# Patient Record
Sex: FEMALE | Race: WHITE | NOT HISPANIC OR LATINO | Employment: OTHER | URBAN - METROPOLITAN AREA
[De-identification: names, ages, dates, MRNs, and addresses within clinical notes are randomized per-mention and may not be internally consistent; named-entity substitution may affect disease eponyms.]

---

## 2016-12-26 NOTE — PATIENT INSTRUCTIONS
Preventive Health Recommendations  Female Ages 50 - 64    Yearly exam: See your health care provider every year in order to  o Review health changes.   o Discuss preventive care.    o Review your medicines if your doctor has prescribed any.      Get a Pap test every three years (unless you have an abnormal result and your provider advises testing more often).    If you get Pap tests with HPV test, you only need to test every 5 years, unless you have an abnormal result.     You do not need a Pap test if your uterus was removed (hysterectomy) and you have not had cancer.    You should be tested each year for STDs (sexually transmitted diseases) if you're at risk.     Have a mammogram every 1 to 2 years.    Have a colonoscopy at age 50, or have a yearly FIT test (stool test). These exams screen for colon cancer.      Have a cholesterol test every 5 years, or more often if advised.    Have a diabetes test (fasting glucose) every three years. If you are at risk for diabetes, you should have this test more often.     If you are at risk for osteoporosis (brittle bone disease), think about having a bone density scan (DEXA).    Shots: Get a flu shot each year. Get a tetanus shot every 10 years.    Nutrition:     Eat at least 5 servings of fruits and vegetables each day.    Eat whole-grain bread, whole-wheat pasta and brown rice instead of white grains and rice.    Talk to your provider about Calcium and Vitamin D.     Lifestyle    Exercise at least 150 minutes a week (30 minutes a day, 5 days a week). This will help you control your weight and prevent disease.    Limit alcohol to one drink per day.    No smoking.     Wear sunscreen to prevent skin cancer.     See your dentist every six months for an exam and cleaning.    See your eye doctor every 1 to 2 years.  ----    Constipation  Goal is at least 1 soft bowel movement daily (toothpaste consistency).   - Drink plenty of liquids, especially water.   - Eat plenty of fruits  and vegetables. Fruits such as peaches, plums, pears, oranges and   prunes/prune juice can help.   - Continue a daily fiber supplement (metamucil, benefiber).   - Dulcolax is a stimulant and will promote or cause a bowel movement. Oral tablet (BM in 6-12  Hrs). Rectal suppository (BM in 60 min)  - Miralax 1-2 capful dissolved in water or juice 1-2x per day    ---  Try tylenol PM at bedtime (less aleve, which is less NSAID use)  ---  Will let you know about spine referral    To schedule your test (mammogram) please call:    [facility]  [phone] [address]  [facility] [address]     ---    --- Spine and Neurosurgery

## 2016-12-26 NOTE — PROGRESS NOTES
"   SUBJECTIVE:     CC: Seema Johnson is an 61 year old woman who presents for preventive health visit.     Healthy Habits:    Do you get at least three servings of calcium containing foods daily (dairy, green leafy vegetables, etc.)? yes    Amount of exercise or daily activities, outside of work: 6-7 day(s) per week    Problems taking medications regularly No    Medication side effects: Yes constipation    Have you had an eye exam in the past two years? yes    Do you see a dentist twice per year? yes    Do you have sleep apnea, excessive snoring or daytime drowsiness?no      Invasive Ductal Carcinoma of right breast: Seeing  in March 2017. Prior visit was Dec 2015. Has completed chemo and radiation.   Feels like she is doing pretty well. Energy is improved. Mood is good for the most part.   Sleep is an issue, related to her compression fractures.   Last mammo was through [facility]: Diagnostic mammo and RIGHT breast US 12/31/2015; and Right Breast bx 01/04/2016.    Compression fractures: Thoracic incurred while skiing spring 2015. Then noted a new lumbar compression fx on imaging when had xrays as part of her breast radiation. Has been managing pain conservatively. Referred to rheumatology for osteoporosis, because these fx were incurred while already on fosamax. Has now done reclast infusion x1.   Dr.Maren Boo- Rheumatology. Continues to have pain. Better than initially but improved to a degree and never totally better beyond that. \"Can tolerate the pain\".  Taking aleve in the am daily for the pain and Aleve PM 2 tabs at bedtime for a year to help with sleep. Doesn't totally take pain away but makes it tolerable. Has been taking calcium. Has gotten back to exercise. Walks the [place] marathon (almost annually). Was able to train Oct-Dec 2016 and did finish the marathon just recently. Recently back from [place].   Did do 2 sessions of PT. Thinks helped some, but has not been doing her " "home exercises.   Kyphosis of spine has been worsening. \"I look 78\" since the fractures. Wonders if there is something that can be done so it doesn't get worse.     Has had constipation for 1 year. Started abruptly when she started tamoxifen and taking calcium citrate. Started both at same time. Not sure if one is aggravating it more.   No BM unless using dulcolax. Has tried metamucil fiber and senna, not helping.  Used to be regular.  Had colonoscopy: 2015- , [facility]. Normal scope. F/U in 5 yrs due to fam hx of colon ca in grandparent.       Today's PHQ-2 Score:   PHQ-2 (  [employer]) 2017   Q1: Little interest or pleasure in doing things 0 0   Q2: Feeling down, depressed or hopeless 0 0   PHQ-2 Score 0 0       Abuse: Current or Past(Physical, Sexual or Emotional)- No  Do you feel safe in your environment - Yes    Social History   Substance Use Topics     Smoking status: Never Smoker      Smokeless tobacco: Never Used     Alcohol Use: Yes      Comment: socially     The patient does not drink >3 drinks per day nor >7 drinks per week.    No results for input(s): CHOL, HDL, LDL, TRIG, CHOLHDLRATIO, NHDL in the last 45580 hours.    Reviewed orders with patient.  Reviewed health maintenance and updated orders accordingly - Yes    Mammo Decision Support:  Alternate mammogram schedule due to breast cancer history: annual mamogram    Pertinent mammograms are reviewed under the imaging tab.  History of abnormal Pap smear: NO - age 30- 65 PAP every 3 years recommended  Comment:/  [facility]  Surgical Pathology Laboratory  [address] [place] [address]   [id number]  Tel: [phone]    CYTOLOGY REPORT    Patient Name: NONA WILLS Specimen No: [id number] Location:  [facility] Age:  57 Sex: F Frank. Date: 5/3/2013 Med. Rec. #: [id number]  : 1955 Received: 2013 Physician(s): Shantel Plaza PA-C   Reported: 2013         SOURCE OF SPECIMEN  A: " CERVICAL-THIN PREP WITH HPV TEST  Imager Screening    CLINICAL HISTORY  Date of Last Menstrual Period:  menopa        SPECIMEN ADEQUACY    Satisfactory for evaluation.  Endocervical/transformation zone component present.    INTERPRETATION    Negative for intraepithelial lesion or malignant cells.    HPV TESTING:    Negative for one or more high risk HPV types 16, 18, 31, 33, 35,  39, 45, 51, 52, 56, 58, 59, 66, or 68.   Test performed using FDA-approved Cervista HPV high risk assay  for qualitative detection of DNA from 14 high risk HPV types.            Electronically Signed Out         ZULMA Stevens (ASCP)      All Histories reviewed and updated in Epic.    ROS:  C: NEGATIVE for fever, chills, change in weight  I: NEGATIVE for worrisome rashes, moles or lesions  E: NEGATIVE for vision changes or irritation  ENT: NEGATIVE for ear, mouth and throat problems  R: NEGATIVE for significant cough or SOB  B: NEGATIVE for masses, tenderness or discharge  CV: NEGATIVE for chest pain, palpitations or peripheral edema  GI: NEGATIVE for nausea, abdominal pain, heartburn  GI: POSITIVE  Constipation. No bleeding w/BM.   menopausal female: no unusual urinary symptoms and no unusual vaginal symptoms  M: MUSCULOSKELETAL:POSITIVE  for back pain ; NEGATIVE for other significant arthralgias or myalgia  N: NEGATIVE for weakness, dizziness or paresthesias; essential tremor is little worse, not at stage where wants to be on meds for it.  H: NEGATIVE for bleeding problems  P: NEGATIVE for changes in mood or affect     Problem list, Medication list, Allergies, and Medical/Social/Surgical histories reviewed in Psychiatric and updated as appropriate.  BP Readings from Last 3 Encounters:   01/05/17 92/60   08/24/16 94/60   10/15/12 109/59    Wt Readings from Last 3 Encounters:   01/05/17 121 lb 4.8 oz (55.021 kg)   08/24/16 130 lb (58.968 kg)   10/15/12 129 lb 9.6 oz (58.786 kg)                  Patient Active Problem List   Diagnosis     Mohs  "defect of cheek     Closed compression fracture of thoracic vertebra (H)     Closed compression fracture of first lumbar vertebra (H)     Osteoporosis     Invasive ductal carcinoma of breast, female (H)-  [facility]     Benign essential tremor     Hyperlipidemia LDL goal <100     Past Surgical History   Procedure Laterality Date     Breast surgery Right 01/2016     breast cancer       Social History   Substance Use Topics     Smoking status: Never Smoker      Smokeless tobacco: Never Used     Alcohol Use: Yes      Comment: socially     Family History   Problem Relation Age of Onset     Breast Cancer Sister      DIABETES No family hx of      Coronary Artery Disease No family hx of      Hypertension No family hx of      Hyperlipidemia No family hx of      CEREBROVASCULAR DISEASE No family hx of      Colon Cancer No family hx of      Prostate Cancer No family hx of      Depression No family hx of      Substance Abuse No family hx of      Thyroid Disease No family hx of      Obesity No family hx of      OSTEOPOROSIS No family hx of      Anesthesia Reaction No family hx of      Asthma No family hx of      MENTAL ILLNESS No family hx of      Anxiety Disorder No family hx of      Other Cancer No family hx of          Current Outpatient Prescriptions   Medication Sig Dispense Refill     simvastatin (ZOCOR) 20 MG tablet Take 1 tablet (20 mg) by mouth At Bedtime 90 tablet 3     tamoxifen (NOLVADEX) 20 MG tablet Take 20 mg by mouth       [DISCONTINUED] simvastatin (ZOCOR) 20 MG tablet Take 1 tablet (20 mg) by mouth At Bedtime 90 tablet 3     Allergies   Allergen Reactions     Compazine [Prochlorperazine]      Codeine Sulfate Nausea     OBJECTIVE:     BP 92/60 mmHg  Pulse 60  Temp(Src) 97.8  F (36.6  C) (Temporal)  Resp 16  Ht 5' 4.41\" (1.636 m)  Wt 121 lb 4.8 oz (55.021 kg)  BMI 20.56 kg/m2  EXAM:  GENERAL APPEARANCE: healthy, alert and no distress  EYES: Eyes grossly normal to inspection, PERRL and " conjunctivae and sclerae normal  HENT: ear canals and TM's normal, nose and mouth without ulcers or lesions, oropharynx clear and oral mucous membranes moist  NECK: no adenopathy, no asymmetry, masses, or scars and thyroid normal to palpation  RESP: lungs clear to auscultation - no rales, rhonchi or wheezes  BREAST: biopsy scars bilaterally, port scar. Normal without masses, tenderness or nipple discharge and no palpable axillary masses or adenopathy  Chest/torso: kyphotic spine with deformity.   CV: regular rate and rhythm, normal S1 S2, no S3 or S4, no murmur, click or rub, no peripheral edema and peripheral pulses strong  ABDOMEN: soft, nontender, no hepatosplenomegaly, no masses and bowel sounds normal   (female): normal female external genitalia, normal urethral meatus, vaginal mucosal atrophy noted, normal cervix, adnexae, and uterus without masses or abnormal discharge  MS: no musculoskeletal defects are noted and gait is age appropriate without ataxia  SKIN: no suspicious lesions or rashes  NEURO: essential tremor - subtle head, hands- sits with hands clasped. Normal strength and tone, sensory exam grossly normal, mentation intact and speech normal  PSYCH: mentation appears normal and affect normal/bright    Results for orders placed or performed in visit on 01/05/17   Lipid panel reflex to direct LDL   Result Value Ref Range    Cholesterol 172 <200 mg/dL    Triglycerides 55 <150 mg/dL    HDL Cholesterol 99 >49 mg/dL    LDL Cholesterol Calculated 62 <100 mg/dL    Non HDL Cholesterol 73 <130 mg/dL   Comprehensive metabolic panel (BMP + Alb, Alk Phos, ALT, AST, Total. Bili, TP)   Result Value Ref Range    Sodium 141 133 - 144 mmol/L    Potassium 4.0 3.4 - 5.3 mmol/L    Chloride 105 94 - 109 mmol/L    Carbon Dioxide 28 20 - 32 mmol/L    Anion Gap 8 3 - 14 mmol/L    Glucose 87 70 - 99 mg/dL    Urea Nitrogen 14 7 - 30 mg/dL    Creatinine 1.06 (H) 0.52 - 1.04 mg/dL    GFR Estimate 52 (L) >60 mL/min/1.7m2    GFR  Estimate If Black 63 >60 mL/min/1.7m2    Calcium 8.9 8.5 - 10.1 mg/dL    Bilirubin Total 0.4 0.2 - 1.3 mg/dL    Albumin 4.0 3.4 - 5.0 g/dL    Protein Total 6.4 (L) 6.8 - 8.8 g/dL    Alkaline Phosphatase 29 (L) 40 - 150 U/L    ALT 21 0 - 50 U/L    AST 20 0 - 45 U/L   TSH with free T4 reflex   Result Value Ref Range    TSH 1.22 0.40 - 4.00 mU/L         ASSESSMENT/PLAN:     1. Routine general medical examination at a health care facility  - Pap imaged thin layer screen with HPV - recommended age 30 - 65  - HPV High Risk Types DNA Cervical  - *MA Screening Digital Bilateral; Future  - Lipid panel reflex to direct LDL  - Comprehensive metabolic panel (BMP + Alb, Alk Phos, ALT, AST, Total. Bili, TP)  - CBC with platelets and differential; Future    2. Closed compression fracture of first lumbar vertebra (H)  3. Closed compression fracture of thoracic vertebra, with routine healing, subsequent encounter  Previous labs from 08/08/2016 at [facility]: Cr 0.65, GFR >60, BUN 23.  Creatinine elevated. Suspect from regular aleve use over the last year  Advised switching to tylenol.   Pain persisting. Consult with neurosurgery.   Try to restart  PT exercises.   - ORTHO  REFERRAL    4. Invasive ductal carcinoma of breast, female, right (H)  Follow up with oncology as scheduled.   Plan for annual mammogram    5. Hyperlipidemia LDL goal <100  refilled x1 yr  - simvastatin (ZOCOR) 20 MG tablet; Take 1 tablet (20 mg) by mouth At Bedtime  Dispense: 90 tablet; Refill: 3    6. Visit for screening mammogram  Referral as above    7. Other kyphosis of thoracic region  - ORTHO  REFERRAL    8. Drug-induced constipation  Constipation onset at time of starting tamoxifen and calcium.   Reviewed tamoxifen side effects- constipation in 4-8% of pts.   Will check TSH as well.   miralax daily, titrate dose prn.   Colonoscopy UTD- 03/2015-- normal.   - TSH with free T4 reflex    COUNSELING:   Reviewed preventive health counseling,  "as reflected in patient instructions       Osteoporosis Prevention/Bone Health       Consider Hep C screening for patients born between 1945 and 1965         reports that she has never smoked. She has never used smokeless tobacco.    Estimated body mass index is 20.56 kg/(m^2) as calculated from the following:    Height as of this encounter: 5' 4.41\" (1.636 m).    Weight as of this encounter: 121 lb 4.8 oz (55.021 kg).       Counseling Resources:  ATP IV Guidelines  Pooled Cohorts Equation Calculator  Breast Cancer Risk Calculator  FRAX Risk Assessment  ICSI Preventive Guidelines  Dietary Guidelines for Americans, 2010  USDA's MyPlate  ASA Prophylaxis  Lung CA Screening    Shantel Plaza PA-C  [facility]  "

## 2017-01-05 ENCOUNTER — OFFICE VISIT (OUTPATIENT)
Dept: FAMILY MEDICINE | Facility: CLINIC | Age: 62
End: 2017-01-05
Payer: COMMERCIAL

## 2017-01-05 VITALS
DIASTOLIC BLOOD PRESSURE: 60 MMHG | SYSTOLIC BLOOD PRESSURE: 92 MMHG | WEIGHT: 121.3 LBS | RESPIRATION RATE: 16 BRPM | HEIGHT: 64 IN | HEART RATE: 60 BPM | TEMPERATURE: 97.8 F | BODY MASS INDEX: 20.71 KG/M2

## 2017-01-05 DIAGNOSIS — Z12.31 VISIT FOR SCREENING MAMMOGRAM: ICD-10-CM

## 2017-01-05 DIAGNOSIS — C50.911 INVASIVE DUCTAL CARCINOMA OF BREAST, FEMALE, RIGHT (H): Chronic | ICD-10-CM

## 2017-01-05 DIAGNOSIS — M40.294 OTHER KYPHOSIS OF THORACIC REGION: ICD-10-CM

## 2017-01-05 DIAGNOSIS — K59.03 DRUG-INDUCED CONSTIPATION: ICD-10-CM

## 2017-01-05 DIAGNOSIS — E78.5 HYPERLIPIDEMIA LDL GOAL <100: ICD-10-CM

## 2017-01-05 DIAGNOSIS — Z00.00 ROUTINE GENERAL MEDICAL EXAMINATION AT A HEALTH CARE FACILITY: Primary | ICD-10-CM

## 2017-01-05 DIAGNOSIS — S22.000D CLOSED COMPRESSION FRACTURE OF THORACIC VERTEBRA, WITH ROUTINE HEALING, SUBSEQUENT ENCOUNTER: ICD-10-CM

## 2017-01-05 DIAGNOSIS — S32.010A CLOSED COMPRESSION FRACTURE OF FIRST LUMBAR VERTEBRA (H): ICD-10-CM

## 2017-01-05 LAB
ALBUMIN SERPL-MCNC: 4 G/DL (ref 3.4–5)
ALP SERPL-CCNC: 29 U/L (ref 40–150)
ALT SERPL W P-5'-P-CCNC: 21 U/L (ref 0–50)
ANION GAP SERPL CALCULATED.3IONS-SCNC: 8 MMOL/L (ref 3–14)
AST SERPL W P-5'-P-CCNC: 20 U/L (ref 0–45)
BILIRUB SERPL-MCNC: 0.4 MG/DL (ref 0.2–1.3)
BUN SERPL-MCNC: 14 MG/DL (ref 7–30)
CALCIUM SERPL-MCNC: 8.9 MG/DL (ref 8.5–10.1)
CHLORIDE SERPL-SCNC: 105 MMOL/L (ref 94–109)
CHOLEST SERPL-MCNC: 172 MG/DL
CO2 SERPL-SCNC: 28 MMOL/L (ref 20–32)
CREAT SERPL-MCNC: 1.06 MG/DL (ref 0.52–1.04)
GFR SERPL CREATININE-BSD FRML MDRD: 52 ML/MIN/1.7M2
GLUCOSE SERPL-MCNC: 87 MG/DL (ref 70–99)
HDLC SERPL-MCNC: 99 MG/DL
LDLC SERPL CALC-MCNC: 62 MG/DL
NONHDLC SERPL-MCNC: 73 MG/DL
POTASSIUM SERPL-SCNC: 4 MMOL/L (ref 3.4–5.3)
PROT SERPL-MCNC: 6.4 G/DL (ref 6.8–8.8)
SODIUM SERPL-SCNC: 141 MMOL/L (ref 133–144)
TRIGL SERPL-MCNC: 55 MG/DL
TSH SERPL DL<=0.005 MIU/L-ACNC: 1.22 MU/L (ref 0.4–4)

## 2017-01-05 PROCEDURE — 36415 COLL VENOUS BLD VENIPUNCTURE: CPT | Performed by: PHYSICIAN ASSISTANT

## 2017-01-05 PROCEDURE — 80053 COMPREHEN METABOLIC PANEL: CPT | Performed by: PHYSICIAN ASSISTANT

## 2017-01-05 PROCEDURE — 99000 SPECIMEN HANDLING OFFICE-LAB: CPT | Performed by: PHYSICIAN ASSISTANT

## 2017-01-05 PROCEDURE — 87624 HPV HI-RISK TYP POOLED RSLT: CPT | Mod: 90 | Performed by: PHYSICIAN ASSISTANT

## 2017-01-05 PROCEDURE — 80061 LIPID PANEL: CPT | Performed by: PHYSICIAN ASSISTANT

## 2017-01-05 PROCEDURE — 84443 ASSAY THYROID STIM HORMONE: CPT | Performed by: PHYSICIAN ASSISTANT

## 2017-01-05 PROCEDURE — G0145 SCR C/V CYTO,THINLAYER,RESCR: HCPCS | Mod: 90 | Performed by: PHYSICIAN ASSISTANT

## 2017-01-05 PROCEDURE — 99396 PREV VISIT EST AGE 40-64: CPT | Performed by: PHYSICIAN ASSISTANT

## 2017-01-05 RX ORDER — TAMOXIFEN CITRATE 20 MG/1
20 TABLET ORAL
COMMUNITY
Start: 2016-08-08 | End: 2018-03-22

## 2017-01-05 ASSESSMENT — ANXIETY QUESTIONNAIRES
2. NOT BEING ABLE TO STOP OR CONTROL WORRYING: NOT AT ALL
7. FEELING AFRAID AS IF SOMETHING AWFUL MIGHT HAPPEN: NOT AT ALL
5. BEING SO RESTLESS THAT IT IS HARD TO SIT STILL: NOT AT ALL
3. WORRYING TOO MUCH ABOUT DIFFERENT THINGS: NOT AT ALL
IF YOU CHECKED OFF ANY PROBLEMS ON THIS QUESTIONNAIRE, HOW DIFFICULT HAVE THESE PROBLEMS MADE IT FOR YOU TO DO YOUR WORK, TAKE CARE OF THINGS AT HOME, OR GET ALONG WITH OTHER PEOPLE: NOT DIFFICULT AT ALL
1. FEELING NERVOUS, ANXIOUS, OR ON EDGE: NOT AT ALL
6. BECOMING EASILY ANNOYED OR IRRITABLE: NOT AT ALL
GAD7 TOTAL SCORE: 0

## 2017-01-05 ASSESSMENT — PAIN SCALES - GENERAL: PAINLEVEL: NO PAIN (0)

## 2017-01-05 ASSESSMENT — PATIENT HEALTH QUESTIONNAIRE - PHQ9: 5. POOR APPETITE OR OVEREATING: NOT AT ALL

## 2017-01-05 NOTE — NURSING NOTE
"Chief Complaint   Patient presents with     Physical     Panel Management     MyChart, Flu Shot, Pap, Mammogram, Honoring Choices, Hep C Screening, Lipids, PHQ-9/ANGIE       Initial BP 92/60 mmHg  Pulse 60  Temp(Src) 97.8  F (36.6  C) (Temporal)  Resp 16  Ht 5' 4.41\" (1.636 m)  Wt 121 lb 4.8 oz (55.021 kg)  BMI 20.56 kg/m2 Estimated body mass index is 20.56 kg/(m^2) as calculated from the following:    Height as of this encounter: 5' 4.41\" (1.636 m).    Weight as of this encounter: 121 lb 4.8 oz (55.021 kg).  BP completed using cuff size: meli Phelps CMA (AAMA)    "

## 2017-01-05 NOTE — MR AVS SNAPSHOT
After Visit Summary   1/5/2017    Seema Johnson    MRN: [id number]           Patient Information     Date Of Birth          1955        Visit Information        Provider Department      1/5/2017 1:40 PM Shantel Plaza PA-C [facility]        Today's Diagnoses     Routine general medical examination at a health care facility    -  1     Encounter for routine adult medical exam with abnormal findings         Closed compression fracture of first lumbar vertebra (H)         Invasive ductal carcinoma of breast, female, right (H)         Hyperlipidemia LDL goal <100         Visit for screening mammogram         Need for hepatitis C screening test         Closed compression fracture of thoracic vertebra, with routine healing, subsequent encounter         Other kyphosis of thoracic region           Care Instructions      Preventive Health Recommendations  Female Ages 50 - 64    Yearly exam: See your health care provider every year in order to  o Review health changes.   o Discuss preventive care.    o Review your medicines if your doctor has prescribed any.      Get a Pap test every three years (unless you have an abnormal result and your provider advises testing more often).    If you get Pap tests with HPV test, you only need to test every 5 years, unless you have an abnormal result.     You do not need a Pap test if your uterus was removed (hysterectomy) and you have not had cancer.    You should be tested each year for STDs (sexually transmitted diseases) if you're at risk.     Have a mammogram every 1 to 2 years.    Have a colonoscopy at age 50, or have a yearly FIT test (stool test). These exams screen for colon cancer.      Have a cholesterol test every 5 years, or more often if advised.    Have a diabetes test (fasting glucose) every three years. If you are at risk for diabetes, you should have this test more often.     If you are at risk for osteoporosis (brittle bone  disease), think about having a bone density scan (DEXA).    Shots: Get a flu shot each year. Get a tetanus shot every 10 years.    Nutrition:     Eat at least 5 servings of fruits and vegetables each day.    Eat whole-grain bread, whole-wheat pasta and brown rice instead of white grains and rice.    Talk to your provider about Calcium and Vitamin D.     Lifestyle    Exercise at least 150 minutes a week (30 minutes a day, 5 days a week). This will help you control your weight and prevent disease.    Limit alcohol to one drink per day.    No smoking.     Wear sunscreen to prevent skin cancer.     See your dentist every six months for an exam and cleaning.    See your eye doctor every 1 to 2 years.  ----    Constipation  Goal is at least 1 soft bowel movement daily (toothpaste consistency).   - Drink plenty of liquids, especially water.   - Eat plenty of fruits and vegetables. Fruits such as peaches, plums, pears, oranges and   prunes/prune juice can help.   - Continue a daily fiber supplement (metamucil, benefiber).   - Dulcolax is a stimulant and will promote or cause a bowel movement. Oral tablet (BM in 6-12  Hrs). Rectal suppository (BM in 60 min)  - Miralax 1-2 capful dissolved in water or juice 1-2x per day    ---  Try tylenol PM at bedtime (less aleve, which is less NSAID use)  ---  Will let you know about spine referral    To schedule your test (mammogram) please call:    [facility]  [phone] [address]  [facility] [address]     ---    --- Spine and Neurosurgery          Follow-ups after your visit        Additional Services     ORTHO  REFERRAL       [facility] is referring you to the Orthopedic  Services at [facility].       The  Representative will assist you in the coordination of your Orthopedic and Musculoskeletal Care as prescribed by your physician.    The  Representative  "will call you within 1 business day to help schedule your appointment, or you may contact the [facility] Representative at:    All areas ~ [phone]     Type of Referral : Spine: Cervical / Thoracic: Cervical / Thoracic Spine Surgeon        Timeframe requested: Routine    Coverage of these services is subject to the terms and limitations of your health insurance plan.  Please call member services at your health plan with any benefit or coverage questions.      If X-rays, CT or MRI's have been performed, please contact the facility where they were done to arrange for , prior to your scheduled appointment.  Please bring this referral request to your appointment and present it to your specialist.                  Future tests that were ordered for you today     Open Future Orders        Priority Expected Expires Ordered    *MA Screening Digital Bilateral Routine  12/26/2017 1/5/2017            Who to contact     If you have questions or need follow up information about today's clinic visit or your schedule please contact [facility] directly at [phone].  Normal or non-critical lab and imaging results will be communicated to you by [employer], letter or phone within 4 business days after the clinic has received the results. If you do not hear from us within 7 days, please contact the clinic through [employer] or phone. If you have a critical or abnormal lab result, we will notify you by phone as soon as possible.  Submit refill requests through [facility] or call your pharmacy and they will forward the refill request to us. Please allow 3 business days for your refill to be completed.          Additional Information About Your Visit        [facility] Information     [facility] lets you send messages to your doctor, view your test results, renew your prescriptions, schedule appointments and more. To sign up, go to [email or web address] . Click on \"Log in\" on the left side of the screen, which will take " "you to the Welcome page. Then click on \"Sign up Now\" on the right side of the page.     You will be asked to enter the access code listed below, as well as some personal information. Please follow the directions to create your username and password.     Your access code is: [email or web address]  Expires: 2017  2:14 PM     Your access code will  in 90 days. If you need help or a new code, please call your [place] clinic or [phone].        Care EveryWhere ID     This is your Care EveryWhere ID. This could be used by other organizations to access your [place] medical records  [id number]        Your Vitals Were     Pulse Temperature Respirations Height BMI (Body Mass Index)       60 97.8  F (36.6  C) (Temporal) 16 5' 4.41\" (1.636 m) 20.56 kg/m2        Blood Pressure from Last 3 Encounters:   17 92/60   16 94/60   10/15/12 109/59    Weight from Last 3 Encounters:   17 121 lb 4.8 oz (55.021 kg)   16 130 lb (58.968 kg)   10/15/12 129 lb 9.6 oz (58.786 kg)              We Performed the Following     Comprehensive metabolic panel (BMP + Alb, Alk Phos, ALT, AST, Total. Bili, TP)     HPV High Risk Types DNA Cervical     Lipid panel reflex to direct LDL     ORTHO  REFERRAL     Pap imaged thin layer screen with HPV - recommended age 30 - 65        Primary Care Provider Office Phone # Fax #    [facility] [phone]       [address] [place] [address], [address]  [facility] [address]        Thank you!     Thank you for choosing [facility]  for your care. Our goal is always to provide you with excellent care. Hearing back from our patients is one way we can continue to improve our services. Please take a few minutes to complete the written survey that you may receive in the mail after your visit with us. Thank you!             Your Updated Medication List - Protect others around you: Learn how to safely use, store and throw away your medicines at " www.disposemymeds.org.          This list is accurate as of: 1/5/17  2:20 PM.  Always use your most recent med list.                   Brand Name Dispense Instructions for use    simvastatin 20 MG tablet    ZOCOR    90 tablet    Take 1 tablet (20 mg) by mouth At Bedtime       tamoxifen 20 MG tablet    NOLVADEX     Take 20 mg by mouth

## 2017-01-05 NOTE — Clinical Note
[facility] [place]  [address]  [place] [address]  [phone]      January 11, 2017    Seema Johnson  [address]  [place] [address]    Dear Seema,  We are happy to inform you that your PAP smear result from 1/5/17 is normal.  We are now able to do a follow up test on PAP smears. The DNA test is for HPV (Human Papilloma Virus). Cervical cancer is closely linked with certain types of HPV. Your result showed no evidence of high risk HPV.  Therefore we recommend you return in 3 years for your next pap smear.  You will still need to return to the clinic every year for an annual exam and other preventive tests.  Please contact the clinic with any questions.  Sincerely,  Shantel Plaza PA-C/pablo

## 2017-01-06 RX ORDER — SIMVASTATIN 20 MG
20 TABLET ORAL AT BEDTIME
Qty: 90 TABLET | Refills: 3 | Status: SHIPPED | OUTPATIENT
Start: 2017-01-06 | End: 2017-11-16

## 2017-01-06 ASSESSMENT — PATIENT HEALTH QUESTIONNAIRE - PHQ9: SUM OF ALL RESPONSES TO PHQ QUESTIONS 1-9: 3

## 2017-01-06 ASSESSMENT — ANXIETY QUESTIONNAIRES: GAD7 TOTAL SCORE: 0

## 2017-01-09 ENCOUNTER — RADIANT APPOINTMENT (OUTPATIENT)
Dept: MAMMOGRAPHY | Facility: CLINIC | Age: 62
End: 2017-01-09
Attending: PHYSICIAN ASSISTANT
Payer: COMMERCIAL

## 2017-01-09 DIAGNOSIS — Z00.00 ROUTINE GENERAL MEDICAL EXAMINATION AT A HEALTH CARE FACILITY: ICD-10-CM

## 2017-01-09 DIAGNOSIS — R79.89 ELEVATED SERUM CREATININE: Primary | ICD-10-CM

## 2017-01-09 LAB
COPATH REPORT: NORMAL
PAP: NORMAL

## 2017-01-09 PROCEDURE — G0202 SCR MAMMO BI INCL CAD: HCPCS | Performed by: RADIOLOGY

## 2017-01-10 LAB
FINAL DIAGNOSIS: NORMAL
HPV HR 12 DNA CVX QL NAA+PROBE: NEGATIVE
HPV16 DNA SPEC QL NAA+PROBE: NEGATIVE
HPV18 DNA SPEC QL NAA+PROBE: NEGATIVE
SPECIMEN DESCRIPTION: NORMAL

## 2017-03-15 ENCOUNTER — TRANSFERRED RECORDS (OUTPATIENT)
Dept: HEALTH INFORMATION MANAGEMENT | Facility: CLINIC | Age: 62
End: 2017-03-15

## 2017-03-29 ENCOUNTER — ALLIED HEALTH/NURSE VISIT (OUTPATIENT)
Dept: FAMILY MEDICINE | Facility: CLINIC | Age: 62
End: 2017-03-29
Payer: COMMERCIAL

## 2017-03-29 DIAGNOSIS — H93.8X9 EAR CONGESTION: Primary | ICD-10-CM

## 2017-03-29 PROCEDURE — 99207 ZZC NO CHARGE NURSE ONLY: CPT

## 2017-03-29 NOTE — MR AVS SNAPSHOT
"              After Visit Summary   3/29/2017    Seema Johnson    MRN: [id number]           Patient Information     Date Of Birth          1955        Visit Information        Provider Department      3/29/2017 1:00 PM RG FLOAT 1 [facility] [place]        Today's Diagnoses     Ear congestion    -  1       Follow-ups after your visit        Who to contact     If you have questions or need follow up information about today's clinic visit or your schedule please contact [facility] [place] directly at [phone].  Normal or non-critical lab and imaging results will be communicated to you by MyChart, letter or phone within 4 business days after the clinic has received the results. If you do not hear from us within 7 days, please contact the clinic through [employer] or phone. If you have a critical or abnormal lab result, we will notify you by phone as soon as possible.  Submit refill requests through [employer] or call your pharmacy and they will forward the refill request to us. Please allow 3 business days for your refill to be completed.          Additional Information About Your Visit        MyChart Information     [employer] lets you send messages to your doctor, view your test results, renew your prescriptions, schedule appointments and more. To sign up, go to [email or web address] . Click on \"Log in\" on the left side of the screen, which will take you to the Welcome page. Then click on \"Sign up Now\" on the right side of the page.     You will be asked to enter the access code listed below, as well as some personal information. Please follow the directions to create your username and password.     Your access code is: [email or web address]  Expires: 2017  3:14 PM     Your access code will  in 90 days. If you need help or a new code, please call your [facility] or [phone].        Care EveryWhere ID     This is your Care EveryWhere ID. This could be used by other organizations to access " your [place] medical records  [id number]         Blood Pressure from Last 3 Encounters:   01/05/17 92/60   08/24/16 94/60   10/15/12 109/59    Weight from Last 3 Encounters:   01/05/17 121 lb 4.8 oz (55 kg)   08/24/16 130 lb (59 kg)   10/15/12 129 lb 9.6 oz (58.8 kg)              Today, you had the following     No orders found for display       Primary Care Provider Office Phone # Fax #    [facility] [phone]       [address] [place] [address], [address]  [facility] [address]        Thank you!     Thank you for choosing [facility]  for your care. Our goal is always to provide you with excellent care. Hearing back from our patients is one way we can continue to improve our services. Please take a few minutes to complete the written survey that you may receive in the mail after your visit with us. Thank you!             Your Updated Medication List - Protect others around you: Learn how to safely use, store and throw away your medicines at www.disposemymeds.org.          This list is accurate as of: 3/29/17  1:45 PM.  Always use your most recent med list.                   Brand Name Dispense Instructions for use    simvastatin 20 MG tablet    ZOCOR    90 tablet    Take 1 tablet (20 mg) by mouth At Bedtime       tamoxifen 20 MG tablet    NOLVADEX     Take 20 mg by mouth

## 2017-11-16 ENCOUNTER — TELEPHONE (OUTPATIENT)
Dept: FAMILY MEDICINE | Facility: CLINIC | Age: 62
End: 2017-11-16

## 2017-11-16 DIAGNOSIS — E78.5 HYPERLIPIDEMIA LDL GOAL <100: ICD-10-CM

## 2017-11-16 RX ORDER — SIMVASTATIN 20 MG
20 TABLET ORAL AT BEDTIME
Qty: 90 TABLET | Refills: 3 | Status: SHIPPED | OUTPATIENT
Start: 2017-11-16 | End: 2018-04-20

## 2017-11-16 NOTE — TELEPHONE ENCOUNTER
Reason for Call:  Medication or medication refill:    Do you use a [facility]?  Name of the pharmacy and phone number for the current request:  [facility] [phone]    Name of the medication requested: simvastatin     Other request: patient is currently in [place] and will not be back until mid January.  Thank you    Can we leave a detailed message on this number? YES    Phone number patient can be reached at: Home number on file [phone] (home)    Best Time: any    Call taken on 11/16/2017 at 12:57 PM by Larisa Mendoza

## 2018-03-20 NOTE — PROGRESS NOTES
SUBJECTIVE:   Seema Johnson is a 62 year old female who presents to clinic today for the following health issues:      HPI  Concern - Plugged left ear  Onset: 1 week    Description:   Plugged left ear    Intensity: mild    Progression of Symptoms:  same    Accompanying Signs & Symptoms:  Can feel pressure when laying down, sometimes painful in the morning but not every morning.    Previous history of similar problem:   Yes    Precipitating factors:   Worsened by: laying done    Alleviating factors:  Improved by: None    Therapies Tried and outcome: None    Problem list and histories reviewed & adjusted, as indicated.  Additional history: as documented          Patient Active Problem List   Diagnosis     Mohs defect of cheek     Closed compression fracture of thoracic vertebra (H)     Closed compression fracture of first lumbar vertebra (H)     Osteoporosis     Invasive ductal carcinoma of breast, female (H)- RIGHT breast, Upper outer quadrant:  [facility]- 12/31/2015     Benign essential tremor     Hyperlipidemia LDL goal <100     Past Surgical History:   Procedure Laterality Date     BREAST SURGERY Right 01/2016    breast cancer       Social History   Substance Use Topics     Smoking status: Never Smoker     Smokeless tobacco: Never Used     Alcohol use Yes      Comment: socially     Family History   Problem Relation Age of Onset     Breast Cancer Sister      DIABETES No family hx of      Coronary Artery Disease No family hx of      Hypertension No family hx of      Hyperlipidemia No family hx of      CEREBROVASCULAR DISEASE No family hx of      Colon Cancer No family hx of      Prostate Cancer No family hx of      Depression No family hx of      Substance Abuse No family hx of      Thyroid Disease No family hx of      Obesity No family hx of      OSTEOPOROSIS No family hx of      Anesthesia Reaction No family hx of      Asthma No family hx of      MENTAL ILLNESS No family hx of      Anxiety  "Disorder No family hx of      Other Cancer No family hx of          Current Outpatient Prescriptions   Medication Sig Dispense Refill     tamoxifen (NOLVADEX) 20 MG tablet Take 20 mg by mouth       simvastatin (ZOCOR) 20 MG tablet Take 1 tablet (20 mg) by mouth At Bedtime 90 tablet 3     [DISCONTINUED] tamoxifen (NOLVADEX) 20 MG tablet Take 20 mg by mouth       Allergies   Allergen Reactions     Compazine [Prochlorperazine]      Codeine Sulfate Nausea     Labs reviewed in EPIC    ROS:  Constitutional, neuro, ENT, endocrine, pulmonary, cardiac, gastrointestinal, genitourinary, musculoskeletal, integument and psychiatric systems are negative, except as otherwise noted.    This document serves as a record of the services and decisions personally performed and made by BRIELLE Whelan CNP. It was created on her behalf by Madyson Arnold, a trained medical scribe. The creation of this document is based the provider's statements to the medical scribe.    Madyson Arnold March 22, 2018 11:15 AM  OBJECTIVE:                                                    /70  Pulse 62  Temp 98.2  F (36.8  C) (Temporal)  Resp 14  Ht 1.664 m (5' 5.5\")  Wt 57.9 kg (127 lb 11.2 oz)  SpO2 98%  BMI 20.93 kg/m2  Body mass index is 20.93 kg/(m^2).  GENERAL APPEARANCE: healthy, alert and no distress  HENT: ear canals and TM's normal and nose and mouth without ulcers or lesions. 10-20% cerumen at top of left eardrum. Minimal wax on right eardrum.    PSYCH: mentation appears normal and affect normal/bright    Diagnostic Test Results:  none      ASSESSMENT/PLAN:                                                        ICD-10-CM    1. Bilateral impacted cerumen H61.23        Will get bilateral ear wash today.    Patient will follow up for annual physical, sooner PRN for other health maintenance. Patient instructed to call with any questions or concerns.      The information in this document, created by the medical scribe for me, " accurately reflects the services I personally performed and the decisions made by me. I have reviewed and approved this document for accuracy prior to leaving the patient care area.    BRIELLE Whelan CNP  [facility] [place]

## 2018-03-22 ENCOUNTER — OFFICE VISIT (OUTPATIENT)
Dept: FAMILY MEDICINE | Facility: CLINIC | Age: 63
End: 2018-03-22
Payer: COMMERCIAL

## 2018-03-22 VITALS
HEART RATE: 62 BPM | RESPIRATION RATE: 14 BRPM | HEIGHT: 66 IN | WEIGHT: 127.7 LBS | BODY MASS INDEX: 20.52 KG/M2 | DIASTOLIC BLOOD PRESSURE: 70 MMHG | SYSTOLIC BLOOD PRESSURE: 100 MMHG | OXYGEN SATURATION: 98 % | TEMPERATURE: 98.2 F

## 2018-03-22 DIAGNOSIS — H61.23 BILATERAL IMPACTED CERUMEN: Primary | ICD-10-CM

## 2018-03-22 PROCEDURE — 69209 REMOVE IMPACTED EAR WAX UNI: CPT | Mod: 50 | Performed by: NURSE PRACTITIONER

## 2018-03-22 PROCEDURE — 99212 OFFICE O/P EST SF 10 MIN: CPT | Mod: 25 | Performed by: NURSE PRACTITIONER

## 2018-03-22 RX ORDER — TAMOXIFEN CITRATE 20 MG/1
20 TABLET ORAL
COMMUNITY
Start: 2016-08-08 | End: 2018-05-29

## 2018-03-22 ASSESSMENT — ANXIETY QUESTIONNAIRES
3. WORRYING TOO MUCH ABOUT DIFFERENT THINGS: NOT AT ALL
IF YOU CHECKED OFF ANY PROBLEMS ON THIS QUESTIONNAIRE, HOW DIFFICULT HAVE THESE PROBLEMS MADE IT FOR YOU TO DO YOUR WORK, TAKE CARE OF THINGS AT HOME, OR GET ALONG WITH OTHER PEOPLE: NOT DIFFICULT AT ALL
6. BECOMING EASILY ANNOYED OR IRRITABLE: NOT AT ALL
5. BEING SO RESTLESS THAT IT IS HARD TO SIT STILL: NOT AT ALL
7. FEELING AFRAID AS IF SOMETHING AWFUL MIGHT HAPPEN: NOT AT ALL
1. FEELING NERVOUS, ANXIOUS, OR ON EDGE: NOT AT ALL
2. NOT BEING ABLE TO STOP OR CONTROL WORRYING: NOT AT ALL
GAD7 TOTAL SCORE: 0

## 2018-03-22 ASSESSMENT — PAIN SCALES - GENERAL: PAINLEVEL: NO PAIN (0)

## 2018-03-22 ASSESSMENT — PATIENT HEALTH QUESTIONNAIRE - PHQ9: 5. POOR APPETITE OR OVEREATING: NOT AT ALL

## 2018-03-22 NOTE — MR AVS SNAPSHOT
"              After Visit Summary   3/22/2018    Seema Johnson    MRN: [id number]           Patient Information     Date Of Birth          1955        Visit Information        Provider Department      3/22/2018 10:40 AM Betty Coe APRN CNP [facility]        Today's Diagnoses     Bilateral impacted cerumen    -  1       Follow-ups after your visit        Who to contact     If you have questions or need follow up information about today's clinic visit or your schedule please [facility] directly at [phone].  Normal or non-critical lab and imaging results will be communicated to you by [employer], letter or phone within 4 business days after the clinic has received the results. If you do not hear from us within 7 days, please contact the clinic through [employer] or phone. If you have a critical or abnormal lab result, we will notify you by phone as soon as possible.  Submit refill requests through [employer] or call your pharmacy and they will forward the refill request to us. Please allow 3 business days for your refill to be completed.          Additional Information About Your Visit        MyChart Information     [employer] lets you send messages to your doctor, view your test results, renew your prescriptions, schedule appointments and more. To sign up, go to [email or web address] . Click on \"Log in\" on the left side of the screen, which will take you to the Welcome page. Then click on \"Sign up Now\" on the right side of the page.     You will be asked to enter the access code listed below, as well as some personal information. Please follow the directions to create your username and password.     Your access code is: [email or web address]  Expires: 2018  5:49 PM     Your access code will  in 90 days. If you need help or a new code, please call your [facility] or [phone].        Care EveryWhere ID     This is your Care EveryWhere ID. This could be used by other " "organizations to access your [place] medical records  [id number]        Your Vitals Were     Pulse Temperature Respirations Height Pulse Oximetry BMI (Body Mass Index)    62 98.2  F (36.8  C) (Temporal) 14 5' 5.5\" (1.664 m) 98% 20.93 kg/m2       Blood Pressure from Last 3 Encounters:   03/22/18 100/70   01/05/17 92/60   08/24/16 94/60    Weight from Last 3 Encounters:   03/22/18 127 lb 11.2 oz (57.9 kg)   01/05/17 121 lb 4.8 oz (55 kg)   08/24/16 130 lb (59 kg)              Today, you had the following     No orders found for display       Primary Care Provider Office Phone # Fax #    [facility] [phone]       [address]  [facility] [address]        Equal Access to Services     [facility] : Hadii aad ku hadasho Soomaali, waaxda luqadaha, qaybta kaalmada adeegyada, waxay idiin hayaan mustapha haron . So [facility] [phone].    ATENCIÓN: Si habla español, tiene a pereira disposición servicios gratuitos de asistencia lingüística. Reilly al [phone].    We comply with applicable federal civil rights laws and [place] laws. We do not discriminate on the basis of race, color, national origin, age, disability, sex, sexual orientation, or gender identity.            Thank you!     Thank you for choosing [facility]  for your care. Our goal is always to provide you with excellent care. Hearing back from our patients is one way we can continue to improve our services. Please take a few minutes to complete the written survey that you may receive in the mail after your visit with us. Thank you!             Your Updated Medication List - Protect others around you: Learn how to safely use, store and throw away your medicines at www.disposemymeds.org.          This list is accurate as of 3/22/18 11:59 PM.  Always use your most recent med list.                   Brand Name Dispense Instructions for use Diagnosis    simvastatin 20 MG tablet    ZOCOR    90 tablet    " Take 1 tablet (20 mg) by mouth At Bedtime    Hyperlipidemia LDL goal <100       tamoxifen 20 MG tablet    NOLVADEX     Take 20 mg by mouth

## 2018-03-23 ASSESSMENT — ANXIETY QUESTIONNAIRES: GAD7 TOTAL SCORE: 0

## 2018-03-23 ASSESSMENT — PATIENT HEALTH QUESTIONNAIRE - PHQ9: SUM OF ALL RESPONSES TO PHQ QUESTIONS 1-9: 0

## 2018-04-02 ENCOUNTER — TELEPHONE (OUTPATIENT)
Dept: FAMILY MEDICINE | Facility: CLINIC | Age: 63
End: 2018-04-02

## 2018-04-02 NOTE — TELEPHONE ENCOUNTER
Please call pt- I do not prescribe tamoxifen. This is a specialty medication that should go through oncology and I would like her to be seeing oncology for her necessary follow up. Can transfer care from Anjum Luis at [facility] to the [facility] if she would like instead. Shantel Plaza PA-C

## 2018-04-02 NOTE — TELEPHONE ENCOUNTER
Reason for Call:  Other medication question    Detailed comments: patient wants to know if Shantel Bola would prescribe tamoxifen (NOLVADEX) 20 MG tablet for her? Patient states this is because of her insurance it is very expensive through her oncologist and it would be cheaper for her to go through Shantel for this.     Phone Number Patient can be reached at: Home number on file [phone] (home) or Cell number on file:    Telephone Information:   Mobile [phone]       Best Time: anytime    Can we leave a detailed message on this number? YES    Call taken on 4/2/2018 at 11:14 AM by Pascale Gibson

## 2018-04-11 NOTE — PATIENT INSTRUCTIONS
Preventive Health Recommendations  Female Ages 50 - 64    Yearly exam: See your health care provider every year in order to  o Review health changes.   o Discuss preventive care.    o Review your medicines if your doctor has prescribed any.      Get a Pap test every three years (unless you have an abnormal result and your provider advises testing more often).    If you get Pap tests with HPV test, you only need to test every 5 years, unless you have an abnormal result.     You do not need a Pap test if your uterus was removed (hysterectomy) and you have not had cancer.    You should be tested each year for STDs (sexually transmitted diseases) if you're at risk.     Have a mammogram every 1 to 2 years.    Have a colonoscopy at age 50, or have a yearly FIT test (stool test). These exams screen for colon cancer.      Have a cholesterol test every 5 years, or more often if advised.    Have a diabetes test (fasting glucose) every three years. If you are at risk for diabetes, you should have this test more often.     If you are at risk for osteoporosis (brittle bone disease), think about having a bone density scan (DEXA).    Shots: Get a flu shot each year. Get a tetanus shot every 10 years.    Nutrition:     Eat at least 5 servings of fruits and vegetables each day.    Eat whole-grain bread, whole-wheat pasta and brown rice instead of white grains and rice.    Talk to your provider about Calcium and Vitamin D.     Lifestyle    Exercise at least 150 minutes a week (30 minutes a day, 5 days a week). This will help you control your weight and prevent disease.    Limit alcohol to one drink per day.    No smoking.     Wear sunscreen to prevent skin cancer.     See your dentist every six months for an exam and cleaning.    See your eye doctor every 1 to 2 years.    ---   at [facility]    Consider acetaminophen (tylenol) 1000mg every 8 hours (up to 3x daily) better alternative for chronic daily  muscular/back/arthritis pain    Monitoring kidney function with the daily ibuprofen.   The GFR was slightly low last year. Could be from this. Rechecking this year.   Risk of gastritis or ulcer with daily advil

## 2018-04-11 NOTE — PROGRESS NOTES
SUBJECTIVE:   CC: Seema Johnson is an 62 year old woman who presents for preventive health visit.     Physical   Annual:     Getting at least 3 servings of Calcium per day::  Yes    Bi-annual eye exam::  Yes    Dental care twice a year::  Yes    Sleep apnea or symptoms of sleep apnea::  None    Diet::  Regular (no restrictions)    Frequency of exercise::  6-7 days/week    Duration of exercise::  Greater than 60 minutes    Taking medications regularly::  Yes    Medication side effects::  Other    Additional concerns today::  YES                 Hyperlipidemia Follow-Up- fasting for labs. No side effects on simvastatin. Tolerating well.     Rate your low fat/cholesterol diet?: good    Taking statin?  Yes, no muscle aches from statin    Other lipid medications/supplements?:  Vitamin D , Calcium , Advil   For exercise: skiing in [place] few months. [place].   Small lull in exercise.   Back - wore lidocaine patches when skiing. Compression fx. OTC patches.       Compression fractures/back pain/osteoporosis:  Reclast infusion. Oct 2017. Dr.Maren Boo rheumatology. Per pt had improvement in most recent DEXA. Taking calcium, getting in diet. Vit D- 5000 IU daily.     NSAIDs daily: take with food. Takes ibuprofen 400mg once daily in the morning. Hydrates well.   For back pain. Compression fractures and kyphosis.  Did PT for pain a few years ago and did help her pain. Is not good about doing her exercises    Breast cancer  Saw  Oct 2017- [facility] - after found out she was out of network. Needs to establish in network and needs referral    Tremor   Saw [facility] 2011 or 2013- dx benign essential tremor-   Tremor at rest worsening. Affects hands, arms, lips, face.   Affects eating  Does not affect gait  Is ready to try something to treat  Had tried propranolol a few years ago but felt tired on it.     Going to [place] Early July for 3  months. May need vacation override on Rxs    Very active. Skiied Jan-March in [place]. Walks the [place] annually December (trains Oct -Dec).  In a small lull with exercise.  Feels good with exercise. Limitations with exercise due to: thoracic back pain. Denies CV sxs with exercise including CP, SOB, palpitations, SPENCE, presyncope.      Today's PHQ-2 Score:   PHQ-2 ( 1999 Pfizer) 4/20/2018   Q1: Little interest or pleasure in doing things 0   Q2: Feeling down, depressed or hopeless 0   PHQ-2 Score 0   Q1: Little interest or pleasure in doing things Not at all   Q2: Feeling down, depressed or hopeless Not at all   PHQ-2 Score 0       Abuse: Current or Past(Physical, Sexual or Emotional)- No  Do you feel safe in your environment - Yes    Social History   Substance Use Topics     Smoking status: Never Smoker     Smokeless tobacco: Never Used     Alcohol use Yes      Comment: socially       Reviewed orders with patient.  Reviewed health maintenance and updated orders accordingly - Yes  Labs reviewed in EPIC  BP Readings from Last 3 Encounters:   04/20/18 112/64   03/22/18 100/70   01/05/17 92/60    Wt Readings from Last 3 Encounters:   04/20/18 128 lb 9.6 oz (58.3 kg)   03/22/18 127 lb 11.2 oz (57.9 kg)   01/05/17 121 lb 4.8 oz (55 kg)                  Patient Active Problem List   Diagnosis     Mohs defect of cheek     Closed compression fracture of thoracic vertebra (H)     Closed compression fracture of first lumbar vertebra (H)     Osteoporosis     Invasive ductal carcinoma of breast, female (H)- RIGHT breast, Upper outer quadrant:  [facility]- 12/31/2015     Benign essential tremor     Hyperlipidemia LDL goal <100     Mixed urge and stress incontinence     Past Surgical History:   Procedure Laterality Date     BREAST SURGERY Right 01/2016    breast cancer       Social History   Substance Use Topics     Smoking status: Never Smoker     Smokeless tobacco: Never Used     Alcohol use Yes      Comment:  socially     Family History   Problem Relation Age of Onset     Breast Cancer Sister      DIABETES No family hx of      Coronary Artery Disease No family hx of      Hypertension No family hx of      Hyperlipidemia No family hx of      CEREBROVASCULAR DISEASE No family hx of      Colon Cancer No family hx of      Prostate Cancer No family hx of      Depression No family hx of      Substance Abuse No family hx of      Thyroid Disease No family hx of      Obesity No family hx of      OSTEOPOROSIS No family hx of      Anesthesia Reaction No family hx of      Asthma No family hx of      MENTAL ILLNESS No family hx of      Anxiety Disorder No family hx of      Other Cancer No family hx of          Current Outpatient Prescriptions   Medication Sig Dispense Refill     propranolol (INDERAL) 20 MG tablet Take 1 tablet (20 mg) by mouth 2 times daily 60 tablet 1     simvastatin (ZOCOR) 20 MG tablet Take 1 tablet (20 mg) by mouth At Bedtime 90 tablet 3     tamoxifen (NOLVADEX) 20 MG tablet Take 20 mg by mouth       Allergies   Allergen Reactions     Compazine [Prochlorperazine] Other (See Comments)     Jittery and hyper and foggy     Codeine Sulfate Nausea       Alternate mammogram schedule due to breast cancer history - per Oncology  Last done 10/02/2017:  MAMMO DIGITAL SCREENING BI10/2/2017  [facility]   Result Impression     #[id number] - MAMMO DIGITAL SCREENING BI  BILATERAL DIGITAL SCREENING MAMMOGRAM WITH CAD: 10/2/2017    COMPARISON: Comparison is made to exams dated:  3/17/2015 mamm[facility] - [facility], 12/31/2015 mammogram - [facility], 10/18/2013 mammogram, and 3/7/2011 mammogram - [facility].      FINDINGS: The tissue of both breasts is extremely dense.    Current study was also evaluated with a Computer Aided Detection (CAD) system.    There are benign scattered calcifications in both breasts.  There also are post operative findings in the right  "breast.    No significant masses, calcifications, or other findings are seen in either breast.    There has been no significant interval change.    IMPRESSION: BENIGN  There is no mammographic evidence of malignancy. A 1 year screening mammogram is recommended.    The patient will be notified of the results by mail.        Miguel Walker M.D., cb/christiano:10/2/2017 14:58:58        letter sent: Normal Letter    Mammogram BI-RADS: 2 Benign           Pertinent mammograms are reviewed under the imaging tab.  History of abnormal Pap smear: no  Last 3 Pap Results:   PAP (no units)   Date Value   01/05/2017 NIL       Reviewed and updated as needed this visit by clinical staff  Tobacco  Allergies  Med Hx  Surg Hx  Fam Hx  Soc Hx        Reviewed and updated as needed this visit by Provider            Review of Systems  CONSTITUTIONAL: NEGATIVE for fever, chills, change in weight  INTEGUMENTARY/SKIN: NEGATIVE for worrisome rashes, moles or lesions  EYES: NEGATIVE for vision changes or irritation  ENT: NEGATIVE for ear, mouth and throat problems  RESP: NEGATIVE for significant cough or SOB  BREAST: NEGATIVE for masses, tenderness or discharge  CV: NEGATIVE for chest pain, palpitations or peripheral edema  GI: NEGATIVE for nausea, abdominal pain, heartburn; Constipation after starting tamoxifen and calcium  : NEGATIVE for unusual urinary or vaginal symptoms. No vaginal bleeding. +stress and urge incontinence, stable, better w/kegels but has not been doing them  MUSCULOSKELETAL: + kyphosis and back pain  NEURO: + tremor  ENDOCRINE: NEGATIVE for temperature intolerance, skin/hair changes  HEME/ALLERGY/IMMUNE: NEGATIVE for bleeding problems  PSYCHIATRIC: NEGATIVE for changes in mood or affect      OBJECTIVE:   /64  Pulse 74  Temp 97.4  F (36.3  C) (Temporal)  Resp 14  Ht 5' 4.5\" (1.638 m)  Wt 128 lb 9.6 oz (58.3 kg)  SpO2 97%  BMI 21.73 kg/m2  Physical Exam  GENERAL APPEARANCE: healthy, alert and no " distress  EYES: Eyes grossly normal to inspection, PERRL and conjunctivae and sclerae normal  HENT: ear canals and TM's normal, nose and mouth without ulcers or lesions, oropharynx clear and oral mucous membranes moist  NECK: no adenopathy, no asymmetry, masses, or scars and thyroid normal to palpation  RESP: lungs clear to auscultation - no rales, rhonchi or wheezes  BREAST: normal without masses, tenderness or nipple discharge and no palpable axillary masses or adenopathy  CV: regular rate and rhythm, normal S1 S2, no S3 or S4, no murmur, click or rub, no peripheral edema and peripheral pulses strong  ABDOMEN: soft, nontender, no hepatosplenomegaly, no masses and bowel sounds normal  MS: +kyphosis of thoracic spine, no musculoskeletal defects are noted and gait is age appropriate without ataxia  SKIN: no suspicious lesions or rashes  NEURO: visible tremor, pt sits holding her hands tightly in her lap. Normal strength and tone, sensory exam grossly normal, mentation intact and speech normal  PSYCH: mentation appears normal and affect normal/bright    Results for orders placed or performed in visit on 04/20/18   Comprehensive metabolic panel (BMP + Alb, Alk Phos, ALT, AST, Total. Bili, TP)   Result Value Ref Range    Sodium 142 133 - 144 mmol/L    Potassium 3.9 3.4 - 5.3 mmol/L    Chloride 108 94 - 109 mmol/L    Carbon Dioxide 28 20 - 32 mmol/L    Anion Gap 6 3 - 14 mmol/L    Glucose 82 70 - 99 mg/dL    Urea Nitrogen 18 7 - 30 mg/dL    Creatinine 0.80 0.52 - 1.04 mg/dL    GFR Estimate 72 >60 mL/min/1.7m2    GFR Estimate If Black 88 >60 mL/min/1.7m2    Calcium 9.0 8.5 - 10.1 mg/dL    Bilirubin Total 0.6 0.2 - 1.3 mg/dL    Albumin 4.0 3.4 - 5.0 g/dL    Protein Total 7.3 6.8 - 8.8 g/dL    Alkaline Phosphatase 37 (L) 40 - 150 U/L    ALT 26 0 - 50 U/L    AST 22 0 - 45 U/L   Vitamin D Deficiency   Result Value Ref Range    Vitamin D Deficiency screening 55 20 - 75 ug/L   CBC with platelets   Result Value Ref Range    WBC  4.6 4.0 - 11.0 10e9/L    RBC Count 4.30 3.8 - 5.2 10e12/L    Hemoglobin 12.9 11.7 - 15.7 g/dL    Hematocrit 38.9 35.0 - 47.0 %    MCV 91 78 - 100 fl    MCH 30.0 26.5 - 33.0 pg    MCHC 33.2 31.5 - 36.5 g/dL    RDW 13.5 10.0 - 15.0 %    Platelet Count 226 150 - 450 10e9/L   Lipid panel reflex to direct LDL Fasting   Result Value Ref Range    Cholesterol 169 <200 mg/dL    Triglycerides 57 <150 mg/dL    HDL Cholesterol 90 >49 mg/dL    LDL Cholesterol Calculated 68 <100 mg/dL    Non HDL Cholesterol 79 <130 mg/dL         ASSESSMENT/PLAN:     1. Routine general medical examination at a health care facility  See counseling messages below  - Comprehensive metabolic panel (BMP + Alb, Alk Phos, ALT, AST, Total. Bili, TP)  - Vitamin D Deficiency  - CBC with platelets  - Lipid panel reflex to direct LDL Fasting    2. Infiltrating ductal carcinoma of right female breast (H)  Referral to oncology, given  information  Reviewed mammogram report in Care Every Where and note from  10/2017  - ONC/HEME ADULT REFERRAL    3. Benign essential tremor  Will retry propranolol low dose BID. If tolerating can up titrate dose. Ref to neurology   - propranolol (INDERAL) 20 MG tablet; Take 1 tablet (20 mg) by mouth 2 times daily  Dispense: 60 tablet; Refill: 1  - NEUROLOGY ADULT REFERRAL    4. Hyperlipidemia LDL goal <100  Tolerating simvastatin, at goal, refilled x1 yr  - Comprehensive metabolic panel (BMP + Alb, Alk Phos, ALT, AST, Total. Bili, TP)  - simvastatin (ZOCOR) 20 MG tablet; Take 1 tablet (20 mg) by mouth At Bedtime  Dispense: 90 tablet; Refill: 3    5. Encounter for monitoring chronic NSAID therapy  Discussed risks vs benefits of chronic nsaids. GFR last year was below her baseline.   Recheck today.  Consider acetaminophen as alternative for pain. Pt will try acetaminophen daily vs advil/aleve and reserve nsaids prn  - Comprehensive metabolic panel (BMP + Alb, Alk Phos, ALT, AST, Total. Bili, TP)  - CBC with  "platelets    6. Osteoporosis, unspecified osteoporosis type, unspecified pathological fracture presence  Vit D level at goal on current supplementation  - Vitamin D Deficiency    7. Chronic bilateral thoracic back pain  8. Kyphosis (acquired) (postural)  9. Closed compression fracture of thoracic vertebra with routine healing, subsequent encounter  Pt had some improvement with PT.   Does limit how active she would like to be  Consult sports med   - SPORTS MEDICINE REFERRAL      COUNSELING:  Reviewed preventive health counseling, as reflected in patient instructions       Regular exercise       Healthy diet/nutrition       Osteoporosis Prevention/Bone Health       Colon cancer screening         reports that she has never smoked. She has never used smokeless tobacco.    Estimated body mass index is 21.73 kg/(m^2) as calculated from the following:    Height as of this encounter: 5' 4.5\" (1.638 m).    Weight as of this encounter: 128 lb 9.6 oz (58.3 kg).       Counseling Resources:  ATP IV Guidelines  Pooled Cohorts Equation Calculator  Breast Cancer Risk Calculator  FRAX Risk Assessment  ICSI Preventive Guidelines  Dietary Guidelines for Americans, 2010  USDA's MyPlate  ASA Prophylaxis  Lung CA Screening    Shantel Plaza PA-C  [facility]  "

## 2018-04-20 ENCOUNTER — OFFICE VISIT (OUTPATIENT)
Dept: FAMILY MEDICINE | Facility: CLINIC | Age: 63
End: 2018-04-20
Payer: COMMERCIAL

## 2018-04-20 VITALS
OXYGEN SATURATION: 97 % | WEIGHT: 128.6 LBS | DIASTOLIC BLOOD PRESSURE: 64 MMHG | BODY MASS INDEX: 21.43 KG/M2 | RESPIRATION RATE: 14 BRPM | HEIGHT: 65 IN | TEMPERATURE: 97.4 F | HEART RATE: 74 BPM | SYSTOLIC BLOOD PRESSURE: 112 MMHG

## 2018-04-20 DIAGNOSIS — Z79.1 ENCOUNTER FOR MONITORING CHRONIC NSAID THERAPY: ICD-10-CM

## 2018-04-20 DIAGNOSIS — G25.0 BENIGN ESSENTIAL TREMOR: ICD-10-CM

## 2018-04-20 DIAGNOSIS — G89.29 CHRONIC BILATERAL THORACIC BACK PAIN: ICD-10-CM

## 2018-04-20 DIAGNOSIS — Z00.00 ROUTINE GENERAL MEDICAL EXAMINATION AT A HEALTH CARE FACILITY: Primary | ICD-10-CM

## 2018-04-20 DIAGNOSIS — C50.911 INFILTRATING DUCTAL CARCINOMA OF RIGHT FEMALE BREAST (H): Chronic | ICD-10-CM

## 2018-04-20 DIAGNOSIS — M81.0 OSTEOPOROSIS, UNSPECIFIED OSTEOPOROSIS TYPE, UNSPECIFIED PATHOLOGICAL FRACTURE PRESENCE: ICD-10-CM

## 2018-04-20 DIAGNOSIS — M40.00 KYPHOSIS (ACQUIRED) (POSTURAL): ICD-10-CM

## 2018-04-20 DIAGNOSIS — E78.5 HYPERLIPIDEMIA LDL GOAL <100: ICD-10-CM

## 2018-04-20 DIAGNOSIS — M54.6 CHRONIC BILATERAL THORACIC BACK PAIN: ICD-10-CM

## 2018-04-20 DIAGNOSIS — S22.000D CLOSED COMPRESSION FRACTURE OF THORACIC VERTEBRA WITH ROUTINE HEALING, SUBSEQUENT ENCOUNTER: Chronic | ICD-10-CM

## 2018-04-20 DIAGNOSIS — Z51.81 ENCOUNTER FOR MONITORING CHRONIC NSAID THERAPY: ICD-10-CM

## 2018-04-20 LAB
ALBUMIN SERPL-MCNC: 4 G/DL (ref 3.4–5)
ALP SERPL-CCNC: 37 U/L (ref 40–150)
ALT SERPL W P-5'-P-CCNC: 26 U/L (ref 0–50)
ANION GAP SERPL CALCULATED.3IONS-SCNC: 6 MMOL/L (ref 3–14)
AST SERPL W P-5'-P-CCNC: 22 U/L (ref 0–45)
BILIRUB SERPL-MCNC: 0.6 MG/DL (ref 0.2–1.3)
BUN SERPL-MCNC: 18 MG/DL (ref 7–30)
CALCIUM SERPL-MCNC: 9 MG/DL (ref 8.5–10.1)
CHLORIDE SERPL-SCNC: 108 MMOL/L (ref 94–109)
CHOLEST SERPL-MCNC: 169 MG/DL
CO2 SERPL-SCNC: 28 MMOL/L (ref 20–32)
CREAT SERPL-MCNC: 0.8 MG/DL (ref 0.52–1.04)
DEPRECATED CALCIDIOL+CALCIFEROL SERPL-MC: 55 UG/L (ref 20–75)
ERYTHROCYTE [DISTWIDTH] IN BLOOD BY AUTOMATED COUNT: 13.5 % (ref 10–15)
GFR SERPL CREATININE-BSD FRML MDRD: 72 ML/MIN/1.7M2
GLUCOSE SERPL-MCNC: 82 MG/DL (ref 70–99)
HCT VFR BLD AUTO: 38.9 % (ref 35–47)
HDLC SERPL-MCNC: 90 MG/DL
HGB BLD-MCNC: 12.9 G/DL (ref 11.7–15.7)
LDLC SERPL CALC-MCNC: 68 MG/DL
MCH RBC QN AUTO: 30 PG (ref 26.5–33)
MCHC RBC AUTO-ENTMCNC: 33.2 G/DL (ref 31.5–36.5)
MCV RBC AUTO: 91 FL (ref 78–100)
NONHDLC SERPL-MCNC: 79 MG/DL
PLATELET # BLD AUTO: 226 10E9/L (ref 150–450)
POTASSIUM SERPL-SCNC: 3.9 MMOL/L (ref 3.4–5.3)
PROT SERPL-MCNC: 7.3 G/DL (ref 6.8–8.8)
RBC # BLD AUTO: 4.3 10E12/L (ref 3.8–5.2)
SODIUM SERPL-SCNC: 142 MMOL/L (ref 133–144)
TRIGL SERPL-MCNC: 57 MG/DL
WBC # BLD AUTO: 4.6 10E9/L (ref 4–11)

## 2018-04-20 PROCEDURE — 99214 OFFICE O/P EST MOD 30 MIN: CPT | Mod: 25 | Performed by: PHYSICIAN ASSISTANT

## 2018-04-20 PROCEDURE — 36415 COLL VENOUS BLD VENIPUNCTURE: CPT | Performed by: PHYSICIAN ASSISTANT

## 2018-04-20 PROCEDURE — 99396 PREV VISIT EST AGE 40-64: CPT | Performed by: PHYSICIAN ASSISTANT

## 2018-04-20 PROCEDURE — 80061 LIPID PANEL: CPT | Performed by: PHYSICIAN ASSISTANT

## 2018-04-20 PROCEDURE — 85027 COMPLETE CBC AUTOMATED: CPT | Performed by: PHYSICIAN ASSISTANT

## 2018-04-20 PROCEDURE — 80053 COMPREHEN METABOLIC PANEL: CPT | Performed by: PHYSICIAN ASSISTANT

## 2018-04-20 PROCEDURE — 82306 VITAMIN D 25 HYDROXY: CPT | Performed by: PHYSICIAN ASSISTANT

## 2018-04-20 RX ORDER — PROPRANOLOL HYDROCHLORIDE 20 MG/1
20 TABLET ORAL 2 TIMES DAILY
Qty: 60 TABLET | Refills: 1 | Status: SHIPPED | OUTPATIENT
Start: 2018-04-20 | End: 2018-06-08

## 2018-04-20 RX ORDER — SIMVASTATIN 20 MG
20 TABLET ORAL AT BEDTIME
Qty: 90 TABLET | Refills: 3 | Status: SHIPPED | OUTPATIENT
Start: 2018-04-20 | End: 2019-04-08

## 2018-04-20 ASSESSMENT — PAIN SCALES - GENERAL: PAINLEVEL: MILD PAIN (3)

## 2018-04-20 NOTE — MR AVS SNAPSHOT
After Visit Summary   4/20/2018    Seema Johnson    MRN: [id number]           Patient Information     Date Of Birth          1955        Visit Information        Provider Department      4/20/2018 9:00 AM Shantel Plaza PA-C [facility]        Today's Diagnoses     Routine general medical examination at a health care facility    -  1    Need for hepatitis C screening test        Infiltrating ductal carcinoma of right female breast (H)        Closed compression fracture of thoracic vertebra with routine healing, subsequent encounter        Osteoporosis, unspecified osteoporosis type, unspecified pathological fracture presence        Benign essential tremor        Hyperlipidemia LDL goal <100        Encounter for monitoring chronic NSAID therapy        Kyphosis (acquired) (postural)          Care Instructions      Preventive Health Recommendations  Female Ages 50 - 64    Yearly exam: See your health care provider every year in order to  o Review health changes.   o Discuss preventive care.    o Review your medicines if your doctor has prescribed any.      Get a Pap test every three years (unless you have an abnormal result and your provider advises testing more often).    If you get Pap tests with HPV test, you only need to test every 5 years, unless you have an abnormal result.     You do not need a Pap test if your uterus was removed (hysterectomy) and you have not had cancer.    You should be tested each year for STDs (sexually transmitted diseases) if you're at risk.     Have a mammogram every 1 to 2 years.    Have a colonoscopy at age 50, or have a yearly FIT test (stool test). These exams screen for colon cancer.      Have a cholesterol test every 5 years, or more often if advised.    Have a diabetes test (fasting glucose) every three years. If you are at risk for diabetes, you should have this test more often.     If you are at risk for osteoporosis (brittle bone  disease), think about having a bone density scan (DEXA).    Shots: Get a flu shot each year. Get a tetanus shot every 10 years.    Nutrition:     Eat at least 5 servings of fruits and vegetables each day.    Eat whole-grain bread, whole-wheat pasta and brown rice instead of white grains and rice.    Talk to your provider about Calcium and Vitamin D.     Lifestyle    Exercise at least 150 minutes a week (30 minutes a day, 5 days a week). This will help you control your weight and prevent disease.    Limit alcohol to one drink per day.    No smoking.     Wear sunscreen to prevent skin cancer.     See your dentist every six months for an exam and cleaning.    See your eye doctor every 1 to 2 years.    ---   at [place]    Consider acetaminophen (tylenol) 1000mg every 8 hours (up to 3x daily) better alternative for chronic daily muscular/back/arthritis pain    Monitoring kidney function with the daily ibuprofen.   The GFR was slightly low last year. Could be from this. Rechecking this year.   Risk of gastritis or ulcer with daily advil              Follow-ups after your visit        Additional Services     NEUROLOGY ADULT REFERRAL       Your provider has referred you for the following:   Consult at [facility]: [facility] [phone]   [email or web address]    Please be aware that coverage of these services is subject to the terms and limitations of your health insurance plan.  Call member services at your health plan with any benefit or coverage questions.      Please bring the following with you to your appointment:    (1) Any X-Rays, CTs or MRIs which have been performed.  Contact the facility where they were done to arrange for  prior to your scheduled appointment.    (2) List of current medications  (3) This referral request   (4) Any documents/labs given to you for this referral            ONC/HEME ADULT REFERRAL        Your provider has referred you to: [facility]: Cancer Care/Hematology (All Cancer Related Services) - [phone]   [email or web address]    Please be aware that coverage of these services is subject to the terms and limitations of your health insurance plan.  Call member services at your health plan with any benefit or coverage questions.      Please bring the following with you to your appointment:    (1) Any X-Rays, CTs or MRIs which have been performed.  Contact the facility where they were done to arrange for  prior to your scheduled appointment.   (2) List of current medications  (3) This referral request   (4) Any documents/labs given to you for this referral            SPORTS MEDICINE REFERRAL       Your provider has referred you to:  [facility]: [facility] [phone]   [email or web address]    Please be aware that coverage of these services is subject to the terms and limitations of your health insurance plan.  Call member services at your health plan with any benefit or coverage questions.      Please bring the following to your appointment:    >>   Any x-rays, CTs or MRIs which have been performed.  Contact the facility where they were done to arrange for  prior to your scheduled appointment.    >>   List of current medications   >>   This referral request   >>   Any documents/labs given to you for this referral                  Who to contact     If you have questions or need follow up information about today's clinic visit or your schedule please contact [facility] directly at [phone].  Normal or non-critical lab and imaging results will be communicated to you by MyChart, letter or phone within 4 business days after the clinic has received the results. If you do not hear from us within 7 days, please contact the clinic through MyChart or phone. If you have  "a critical or abnormal lab result, we will notify you by phone as soon as possible.  Submit refill requests through Get[employer] or call your pharmacy and they will forward the refill request to us. Please allow 3 business days for your refill to be completed.          Additional Information About Your Visit        [employer] Information     GetMy[employer] lets you send messages to your doctor, view your test results, renew your prescriptions, schedule appointments and more. To sign up, go to [email or web address] . Click on \"Log in\" on the left side of the screen, which will take you to the Welcome page. Then click on \"Sign up Now\" on the right side of the page.     You will be asked to enter the access code listed below, as well as some personal information. Please follow the directions to create your username and password.     Your access code is: [email or web address]  Expires: 2018  5:49 PM     Your access code will  in 90 days. If you need help or a new code, please call your [place] clinic or [phone].        Care EveryWhere ID     This is your Care EveryWhere ID. This could be used by other organizations to access your [place] medical records  [id number]        Your Vitals Were     Pulse Temperature Respirations Height Pulse Oximetry BMI (Body Mass Index)    74 97.4  F (36.3  C) (Temporal) 14 5' 4.5\" (1.638 m) 97% 21.73 kg/m2       Blood Pressure from Last 3 Encounters:   18 112/64   18 100/70   17 92/60    Weight from Last 3 Encounters:   18 128 lb 9.6 oz (58.3 kg)   18 127 lb 11.2 oz (57.9 kg)   17 121 lb 4.8 oz (55 kg)              We Performed the Following     CBC with platelets     Comprehensive metabolic panel (BMP + Alb, Alk Phos, ALT, AST, Total. Bili, TP)     Lipid panel reflex to direct LDL Fasting     NEUROLOGY ADULT REFERRAL     ONC/HEME ADULT REFERRAL     SPORTS MEDICINE REFERRAL     Vitamin D Deficiency          Today's Medication Changes          These changes " are accurate as of 4/20/18  9:41 AM.  If you have any questions, ask your nurse or doctor.               Start taking these medicines.        Dose/Directions    propranolol 20 MG tablet   Commonly known as:  INDERAL   Used for:  Benign essential tremor   Started by:  Shantel Plaza PA-C        Dose:  20 mg   Take 1 tablet (20 mg) by mouth 2 times daily   Quantity:  60 tablet   Refills:  1            Where to get your medicines      These medications were sent to [id number] - [place], [place] - [address]  [address], [facility] [address]     Phone:  [phone]     propranolol 20 MG tablet    simvastatin 20 MG tablet                Primary Care Provider Office Phone # Fax #    [facility] [phone]       [id number]  [facility] [address]        Equal Access to Services     [facility]ID [facility] : Hadii elie park hadasho Sojames, waaxda luqadaha, qaybta kaalmada adeegyada, divina pulliam hayflaco johnson . So [facility] [phone].    ATENCIÓN: Si habla español, tiene a pereira disposición servicios gratuitos de asistencia lingüística. Llame al [phone].    We comply with applicable federal civil rights laws and [place] laws. We do not discriminate on the basis of race, color, national origin, age, disability, sex, sexual orientation, or gender identity.            Thank you!     Thank you for choosing [facility]  for your care. Our goal is always to provide you with excellent care. Hearing back from our patients is one way we can continue to improve our services. Please take a few minutes to complete the written survey that you may receive in the mail after your visit with us. Thank you!             Your Updated Medication List - Protect others around you: Learn how to safely use, store and throw away your medicines at www.disposemymeds.org.          This list is accurate as of 4/20/18  9:41 AM.  Always use your most recent  med list.                   Brand Name Dispense Instructions for use Diagnosis    propranolol 20 MG tablet    INDERAL    60 tablet    Take 1 tablet (20 mg) by mouth 2 times daily    Benign essential tremor       simvastatin 20 MG tablet    ZOCOR    90 tablet    Take 1 tablet (20 mg) by mouth At Bedtime    Hyperlipidemia LDL goal <100       tamoxifen 20 MG tablet    NOLVADEX     Take 20 mg by mouth

## 2018-04-20 NOTE — LETTER
April 24, 2018      Seema Johnson  [address]  [facility] [id number]        Dear ,    We are writing to inform you of your test results.    Your test results fall within the expected range(s) or remain unchanged from previous results.  Please continue with current treatment plan.    Great to see you in clinic. Labs look good!  Vit D is at goal at 55 on your current supplementation dosing.   Cholesterol is well controlled on the simvastatin.   Kidney function was normal this year- good news. Would still have you try acetaminophen (tylenol) for daily pain and advil or aleve on more intermittent/limited basis.  Blood sugar (glucose) - normal- no diabetes.   Complete blood counts normal.   Liver tests normal.     Resulted Orders   Comprehensive metabolic panel (BMP + Alb, Alk Phos, ALT, AST, Total. Bili, TP)   Result Value Ref Range    Sodium 142 133 - 144 mmol/L    Potassium 3.9 3.4 - 5.3 mmol/L    Chloride 108 94 - 109 mmol/L    Carbon Dioxide 28 20 - 32 mmol/L    Anion Gap 6 3 - 14 mmol/L    Glucose 82 70 - 99 mg/dL    Urea Nitrogen 18 7 - 30 mg/dL    Creatinine 0.80 0.52 - 1.04 mg/dL    GFR Estimate 72 >60 mL/min/1.7m2      Comment:      Non  GFR Calc    GFR Estimate If Black 88 >60 mL/min/1.7m2      Comment:       GFR Calc    Calcium 9.0 8.5 - 10.1 mg/dL    Bilirubin Total 0.6 0.2 - 1.3 mg/dL    Albumin 4.0 3.4 - 5.0 g/dL    Protein Total 7.3 6.8 - 8.8 g/dL    Alkaline Phosphatase 37 (L) 40 - 150 U/L    ALT 26 0 - 50 U/L    AST 22 0 - 45 U/L   Vitamin D Deficiency   Result Value Ref Range    Vitamin D Deficiency screening 55 20 - 75 ug/L      Comment:      Season, race, dietary intake, and treatment affect the concentration of   25-hydroxy-Vitamin D. Values may decrease during winter months and increase   during summer months. Values 20-29 ug/L may indicate Vitamin D insufficiency   and values <20 ug/L may indicate Vitamin D deficiency.  Vitamin D determination  is routinely performed by an immunoassay specific for   25 hydroxyvitamin D3.  If an individual is on vitamin D2 (ergocalciferol)   supplementation, please specify 25 OH vitamin D2 and D3 level determination by   LCMSMS test [id number].     CBC with platelets   Result Value Ref Range    WBC 4.6 4.0 - 11.0 10e9/L    RBC Count 4.30 3.8 - 5.2 10e12/L    Hemoglobin 12.9 11.7 - 15.7 g/dL    Hematocrit 38.9 35.0 - 47.0 %    MCV 91 78 - 100 fl    MCH 30.0 26.5 - 33.0 pg    MCHC 33.2 31.5 - 36.5 g/dL    RDW 13.5 10.0 - 15.0 %    Platelet Count 226 150 - 450 10e9/L   Lipid panel reflex to direct LDL Fasting   Result Value Ref Range    Cholesterol 169 <200 mg/dL    Triglycerides 57 <150 mg/dL    HDL Cholesterol 90 >49 mg/dL    LDL Cholesterol Calculated 68 <100 mg/dL      Comment:      Desirable:       <100 mg/dl    Non HDL Cholesterol 79 <130 mg/dL       If you have any questions or concerns, please call the clinic at the number listed above.       Sincerely,        Shantel Plaza PA-C

## 2018-04-22 PROBLEM — N39.46 MIXED URGE AND STRESS INCONTINENCE: Status: ACTIVE | Noted: 2018-04-22

## 2018-04-24 ENCOUNTER — TELEPHONE (OUTPATIENT)
Dept: FAMILY MEDICINE | Facility: CLINIC | Age: 63
End: 2018-04-24

## 2018-04-24 NOTE — TELEPHONE ENCOUNTER
Called patient and LM for return call to clinic. When call is returned please give result message. Kaur Barrera MA    ------    Notes Recorded by Shantel Plaza PA-C on 4/24/2018 at 1:23 PM  Please contact pt with the following message:    Her labs are all normal. Will send letter for her records. Vit D at goal at 55. Cholesterol is well controlled. Kidney function was normal this year.    Shantel Plaza PA-C

## 2018-05-17 ENCOUNTER — PRE VISIT (OUTPATIENT)
Dept: ONCOLOGY | Facility: CLINIC | Age: 63
End: 2018-05-17

## 2018-05-17 NOTE — TELEPHONE ENCOUNTER
1.  Date of consult: 5/25/18    2.  Reason for consult: Breast Cancer, transferring care    3.  Referring provider/facility: Self referring    4.  Outside records requested from: Records in Care Everywhere from [facility]    5.  Additional Information: Scans from [facility] pushed to PACS, slides received from [facility]

## 2018-05-18 PROCEDURE — 00000346 ZZHCL STATISTIC REVIEW OUTSIDE SLIDES TC 88321: Performed by: INTERNAL MEDICINE

## 2018-05-25 NOTE — PROGRESS NOTES
Oncology Consultation:  Date on this visit: 5/29/2018    Seema Johnson  is referred by  Shantel Plaza PA-C for an oncology consultation. She requires evaluation and transfer of care for Stage II ER positive R breast cancer.  Oncologist: Adelita Pierre   Rheumatologist: Andreia Carranza  PCP: Shantel Plaza PA-C     DIAGNOSIS:    1.  Stage II, T1B N1 M0, invasive ductal carcinoma right breast. Grade 1, ER positive in 95% of the cells, KS negative and HER2/lex negative. She is s/p lumpectomy with sentinel lymph node evaluation on January 21, 2016. She has completed adjuvant chemotherapy with dose dense Taxol IV q 2 weeks x 4 cycles, March 18th- April 28, 2016, followed by dose dense AC x 4 cycles, May 12- June 23, 2016.  She has received adjuvant radiation therapy from 7/18/16- 8/26/16. She has started on Tamoxifen mid Oct 2016.   BREAST NEXT genetic testing panel was negative    2. Osteoporosis- She has had compression fractures of T11 and T12. She has been on annual Reclast infusions with Dr. Boo, in the fall usually. She is on calcium and vitamin D supplementation.  DEXA in March 2015: Osteopenia. T score -2.1  DEXA in March 2017: Osteopenia. T score -1.8    History Of Present Illness:  Ms. Johnson is a 62 year old female who presents for evaluation and continued care of Stage II, T1B N1 M0, invasive ductal carcinoma right breast, grade 1, ER positive in 95% of the cells, KS negative and HER2/lex negative by IHC (1+). She was under the care of Dr. Iyer but would like to change her care to  due to insurance change. I have reviewed her extensive outside medical records.   She underwent a lumpectomy with sentinel lymph node evaluation on January 21, 2016. She was identified to have a 7 mm breast cancer with an intramammary lymph node positivity and a sentinel lymph node positivity. One of two sentinel lymph nodes was positive with the tumor size being 0.3 cm and an intramammary  lymph node was positive as well. All margins completely clear with invasive margin being 0.3 cm. There was some background DCIS and that margin was also clear, but less than 0.1 cm. The tumor was grade 1, strongly ER positive in 95% of the cells, SD negative in less than 1% of the cells and HER2/lex negative.   She remains on Tamoxifen. She is tolerating it well, with the exception of moderate hot flashes during daytime. She was not interested in any pharmacotherapy for hot flashes. She is staying very active, walking marathons, and an active skier in the winter.  She had her last Reclast infusion last fall. She is on calcium and vitamin D supplementation.  Her last bilateral screening mammogram was on 10/2/2017 and showed no suspicious findings.  She has a baseline benign tremor. She has no other health related complaints. Weight has been stable. She is pain free today. She leaves for [place] each October for the winter. She participates in a marathon there.  In addition, a complete 12 point  review of systems is negative.      Past Medical/Surgical History:  Past Medical History:   Diagnosis Date     Basal cell cancer      Mohs defect of nose 06/2012     Past Surgical History:   Procedure Laterality Date     BREAST SURGERY Right 01/2016    breast cancer   Osteoporosis.   Benign essential tremor.   She has mild hyperlipidemia.   She has had multiple breast biopsies in the past, three on the right, three on the left.     Allergies:  Allergies as of 05/29/2018 - Wes as Reviewed 04/20/2018   Allergen Reaction Noted     Compazine [prochlorperazine] Other (See Comments) 03/31/2016     Codeine sulfate Nausea 10/15/2012     Current Medications:  Current Outpatient Prescriptions   Medication Sig Dispense Refill     propranolol (INDERAL) 20 MG tablet Take 1 tablet (20 mg) by mouth 2 times daily 60 tablet 1     simvastatin (ZOCOR) 20 MG tablet Take 1 tablet (20 mg) by mouth At Bedtime 90 tablet 3     tamoxifen (NOLVADEX)  "20 MG tablet Take 20 mg by mouth        Family History:   Mom side family members have had a lot of heart disease. Sister had ductal carcinoma in situ. Another sister had uterine fibroids. Patient's father had brain cancer. On the paternal side, paternal grandmother had colon cancer, an aunt had breast cancer.  Seema underwent genetic testing and her  BREAST NEXT genetic testing panel was negative.      Social History:  Social History     Social History     Marital status:      Social History Main Topics     Smoking status: Never Smoker     Smokeless tobacco: Never Used     Alcohol use Yes      Comment: socially      She is , has one daughter in her 30s and a grandchild. Does not smoke. Takes alcohol socially when there is a dinner party.The patient is extremely active. She is an avid skier. She likes to travel a lot. They spent phelps in [place].     Physical Exam:  /66  Pulse 66  Temp 98.2  F (36.8  C)  Resp 15  Ht 1.638 m (5' 4.49\")  Wt 59 kg (130 lb)  SpO2 99%  BMI 21.98 kg/m2      GENERAL APPEARANCE: healthy, alert and no distress     HENT: Mouth without ulcers or lesions     NECK: no adenopathy, no asymmetry or masses     LYMPHATICS: No cervical, supraclavicular, axillary  lymphadenopathy     RESP: lungs clear to auscultation - no rales, rhonchi or wheezes     CARDIOVASCULAR: regular rates and rhythm, normal S1 S2, no S3 or S4 and no murmur.     ABDOMEN:  soft, nontender, no HSM or masses and bowel sounds normal     MUSCULOSKELETAL: extremities normal- no gross deformities noted, no evidence of inflammation in joints, FROM in all extremities. No edema b/l LE.     SKIN: no suspicious lesions or rashes     PSYCHIATRIC: mentation appears normal and affect normal  Neuro: slight head tremor at rest. Non-focal exam otherwise. Axox3  Chest: No breast masses b/l and no axillary lymphadenopathy b/l. Incisions from right lumpectomy, R ASLN biopsy healed well. Other incisions from " port-a-cath and prior breast biopsies healed well.    Laboratory/Imaging Studies  Results for orders placed or performed in visit on 04/20/18   Comprehensive metabolic panel (BMP + Alb, Alk Phos, ALT, AST, Total. Bili, TP)   Result Value Ref Range    Sodium 142 133 - 144 mmol/L    Potassium 3.9 3.4 - 5.3 mmol/L    Chloride 108 94 - 109 mmol/L    Carbon Dioxide 28 20 - 32 mmol/L    Anion Gap 6 3 - 14 mmol/L    Glucose 82 70 - 99 mg/dL    Urea Nitrogen 18 7 - 30 mg/dL    Creatinine 0.80 0.52 - 1.04 mg/dL    GFR Estimate 72 >60 mL/min/1.7m2    GFR Estimate If Black 88 >60 mL/min/1.7m2    Calcium 9.0 8.5 - 10.1 mg/dL    Bilirubin Total 0.6 0.2 - 1.3 mg/dL    Albumin 4.0 3.4 - 5.0 g/dL    Protein Total 7.3 6.8 - 8.8 g/dL    Alkaline Phosphatase 37 (L) 40 - 150 U/L    ALT 26 0 - 50 U/L    AST 22 0 - 45 U/L   Vitamin D Deficiency   Result Value Ref Range    Vitamin D Deficiency screening 55 20 - 75 ug/L   CBC with platelets   Result Value Ref Range    WBC 4.6 4.0 - 11.0 10e9/L    RBC Count 4.30 3.8 - 5.2 10e12/L    Hemoglobin 12.9 11.7 - 15.7 g/dL    Hematocrit 38.9 35.0 - 47.0 %    MCV 91 78 - 100 fl    MCH 30.0 26.5 - 33.0 pg    MCHC 33.2 31.5 - 36.5 g/dL    RDW 13.5 10.0 - 15.0 %    Platelet Count 226 150 - 450 10e9/L   Lipid panel reflex to direct LDL Fasting   Result Value Ref Range    Cholesterol 169 <200 mg/dL    Triglycerides 57 <150 mg/dL    HDL Cholesterol 90 >49 mg/dL    LDL Cholesterol Calculated 68 <100 mg/dL    Non HDL Cholesterol 79 <130 mg/dL     ASSESSMENT/PLAN:  Seema is a very pleasant 62 year old woman with Stage II (pT1b N1(sn)cMo), invasive ductal carcinoma right breast,  Grade 1, ER positive in 95% of the cells, MN negative and HER2/lex negative. She is s/p lumpectomy with sentinel lymph node evaluation on January 21, 2016. She has completed adjuvant chemotherapy with dose dense Taxol IV q 2 weeks x 4 cycles, March 18th- April 28, 2016, followed by dose dense AC x 4 cycles, May 12- June 23, 2016.   She has received adjuvant radiation therapy from 7/18/16- 8/26/16. She has been on Tamoxifen mid Oct 2016.  ECOG PS 0.    1. Stage II breast cancer- continue Tamoxifen for at least 5 years. Given Hx of osteoporosis with compression fractures (although most recently bone mineral density has been improving on Tamoxifen, Reclast, vitamin D and calcium and exercise), we'll plan either 10 years of Tamoxifen or per recent [facility] data from 2017 -5 years of tamoxifen followed by 2 years of an aromatase inhibitor (AI). That data showed that extending hormonal therapy with an AI for 2 years after 5 years of Tamoxifen is equivalent to an additional 5 years of an AI after Tamoxifen, as far as disease free survival data, in women with early stage disease ([place] Breast and Colorectal Cancer Study Group (ABCSG)-16 phase III trial). I would prefer that for Seema over 5 years of an AI, if we will be using an AI in the future.  For now, she will continue on Tamoxifen (Rx renewed). F/up in 6 months before she leaves for [place], with labs and screening mammogram as below.    2. Spontaneous (and traumatic) fractures in her spine and osteopenia on DEXA scan. This is c/w osteoporosis.  She is on yearly Reclast with Dr. Boo. We'll obtain Dr. Boo's notes for review.     3. Breast cancer screening- we'll proceed with annual screening mammogram with our next visit, due in October 2018. We'll obtain mammogram with tomosynthesis, given extremely dense breast tissue reported on the most recent outside mammogram from 10/2/2017 from [facility]. We'll also be obtaining outside mammogram films for review and comparison.     4. Hot flashes- declines pharmacotherapy. Exercises regularly.  5. Hyperlipidemia- lipid profile within normal limits in 04/2018 on simvastatin as above. F/up with Shantel Antoine.    It was my pleasure to meet Seema  At the end of our visit patient verbalized understanding and concurred with the plan.    Addendum:  Pathology  reviewed at [facility] by Dr. Huang, resulted as below:  I. CASE FROM [place], [place] ([id number],   OBTAINED 12/31/2015):   RIGHT BREAST, ANTERIOR AXILLA, ULTRASOUND BIOPSY:   - INVASIVE MAMMARY CARCINOMA (DUCTAL/NST), Widener grade 1 (of 3),   measuring at least 0.5 cm.   - Invasive carcinoma is estrogen receptor positive (>95% nuclei, strong   intensity) and progesterone receptor   negative (<1% nuclei); HER2/lex gene amplification study by   immunohistochemistry is negative (score 1+).     II. CASE FROM [place], [place] ([id number],   OBTAINED 01/21/2016):   A. RIGHT BREAST, WIDE LOCAL EXCISION:   - INVASIVE DUCTAL CARCINOMA, Esperanza grade 1, 0.7 cm in greatest   dimension.   - DUCTAL CARCINOMA IN SITU (DCIS), intermediate nuclear grade, solid type;    negative for extensive intraductal   component.   - Surgical  Margins are negative for in-situ or invasive tumor.   - Angiolymphatic invasion is present.   - METASTATIC CARCINOMA involving one intramammary lymph node, 0.5 cm in   greatest dimension, with no extra   ana maría extension (1/1).   - The AJCC pathologic staging is pT1b, N1(sn).   - See outside synoptic report for details.     B. SENTINEL LYMPH NODES, RIGHT, EXCISION:   - METASTATIC CARCINOMA present in one of two lymph nodes, 0.35 cm in   greatest dimension, with no extranodal   extension (1/2).

## 2018-05-29 ENCOUNTER — ONCOLOGY VISIT (OUTPATIENT)
Dept: ONCOLOGY | Facility: CLINIC | Age: 63
End: 2018-05-29
Payer: COMMERCIAL

## 2018-05-29 VITALS
TEMPERATURE: 98.2 F | RESPIRATION RATE: 15 BRPM | SYSTOLIC BLOOD PRESSURE: 101 MMHG | HEIGHT: 64 IN | BODY MASS INDEX: 22.2 KG/M2 | WEIGHT: 130 LBS | DIASTOLIC BLOOD PRESSURE: 66 MMHG | OXYGEN SATURATION: 99 % | HEART RATE: 66 BPM

## 2018-05-29 DIAGNOSIS — C50.911 INFILTRATING DUCTAL CARCINOMA OF RIGHT FEMALE BREAST (H): Primary | Chronic | ICD-10-CM

## 2018-05-29 DIAGNOSIS — R92.30 DENSE BREASTS: ICD-10-CM

## 2018-05-29 DIAGNOSIS — Z79.899 ENCOUNTER FOR LONG-TERM (CURRENT) USE OF MEDICATIONS: ICD-10-CM

## 2018-05-29 PROCEDURE — 99205 OFFICE O/P NEW HI 60 MIN: CPT | Performed by: INTERNAL MEDICINE

## 2018-05-29 RX ORDER — TAMOXIFEN CITRATE 20 MG/1
20 TABLET ORAL DAILY
Qty: 90 TABLET | Refills: 3 | Status: SHIPPED | OUTPATIENT
Start: 2018-05-29 | End: 2019-04-11

## 2018-05-29 ASSESSMENT — PAIN SCALES - GENERAL: PAINLEVEL: NO PAIN (0)

## 2018-05-29 NOTE — NURSING NOTE
"Oncology Rooming Note    May 29, 2018 1:04 PM   Seema Johnson is a 62 year old female who presents for:    Chief Complaint   Patient presents with     Oncology Clinic Visit     new breast cancer     Initial Vitals: /66  Pulse 66  Temp 98.2  F (36.8  C)  Resp 15  Ht 1.638 m (5' 4.49\")  Wt 59 kg (130 lb)  SpO2 99%  BMI 21.98 kg/m2 Estimated body mass index is 21.98 kg/(m^2) as calculated from the following:    Height as of this encounter: 1.638 m (5' 4.49\").    Weight as of this encounter: 59 kg (130 lb). Body surface area is 1.64 meters squared.  No Pain (0) Comment: Data Unavailable   No LMP recorded. Patient is postmenopausal.  Allergies reviewed: Yes  Medications reviewed: Yes    Medications: Medication refills not needed today.  Pharmacy name entered into [facility]:    [id number] - [place], [place] - [address]/[facility] #[id number] - [facility] [address] [facility] # [id number] - [facility] [address] [address]  [facility] [address]        5 minutes for nursing intake (face to face time)     Demetria Jiménez LPN              "

## 2018-05-29 NOTE — LETTER
5/29/2018         RE: Seema Johnson  [address]  [facility] [address]        Dear Colleague,    Thank you for referring your patient, Seema Johnson, to the [facility]. Please see a copy of my visit note below.    Oncology Consultation:  Date on this visit: 5/29/2018    Seema Johnson  is referred by  Shantel Plaza PA-C for an oncology consultation. She requires evaluation and transfer of care for Stage II ER positive R breast cancer.  Oncologist: Adelita Pierre   Rheumatologist: Andreia Carranza  PCP: Shantel Plaza PA-C     DIAGNOSIS:    1.  Stage II, T1B N1 M0, invasive ductal carcinoma right breast. Grade 1, ER positive in 95% of the cells, MO negative and HER2/elx negative. She is s/p lumpectomy with sentinel lymph node evaluation on January 21, 2016. She has completed adjuvant chemotherapy with dose dense Taxol IV q 2 weeks x 4 cycles, March 18th- April 28, 2016, followed by dose dense AC x 4 cycles, May 12- June 23, 2016.  She has received adjuvant radiation therapy from 7/18/16- 8/26/16. She has started on Tamoxifen mid Oct 2016.   BREAST NEXT genetic testing panel was negative    2. Osteoporosis- She has had compression fractures of T11 and T12. She has been on annual Reclast infusions with Dr. Boo, in the fall usually. She is on calcium and vitamin D supplementation.  DEXA in March 2015: Osteopenia. T score -2.1  DEXA in March 2017: Osteopenia. T score -1.8    History Of Present Illness:  Ms. Johnson is a 62 year old female who presents for evaluation and continued care of Stage II, T1B N1 M0, invasive ductal carcinoma right breast, grade 1, ER positive in 95% of the cells, MO negative and HER2/lex negative by IHC (1+). She was under the care of Dr. Iyer but would like to change her care to  due to insurance change. I have reviewed her extensive outside medical records.   She underwent a lumpectomy with sentinel lymph node evaluation  on January 21, 2016. She was identified to have a 7 mm breast cancer with an intramammary lymph node positivity and a sentinel lymph node positivity. One of two sentinel lymph nodes was positive with the tumor size being 0.3 cm and an intramammary lymph node was positive as well. All margins completely clear with invasive margin being 0.3 cm. There was some background DCIS and that margin was also clear, but less than 0.1 cm. The tumor was grade 1, strongly ER positive in 95% of the cells, DC negative in less than 1% of the cells and HER2/lex negative.   She remains on Tamoxifen. She is tolerating it well, with the exception of moderate hot flashes during daytime. She was not interested in any pharmacotherapy for hot flashes. She is staying very active, walking marathons, and an active skier in the winter.  She had her last Reclast infusion last fall. She is on calcium and vitamin D supplementation.  Her last bilateral screening mammogram was on 10/2/2017 and showed no suspicious findings.  She has a baseline benign tremor. She has no other health related complaints. Weight has been stable. She is pain free today. She leaves for [place] each October for the winter. She participates in a marathon there.  In addition, a complete 12 point  review of systems is negative.      Past Medical/Surgical History:  Past Medical History:   Diagnosis Date     Basal cell cancer      Mohs defect of nose 06/2012     Past Surgical History:   Procedure Laterality Date     BREAST SURGERY Right 01/2016    breast cancer   Osteoporosis.   Benign essential tremor.   She has mild hyperlipidemia.   She has had multiple breast biopsies in the past, three on the right, three on the left.     Allergies:  Allergies as of 05/29/2018 - Wes as Reviewed 04/20/2018   Allergen Reaction Noted     Compazine [prochlorperazine] Other (See Comments) 03/31/2016     Codeine sulfate Nausea 10/15/2012     Current Medications:  Current Outpatient  "Prescriptions   Medication Sig Dispense Refill     propranolol (INDERAL) 20 MG tablet Take 1 tablet (20 mg) by mouth 2 times daily 60 tablet 1     simvastatin (ZOCOR) 20 MG tablet Take 1 tablet (20 mg) by mouth At Bedtime 90 tablet 3     tamoxifen (NOLVADEX) 20 MG tablet Take 20 mg by mouth        Family History:   Mom side family members have had a lot of heart disease. Sister had ductal carcinoma in situ. Another sister had uterine fibroids. Patient's father had brain cancer. On the paternal side, paternal grandmother had colon cancer, an aunt had breast cancer.  Seema underwent genetic testing and her  BREAST NEXT genetic testing panel was negative.      Social History:  Social History     Social History     Marital status:      Social History Main Topics     Smoking status: Never Smoker     Smokeless tobacco: Never Used     Alcohol use Yes      Comment: socially      She is , has one daughter in her 30s and a grandchild. Does not smoke. Takes alcohol socially when there is a dinner party.The patient is extremely active. She is an avid skier. She likes to travel a lot. They spent phelps in [place].     Physical Exam:  /66  Pulse 66  Temp 98.2  F (36.8  C)  Resp 15  Ht 1.638 m (5' 4.49\")  Wt 59 kg (130 lb)  SpO2 99%  BMI 21.98 kg/m2      GENERAL APPEARANCE: healthy, alert and no distress     HENT: Mouth without ulcers or lesions     NECK: no adenopathy, no asymmetry or masses     LYMPHATICS: No cervical, supraclavicular, axillary  lymphadenopathy     RESP: lungs clear to auscultation - no rales, rhonchi or wheezes     CARDIOVASCULAR: regular rates and rhythm, normal S1 S2, no S3 or S4 and no murmur.     ABDOMEN:  soft, nontender, no HSM or masses and bowel sounds normal     MUSCULOSKELETAL: extremities normal- no gross deformities noted, no evidence of inflammation in joints, FROM in all extremities. No edema b/l LE.     SKIN: no suspicious lesions or rashes     PSYCHIATRIC: mentation " appears normal and affect normal  Neuro: slight head tremor at rest. Non-focal exam otherwise. Axox3  Chest: No breast masses b/l and no axillary lymphadenopathy b/l. Incisions from right lumpectomy, R ASLN biopsy healed well. Other incisions from port-a-cath and prior breast biopsies healed well.    Laboratory/Imaging Studies  Results for orders placed or performed in visit on 04/20/18   Comprehensive metabolic panel (BMP + Alb, Alk Phos, ALT, AST, Total. Bili, TP)   Result Value Ref Range    Sodium 142 133 - 144 mmol/L    Potassium 3.9 3.4 - 5.3 mmol/L    Chloride 108 94 - 109 mmol/L    Carbon Dioxide 28 20 - 32 mmol/L    Anion Gap 6 3 - 14 mmol/L    Glucose 82 70 - 99 mg/dL    Urea Nitrogen 18 7 - 30 mg/dL    Creatinine 0.80 0.52 - 1.04 mg/dL    GFR Estimate 72 >60 mL/min/1.7m2    GFR Estimate If Black 88 >60 mL/min/1.7m2    Calcium 9.0 8.5 - 10.1 mg/dL    Bilirubin Total 0.6 0.2 - 1.3 mg/dL    Albumin 4.0 3.4 - 5.0 g/dL    Protein Total 7.3 6.8 - 8.8 g/dL    Alkaline Phosphatase 37 (L) 40 - 150 U/L    ALT 26 0 - 50 U/L    AST 22 0 - 45 U/L   Vitamin D Deficiency   Result Value Ref Range    Vitamin D Deficiency screening 55 20 - 75 ug/L   CBC with platelets   Result Value Ref Range    WBC 4.6 4.0 - 11.0 10e9/L    RBC Count 4.30 3.8 - 5.2 10e12/L    Hemoglobin 12.9 11.7 - 15.7 g/dL    Hematocrit 38.9 35.0 - 47.0 %    MCV 91 78 - 100 fl    MCH 30.0 26.5 - 33.0 pg    MCHC 33.2 31.5 - 36.5 g/dL    RDW 13.5 10.0 - 15.0 %    Platelet Count 226 150 - 450 10e9/L   Lipid panel reflex to direct LDL Fasting   Result Value Ref Range    Cholesterol 169 <200 mg/dL    Triglycerides 57 <150 mg/dL    HDL Cholesterol 90 >49 mg/dL    LDL Cholesterol Calculated 68 <100 mg/dL    Non HDL Cholesterol 79 <130 mg/dL     ASSESSMENT/PLAN:  Seema is a very pleasant 62 year old woman with Stage II (pT1b N1(sn)cMo), invasive ductal carcinoma right breast,  Grade 1, ER positive in 95% of the cells, MO negative and HER2/lex negative. She is  s/p lumpectomy with sentinel lymph node evaluation on January 21, 2016. She has completed adjuvant chemotherapy with dose dense Taxol IV q 2 weeks x 4 cycles, March 18th- April 28, 2016, followed by dose dense AC x 4 cycles, May 12- June 23, 2016.  She has received adjuvant radiation therapy from 7/18/16- 8/26/16. She has been on Tamoxifen mid Oct 2016.    1. Stage II breast cancer- continue Tamoxifen for at least 5 years. Given Hx of osteoporosis with compression fractures (although most recently bone mineral density has been improving on Tamoxifen, Reclast, vitamin D and calcium and exercise), we'll plan either 10 years of Tamoxifen or per recent [facility] data from 2017 -5 years of tamoxifen followed by 2 years of an aromatase inhibitor (AI). That data showed that extending hormonal therapy with an AI for 2 years after 5 years of Tamoxifen is equivalent to an additional 5 years of an AI after Tamoxifen, as far as disease free survival data, in women with early stage disease ([place] Breast and Colorectal Cancer Study Group (ABCSG)-16 phase III trial). I would prefer that for Seema over 5 years of an AI, if we will be using an AI in the future.  For now, she will continue on Tamoxifen (Rx renewed). F/up in 6 months before she leaves for [place], with labs and screening mammogram as below.    2. Spontaneous (and traumatic) fractures in her spine and osteopenia on DEXA scan. This is c/w osteoporosis.  She is on yearly Reclast with Dr. Boo. We'll obtain Dr. Boo's notes for review.     3. Breast cancer screening- we'll proceed with annual screening mammogram with our next visit, due in October 2018. We'll obtain mammogram with tomosynthesis, given extremely dense breast tissue reported on the most recent outside [facility]. We'll also be obtaining outside mammogram films for review and comparison.     4. Hot flashes- declines pharmacotherapy. Exercises regularly.  5. Hyperlipidemia- lipid  profile within normal limits in 04/2018 on simvastatin as above. F/up with Shantel Antoine.    It was my pleasure to meet Seema  At the end of our visit patient verbalized understanding and concurred with the plan.      Again, thank you for allowing me to participate in the care of your patient.        Sincerely,        Meghan Haynes MD, MD

## 2018-05-29 NOTE — MR AVS SNAPSHOT
"              After Visit Summary   5/29/2018    Seema Johnson    MRN: [id number]           Patient Information     Date Of Birth          1955        Visit Information        Provider Department      5/29/2018 1:00 PM Meghan Haynes MD [facility]        Today's Diagnoses     Infiltrating ductal carcinoma of right female breast (H)    -  1    Dense breasts        Encounter for long-term (current) use of medications           Follow-ups after your visit        Your next 10 appointments already scheduled     Jun 08, 2018  9:30 AM CDT   New Visit with Rudy Casarez MD   [facility] ([facility])    [address] [place] [id number] [phone]            Oct 02, 2018 10:00 AM CDT   LAB with LAB FIRST FLOOR [facility] ([facility])    [address] [place] [id number] [phone]           Please do not eat 10-12 hours before your appointment if you are coming in fasting for labs on lipids, cholesterol, or glucose (sugar). This does not apply to pregnant women. Water, hot tea and black coffee (with nothing added) are okay. Do not drink other fluids, diet soda or chew gum.            Oct 02, 2018 10:10 AM CDT   MA SCREENING BILATERAL W/ BASSEM with MGKEMAR, MG MA TECH   [facility] ([facility])    [address] [place] [id number] [phone]           Three-dimensional (3D) mammograms are available at [place] locations in [facility], [facility], [facility], and [place]. [facility] locations include [facility] in [place]. Benefits of 3D mammograms include: - Improved rate of cancer detection - Decreases your chance of having to go back for more tests, which means fewer: - \"False-positive\" results (This means that there is an abnormal area but it isn't cancer.) - " Invasive testing procedures, such as a biopsy or surgery - Can provide clearer images of the breast if you have dense breast tissue. 3D mammography is an optional exam that anyone can have with a 2D mammogram. It doesn't replace or take the place of a 2D mammogram. 2D mammograms remain an effective screening test for all women.  Not all insurance companies cover the cost of a 3D mammogram. Check with your insurance.            Oct 04, 2018 12:30 PM CDT   Return Visit with Meghan Haynes MD   [facility]    [address] [place] [id number] [phone]              Future tests that were ordered for you today     Open Future Orders        Priority Expected Expires Ordered    CBC with platelets differential Routine  5/29/2019 5/29/2018    Comprehensive metabolic panel Routine  5/29/2019 5/29/2018    MA Screen Bilateral w/Hakeem Routine  5/29/2019 5/29/2018            Who to contact     If you have questions or need follow up information about today's clinic visit or your schedule please contact [facility] directly at [phone].  Normal or non-critical lab and imaging results will be communicated to you by MyChart, letter or phone within 4 business days after the clinic has received the results. If you do not hear from us within 7 days, please contact the clinic through MyChart or phone. If you have a critical or abnormal lab result, we will notify you by phone as soon as possible.  Submit refill requests through [employer] or call your pharmacy and they will forward the refill request to us. Please allow 3 business days for your refill to be completed.          Additional Information About Your Visit        Care EveryWhere ID     This is your Care EveryWhere ID. This could be used by other organizations to access your [place] medical records  [id number]        Your Vitals Were     Pulse Temperature Respirations Height Pulse Oximetry  "BMI (Body Mass Index)    66 98.2  F (36.8  C) 15 1.638 m (5' 4.49\") 99% 21.98 kg/m2       Blood Pressure from Last 3 Encounters:   05/29/18 101/66   04/20/18 112/64   03/22/18 100/70    Weight from Last 3 Encounters:   05/29/18 59 kg (130 lb)   04/20/18 58.3 kg (128 lb 9.6 oz)   03/22/18 57.9 kg (127 lb 11.2 oz)                 Today's Medication Changes          These changes are accurate as of 5/29/18  1:31 PM.  If you have any questions, ask your nurse or doctor.               These medicines have changed or have updated prescriptions.        Dose/Directions    tamoxifen 20 MG tablet   Commonly known as:  NOLVADEX   This may have changed:  when to take this   Used for:  Infiltrating ductal carcinoma of right female breast (H), Dense breasts, Encounter for long-term (current) use of medications        Dose:  20 mg   Take 1 tablet (20 mg) by mouth daily   Quantity:  90 tablet   Refills:  3            Where to get your medicines      These medications were sent to [id number] - [place], [place] - [address]  [address], [facility] [address]     Phone:  [phone]     tamoxifen 20 MG tablet                Primary Care Provider Office Phone # Fax #    [facility] [phone]       [id number]  [facility] [address]        Equal Access to Services     ROCK HERCULES : Hadii leie park hadasho Soraulali, waaxda luqadaha, qaybta kaalmada tfifany, waxay heraclio josue. So [facility] [phone].    ATENCIÓN: Si habla chao, tiene a pereira disposición servicios gratuitos de asistencia lingüística. Reilly holbrook [phone].    We comply with applicable federal civil rights laws and [place] laws. We do not discriminate on the basis of race, color, national origin, age, disability, sex, sexual orientation, or gender identity.            Thank you!     Thank you for choosing [facility]  for your care. Our goal is always to " provide you with excellent care. Hearing back from our patients is one way we can continue to improve our services. Please take a few minutes to complete the written survey that you may receive in the mail after your visit with us. Thank you!             Your Updated Medication List - Protect others around you: Learn how to safely use, store and throw away your medicines at www.disposemymeds.org.          This list is accurate as of 5/29/18  1:31 PM.  Always use your most recent med list.                   Brand Name Dispense Instructions for use Diagnosis    propranolol 20 MG tablet    INDERAL    60 tablet    Take 1 tablet (20 mg) by mouth 2 times daily    Benign essential tremor       simvastatin 20 MG tablet    ZOCOR    90 tablet    Take 1 tablet (20 mg) by mouth At Bedtime    Hyperlipidemia LDL goal <100       tamoxifen 20 MG tablet    NOLVADEX    90 tablet    Take 1 tablet (20 mg) by mouth daily    Infiltrating ductal carcinoma of right female breast (H), Dense breasts, Encounter for long-term (current) use of medications

## 2018-05-31 LAB — COPATH REPORT: NORMAL

## 2018-06-05 ENCOUNTER — PRE VISIT (OUTPATIENT)
Dept: NEUROLOGY | Facility: CLINIC | Age: 63
End: 2018-06-05

## 2018-06-05 NOTE — TELEPHONE ENCOUNTER
PREVISIT INFORMATION                                                    Seema Johnson scheduled for future visit at [facility] specialty clinics.    Patient is scheduled to see Leida on 6/8  Reason for visit: benign essential tremor   Referring provider Shantel Plaza PAC   Has patient seen previous specialist? N   Medical Records:  Available in chart.  Patient was previously seen at a [facility] or [facility] facility. [facility] [place]     REVIEW                                                      New patient packet mailed to patient: Yes  Medication reconciliation complete: Yes, recent office visit       Current Outpatient Prescriptions   Medication Sig Dispense Refill     propranolol (INDERAL) 20 MG tablet Take 1 tablet (20 mg) by mouth 2 times daily 60 tablet 1     simvastatin (ZOCOR) 20 MG tablet Take 1 tablet (20 mg) by mouth At Bedtime 90 tablet 3     tamoxifen (NOLVADEX) 20 MG tablet Take 1 tablet (20 mg) by mouth daily 90 tablet 3       Allergies: Compazine [prochlorperazine] and Codeine sulfate        PLAN/FOLLOW-UP NEEDED                                                      Left message for pt to confirm appt with Dr. Casarez.   Patient Reminders Given:  Please, make sure you bring an updated list of your medications.   If you are having a procedure, please, present 15 minutes early.  If you need to cancel or reschedule,please call [phone].    Alyssa Manriquez

## 2018-06-08 ENCOUNTER — OFFICE VISIT (OUTPATIENT)
Dept: NEUROLOGY | Facility: CLINIC | Age: 63
End: 2018-06-08
Attending: PHYSICIAN ASSISTANT
Payer: COMMERCIAL

## 2018-06-08 VITALS
HEART RATE: 66 BPM | SYSTOLIC BLOOD PRESSURE: 103 MMHG | DIASTOLIC BLOOD PRESSURE: 65 MMHG | BODY MASS INDEX: 22.55 KG/M2 | WEIGHT: 132.1 LBS | HEIGHT: 64 IN

## 2018-06-08 DIAGNOSIS — G25.0 BENIGN ESSENTIAL TREMOR: ICD-10-CM

## 2018-06-08 PROCEDURE — 99205 OFFICE O/P NEW HI 60 MIN: CPT | Performed by: PSYCHIATRY & NEUROLOGY

## 2018-06-08 RX ORDER — PROPRANOLOL HYDROCHLORIDE 20 MG/1
20 TABLET ORAL 2 TIMES DAILY
Qty: 180 TABLET | Refills: 3 | Status: SHIPPED | OUTPATIENT
Start: 2018-06-08 | End: 2019-04-08

## 2018-06-08 ASSESSMENT — PAIN SCALES - GENERAL: PAINLEVEL: NO PAIN (1)

## 2018-06-08 NOTE — MR AVS SNAPSHOT
"              After Visit Summary   6/8/2018    Seema Johnson    MRN: [id number]           Patient Information     Date Of Birth          1955        Visit Information        Provider Department      6/8/2018 9:30 AM Rudy Casarez MD [facility]        Today's Diagnoses     Benign essential tremor          Care Instructions    1. Call in about 2 months when you are back from [place]! [phone] Dr. Leida Magana's RN :)           Follow-ups after your visit        Your next 10 appointments already scheduled     Oct 02, 2018 10:00 AM CDT   LAB with LAB FIRST FLOOR [facility] ([facility])    [id number] [place] [id number] [phone]           Please do not eat 10-12 hours before your appointment if you are coming in fasting for labs on lipids, cholesterol, or glucose (sugar). This does not apply to pregnant women. Water, hot tea and black coffee (with nothing added) are okay. Do not drink other fluids, diet soda or chew gum.            Oct 02, 2018 10:10 AM CDT   MA SCREENING BILATERAL W/ BASSEM with MGMA2, MG MA TECH   [facility] ([facility])    [address] [place] [id number] [phone]           Three-dimensional (3D) mammograms are available at [facility] locations in [facility], [facility], [facility], [place], [place], and [place]. [facility] locations include [place] and [facility] in [place]. Benefits of 3D mammograms include: - Improved rate of cancer detection - Decreases your chance of having to go back for more tests, which means fewer: - \"False-positive\" results (This means that there is an abnormal area but it isn't cancer.) - Invasive testing procedures, such as a biopsy or surgery - Can provide clearer images of the breast if you have dense breast tissue. 3D mammography is an optional exam that anyone can have " "with a 2D mammogram. It doesn't replace or take the place of a 2D mammogram. 2D mammograms remain an effective screening test for all women.  Not all insurance companies cover the cost of a 3D mammogram. Check with your insurance.            Oct 04, 2018 12:30 PM CDT   Return Visit with Meghan Haynes MD   [facility] ([facility])    [address] [place] [id number] [phone]              Who to contact     If you have questions or need follow up information about today's clinic visit or your schedule please contact [facility] directly at [phone].  Normal or non-critical lab and imaging results will be communicated to you by MyChart, letter or phone within 4 business days after the clinic has received the results. If you do not hear from us within 7 days, please contact the clinic through MyChart or phone. If you have a critical or abnormal lab result, we will notify you by phone as soon as possible.  Submit refill requests through [employer] or call your pharmacy and they will forward the refill request to us. Please allow 3 business days for your refill to be completed.          Additional Information About Your Visit        Care EveryWhere ID     This is your Care EveryWhere ID. This could be used by other organizations to access your [facility] medical records  [id number]        Your Vitals Were     Pulse Height BMI (Body Mass Index)             66 1.638 m (5' 4.49\") 22.33 kg/m2          Blood Pressure from Last 3 Encounters:   06/08/18 103/65   05/29/18 101/66   04/20/18 112/64    Weight from Last 3 Encounters:   06/08/18 59.9 kg (132 lb 1.6 oz)   05/29/18 59 kg (130 lb)   04/20/18 58.3 kg (128 lb 9.6 oz)              Today, you had the following     No orders found for display       Primary Care Provider Office Phone # Fax #    [facility] [phone]       [address]  [facility] [address]   "      Equal Access to Services     [facility] : Hadii aad ku hadradhacynthia House, wadiannada luqcynthiaha, qakennyta alexsandraeridivina adler. So [facility] [phone].    ATENCIÓN: Si habla español, tiene a pereira disposición servicios gratuitos de asistencia lingüística. Llame al [phone].    We comply with applicable federal civil rights laws and [place] laws. We do not discriminate on the basis of race, color, national origin, age, disability, sex, sexual orientation, or gender identity.            Thank you!     Thank you for choosing [facility]  for your care. Our goal is always to provide you with excellent care. Hearing back from our patients is one way we can continue to improve our services. Please take a few minutes to complete the written survey that you may receive in the mail after your visit with us. Thank you!             Your Updated Medication List - Protect others around you: Learn how to safely use, store and throw away your medicines at www.disposemymeds.org.          This list is accurate as of 6/8/18 10:19 AM.  Always use your most recent med list.                   Brand Name Dispense Instructions for use Diagnosis    propranolol 20 MG tablet    INDERAL    60 tablet    Take 1 tablet (20 mg) by mouth 2 times daily    Benign essential tremor       simvastatin 20 MG tablet    ZOCOR    90 tablet    Take 1 tablet (20 mg) by mouth At Bedtime    Hyperlipidemia LDL goal <100       tamoxifen 20 MG tablet    NOLVADEX    90 tablet    Take 1 tablet (20 mg) by mouth daily    Infiltrating ductal carcinoma of right female breast (H), Dense breasts, Encounter for long-term (current) use of medications

## 2018-06-08 NOTE — LETTER
2018         RE: Seema Johnson  [address]  [facility] [address]        Dear Colleague,    Thank you for referring your patient, Seema Johnson, to the [facility]. Please see a copy of my visit note below.    Department of Neurology  Movement Disorders Division   Initial Clinic Evaluation     Patient: Seema Johnson   MRN: [id number]   : 1955   Date of Visit: 2018     Referring Physician: Bola    Reason for Referral: Essential tremor    Impression:  1.  Essential tremor    Recommendations:  1.  Continue propranolol 20 mg twice daily.  If she continues to have tremor disability on this we will start her on propranolol long-acting 60 mg when she returns from [place].  If she continues to have disability we can gradually increase the dose of propranolol but in the past this was limited by side effect.  2.  If propranolol is not completely effective or not tolerated we would try primidone.  3.  Again if primidone was not completely effective the patient would be a candidate for deep brain stimulation.  I told her that she should never be disabled or troubled by her essential tremor.    Please call or write with questions or concerns,    Sincerely yours,    Rudy Casarez MD      History of Present Illness  Ms. Johnson is a 62 year old female who is right-handed.  She is a retired high school .    The patient's maternal grandmother has tremor.  Her mother developed tremor but only at the age of 92.    The patient began having tremor 15 years ago.  It affected both hands equally.  The tremor has gradually worsened.  She was tried in the past on propranolol at unknown dosage but had to stop it because it caused grogginess.  She says her tremor now affects her more.  It affects her head but not her voice.  Rarely it will affect her legs.  Her tremor is much worse when she is anxious.  She does not notice an alcohol effect.    The tremors notes  causing significant disability.  She could barely eat.  She could not use a spoon knife or fork.  She began eating with a big spoon.  Her handwriting is significantly affected.  She could not do fine work with her hands.  She could not put a key in the lock.  It affected everything she tried to do with her hands.  She saw Dr. Plaza and was started on propranolol 20 mg twice a day.  At present she is tolerating this.  This has had great effect on her tremor.  She is able to eat now.  Her handwriting is improved.  In terms of her head tremor this is not bothersome.  She is never noticed that her head is significantly deviated.    Asking questions about parkinsonism she is quite normal.  She has a good sense of smell.  She does not have REM behavior disorder.  She has no micrographia.  Her gait is normal.  She can turn in bed and stand from a chair normally.    She has had some minor numbness of her hands and fingers for years.  She has never had an EMG.  There is no numbness of the feet.    The patient is taking no medications which would cause tremor.  Her TSH on 1/5/2017 was 1.22      Past Medical History:   Past Medical History:   Diagnosis Date     Basal cell cancer      Mohs defect of nose 06/2012       Past Surgical History:   Past Surgical History:   Procedure Laterality Date     BREAST SURGERY Right 01/2016    breast cancer       Medications:  Current Outpatient Prescriptions   Medication Sig Dispense Refill     propranolol (INDERAL) 20 MG tablet Take 1 tablet (20 mg) by mouth 2 times daily 60 tablet 1     simvastatin (ZOCOR) 20 MG tablet Take 1 tablet (20 mg) by mouth At Bedtime 90 tablet 3     tamoxifen (NOLVADEX) 20 MG tablet Take 1 tablet (20 mg) by mouth daily 90 tablet 3          Movement Disorder-related Medications                   8 10      Propranolol 20 mg bid                                                1 1                                                                                   Allergies: is allergic to compazine [prochlorperazine] and codeine sulfate.    Social History:   Social History     Social History     Marital status:      Spouse name: N/A     Number of children: N/A     Years of education: N/A     Social History Main Topics     Smoking status: Never Smoker     Smokeless tobacco: Never Used     Alcohol use Yes      Comment: socially     Drug use: No     Sexual activity: Yes     Partners: Male     Other Topics Concern     Parent/Sibling W/ Cabg, Mi Or Angioplasty Before 65f 55m? No     Social History Narrative       Family History:  Family History   Problem Relation Age of Onset     Breast Cancer Sister      DIABETES No family hx of      Coronary Artery Disease No family hx of      Hypertension No family hx of      Hyperlipidemia No family hx of      CEREBROVASCULAR DISEASE No family hx of      Colon Cancer No family hx of      Prostate Cancer No family hx of      Depression No family hx of      Substance Abuse No family hx of      Thyroid Disease No family hx of      Obesity No family hx of      OSTEOPOROSIS No family hx of      Anesthesia Reaction No family hx of      Asthma No family hx of      MENTAL ILLNESS No family hx of      Anxiety Disorder No family hx of      Other Cancer No family hx of        ROS:  General:  Fever : no, Chills: no, Sweats: no, Fatigue: no, ,Weight loss: no  Cardiovascular  Chest Pains: no, Palpitations: no, Edema: no, Shortness of breath: no  Respiratory  Cough: no  Gastrointestinal  Nausea: no, Vomiting: no, Diarrhea: no, Constipation: no  Genitourinary  Dysuria: no, Frequency: no, Urgency: no,  Nocturia: no, Incontinence: no Women:  Abnormal vaginal bleeding: no  Musculoskeletal  Back pain: no, Neck Pain: no  Joint pain, swelling, redness: no, Stiffness: no  Skin  Rash: no, Itching: no,  Suspicious lesions: no  Psychiatric  Depression: no, Anxiety/Panic: no  Endocrine  Cold intolerance: no, Heat intolerance: no, Excessive thirst:  no  Hematologic/LymphatiAbnormal bruising, bleeding: no ,Enlarged lymph nodes: {.:[id number]  Allergic/Immunologic  Hives (Urticaria): no, HIV exposure: no    Neurologic  Visual loss: no, Double vision: no, Headache: no, Loss of consciousness:  no, Seizure: no, Fainting (syncope): no, Dysarthria: no, Dysphagia:  no, Vertigo:  no, Weakness: no, Atrophy: no, Twitches: no, Lhermitte's: no, Numbness or tingling: YES, Handwriting change: YES, Tremors: YES, Involuntary movements: no, Imbalance: no, Abnormal gait:  no, Falls :no, Memory loss: no, RBD: no, Sleep disorder: no, Hallucination: no, Loss of concentration: no,  Behavioral change: no,  Loss of motivation: no      Comprehensive Neurologic Exam    Mental Status Exam   The patient is alert, oriented and exhibits no difficulty with cognition or memory.  A formal short test of mental status was performed.     Neurovascular         Speech and Language   Right Left     Carotid Bruit Absent Absent  Speech is normal.   Dorsalis Pedis Pulse Normal Normal  Description   Posterior Tibial Pulse Normal Normal  Language is normal.     Cranial Nerve Exam                 Right Left   Right Left   II                                        Normal Normal  Hearing (VIII) Normal Normal      III, IV, V!                   Normal Normal  Nystagmus (VIII) Normal Normal   EOM description                              Gag (IX, X) Normal Normal   Facial Sensation (V) Normal Normal  SCM (XI) Normal Normal   Muscles of Mastication (V) Normal Normal  Trapezius (XI) Normal Normal   Facial Strength (VII) Normal Normal  Tongue (XII) Normal Normal     Motor Exam  Strength (*/5 grading)  Muscle                  Right Left  Muscle Right Left   Frontalis                                           Normal Normal  Iliopsoas Normal    Normal          Orbicularis Oculi                     Normal Normal  Quadrideps Normal    Normal        Orbicularis Oris                         Normal Normal  Anterior Tibial  Normal Normal      Deltoid Normal Normal  Gastrocnemius Normal    Normal   Biceps Normal Normal  Extensor Hallucis Longus Normal    Normal   Triceps Normal Normal  Toe Extensors Normal    Normal   EDC Normal Normal  Toe Flexor Normal    Normal   Finger Flexors Normal Normal  Other Normal Normal   First Dorsal Interosseous Normal Normal  Other Normal Normal   Hypothenar Normal Normal  Other Normal Normal   Thenar Normal Normal  Other Normal Normal     Reflexes      Tone   Right Left   Right Left   Biceps Normal Normal  Spasticity (S)  Rigidity (R)     Triceps Normal Normal  Neck     Brachioradialis Normal Normal  Arm     Quadriceps Normal Normal  Leg     Ankle Normal Normal       Babkinski Flexor Flexor         AMR       Coordination   Right Left   Right Left   Fingers Normal Normal  Finger nose finger Normal Normal   Hand Normal Normal  Drift Normal Normal   Leg Normal Normal  Heel Clay Normal Normal   Foot Normal Normal  Other     Other               Involuntary Movements    Gait and Station  None            Right Left  Stand & Sit Normal   Postural hand tremor +2 +2  Gait Normal   Head tremor +1 +1  Tandem Normal   (Movement type) Normal Normal  Romberg Absent   Mild left head deviation    Sensory Exam          Right Left    Pin Normal Normal    Vibration Normal Normal    Joint position Normal Normal    Other             Coding statement:     Duration of  Services: face-to-face 60 min.   Greater than 50% of this visit was spent in counseling and coordination of care.        Number of diagnoses:New0>Established1  Management Medications were prescribed. .  Complexity of medical data: Review/order clinical lab tests. .  Risk<  One or more chronic illnesses with severe exacerbation, progression, or side effects of treatment.                         Again, thank you for allowing me to participate in the care of your patient.        Sincerely,        Rudy Casarez MD

## 2018-06-08 NOTE — NURSING NOTE
"Seema Johnson's goals for this visit include:   Chief Complaint   Patient presents with     Consult     benign essential tremor      She requests these members of her care team be copied on today's visit information: Yes -     Referring Provider:  Shantel Plaza PA-C  [address] [place] [address]    Initial /65 (BP Location: Left arm, Patient Position: Chair, Cuff Size: Adult Regular)  Pulse 66  Ht 1.638 m (5' 4.49\")  Wt 59.9 kg (132 lb 1.6 oz)  BMI 22.33 kg/m2 Estimated body mass index is 22.33 kg/(m^2) as calculated from the following:    Height as of this encounter: 1.638 m (5' 4.49\").    Weight as of this encounter: 59.9 kg (132 lb 1.6 oz).  BP completed using cuff size: regular        "

## 2018-06-08 NOTE — PROGRESS NOTES
Department of Neurology  Movement Disorders Division   Initial Clinic Evaluation     Patient: Seema Johnson   MRN: [id number]   : 1955   Date of Visit: 2018     Referring Physician: Bola    Reason for Referral: Essential tremor    Impression:  1.  Essential tremor    Recommendations:  1.  Continue propranolol 20 mg twice daily.  If she continues to have tremor disability on this we will start her on propranolol long-acting 60 mg when she returns from [place].  If she continues to have disability we can gradually increase the dose of propranolol but in the past this was limited by side effect.  2.  If propranolol is not completely effective or not tolerated we would try primidone.  3.  Again if primidone was not completely effective the patient would be a candidate for deep brain stimulation.  I told her that she should never be disabled or troubled by her essential tremor.    Please call or write with questions or concerns,    Sincerely yours,    Rudy Casarez MD      History of Present Illness  Ms. Johnson is a 62 year old female who is right-handed.  She is a retired high school .    The patient's maternal grandmother has tremor.  Her mother developed tremor but only at the age of 92.    The patient began having tremor 15 years ago.  It affected both hands equally.  The tremor has gradually worsened.  She was tried in the past on propranolol at unknown dosage but had to stop it because it caused grogginess.  She says her tremor now affects her more.  It affects her head but not her voice.  Rarely it will affect her legs.  Her tremor is much worse when she is anxious.  She does not notice an alcohol effect.    The tremors notes causing significant disability.  She could barely eat.  She could not use a spoon knife or fork.  She began eating with a big spoon.  Her handwriting is significantly affected.  She could not do fine work with her hands.  She could not put a key in the  lock.  It affected everything she tried to do with her hands.  She saw Dr. Plaza and was started on propranolol 20 mg twice a day.  At present she is tolerating this.  This has had great effect on her tremor.  She is able to eat now.  Her handwriting is improved.  In terms of her head tremor this is not bothersome.  She is never noticed that her head is significantly deviated.    Asking questions about parkinsonism she is quite normal.  She has a good sense of smell.  She does not have REM behavior disorder.  She has no micrographia.  Her gait is normal.  She can turn in bed and stand from a chair normally.    She has had some minor numbness of her hands and fingers for years.  She has never had an EMG.  There is no numbness of the feet.    The patient is taking no medications which would cause tremor.  Her TSH on 1/5/2017 was 1.22      Past Medical History:   Past Medical History:   Diagnosis Date     Basal cell cancer      Mohs defect of nose 06/2012       Past Surgical History:   Past Surgical History:   Procedure Laterality Date     BREAST SURGERY Right 01/2016    breast cancer       Medications:  Current Outpatient Prescriptions   Medication Sig Dispense Refill     propranolol (INDERAL) 20 MG tablet Take 1 tablet (20 mg) by mouth 2 times daily 60 tablet 1     simvastatin (ZOCOR) 20 MG tablet Take 1 tablet (20 mg) by mouth At Bedtime 90 tablet 3     tamoxifen (NOLVADEX) 20 MG tablet Take 1 tablet (20 mg) by mouth daily 90 tablet 3          Movement Disorder-related Medications                   8 10      Propranolol 20 mg bid                                                1 1                                                                                  Allergies: is allergic to compazine [prochlorperazine] and codeine sulfate.    Social History:   Social History     Social History     Marital status:      Spouse name: N/A     Number of children: N/A     Years of education: N/A     Social History  Main Topics     Smoking status: Never Smoker     Smokeless tobacco: Never Used     Alcohol use Yes      Comment: socially     Drug use: No     Sexual activity: Yes     Partners: Male     Other Topics Concern     Parent/Sibling W/ Cabg, Mi Or Angioplasty Before 65f 55m? No     Social History Narrative       Family History:  Family History   Problem Relation Age of Onset     Breast Cancer Sister      DIABETES No family hx of      Coronary Artery Disease No family hx of      Hypertension No family hx of      Hyperlipidemia No family hx of      CEREBROVASCULAR DISEASE No family hx of      Colon Cancer No family hx of      Prostate Cancer No family hx of      Depression No family hx of      Substance Abuse No family hx of      Thyroid Disease No family hx of      Obesity No family hx of      OSTEOPOROSIS No family hx of      Anesthesia Reaction No family hx of      Asthma No family hx of      MENTAL ILLNESS No family hx of      Anxiety Disorder No family hx of      Other Cancer No family hx of        ROS:  General:  Fever : no, Chills: no, Sweats: no, Fatigue: no, ,Weight loss: no  Cardiovascular  Chest Pains: no, Palpitations: no, Edema: no, Shortness of breath: no  Respiratory  Cough: no  Gastrointestinal  Nausea: no, Vomiting: no, Diarrhea: no, Constipation: no  Genitourinary  Dysuria: no, Frequency: no, Urgency: no,  Nocturia: no, Incontinence: no Women:  Abnormal vaginal bleeding: no  Musculoskeletal  Back pain: no, Neck Pain: no  Joint pain, swelling, redness: no, Stiffness: no  Skin  Rash: no, Itching: no,  Suspicious lesions: no  Psychiatric  Depression: no, Anxiety/Panic: no  Endocrine  Cold intolerance: no, Heat intolerance: no, Excessive thirst: no  Hematologic/LymphatiAbnormal bruising, bleeding: no ,Enlarged lymph nodes: {.:[id number]  Allergic/Immunologic  Hives (Urticaria): no, HIV exposure: no    Neurologic  Visual loss: no, Double vision: no, Headache: no, Loss of consciousness:  no, Seizure: no, Fainting  (syncope): no, Dysarthria: no, Dysphagia:  no, Vertigo:  no, Weakness: no, Atrophy: no, Twitches: no, Lhermitte's: no, Numbness or tingling: YES, Handwriting change: YES, Tremors: YES, Involuntary movements: no, Imbalance: no, Abnormal gait:  no, Falls :no, Memory loss: no, RBD: no, Sleep disorder: no, Hallucination: no, Loss of concentration: no,  Behavioral change: no,  Loss of motivation: no      Comprehensive Neurologic Exam    Mental Status Exam   The patient is alert, oriented and exhibits no difficulty with cognition or memory.  A formal short test of mental status was performed.     Neurovascular         Speech and Language   Right Left     Carotid Bruit Absent Absent  Speech is normal.   Dorsalis Pedis Pulse Normal Normal  Description   Posterior Tibial Pulse Normal Normal  Language is normal.     Cranial Nerve Exam                 Right Left   Right Left   II                                        Normal Normal  Hearing (VIII) Normal Normal      III, IV, V!                   Normal Normal  Nystagmus (VIII) Normal Normal   EOM description                              Gag (IX, X) Normal Normal   Facial Sensation (V) Normal Normal  SCM (XI) Normal Normal   Muscles of Mastication (V) Normal Normal  Trapezius (XI) Normal Normal   Facial Strength (VII) Normal Normal  Tongue (XII) Normal Normal     Motor Exam  Strength (*/5 grading)  Muscle                  Right Left  Muscle Right Left   Frontalis                                           Normal Normal  Iliopsoas Normal    Normal          Orbicularis Oculi                     Normal Normal  Quadrideps Normal    Normal        Orbicularis Oris                         Normal Normal  Anterior Tibial Normal Normal      Deltoid Normal Normal  Gastrocnemius Normal    Normal   Biceps Normal Normal  Extensor Hallucis Longus Normal    Normal   Triceps Normal Normal  Toe Extensors Normal    Normal   EDC Normal Normal  Toe Flexor Normal    Normal   Finger Flexors Normal  Normal  Other Normal Normal   First Dorsal Interosseous Normal Normal  Other Normal Normal   Hypothenar Normal Normal  Other Normal Normal   Thenar Normal Normal  Other Normal Normal     Reflexes      Tone   Right Left   Right Left   Biceps Normal Normal  Spasticity (S)  Rigidity (R)     Triceps Normal Normal  Neck     Brachioradialis Normal Normal  Arm     Quadriceps Normal Normal  Leg     Ankle Normal Normal       Babkinski Flexor Flexor         AMR       Coordination   Right Left   Right Left   Fingers Normal Normal  Finger nose finger Normal Normal   Hand Normal Normal  Drift Normal Normal   Leg Normal Normal  Heel Clay Normal Normal   Foot Normal Normal  Other     Other               Involuntary Movements    Gait and Station  None            Right Left  Stand & Sit Normal   Postural hand tremor +2 +2  Gait Normal   Head tremor +1 +1  Tandem Normal   (Movement type) Normal Normal  Romberg Absent   Mild left head deviation    Sensory Exam          Right Left    Pin Normal Normal    Vibration Normal Normal    Joint position Normal Normal    Other             Coding statement:     Duration of  Services: face-to-face 60 min.   Greater than 50% of this visit was spent in counseling and coordination of care.        Number of diagnoses:New0>Established1  Management Medications were prescribed. .  Complexity of medical data: Review/order clinical lab tests. .  Risk<  One or more chronic illnesses with severe exacerbation, progression, or side effects of treatment.

## 2018-06-18 ENCOUNTER — TELEPHONE (OUTPATIENT)
Dept: FAMILY MEDICINE | Facility: CLINIC | Age: 63
End: 2018-06-18

## 2018-06-18 ENCOUNTER — OFFICE VISIT (OUTPATIENT)
Dept: FAMILY MEDICINE | Facility: CLINIC | Age: 63
End: 2018-06-18
Payer: COMMERCIAL

## 2018-06-18 VITALS
OXYGEN SATURATION: 98 % | TEMPERATURE: 98 F | BODY MASS INDEX: 21.66 KG/M2 | SYSTOLIC BLOOD PRESSURE: 106 MMHG | RESPIRATION RATE: 14 BRPM | WEIGHT: 130 LBS | DIASTOLIC BLOOD PRESSURE: 64 MMHG | HEIGHT: 65 IN | HEART RATE: 63 BPM

## 2018-06-18 DIAGNOSIS — R07.81 RIB PAIN ON RIGHT SIDE: Primary | ICD-10-CM

## 2018-06-18 DIAGNOSIS — R14.0 BLOATING: ICD-10-CM

## 2018-06-18 PROCEDURE — 99214 OFFICE O/P EST MOD 30 MIN: CPT | Performed by: PHYSICIAN ASSISTANT

## 2018-06-18 RX ORDER — METHOCARBAMOL 750 MG/1
750 TABLET, FILM COATED ORAL 4 TIMES DAILY PRN
Qty: 20 TABLET | Refills: 0 | Status: SHIPPED | OUTPATIENT
Start: 2018-06-18 | End: 2019-10-03

## 2018-06-18 ASSESSMENT — PAIN SCALES - GENERAL: PAINLEVEL: MILD PAIN (3)

## 2018-06-18 NOTE — MR AVS SNAPSHOT
After Visit Summary   6/18/2018    Seema Johnson    MRN: [id number]           Patient Information     Date Of Birth          1955        Visit Information        Provider Department      6/18/2018 2:20 PM Shantel Plaza PA-C [facility]        Today's Diagnoses     Rib pain on right side    -  1      Care Instructions    advil or aleve for a few days  Ice or heat  Could try muscle relaxer at bedtime or when not driving.  You were prescribed a muscle relaxer. This can make you dizzy and/or drowsy.   Do NOT drink alcohol while taking.   Try for the first time at home (ie before bed) so you know how it affects you.   Do not drive while taking if you feel dizzy or drowsy.     Consider the xray if sx are not gradually improving    To schedule your test or specialty referral please call:  [facility]:  Radiology (imaging- mammogram, ultrasound, CT, MRI): [phone]  Speciality consultation: [phone]  [address] [place] [address]     --    Monitor the bloating symptoms- if not improving when you return from [facility] or if worse- would consider imaging                   Follow-ups after your visit        Your next 10 appointments already scheduled     Oct 02, 2018 10:00 AM CDT   LAB with LAB FIRST FLOOR [facility] ([facility])    [id number] [place] [id number] [phone]           Please do not eat 10-12 hours before your appointment if you are coming in fasting for labs on lipids, cholesterol, or glucose (sugar). This does not apply to pregnant women. Water, hot tea and black coffee (with nothing added) are okay. Do not drink other fluids, diet soda or chew gum.            Oct 02, 2018 10:10 AM CDT   MA SCREENING BILATERAL W/ BASSEM with MGMA2, MG MA [facility] ([facility])    [address] [address] [place] [id number]  "  [phone]           Three-dimensional (3D) mammograms are available at [place] locations in [place], [place], [place], [place], [facility][1], [place], [place], and [place]. [facility] locations include [facility] in [place]. Benefits of 3D mammograms include: - Improved rate of cancer detection - Decreases your chance of having to go back for more tests, which means fewer: - \"False-positive\" results (This means that there is an abnormal area but it isn't cancer.) - Invasive testing procedures, such as a biopsy or surgery - Can provide clearer images of the breast if you have dense breast tissue. 3D mammography is an optional exam that anyone can have with a 2D mammogram. It doesn't replace or take the place of a 2D mammogram. 2D mammograms remain an effective screening test for all women.  Not all insurance companies cover the cost of a 3D mammogram. Check with your insurance.            Oct 04, 2018 12:30 PM CDT   Return Visit with Meghan Haynes MD   [facility] ([facility])    [address] [place] [id number] [phone]              Future tests that were ordered for you today     Open Future Orders        Priority Expected Expires Ordered    XR Ribs & Chest Right G/E 3 Views Routine 6/18/2018 6/18/2019 6/18/2018            Who to contact     If you have questions or need follow up information about today's clinic visit or your schedule please contact [facility] directly at [phone].  Normal or non-critical lab and imaging results will be communicated to you by MyChart, letter or phone within 4 business days after the clinic has received the results. If you do not hear from us within 7 days, please contact the clinic through MyChart or phone. If you have a critical or abnormal lab result, we will notify you by phone as soon as possible.  Submit refill requests through [employer] or call your pharmacy " "and they will forward the refill request to us. Please allow 3 business days for your refill to be completed.          Additional Information About Your Visit        Care EveryWhere ID     This is your Care EveryWhere ID. This could be used by other organizations to access your [place] medical records  [id number]        Your Vitals Were     Pulse Temperature Respirations Height Pulse Oximetry BMI (Body Mass Index)    63 98  F (36.7  C) (Temporal) 14 5' 4.5\" (1.638 m) 98% 21.97 kg/m2       Blood Pressure from Last 3 Encounters:   06/18/18 106/64   06/08/18 103/65   05/29/18 101/66    Weight from Last 3 Encounters:   06/18/18 130 lb (59 kg)   06/08/18 132 lb 1.6 oz (59.9 kg)   05/29/18 130 lb (59 kg)                 Today's Medication Changes          These changes are accurate as of 6/18/18  3:14 PM.  If you have any questions, ask your nurse or doctor.               Start taking these medicines.        Dose/Directions    methocarbamol 750 MG tablet   Commonly known as:  ROBAXIN   Used for:  Rib pain on right side   Started by:  Shantel Plaza PA-C        Dose:  750 mg   Take 1 tablet (750 mg) by mouth 4 times daily as needed for muscle spasms   Quantity:  20 tablet   Refills:  0            Where to get your medicines      These medications were sent to [id number] - [place], [place] - [address]  [address], [facility] [address]     Phone:  [phone]     methocarbamol 750 MG tablet                Primary Care Provider Office Phone # Fax #    [facility] [phone]       [id number]  [facility] [address]        Equal Access to Services     DAVID HERCULES : Renetta House, pancho ozuna, qalaurie kaalmada tiffany, divina josue. So [facility] [phone].    ATENCIÓN: Si habla español, tiene a pereira disposición servicios gratuitos de asistencia lingüística. Llame al [phone].    We comply with " applicable federal civil rights laws and [place] laws. We do not discriminate on the basis of race, color, national origin, age, disability, sex, sexual orientation, or gender identity.            Thank you!     Thank you for choosing [facility]  for your care. Our goal is always to provide you with excellent care. Hearing back from our patients is one way we can continue to improve our services. Please take a few minutes to complete the written survey that you may receive in the mail after your visit with us. Thank you!             Your Updated Medication List - Protect others around you: Learn how to safely use, store and throw away your medicines at www.disposemymeds.org.          This list is accurate as of 6/18/18  3:14 PM.  Always use your most recent med list.                   Brand Name Dispense Instructions for use Diagnosis    methocarbamol 750 MG tablet    ROBAXIN    20 tablet    Take 1 tablet (750 mg) by mouth 4 times daily as needed for muscle spasms    Rib pain on right side       propranolol 20 MG tablet    INDERAL    180 tablet    Take 1 tablet (20 mg) by mouth 2 times daily    Benign essential tremor       simvastatin 20 MG tablet    ZOCOR    90 tablet    Take 1 tablet (20 mg) by mouth At Bedtime    Hyperlipidemia LDL goal <100       tamoxifen 20 MG tablet    NOLVADEX    90 tablet    Take 1 tablet (20 mg) by mouth daily    Infiltrating ductal carcinoma of right female breast (H), Dense breasts, Encounter for long-term (current) use of medications

## 2018-06-18 NOTE — PROGRESS NOTES
SUBJECTIVE:   Seema Johnson is a 62 year old female who presents to clinic today for the following health issues:      HPI  Right RIB      Onset: Thursday morning - 4 days ago.   Had been trying to lift stereo equipment into large garbage can. Was caught off balance and fell backward while holding it.  Did not actually strike chest wall on anything but had instant pain in lower right ribs at mid-axillary line and radiating to the back.   Pain has been waxing and waning. Some days able to go out and pull weeds. Then yesterday was horrible.  Leaning forward is more comfortable.  Reaching overhead and twisting, sitting up is terrible.  Laughing/coughing worse.   Did have painful breathing and SOB, that is better today.  Has osteoporosis and hx of compression fx  No bruising.   No N/T, weakness in hands.    Some tylenol 2 tabs in the am.     Description:   Location:  right side  Character: constant achey/ sometimes sharp pain   Radiation: on right side  Duration: constant     Intensity: mild 3/10     Progression of Symptoms:  improving    Accompanying Signs & Symptoms:  Shortness of breath: YES, yesterday and light this morning   Sweating: no   Nausea/vomiting: very light nausea yesterday   Lightheadedness: no   Palpitations: YES  Fever/Chills: no   Cough: no   Heartburn: no     History:   Family history of heart disease YES, both grandparents on mom and dad side, mother , aunts , uncles   Tobacco use: no     Precipitating factors:   Worse with exertion: YES  Worse with deep breaths :  YES  Related to food: no     Alleviating factors: Tylenol every 6 hours as needed , helped with pain     Doing reclast with rheumatology.      Saw  neurology for tremor. Has been doing bit better on propranolol. Will try to increase dose, but have been limited by fatigue.   So far fatigue on propranolol not as bad as when tried before.     Had 1st OV with - oncology in May. She was pleased and plans to  establish with her practice.     Hyperlipidemia Follow-Up    Rate your low fat/cholesterol diet?: not monitoring fat    Taking statin?  Yes, no muscle aches from statin    Other lipid medications/supplements?:  Calcium     Problem list and histories reviewed & adjusted, as indicated.  Additional history: as documented    Patient Active Problem List   Diagnosis     Mohs defect of cheek     Closed compression fracture of thoracic vertebra (H)     Closed compression fracture of first lumbar vertebra (H)     Osteoporosis     Invasive ductal carcinoma of breast, female (H)- RIGHT breast, Upper outer quadrant:  [facility]- 12/31/2015     Benign essential tremor     Hyperlipidemia LDL goal <100     Mixed urge and stress incontinence     Past Surgical History:   Procedure Laterality Date     BREAST SURGERY Right 01/2016    breast cancer       Social History   Substance Use Topics     Smoking status: Never Smoker     Smokeless tobacco: Never Used     Alcohol use Yes      Comment: socially     Family History   Problem Relation Age of Onset     Breast Cancer Sister      Diabetes No family hx of      Coronary Artery Disease No family hx of      Hypertension No family hx of      Hyperlipidemia No family hx of      Cerebrovascular Disease No family hx of      Colon Cancer No family hx of      Prostate Cancer No family hx of      Depression No family hx of      Substance Abuse No family hx of      Thyroid Disease No family hx of      Obesity No family hx of      Osteoperosis No family hx of      Anesthesia Reaction No family hx of      Asthma No family hx of      Mental Illness No family hx of      Anxiety Disorder No family hx of      Other Cancer No family hx of          Current Outpatient Prescriptions   Medication Sig Dispense Refill     methocarbamol (ROBAXIN) 750 MG tablet Take 1 tablet (750 mg) by mouth 4 times daily as needed for muscle spasms 20 tablet 0     propranolol (INDERAL) 20 MG tablet Take 1  "tablet (20 mg) by mouth 2 times daily 180 tablet 3     simvastatin (ZOCOR) 20 MG tablet Take 1 tablet (20 mg) by mouth At Bedtime 90 tablet 3     tamoxifen (NOLVADEX) 20 MG tablet Take 1 tablet (20 mg) by mouth daily 90 tablet 3     Allergies   Allergen Reactions     Compazine [Prochlorperazine] Other (See Comments)     Jittery and hyper and foggy     Codeine Sulfate Nausea     BP Readings from Last 3 Encounters:   06/18/18 106/64   06/08/18 103/65   05/29/18 101/66    Wt Readings from Last 3 Encounters:   06/18/18 130 lb (59 kg)   06/08/18 132 lb 1.6 oz (59.9 kg)   05/29/18 130 lb (59 kg)                  Labs reviewed in EPIC    ROS:  +more bloated feeling past 2 weeks. Doesn't think related to diet. Has had slower bowels and more constipation past few years that started when she started taking calcium for osteoporosis and being on tamoxifen. Uses miralax and that helps. No blood in stools. Does feel better if has a more substantial BM. No vaginal bleeding or gyn sx. No urinary pain, hematuria, frequency, urgency.   Constitutional, HEENT, cardiovascular, pulmonary, gi and gu systems are negative, except as otherwise noted.    OBJECTIVE:     /64  Pulse 63  Temp 98  F (36.7  C) (Temporal)  Resp 14  Ht 5' 4.5\" (1.638 m)  Wt 130 lb (59 kg)  SpO2 98%  BMI 21.97 kg/m2  Body mass index is 21.97 kg/(m^2).  GENERAL: healthy, alert and no distress  EYES: Eyes grossly normal to inspection, PERRL and conjunctivae and sclerae normal  NECK: no adenopathy, no asymmetry, masses, or scars and thyroid normal to palpation  RESP: breath sounds to the bases, lungs clear to auscultation - no rales, rhonchi or wheezes  CV: regular rate and rhythm, normal S1 S2, no S3 or S4, no murmur, click or rub, no peripheral edema and peripheral pulses strong  ABDOMEN: soft, nontender, no hepatosplenomegaly, no masses and bowel sounds normal  MS: focally tender right lower ribs mid-axillary line and posterior right thoracic chest wall. " No bruising, skin changes or abrasions. No step offs. No costcondral tenderness. No midline vertebral body tenderness. Pt very slow with reclining and then sitting up after exam.   NEURO: essential tremor present, less prominent since last visit. Normal strength and tone, mentation intact and speech normal  PSYCH: mentation appears normal, affect normal/bright    Diagnostic Test Results:  Xray ordered    ASSESSMENT/PLAN:     1. Rib pain on right side  Rib pain following in promper body mechanics with lifting heavy and bulk item to chest level.   Likely muscle pull. But pt does have osteoporosis and hx of compression fx.  Discussed xray, likely would not  so pt does not think she wants to pursue but will think on it and schedule if pain is not gradually improving.  She declines pain medications. Sent w/rx for muscle relaxer of needed in the evening/qhs. Precautions discussed.  Ice or heat.   Is using tyenol OTC  - XR Ribs & Chest Right G/E 3 Views; Future  - methocarbamol (ROBAXIN) 750 MG tablet; Take 1 tablet (750 mg) by mouth 4 times daily as needed for muscle spasms  Dispense: 20 tablet; Refill: 0    2. Bloating  Discussed sx and constipation w/calcium  Colonoscopy 03/2015- normal. Advised 5  Yr follow up due to fam hx  She would like to observe for now. Is leaving for [facility] for a month.   If persisting sx when she returns will pursue evaluation, imaging.   Monitor for other sx associated with the bloating.          Follow Up: For worsening or changing symptoms, non-improvement as expected/discussed, questions regarding your medications or treatment plan patient was instructed to contact the clinic. Discussed parameters for follow up and included in After Visit Summary given to patient.      Shantel Plaza PA-C  [facility]

## 2018-06-18 NOTE — TELEPHONE ENCOUNTER
Seema Johnson is a 62 year old female who calls with rib pain.    NURSING ASSESSMENT:  Description:  I spoke with pt who states she was lifting something heavy she hurt her rib. Right side, back lower rib cage. Pain gets worsse with certain movements, sitting relaxes it. Sitting relaxes it. SOB with some movements  Onset/duration:  Thursday  Precip. factors:  osteoporosis  Associated symptoms:  Going to [place] in a couple of weeks and wants to get it checked out.  Improves/worsens symptoms:  Movement including raising her arm.   Pain scale (0-10)   8-9/10 with movement 3-4/10 at rest    Allergies:   Allergies   Allergen Reactions     Compazine [Prochlorperazine] Other (See Comments)     Jittery and hyper and foggy     Codeine Sulfate Nausea     RECOMMENDED DISPOSITION:  Keep appt today  Will comply with recommendation: Yes  If further questions/concerns or if symptoms do not improve, worsen or new symptoms develop, call your PCP or [facility] as soon as possible.      Guideline used:   Telephone Triage Protocols for Nurses, Fifth Edition, Ebonie Malcolm RN

## 2018-06-18 NOTE — PATIENT INSTRUCTIONS
advil or aleve for a few days  Ice or heat  Could try muscle relaxer at bedtime or when not driving.  You were prescribed a muscle relaxer. This can make you dizzy and/or drowsy.   Do NOT drink alcohol while taking.   Try for the first time at home (ie before bed) so you know how it affects you.   Do not drive while taking if you feel dizzy or drowsy.     Consider the xray if sx are not gradually improving    To schedule your test or specialty referral please call:  [facility]:  Radiology (imaging- mammogram, ultrasound, CT, MRI): [phone]  Speciality consultation: [phone] [address] [facility] [address]     --    Monitor the bloating symptoms- if not improving when you return from [facility] or if worse- would consider imaging

## 2018-10-02 ENCOUNTER — RADIANT APPOINTMENT (OUTPATIENT)
Dept: MAMMOGRAPHY | Facility: CLINIC | Age: 63
End: 2018-10-02
Attending: INTERNAL MEDICINE
Payer: COMMERCIAL

## 2018-10-02 DIAGNOSIS — Z12.39 SCREENING BREAST EXAMINATION: ICD-10-CM

## 2018-10-02 DIAGNOSIS — R92.30 DENSE BREASTS: ICD-10-CM

## 2018-10-02 DIAGNOSIS — Z79.899 ENCOUNTER FOR LONG-TERM (CURRENT) USE OF MEDICATIONS: ICD-10-CM

## 2018-10-02 DIAGNOSIS — C50.911 INFILTRATING DUCTAL CARCINOMA OF RIGHT FEMALE BREAST (H): Chronic | ICD-10-CM

## 2018-10-02 LAB
ALBUMIN SERPL-MCNC: 3.8 G/DL (ref 3.4–5)
ALP SERPL-CCNC: 50 U/L (ref 40–150)
ALT SERPL W P-5'-P-CCNC: 25 U/L (ref 0–50)
ANION GAP SERPL CALCULATED.3IONS-SCNC: 5 MMOL/L (ref 3–14)
AST SERPL W P-5'-P-CCNC: 19 U/L (ref 0–45)
BASOPHILS # BLD AUTO: 0 10E9/L (ref 0–0.2)
BASOPHILS NFR BLD AUTO: 0.6 %
BILIRUB SERPL-MCNC: 0.4 MG/DL (ref 0.2–1.3)
BUN SERPL-MCNC: 19 MG/DL (ref 7–30)
CALCIUM SERPL-MCNC: 9 MG/DL (ref 8.5–10.1)
CHLORIDE SERPL-SCNC: 106 MMOL/L (ref 94–109)
CO2 SERPL-SCNC: 30 MMOL/L (ref 20–32)
CREAT SERPL-MCNC: 0.86 MG/DL (ref 0.52–1.04)
DIFFERENTIAL METHOD BLD: NORMAL
EOSINOPHIL # BLD AUTO: 0.1 10E9/L (ref 0–0.7)
EOSINOPHIL NFR BLD AUTO: 1.3 %
ERYTHROCYTE [DISTWIDTH] IN BLOOD BY AUTOMATED COUNT: 12.7 % (ref 10–15)
GFR SERPL CREATININE-BSD FRML MDRD: 66 ML/MIN/1.7M2
GLUCOSE SERPL-MCNC: 68 MG/DL (ref 70–99)
HCT VFR BLD AUTO: 38.2 % (ref 35–47)
HGB BLD-MCNC: 12.4 G/DL (ref 11.7–15.7)
IMM GRANULOCYTES # BLD: 0 10E9/L (ref 0–0.4)
IMM GRANULOCYTES NFR BLD: 0.2 %
LYMPHOCYTES # BLD AUTO: 1.2 10E9/L (ref 0.8–5.3)
LYMPHOCYTES NFR BLD AUTO: 22.2 %
MCH RBC QN AUTO: 30.6 PG (ref 26.5–33)
MCHC RBC AUTO-ENTMCNC: 32.5 G/DL (ref 31.5–36.5)
MCV RBC AUTO: 94 FL (ref 78–100)
MONOCYTES # BLD AUTO: 0.5 10E9/L (ref 0–1.3)
MONOCYTES NFR BLD AUTO: 8.7 %
NEUTROPHILS # BLD AUTO: 3.5 10E9/L (ref 1.6–8.3)
NEUTROPHILS NFR BLD AUTO: 67 %
PLATELET # BLD AUTO: 242 10E9/L (ref 150–450)
POTASSIUM SERPL-SCNC: 4.2 MMOL/L (ref 3.4–5.3)
PROT SERPL-MCNC: 7.2 G/DL (ref 6.8–8.8)
RBC # BLD AUTO: 4.05 10E12/L (ref 3.8–5.2)
SODIUM SERPL-SCNC: 141 MMOL/L (ref 133–144)
WBC # BLD AUTO: 5.3 10E9/L (ref 4–11)

## 2018-10-02 PROCEDURE — 77067 SCR MAMMO BI INCL CAD: CPT

## 2018-10-02 PROCEDURE — 36415 COLL VENOUS BLD VENIPUNCTURE: CPT | Performed by: INTERNAL MEDICINE

## 2018-10-02 PROCEDURE — 80053 COMPREHEN METABOLIC PANEL: CPT | Performed by: INTERNAL MEDICINE

## 2018-10-02 PROCEDURE — 85025 COMPLETE CBC W/AUTO DIFF WBC: CPT | Performed by: INTERNAL MEDICINE

## 2018-10-02 NOTE — PROGRESS NOTES
Oncology Follow-up visit:  Date on this visit: Oct 4, 2018    Oncologist: Adelita Pierre   Rheumatologist: Andreia Carranza  PCP: Shantel Plaza PA-C     DIAGNOSIS:    1. Stage II, T1B N1 M0, invasive ductal carcinoma right breast. Grade 1, ER positive in 95% of the cells, MT negative and HER2/lex negative.   She is s/p lumpectomy with sentinel lymph node evaluation on January 21, 2016.    She was identified to have a 7 mm breast cancer with an intramammary lymph node positivity and a sentinel lymph node positivity. One of two sentinel lymph nodes was positive with the tumor size being 0.3 cm and an intramammary lymph node was positive as well. All margins completely clear with invasive margin being 0.3 cm. There was some background DCIS and that margin was also clear, but less than 0.1 cm. The tumor was grade 1, strongly ER positive in 95% of the cells, MT negative ( stained in less than 1% of the cells) and HER2/lex negative.   Pathology reviewed at [facility] by Dr. Huang.  She has completed adjuvant chemotherapy with dose dense Taxol IV q 2 weeks x 4 cycles, March 18th- April 28, 2016, followed by dose dense AC x 4 cycles, May 12- June 23, 2016.  She has received adjuvant radiation therapy from 7/18/16- 8/26/16. She has started on Tamoxifen mid Oct 2016.  She was under the care of Dr. Iyer but transferrred her care to  in May 2018 due to insurance change.      2. BREAST NEXT genetic testing panel was negative    3. Osteoporosis- She has had compression fractures of T11 and T12. She has been on annual Reclast infusions with Dr. Boo, in the fall usually. She is on calcium and vitamin D supplementation.  DEXA in March 2015: Osteopenia. T score -2.1  DEXA in March 2017: Osteopenia. T score -1.8    History Of Present Illness:  Ms. Johnson is a 62 year old female who presents for evaluation and continued care of Stage II, T1B N1 M0, invasive ductal carcinoma right breast, grade 1, ER positive  in 95% of the cells, ID negative and HER2/lex negative by IHC (1+).   She remains on Tamoxifen. She is tolerating it well, with the exception of moderate hot flashes. She was not interested in any pharmacotherapy for hot flashes. She is staying very active, walking marathons, and an active skier in the winter.  She had her last Reclast infusion yesterday. She is on calcium and vitamin D supplementation.  Her last bilateral screening mammogram was on 10/2/2018 and showed no suspicious findings.  She has a baseline benign tremor. She has no other health related complaints. Weight has been stable. She is pain free today. She leaves for [place] later this month and will spend the winter there.   She has had chronic lower back pain secondary to compression fractures, relieves by Tylenol. This is unchanged and she rates her pain today at 5/10.   In addition, a complete 12 point  review of systems is negative.      Past Medical/Surgical History:  Past Medical History:   Diagnosis Date     Basal cell cancer      Mohs defect of nose 06/2012     Past Surgical History:   Procedure Laterality Date     BREAST SURGERY Right 01/2016    breast cancer   Osteoporosis.   Benign essential tremor.   She has mild hyperlipidemia.   She has had multiple breast biopsies in the past, three on the right, three on the left.     Allergies:  Allergies as of 10/04/2018 - Wes as Reviewed 06/18/2018   Allergen Reaction Noted     Compazine [prochlorperazine] Other (See Comments) 03/31/2016     Codeine sulfate Nausea 10/15/2012     Current Medications:  Current Outpatient Prescriptions   Medication Sig Dispense Refill     methocarbamol (ROBAXIN) 750 MG tablet Take 1 tablet (750 mg) by mouth 4 times daily as needed for muscle spasms 20 tablet 0     propranolol (INDERAL) 20 MG tablet Take 1 tablet (20 mg) by mouth 2 times daily 180 tablet 3     simvastatin (ZOCOR) 20 MG tablet Take 1 tablet (20 mg) by mouth At Bedtime 90 tablet 3     tamoxifen  "(NOLVADEX) 20 MG tablet Take 1 tablet (20 mg) by mouth daily 90 tablet 3      Family History:   Mom side family members have had a lot of heart disease. Sister had ductal carcinoma in situ. Another sister had uterine fibroids. Patient's father had brain cancer. On the paternal side, paternal grandmother had colon cancer, an aunt had breast cancer.  Seema underwent genetic testing and her  BREAST NEXT genetic testing panel was negative.      Social History:  Social History     Social History     Marital status:      Social History Main Topics     Smoking status: Never Smoker     Smokeless tobacco: Never Used     Alcohol use Yes      Comment: socially      She is , has one daughter in her 30s and a grandchild. Does not smoke. Takes alcohol socially when there is a dinner party.The patient is extremely active. She is an avid skier. She likes to travel a lot. They spent phelps in [place].     Physical Exam:  /79  Pulse 53  Temp 97.6  F (36.4  C)  Resp 16  Ht 1.638 m (5' 4.5\")  Wt 57.6 kg (127 lb 0.9 oz)  SpO2 99%  BMI 21.47 kg/m2        GENERAL APPEARANCE: healthy, alert and no distress     HENT: Mouth without ulcers or lesions     NECK: no adenopathy, no asymmetry or masses     LYMPHATICS: No cervical, supraclavicular, axillary  lymphadenopathy     RESP: lungs clear to auscultation - no rales, rhonchi or wheezes     CARDIOVASCULAR: regular rates and rhythm, normal S1 S2, no S3 or S4 and no murmur.     ABDOMEN:  soft, nontender, no HSM or masses and bowel sounds normal     MUSCULOSKELETAL: extremities normal- no gross deformities noted, no evidence of inflammation in joints, FROM in all extremities. No edema b/l LE.     SKIN: no suspicious lesions or rashes     PSYCHIATRIC: mentation appears normal and affect normal  Neuro: slight head tremor at rest. Non-focal exam otherwise. Axox3  Chest: No breast masses b/l and no axillary lymphadenopathy b/l. Incisions from right lumpectomy, R ASLN " biopsy healed well. Other incisions from port-a-cath and prior breast biopsies healed well.    Laboratory/Imaging Studies  Results for orders placed or performed in visit on 10/02/18   CBC with platelets differential   Result Value Ref Range    WBC 5.3 4.0 - 11.0 10e9/L    RBC Count 4.05 3.8 - 5.2 10e12/L    Hemoglobin 12.4 11.7 - 15.7 g/dL    Hematocrit 38.2 35.0 - 47.0 %    MCV 94 78 - 100 fl    MCH 30.6 26.5 - 33.0 pg    MCHC 32.5 31.5 - 36.5 g/dL    RDW 12.7 10.0 - 15.0 %    Platelet Count 242 150 - 450 10e9/L    % Neutrophils 67.0 %    % Lymphocytes 22.2 %    % Monocytes 8.7 %    % Eosinophils 1.3 %    % Basophils 0.6 %    % Immature Granulocytes 0.2 %    Absolute Neutrophil 3.5 1.6 - 8.3 10e9/L    Absolute Lymphocytes 1.2 0.8 - 5.3 10e9/L    Absolute Monocytes 0.5 0.0 - 1.3 10e9/L    Absolute Eosinophils 0.1 0.0 - 0.7 10e9/L    Absolute Basophils 0.0 0.0 - 0.2 10e9/L    Abs Immature Granulocytes 0.0 0 - 0.4 10e9/L    Diff Method Automated Method    Comprehensive metabolic panel   Result Value Ref Range    Sodium 141 133 - 144 mmol/L    Potassium 4.2 3.4 - 5.3 mmol/L    Chloride 106 94 - 109 mmol/L    Carbon Dioxide 30 20 - 32 mmol/L    Anion Gap 5 3 - 14 mmol/L    Glucose 68 (L) 70 - 99 mg/dL    Urea Nitrogen 19 7 - 30 mg/dL    Creatinine 0.86 0.52 - 1.04 mg/dL    GFR Estimate 66 >60 mL/min/1.7m2    GFR Estimate If Black 80 >60 mL/min/1.7m2    Calcium 9.0 8.5 - 10.1 mg/dL    Bilirubin Total 0.4 0.2 - 1.3 mg/dL    Albumin 3.8 3.4 - 5.0 g/dL    Protein Total 7.2 6.8 - 8.8 g/dL    Alkaline Phosphatase 50 40 - 150 U/L    ALT 25 0 - 50 U/L    AST 19 0 - 45 U/L     ASSESSMENT/PLAN:  Seema is a very pleasant 62 year old woman with Stage II (pT1b N1(sn)cMo), invasive ductal carcinoma right breast,  Grade 1, ER positive in 95% of the cells, CT negative and HER2/lex negative. She is s/p lumpectomy with sentinel lymph node evaluation on January 21, 2016. She has completed adjuvant chemotherapy with dose dense Taxol IV q  2 weeks x 4 cycles, March 18th- April 28, 2016, followed by dose dense AC x 4 cycles, May 12- June 23, 2016.  She has received adjuvant radiation therapy from 7/18/16- 8/26/16. She has been on Tamoxifen mid Oct 2016.  ECOG PS 0.    1. Stage II breast cancer- continue Tamoxifen for at least 5 years. Given Hx of osteoporosis with compression fractures (although most recently bone mineral density has been improving on Tamoxifen, Reclast, vitamin D and calcium and exercise), we'll plan either 10 years of Tamoxifen or per recent [facility] data from 2017 -5 years of tamoxifen followed by 2 years of an aromatase inhibitor (AI).  For now, she will continue on Tamoxifen and we'll plan to see her back in 6 months, with CMP. She was reminded to have an annual gynecologist exam while she remains on Tamoxifen.    2. Spontaneous (and traumatic) fractures in her spine and osteopenia on DEXA scan. This is c/w osteoporosis.  She is on yearly Reclast with Dr. Boo, last dose October 2018.    3. Breast cancer screening-  annual screening mammogram negative on October 2, 2018. Patient states she will go on medicare in the next year and will see if a mammogram with tomosynthesis will be covered in the future which is preferred in her situation, given extremely dense breast tissue reported on the most recent mammogram.     4. Hot flashes- declines pharmacotherapy. Exercises regularly.  5. Hypoglycemia- borderline. Pt reports this has happened in the past too. We'll repeat BG with next visit.   At the end of our visit patient verbalized understanding and concurred with the plan.

## 2018-10-04 ENCOUNTER — ONCOLOGY VISIT (OUTPATIENT)
Dept: ONCOLOGY | Facility: CLINIC | Age: 63
End: 2018-10-04
Payer: COMMERCIAL

## 2018-10-04 VITALS
RESPIRATION RATE: 16 BRPM | DIASTOLIC BLOOD PRESSURE: 79 MMHG | HEIGHT: 65 IN | BODY MASS INDEX: 21.17 KG/M2 | SYSTOLIC BLOOD PRESSURE: 120 MMHG | TEMPERATURE: 97.6 F | HEART RATE: 53 BPM | OXYGEN SATURATION: 99 % | WEIGHT: 127.06 LBS

## 2018-10-04 DIAGNOSIS — C50.911 INFILTRATING DUCTAL CARCINOMA OF RIGHT FEMALE BREAST (H): Primary | Chronic | ICD-10-CM

## 2018-10-04 PROCEDURE — 99214 OFFICE O/P EST MOD 30 MIN: CPT | Performed by: INTERNAL MEDICINE

## 2018-10-04 ASSESSMENT — PAIN SCALES - GENERAL: PAINLEVEL: MODERATE PAIN (5)

## 2018-10-04 NOTE — LETTER
10/4/2018         RE: Seema Johnson  [address]  [facility] [address]        Dear Colleague,    Thank you for referring your patient, Seema Johnson, to the [facility]. Please see a copy of my visit note below.    Oncology Follow-up visit:  Date on this visit: Oct 4, 2018    Oncologist: Adelita Pierre   Rheumatologist: Andreia Carranza  PCP: Shantel Plaza PA-C     DIAGNOSIS:    1. Stage II, T1B N1 M0, invasive ductal carcinoma right breast. Grade 1, ER positive in 95% of the cells, [place] negative and HER2/lex negative.   She is s/p lumpectomy with sentinel lymph node evaluation on January 21, 2016.    She was identified to have a 7 mm breast cancer with an intramammary lymph node positivity and a sentinel lymph node positivity. One of two sentinel lymph nodes was positive with the tumor size being 0.3 cm and an intramammary lymph node was positive as well. All margins completely clear with invasive margin being 0.3 cm. There was some background DCIS and that margin was also clear, but less than 0.1 cm. The tumor was grade 1, strongly ER positive in 95% of the cells, IN negative ( stained in less than 1% of the cells) and HER2/lex negative.   Pathology reviewed at [facility],  by Dr. Huang.  She has completed adjuvant chemotherapy with dose dense Taxol IV q 2 weeks x 4 cycles, March 18th- April 28, 2016, followed by dose dense AC x 4 cycles, May 12- June 23, 2016.  She has received adjuvant radiation therapy from 7/18/16- 8/26/16. She has started on Tamoxifen mid Oct 2016.  She was under the care of Dr. Iyer but transferrred her care to  in May 2018 due to insurance change.      2. BREAST NEXT genetic testing panel was negative    3. Osteoporosis- She has had compression fractures of T11 and T12. She has been on annual Reclast infusions with Dr. Boo, in the fall usually. She is on calcium and vitamin D supplementation.  DEXA in March 2015: Osteopenia. T  score -2.1  DEXA in March 2017: Osteopenia. T score -1.8    History Of Present Illness:  Ms. Johnson is a 62 year old female who presents for evaluation and continued care of Stage II, T1B N1 M0, invasive ductal carcinoma right breast, grade 1, ER positive in 95% of the cells, [place] negative and HER2/lex negative by IHC (1+).   She remains on Tamoxifen. She is tolerating it well, with the exception of moderate hot flashes. She was not interested in any pharmacotherapy for hot flashes. She is staying very active, walking marathons, and an active skier in the winter.  She had her last Reclast infusion yesterday. She is on calcium and vitamin D supplementation.  Her last bilateral screening mammogram was on 10/2/2018 and showed no suspicious findings.  She has a baseline benign tremor. She has no other health related complaints. Weight has been stable. She is pain free today. She leaves for [place] later this month and will spend the winter there.   She has had chronic lower back pain secondary to compression fractures, relieves by Tylenol. This is unchanged and she rates her pain today at 5/10.   In addition, a complete 12 point  review of systems is negative.      Past Medical/Surgical History:  Past Medical History:   Diagnosis Date     Basal cell cancer      Mohs defect of nose 06/2012     Past Surgical History:   Procedure Laterality Date     BREAST SURGERY Right 01/2016    breast cancer   Osteoporosis.   Benign essential tremor.   She has mild hyperlipidemia.   She has had multiple breast biopsies in the past, three on the right, three on the left.     Allergies:  Allergies as of 10/04/2018 - Wes as Reviewed 06/18/2018   Allergen Reaction Noted     Compazine [prochlorperazine] Other (See Comments) 03/31/2016     Codeine sulfate Nausea 10/15/2012     Current Medications:  Current Outpatient Prescriptions   Medication Sig Dispense Refill     methocarbamol (ROBAXIN) 750 MG tablet Take 1 tablet (750 mg) by mouth 4  "times daily as needed for muscle spasms 20 tablet 0     propranolol (INDERAL) 20 MG tablet Take 1 tablet (20 mg) by mouth 2 times daily 180 tablet 3     simvastatin (ZOCOR) 20 MG tablet Take 1 tablet (20 mg) by mouth At Bedtime 90 tablet 3     tamoxifen (NOLVADEX) 20 MG tablet Take 1 tablet (20 mg) by mouth daily 90 tablet 3      Family History:   Mom side family members have had a lot of heart disease. Sister had ductal carcinoma in situ. Another sister had uterine fibroids. Patient's father had brain cancer. On the paternal side, paternal grandmother had colon cancer, an aunt had breast cancer.  Seema underwent genetic testing and her  BREAST NEXT genetic testing panel was negative.      Social History:  Social History     Social History     Marital status:      Social History Main Topics     Smoking status: Never Smoker     Smokeless tobacco: Never Used     Alcohol use Yes      Comment: socially      She is , has one daughter in her 30s and a grandchild. Does not smoke. Takes alcohol socially when there is a dinner party.The patient is extremely active. She is an avid skier. She likes to travel a lot. They spent phelps in [place].     Physical Exam:  /79  Pulse 53  Temp 97.6  F (36.4  C)  Resp 16  Ht 1.638 m (5' 4.5\")  Wt 57.6 kg (127 lb 0.9 oz)  SpO2 99%  BMI 21.47 kg/m2        GENERAL APPEARANCE: healthy, alert and no distress     HENT: Mouth without ulcers or lesions     NECK: no adenopathy, no asymmetry or masses     LYMPHATICS: No cervical, supraclavicular, axillary  lymphadenopathy     RESP: lungs clear to auscultation - no rales, rhonchi or wheezes     CARDIOVASCULAR: regular rates and rhythm, normal S1 S2, no S3 or S4 and no murmur.     ABDOMEN:  soft, nontender, no HSM or masses and bowel sounds normal     MUSCULOSKELETAL: extremities normal- no gross deformities noted, no evidence of inflammation in joints, FROM in all extremities. No edema b/l LE.     SKIN: no suspicious " lesions or rashes     PSYCHIATRIC: mentation appears normal and affect normal  Neuro: slight head tremor at rest. Non-focal exam otherwise. Axox3  Chest: No breast masses b/l and no axillary lymphadenopathy b/l. Incisions from right lumpectomy, R ASLN biopsy healed well. Other incisions from port-a-cath and prior breast biopsies healed well.    Laboratory/Imaging Studies  Results for orders placed or performed in visit on 10/02/18   CBC with platelets differential   Result Value Ref Range    WBC 5.3 4.0 - 11.0 10e9/L    RBC Count 4.05 3.8 - 5.2 10e12/L    Hemoglobin 12.4 11.7 - 15.7 g/dL    Hematocrit 38.2 35.0 - 47.0 %    MCV 94 78 - 100 fl    MCH 30.6 26.5 - 33.0 pg    MCHC 32.5 31.5 - 36.5 g/dL    RDW 12.7 10.0 - 15.0 %    Platelet Count 242 150 - 450 10e9/L    % Neutrophils 67.0 %    % Lymphocytes 22.2 %    % Monocytes 8.7 %    % Eosinophils 1.3 %    % Basophils 0.6 %    % Immature Granulocytes 0.2 %    Absolute Neutrophil 3.5 1.6 - 8.3 10e9/L    Absolute Lymphocytes 1.2 0.8 - 5.3 10e9/L    Absolute Monocytes 0.5 0.0 - 1.3 10e9/L    Absolute Eosinophils 0.1 0.0 - 0.7 10e9/L    Absolute Basophils 0.0 0.0 - 0.2 10e9/L    Abs Immature Granulocytes 0.0 0 - 0.4 10e9/L    Diff Method Automated Method    Comprehensive metabolic panel   Result Value Ref Range    Sodium 141 133 - 144 mmol/L    Potassium 4.2 3.4 - 5.3 mmol/L    Chloride 106 94 - 109 mmol/L    Carbon Dioxide 30 20 - 32 mmol/L    Anion Gap 5 3 - 14 mmol/L    Glucose 68 (L) 70 - 99 mg/dL    Urea Nitrogen 19 7 - 30 mg/dL    Creatinine 0.86 0.52 - 1.04 mg/dL    GFR Estimate 66 >60 mL/min/1.7m2    GFR Estimate If Black 80 >60 mL/min/1.7m2    Calcium 9.0 8.5 - 10.1 mg/dL    Bilirubin Total 0.4 0.2 - 1.3 mg/dL    Albumin 3.8 3.4 - 5.0 g/dL    Protein Total 7.2 6.8 - 8.8 g/dL    Alkaline Phosphatase 50 40 - 150 U/L    ALT 25 0 - 50 U/L    AST 19 0 - 45 U/L     ASSESSMENT/PLAN:  Seema is a very pleasant 62 year old woman with Stage II (pT1b N1(sn)cMo), invasive  ductal carcinoma right breast,  Grade 1, ER positive in 95% of the cells, [place] negative and HER2/lex negative. She is s/p lumpectomy with sentinel lymph node evaluation on January 21, 2016. She has completed adjuvant chemotherapy with dose dense Taxol IV q 2 weeks x 4 cycles, March 18th- April 28, 2016, followed by dose dense AC x 4 cycles, May 12- June 23, 2016.  She has received adjuvant radiation therapy from 7/18/16- 8/26/16. She has been on Tamoxifen mid Oct 2016.  ECOG PS 0.    1. Stage II breast cancer- continue Tamoxifen for at least 5 years. Given Hx of osteoporosis with compression fractures (although most recently bone mineral density has been improving on Tamoxifen, Reclast, vitamin D and calcium and exercise), we'll plan either 10 years of Tamoxifen or per recent [facility] data from 2017 -5 years of tamoxifen followed by 2 years of an aromatase inhibitor (AI).  For now, she will continue on Tamoxifen and we'll plan to see her back in 6 months, with CMP. She was reminded to have an annual gynecologist exam while she remains on Tamoxifen.    2. Spontaneous (and traumatic) fractures in her spine and osteopenia on DEXA scan. This is c/w osteoporosis.  She is on yearly Reclast with Dr. Boo, last dose October 2018.    3. Breast cancer screening-  annual screening mammogram negative on October 2, 2018. Patient states she will go on medicare in the next year and will see if a mammogram with tomosynthesis will be covered in the future which is preferred in her situation, given extremely dense breast tissue reported on the most recent mammogram.     4. Hot flashes- declines pharmacotherapy. Exercises regularly.  5. Hypoglycemia- borderline. Pt reports this has happened in the past too. We'll repeat BG with next visit.   At the end of our visit patient verbalized understanding and concurred with the plan.        Again, thank you for allowing me to participate in the care of your patient.         Sincerely,        Meghan Haynes MD, MD

## 2018-10-04 NOTE — MR AVS SNAPSHOT
After Visit Summary   10/4/2018    Seema Johnson    MRN: [id number]           Patient Information     Date Of Birth          1955        Visit Information        Provider Department      10/4/2018 12:30 PM Meghan Haynes MD [facility]        Today's Diagnoses     Infiltrating ductal carcinoma of right female breast (H)    -  1       Follow-ups after your visit        Future tests that were ordered for you today     Open Future Orders        Priority Expected Expires Ordered    Comprehensive metabolic panel Routine  10/4/2019 10/4/2018            Who to contact     If you have questions or need follow up information about today's clinic visit or your schedule please contact [facility] directly at [phone].  Normal or non-critical lab and imaging results will be communicated to you by MyChart, letter or phone within 4 business days after the clinic has received the results. If you do not hear from us within 7 days, please contact the clinic through MyChart or phone. If you have a critical or abnormal lab result, we will notify you by phone as soon as possible.  Submit refill requests through Mashery or call your pharmacy and they will forward the refill request to us. Please allow 3 business days for your refill to be completed.          Additional Information About Your Visit        MyChart Information     Mashery is an electronic gateway that provides easy, online access to your medical records. With Mashery, you can request a clinic appointment, read your test results, renew a prescription or communicate with your care team.     To sign up for Mashery visit the website at [email or web address]   You will be asked to enter the access code listed below, as well as some personal information. Please follow the directions to create your username and password.     Your access code is: [email or web address]  Expires: 1/2/2019 12:58 PM     Your access code  "will  in 90 days. If you need help or a new code, please contact your [facility] or call [phone] for assistance.        Care EveryWhere ID     This is your Care EveryWhere ID. This could be used by other organizations to access your [place] medical records  [id number]        Your Vitals Were     Pulse Temperature Respirations Height Pulse Oximetry BMI (Body Mass Index)    53 97.6  F (36.4  C) 16 1.638 m (5' 4.5\") 99% 21.47 kg/m2       Blood Pressure from Last 3 Encounters:   10/04/18 120/79   18 106/64   18 103/65    Weight from Last 3 Encounters:   10/04/18 57.6 kg (127 lb 0.9 oz)   18 59 kg (130 lb)   18 59.9 kg (132 lb 1.6 oz)               Primary Care Provider Office Phone # Fax #    [facility] [phone]       [address]  [facility] [address]        Equal Access to Services     DAVID HERCULES : Hadii elie ku hadasho Soomaali, waaxda luqadaha, qaybta kaalmada adeangelica, divina johnson . So [facility] [phone].    ATENCIÓN: Si habla español, tiene a pereira disposición servicios gratuitos de asistencia lingüística. [facility] [phone].    We comply with applicable federal civil rights laws and [place] laws. We do not discriminate on the basis of race, color, national origin, age, disability, sex, sexual orientation, or gender identity.            Thank you!     Thank you for choosing [facility]  for your care. Our goal is always to provide you with excellent care. Hearing back from our patients is one way we can continue to improve our services. Please take a few minutes to complete the written survey that you may receive in the mail after your visit with us. Thank you!             Your Updated Medication List - Protect others around you: Learn how to safely use, store and throw away your medicines at www.disposemymeds.org.          This list is accurate as of " 10/4/18 12:58 PM.  Always use your most recent med list.                   Brand Name Dispense Instructions for use Diagnosis    methocarbamol 750 MG tablet    ROBAXIN    20 tablet    Take 1 tablet (750 mg) by mouth 4 times daily as needed for muscle spasms    Rib pain on right side       propranolol 20 MG tablet    INDERAL    180 tablet    Take 1 tablet (20 mg) by mouth 2 times daily    Benign essential tremor       simvastatin 20 MG tablet    ZOCOR    90 tablet    Take 1 tablet (20 mg) by mouth At Bedtime    Hyperlipidemia LDL goal <100       tamoxifen 20 MG tablet    NOLVADEX    90 tablet    Take 1 tablet (20 mg) by mouth daily    Infiltrating ductal carcinoma of right female breast (H), Dense breasts, Encounter for long-term (current) use of medications

## 2018-10-04 NOTE — NURSING NOTE
"Oncology Rooming Note    October 4, 2018 12:33 PM   Seema Johnson is a 62 year old female who presents for:    Chief Complaint   Patient presents with     Oncology Clinic Visit     6 month follow up     Initial Vitals: /79  Pulse 53  Temp 97.6  F (36.4  C)  Resp 16  Ht 1.638 m (5' 4.5\")  Wt 57.6 kg (127 lb 0.9 oz)  SpO2 99%  BMI 21.47 kg/m2 Estimated body mass index is 21.47 kg/(m^2) as calculated from the following:    Height as of this encounter: 1.638 m (5' 4.5\").    Weight as of this encounter: 57.6 kg (127 lb 0.9 oz). Body surface area is 1.62 meters squared.  Moderate Pain (5) Comment: tylenol   No LMP recorded. Patient is postmenopausal.  Allergies reviewed: Yes  Medications reviewed: Yes    Medications: Medication refills not needed today.  Pharmacy name entered into [facility]:    [id number] - [place], [place] - [address] [facility]/PHARMACY #[id number] - [facility] [address] [facility] # [id number] - [facility] [address] [address]  [facility] [id number] - [facility] [address] [facility]         5 minutes for nursing intake (face to face time)     Rosita Kingsley LPN              "

## 2018-10-05 ENCOUNTER — ALLIED HEALTH/NURSE VISIT (OUTPATIENT)
Dept: FAMILY MEDICINE | Facility: CLINIC | Age: 63
End: 2018-10-05
Payer: COMMERCIAL

## 2018-10-05 DIAGNOSIS — Z23 NEED FOR PROPHYLACTIC VACCINATION AND INOCULATION AGAINST INFLUENZA: Primary | ICD-10-CM

## 2018-10-05 PROCEDURE — 99207 ZZC NO CHARGE NURSE ONLY: CPT

## 2018-10-05 PROCEDURE — 90471 IMMUNIZATION ADMIN: CPT

## 2018-10-05 PROCEDURE — 90686 IIV4 VACC NO PRSV 0.5 ML IM: CPT

## 2018-10-05 NOTE — MR AVS SNAPSHOT
After Visit Summary   10/5/2018    Seema Johnson    MRN: [id number]           Patient Information     Date Of Birth          1955        Visit Information        Provider Department      10/5/2018 9:30 AM [facility] 1 [facility]        Today's Diagnoses     Need for prophylactic vaccination and inoculation against influenza    -  1       Follow-ups after your visit        Your next 10 appointments already scheduled     Oct 05, 2018  9:30 AM CDT   [facility]   [facility] ([facility])    [address], [address]  [place] [id number] [phone]            Apr 04, 2019 11:45 AM CDT   LAB with LAB ONC [facility] ([facility])    [address] [place] [address] [phone]           Please do not eat 10-12 hours before your appointment if you are coming in fasting for labs on lipids, cholesterol, or glucose (sugar). This does not apply to pregnant women. Water, hot tea and black coffee (with nothing added) are okay. Do not drink other fluids, diet soda or chew gum.            Apr 04, 2019 12:30 PM CDT   Return Visit with Meghan Haynes MD   [facility] ([facility])    [address] [place] [address] [phone]              Future tests that were ordered for you today     Open Future Orders        Priority Expected Expires Ordered    Comprehensive metabolic panel Routine  10/4/2019 10/4/2018            Who to contact     If you have questions or need follow up information about today's clinic visit or your schedule please contact [facility] directly at [phone].  Normal or non-critical lab and imaging results will be communicated to you by MyChart, letter or phone within 4 business days after the clinic has received the results. If you do not hear from us within 7 days, please contact the clinic through  "BevyUphart or phone. If you have a critical or abnormal lab result, we will notify you by phone as soon as possible.  Submit refill requests through AdviceIQ or call your pharmacy and they will forward the refill request to us. Please allow 3 business days for your refill to be completed.          Additional Information About Your Visit        BevyUpharYieldr Information     AdviceIQ lets you send messages to your doctor, view your test results, renew your prescriptions, schedule appointments and more. To sign up, go to [email or web address] . Click on \"Log in\" on the left side of the screen, which will take you to the Welcome page. Then click on \"Sign up Now\" on the right side of the page.     You will be asked to enter the access code listed below, as well as some personal information. Please follow the directions to create your username and password.     Your access code is: [email or web address]  Expires: 2019 12:58 PM     Your access code will  in 90 days. If you need help or a new code, please call your [place] clinic or [phone].        Care EveryWhere ID     This is your Care EveryWhere ID. This could be used by other organizations to access your [place] medical records  [id number]         Blood Pressure from Last 3 Encounters:   10/04/18 120/79   18 106/64   18 103/65    Weight from Last 3 Encounters:   10/04/18 127 lb 0.9 oz (57.6 kg)   18 130 lb (59 kg)   18 132 lb 1.6 oz (59.9 kg)              We Performed the Following     FLU VACCINE, SPLIT VIRUS, IM (QUADRIVALENT) [09157]- >3 YRS     Vaccine Administration, Initial [64280]        Primary Care Provider Office Phone # Fax #    [facility] [phone] [address]  [facility] [address]        Equal Access to Services     ROCK HERCULES : Renetta House, pancho ozuna, divina govea. Formerly [facility] [phone].    ATENCIÓN: " Si dwight guidry, tiene a pereira disposición servicios gratuitos de asistencia lingüística. Reilly holbrook [phone].    We comply with applicable federal civil rights laws and [place] laws. We do not discriminate on the basis of race, color, national origin, age, disability, sex, sexual orientation, or gender identity.            Thank you!     Thank you for choosing [facility]  for your care. Our goal is always to provide you with excellent care. Hearing back from our patients is one way we can continue to improve our services. Please take a few minutes to complete the written survey that you may receive in the mail after your visit with us. Thank you!             Your Updated Medication List - Protect others around you: Learn how to safely use, store and throw away your medicines at www.disposemymeds.org.          This list is accurate as of 10/5/18  9:18 AM.  Always use your most recent med list.                   Brand Name Dispense Instructions for use Diagnosis    methocarbamol 750 MG tablet    ROBAXIN    20 tablet    Take 1 tablet (750 mg) by mouth 4 times daily as needed for muscle spasms    Rib pain on right side       propranolol 20 MG tablet    INDERAL    180 tablet    Take 1 tablet (20 mg) by mouth 2 times daily    Benign essential tremor       simvastatin 20 MG tablet    ZOCOR    90 tablet    Take 1 tablet (20 mg) by mouth At Bedtime    Hyperlipidemia LDL goal <100       tamoxifen 20 MG tablet    NOLVADEX    90 tablet    Take 1 tablet (20 mg) by mouth daily    Infiltrating ductal carcinoma of right female breast (H), Dense breasts, Encounter for long-term (current) use of medications

## 2018-10-05 NOTE — PROGRESS NOTES

## 2019-04-03 ENCOUNTER — TELEPHONE (OUTPATIENT)
Dept: FAMILY MEDICINE | Facility: CLINIC | Age: 64
End: 2019-04-03

## 2019-04-03 DIAGNOSIS — G25.0 BENIGN ESSENTIAL TREMOR: ICD-10-CM

## 2019-04-03 DIAGNOSIS — E78.5 HYPERLIPIDEMIA LDL GOAL <100: ICD-10-CM

## 2019-04-03 NOTE — TELEPHONE ENCOUNTER
Please let patient know she needs to call her insurance and ask them what they need her to do and if they would even cover it.  Then let us know what they need.  The insurance may even want to fax the clinic a specific form.    Albert Tan RN, BSN

## 2019-04-03 NOTE — TELEPHONE ENCOUNTER
Reason for Call:  Medication or medication refill:    Do you use a [facility]?  Name of the pharmacy and phone number for the current request:  [facility] [place]    Name of the medication requested: Simvastatin    Other request: Patient is leaving the country [place] 5 months and would like to know what she has to do so she can get this quantity    Can we leave a detailed message on this number? YES    Phone number patient can be reached at: Home number on file [phone] (home)    Best Time: anytime    Call taken on 4/3/2019 at 10:45 AM by Adrien Cui

## 2019-04-08 ENCOUNTER — ALLIED HEALTH/NURSE VISIT (OUTPATIENT)
Dept: FAMILY MEDICINE | Facility: CLINIC | Age: 64
End: 2019-04-08
Payer: COMMERCIAL

## 2019-04-08 DIAGNOSIS — Z23 ENCOUNTER FOR ADMINISTRATION OF VACCINE: Primary | ICD-10-CM

## 2019-04-08 PROCEDURE — 90471 IMMUNIZATION ADMIN: CPT

## 2019-04-08 PROCEDURE — 90750 HZV VACC RECOMBINANT IM: CPT

## 2019-04-08 PROCEDURE — 99207 ZZC NO CHARGE NURSE ONLY: CPT

## 2019-04-08 RX ORDER — PROPRANOLOL HYDROCHLORIDE 20 MG/1
20 TABLET ORAL 2 TIMES DAILY
Qty: 360 TABLET | Refills: 1 | Status: SHIPPED | OUTPATIENT
Start: 2019-04-08 | End: 2019-10-03 | Stop reason: DRUGHIGH

## 2019-04-08 RX ORDER — SIMVASTATIN 20 MG
20 TABLET ORAL AT BEDTIME
Qty: 180 TABLET | Refills: 1 | Status: SHIPPED | OUTPATIENT
Start: 2019-04-08 | End: 2020-06-16

## 2019-04-08 NOTE — NURSING NOTE
Screening Questionnaire for Adult Immunization    Are you sick today?   No   Do you have allergies to medications, food, a vaccine component or latex?   No   Have you ever had a serious reaction after receiving a vaccination?   No   Do you have a long-term health problem with heart disease, lung disease, asthma, kidney disease, metabolic disease (e.g. diabetes), anemia, or other blood disorder?   No   Do you have cancer, leukemia, HIV/AIDS, or any other immune system problem?   No   In the past 3 months, have you taken medications that affect  your immune system, such as prednisone, other steroids, or anticancer drugs; drugs for the treatment of rheumatoid arthritis, Crohn s disease, or psoriasis; or have you had radiation treatments?   No   Have you had a seizure, or a brain or other nervous system problem?   No   During the past year, have you received a transfusion of blood or blood     products, or been given immune (gamma) globulin or antiviral drug?   No   For women: Are you pregnant or is there a chance you could become        pregnant during the next month?   No   Have you received any vaccinations in the past 4 weeks?   No     Immunization questionnaire answers were all negative.        Per orders of Shantel Plaza PA-C, injection of Shingrix given by Shaila Mackenzie. Patient instructed to remain in clinic for 15 minutes afterwards, and to report any adverse reaction to me immediately.       Screening performed by Shaila Mackenzie on 4/8/2019 at 11:25 AM.

## 2019-04-11 ENCOUNTER — ONCOLOGY VISIT (OUTPATIENT)
Dept: ONCOLOGY | Facility: CLINIC | Age: 64
End: 2019-04-11
Payer: COMMERCIAL

## 2019-04-11 VITALS
RESPIRATION RATE: 16 BRPM | SYSTOLIC BLOOD PRESSURE: 98 MMHG | DIASTOLIC BLOOD PRESSURE: 64 MMHG | HEIGHT: 65 IN | OXYGEN SATURATION: 99 % | BODY MASS INDEX: 22.39 KG/M2 | TEMPERATURE: 97.8 F | HEART RATE: 61 BPM | WEIGHT: 134.38 LBS

## 2019-04-11 DIAGNOSIS — Z12.31 VISIT FOR SCREENING MAMMOGRAM: Primary | ICD-10-CM

## 2019-04-11 DIAGNOSIS — Z79.899 ENCOUNTER FOR LONG-TERM (CURRENT) USE OF MEDICATIONS: ICD-10-CM

## 2019-04-11 DIAGNOSIS — C50.911 INFILTRATING DUCTAL CARCINOMA OF RIGHT FEMALE BREAST (H): Chronic | ICD-10-CM

## 2019-04-11 DIAGNOSIS — R92.30 DENSE BREASTS: ICD-10-CM

## 2019-04-11 DIAGNOSIS — G25.0 BENIGN ESSENTIAL TREMOR: ICD-10-CM

## 2019-04-11 LAB
ALBUMIN SERPL-MCNC: 4 G/DL (ref 3.4–5)
ALP SERPL-CCNC: 45 U/L (ref 40–150)
ALT SERPL W P-5'-P-CCNC: 23 U/L (ref 0–50)
ANION GAP SERPL CALCULATED.3IONS-SCNC: 7 MMOL/L (ref 3–14)
AST SERPL W P-5'-P-CCNC: 20 U/L (ref 0–45)
BILIRUB SERPL-MCNC: 0.4 MG/DL (ref 0.2–1.3)
BUN SERPL-MCNC: 17 MG/DL (ref 7–30)
CALCIUM SERPL-MCNC: 9.5 MG/DL (ref 8.5–10.1)
CHLORIDE SERPL-SCNC: 107 MMOL/L (ref 94–109)
CO2 SERPL-SCNC: 27 MMOL/L (ref 20–32)
CREAT SERPL-MCNC: 0.82 MG/DL (ref 0.52–1.04)
GFR SERPL CREATININE-BSD FRML MDRD: 74 ML/MIN/{1.73_M2}
GLUCOSE SERPL-MCNC: 75 MG/DL (ref 70–99)
POTASSIUM SERPL-SCNC: 3.9 MMOL/L (ref 3.4–5.3)
PROT SERPL-MCNC: 7.1 G/DL (ref 6.8–8.8)
SODIUM SERPL-SCNC: 141 MMOL/L (ref 133–144)

## 2019-04-11 PROCEDURE — 80053 COMPREHEN METABOLIC PANEL: CPT | Performed by: INTERNAL MEDICINE

## 2019-04-11 PROCEDURE — 36415 COLL VENOUS BLD VENIPUNCTURE: CPT | Performed by: INTERNAL MEDICINE

## 2019-04-11 PROCEDURE — 99214 OFFICE O/P EST MOD 30 MIN: CPT | Performed by: INTERNAL MEDICINE

## 2019-04-11 RX ORDER — TAMOXIFEN CITRATE 20 MG/1
20 TABLET ORAL DAILY
Qty: 90 TABLET | Refills: 3 | Status: SHIPPED | OUTPATIENT
Start: 2019-04-11 | End: 2019-04-11

## 2019-04-11 RX ORDER — TAMOXIFEN CITRATE 20 MG/1
20 TABLET ORAL DAILY
Qty: 180 TABLET | Refills: 1 | Status: SHIPPED | OUTPATIENT
Start: 2019-04-11 | End: 2020-04-29

## 2019-04-11 ASSESSMENT — PAIN SCALES - GENERAL: PAINLEVEL: NO PAIN (0)

## 2019-04-11 ASSESSMENT — MIFFLIN-ST. JEOR: SCORE: 1147.46

## 2019-04-11 NOTE — NURSING NOTE
"Oncology Rooming Note    April 11, 2019 3:48 PM   Seema Johnson is a 63 year old female who presents for:    Chief Complaint   Patient presents with     Oncology Clinic Visit     6 mon f/u     Initial Vitals: BP 98/64 (BP Location: Left arm)   Pulse 61   Temp 97.8  F (36.6  C) (Oral)   Resp 16   Ht 1.638 m (5' 4.5\")   Wt 61 kg (134 lb 6 oz)   SpO2 99%   BMI 22.71 kg/m   Estimated body mass index is 22.71 kg/m  as calculated from the following:    Height as of this encounter: 1.638 m (5' 4.5\").    Weight as of this encounter: 61 kg (134 lb 6 oz). Body surface area is 1.67 meters squared.  No Pain (0) Comment: Data Unavailable   No LMP recorded. Patient is postmenopausal.  Allergies reviewed: Yes  Medications reviewed: Yes    Medications: MEDICATION REFILLS NEEDED TODAY. Provider was notified.  Pharmacy name entered into [facility]:    [facility] #[id number] - [facility] [address] [facility]/PHARMACY #[id number] [facility] [address] PHARMACY [address] [id number] [facility] [id number] MATT LORENZ  [facility] # [id number] [facility] [address] [facility]    Sharla Hoang LPN              "

## 2019-04-11 NOTE — PROGRESS NOTES
Oncology Follow-up visit:  Date on this visit: Apr 11, 2019    Oncologist: Adelita Pierre   Rheumatologist: Andreia Carranza  PCP: Shantel Plaza PA-C     DIAGNOSIS:    1. Stage II, T1B N1 M0, invasive ductal carcinoma right breast. Grade 1, ER positive in 95% of the cells, MT negative and HER2/lex negative.   She is s/p lumpectomy with sentinel lymph node evaluation on January 21, 2016.    She was identified to have a 7 mm breast cancer with an intramammary lymph node positivity and a sentinel lymph node positivity. One of two sentinel lymph nodes was positive with the tumor size being 0.3 cm and an intramammary lymph node was positive as well. All margins completely clear with invasive margin being 0.3 cm. There was some background DCIS and that margin was also clear, but less than 0.1 cm. The tumor was grade 1, strongly ER positive in 95% of the cells, MT negative ( stained in less than 1% of the cells) and HER2/lex negative.   Pathology reviewed at [facility] by Dr. Huang.  She has completed adjuvant chemotherapy with dose dense Taxol IV q 2 weeks x 4 cycles, March 18th- April 28, 2016, followed by dose dense AC x 4 cycles, May 12- June 23, 2016.  She has received adjuvant radiation therapy from 7/18/16- 8/26/16. She has started on Tamoxifen mid Oct 2016.  She was under the care of Dr. Iyer but transferrred her care to  in May 2018 due to insurance change.      2. BREAST NEXT genetic testing panel was negative    3. Osteoporosis- She has had compression fractures of T11 and T12. She has been on annual Reclast infusions with Dr. Boo, in the fall usually. She is on calcium and vitamin D supplementation.  DEXA in March 2015: Osteopenia. T score -2.1  DEXA in March 2017: Osteopenia. T score -1.8    History Of Present Illness:  Ms. Johnson is a 63 year old female who presents for f/up of Stage II (T1B N1 M0), invasive ductal carcinoma right breast, grade 1, ER positive in 95% of the cells, MT  negative and HER2/lex negative by IHC (1+).   She remains on Tamoxifen. She is tolerating it well, with the exception of  hot flashes. She was not interested in any pharmacotherapy for hot flashes.She had her last Reclast infusion in Oct 2018 and plans to continue yearly. She is on calcium and vitamin D supplementation.  Her last bilateral screening mammogram was on 10/2/2018 and showed no suspicious findings.  She has a baseline benign tremor. She has no other health related complaints.  She spent winter in [place] and [place] and is planning to leave for [place] for leisure travel for 6 months. Tamoxifen RX reordered today with 6 months supply. She has had chronic lower back pain secondary to compression fractures, relieves by Tylenol. She has occasional hard stools.   She is staying very active, walking marathons, and an active skier in the winter.  In addition, a complete 12 point  review of systems is negative.      Past Medical/Surgical History:  Past Medical History:   Diagnosis Date     Basal cell cancer      Mohs defect of nose 06/2012     Past Surgical History:   Procedure Laterality Date     BREAST SURGERY Right 01/2016    breast cancer   Osteoporosis.   Benign essential tremor.   She has mild hyperlipidemia.   She has had multiple breast biopsies in the past, three on the right, three on the left.     Allergies:  Allergies as of 04/11/2019 - Reviewed 10/04/2018   Allergen Reaction Noted     Compazine [prochlorperazine] Other (See Comments) 03/31/2016     Codeine sulfate Nausea 10/15/2012     Current Medications:  Current Outpatient Medications   Medication Sig Dispense Refill     methocarbamol (ROBAXIN) 750 MG tablet Take 1 tablet (750 mg) by mouth 4 times daily as needed for muscle spasms (Patient not taking: Reported on 10/4/2018) 20 tablet 0     propranolol (INDERAL) 20 MG tablet Take 1 tablet (20 mg) by mouth 2 times daily 360 tablet 1     simvastatin (ZOCOR) 20 MG tablet Take 1 tablet (20 mg) by  "mouth At Bedtime 180 tablet 1     tamoxifen (NOLVADEX) 20 MG tablet Take 1 tablet (20 mg) by mouth daily 90 tablet 3      Family History:   Mom side family members have had a lot of heart disease. Sister had ductal carcinoma in situ. Another sister had uterine fibroids. Patient's father had brain cancer. On the paternal side, paternal grandmother had colon cancer, an aunt had breast cancer.  Seema underwent genetic testing and her  BREAST NEXT genetic testing panel was negative.      Social History:  Social History     Social History     Marital status:      Social History Main Topics     Smoking status: Never Smoker     Smokeless tobacco: Never Used     Alcohol use Yes      Comment: socially      She is , has one daughter in her 30s and a grandchild. Does not smoke. Takes alcohol socially when there is a dinner party.The patient is extremely active. She is an avid skier. She likes to travel a lot. [place] spent [place].     Physical Exam:  BP 98/64 (BP Location: Left arm)   Pulse 61   Temp 97.8  F (36.6  C) (Oral)   Resp 16   Ht 1.638 m (5' 4.5\")   Wt 61 kg (134 lb 6 oz)   SpO2 99%   BMI 22.71 kg/m          GENERAL APPEARANCE: healthy, alert and no distress     HENT: Mouth without ulcers or lesions     NECK: no adenopathy, no asymmetry or masses     LYMPHATICS: No cervical, supraclavicular, axillary  lymphadenopathy     RESP: lungs clear to auscultation - no rales, rhonchi or wheezes     CARDIOVASCULAR: regular rates and rhythm, normal S1 S2, no S3 or S4 and no murmur.     ABDOMEN:  soft, nontender, no HSM or masses and bowel sounds normal     MUSCULOSKELETAL: extremities normal- no gross deformities noted, no evidence of inflammation in joints, FROM in all extremities. No edema b/l LE.     SKIN: no suspicious lesions or rashes     PSYCHIATRIC: mentation appears normal and affect normal  Neuro: slight head tremor at rest. Non-focal exam otherwise. Axox3  Chest: No breast masses b/l " and no axillary lymphadenopathy b/l. Incisions from right lumpectomy, R ASLN biopsy healed well. Other incisions from port-a-cath and prior breast biopsies healed well.    Laboratory/Imaging Studies  Orders Only on 04/11/2019   Component Date Value Ref Range Status     Sodium 04/11/2019 141  133 - 144 mmol/L Final     Potassium 04/11/2019 3.9  3.4 - 5.3 mmol/L Final     Chloride 04/11/2019 107  94 - 109 mmol/L Final     Carbon Dioxide 04/11/2019 27  20 - 32 mmol/L Final     Anion Gap 04/11/2019 7  3 - 14 mmol/L Final     Glucose 04/11/2019 75  70 - 99 mg/dL Final    Non Fasting     Urea Nitrogen 04/11/2019 17  7 - 30 mg/dL Final     Creatinine 04/11/2019 0.82  0.52 - 1.04 mg/dL Final     GFR Estimate 04/11/2019 74  >60 mL/min/[1.73_m2] Final    Comment: Non  GFR Calc  Starting 12/18/2018, serum creatinine based estimated GFR (eGFR) will be   calculated using the Chronic Kidney Disease Epidemiology Collaboration   (CKD-EPI) equation.       GFR Estimate If Black 04/11/2019 86  >60 mL/min/[1.73_m2] Final    Comment:  GFR Calc  Starting 12/18/2018, serum creatinine based estimated GFR (eGFR) will be   calculated using the Chronic Kidney Disease Epidemiology Collaboration   (CKD-EPI) equation.       Calcium 04/11/2019 9.5  8.5 - 10.1 mg/dL Final     Bilirubin Total 04/11/2019 0.4  0.2 - 1.3 mg/dL Final     Albumin 04/11/2019 4.0  3.4 - 5.0 g/dL Final     Protein Total 04/11/2019 7.1  6.8 - 8.8 g/dL Final     Alkaline Phosphatase 04/11/2019 45  40 - 150 U/L Final     ALT 04/11/2019 23  0 - 50 U/L Final     AST 04/11/2019 20  0 - 45 U/L Final       ASSESSMENT/PLAN:  Seema is a very pleasant 63 year old woman with Stage II (pT1b N1(sn)cMo), invasive ductal carcinoma right breast,  Grade 1, ER positive in 95% of the cells, MI negative and HER2/lex negative. She is s/p lumpectomy with sentinel lymph node evaluation on January 21, 2016. She has completed adjuvant chemotherapy with dose dense  Taxol IV q 2 weeks x 4 cycles, March 18th- April 28, 2016, followed by dose dense AC x 4 cycles, May 12- June 23, 2016.  She has received adjuvant radiation therapy from 7/18/16- 8/26/16. She has been on Tamoxifen mid Oct 2016.  ECOG PS 0.    1. Stage II breast cancer- continue Tamoxifen for at least 5 years. Given Hx of osteoporosis with compression fractures (although most recently bone mineral density has been improving on Tamoxifen, Reclast, vitamin D and calcium and exercise), we'll plan either 10 years of Tamoxifen or per recent [facility] data from 2017 -5 years of tamoxifen followed by 2 years of an aromatase inhibitor (AI).  For now, she will continue on Tamoxifen and we'll plan to see her back in 6 months, with cbcd, creat, LFTs. She needs to continue with annual gynecologist exams while she remains on Tamoxifen.    2. Spontaneous (and traumatic) fractures in her spine and osteopenia on DEXA scan. This is c/w osteoporosis.  She is on yearly Reclast with Dr. Boo, last dose October 2018.    3. Breast cancer screening-  annual screening mammogram negative on October 2, 2018. Patient states she will go on medicare in the next year and will see if a mammogram with tomosynthesis will be covered in the future which is preferred in her situation, given extremely dense breast tissue reported on the most recent mammogram. Orders placed. Due with next visit.    At the end of our visit patient verbalized understanding and concurred with the plan.

## 2019-04-11 NOTE — LETTER
4/11/2019         RE: Seema Johnson  [address]  [place] [facility] [place] [address]        Dear Colleague,    Thank you for referring your patient, Seema Johnson, to the [facility]. Please see a copy of my visit note below.    Oncology Follow-up visit:  Date on this visit: Apr 11, 2019    Oncologist: Adelita Pierre   Rheumatologist: Andreia Carranza  PCP: Shantel Plaza PA-C     DIAGNOSIS:    1. Stage II, T1B N1 M0, invasive ductal carcinoma right breast. Grade 1, ER positive in 95% of the cells, PA negative and HER2/lex negative.   She is s/p lumpectomy with sentinel lymph node evaluation on January 21, 2016.    She was identified to have a 7 mm breast cancer with an intramammary lymph node positivity and a sentinel lymph node positivity. One of two sentinel lymph nodes was positive with the tumor size being 0.3 cm and an intramammary lymph node was positive as well. All margins completely clear with invasive margin being 0.3 cm. There was some background DCIS and that margin was also clear, but less than 0.1 cm. The tumor was grade 1, strongly ER positive in 95% of the cells, PA negative ( stained in less than 1% of the cells) and HER2/lex negative.   Pathology reviewed at [facility],  by Dr. Huang.  She has completed adjuvant chemotherapy with dose dense Taxol IV q 2 weeks x 4 cycles, March 18th- April 28, 2016, followed by dose dense AC x 4 cycles, May 12- June 23, 2016.  She has received adjuvant radiation therapy from 7/18/16- 8/26/16. She has started on Tamoxifen mid Oct 2016.  She was under the care of Dr. Iyer but transferrred her care to  in May 2018 due to insurance change.      2. BREAST NEXT genetic testing panel was negative    3. Osteoporosis- She has had compression fractures of T11 and T12. She has been on annual Reclast infusions with Dr. Boo, in the fall usually. She is on calcium and vitamin D supplementation.  DEXA in March 2015: Osteopenia. T  score -2.1  DEXA in March 2017: Osteopenia. T score -1.8    History Of Present Illness:  Ms. Johnson is a 63 year old female who presents for f/up of Stage II (T1B N1 M0), invasive ductal carcinoma right breast, grade 1, ER positive in 95% of the cells, SC negative and HER2/lex negative by IHC (1+).   She remains on Tamoxifen. She is tolerating it well, with the exception of  hot flashes. She was not interested in any pharmacotherapy for hot flashes.She had her last Reclast infusion in Oct 2018 and plans to continue yearly. She is on calcium and vitamin D supplementation.  Her last bilateral screening mammogram was on 10/2/2018 and showed no suspicious findings.  She has a baseline benign tremor. She has no other health related complaints.  She spent winter in [place] and [place] and is planning to leave for [place] for leisure travel for 6 months. Tamoxifen RX reordered today with 6 months supply. She has had chronic lower back pain secondary to compression fractures, relieves by Tylenol. She has occasional hard stools.   She is staying very active, walking marathons, and an active skier in the winter.  In addition, a complete 12 point  review of systems is negative.      Past Medical/Surgical History:  Past Medical History:   Diagnosis Date     Basal cell cancer      Mohs defect of nose 06/2012     Past Surgical History:   Procedure Laterality Date     BREAST SURGERY Right 01/2016    breast cancer   Osteoporosis.   Benign essential tremor.   She has mild hyperlipidemia.   She has had multiple breast biopsies in the past, three on the right, three on the left.     Allergies:  Allergies as of 04/11/2019 - Reviewed 10/04/2018   Allergen Reaction Noted     Compazine [prochlorperazine] Other (See Comments) 03/31/2016     Codeine sulfate Nausea 10/15/2012     Current Medications:  Current Outpatient Medications   Medication Sig Dispense Refill     methocarbamol (ROBAXIN) 750 MG tablet Take 1 tablet (750 mg) by mouth 4  "times daily as needed for muscle spasms (Patient not taking: Reported on 10/4/2018) 20 tablet 0     propranolol (INDERAL) 20 MG tablet Take 1 tablet (20 mg) by mouth 2 times daily 360 tablet 1     simvastatin (ZOCOR) 20 MG tablet Take 1 tablet (20 mg) by mouth At Bedtime 180 tablet 1     tamoxifen (NOLVADEX) 20 MG tablet Take 1 tablet (20 mg) by mouth daily 90 tablet 3      Family History:   Mom side family members have had a lot of heart disease. Sister had ductal carcinoma in situ. Another sister had uterine fibroids. Patient's father had brain cancer. On the paternal side, paternal grandmother had colon cancer, an aunt had breast cancer.  Seema underwent genetic testing and her  BREAST NEXT genetic testing panel was negative.      Social History:  Social History     Social History     Marital status:      Social History Main Topics     Smoking status: Never Smoker     Smokeless tobacco: Never Used     Alcohol use Yes      Comment: socially      She is , has one daughter in her 30s and a grandchild. Does not smoke. Takes alcohol socially when there is a dinner party.The patient is extremely active. She is an avid skier. She likes to travel a lot. They spent phelps in [place].     Physical Exam:  BP 98/64 (BP Location: Left arm)   Pulse 61   Temp 97.8  F (36.6  C) (Oral)   Resp 16   Ht 1.638 m (5' 4.5\")   Wt 61 kg (134 lb 6 oz)   SpO2 99%   BMI 22.71 kg/m           GENERAL APPEARANCE: healthy, alert and no distress     HENT: Mouth without ulcers or lesions     NECK: no adenopathy, no asymmetry or masses     LYMPHATICS: No cervical, supraclavicular, axillary  lymphadenopathy     RESP: lungs clear to auscultation - no rales, rhonchi or wheezes     CARDIOVASCULAR: regular rates and rhythm, normal S1 S2, no S3 or S4 and no murmur.     ABDOMEN:  soft, nontender, no HSM or masses and bowel sounds normal     MUSCULOSKELETAL: extremities normal- no gross deformities noted, no evidence of " inflammation in joints, FROM in all extremities. No edema b/l LE.     SKIN: no suspicious lesions or rashes     PSYCHIATRIC: mentation appears normal and affect normal  Neuro: slight head tremor at rest. Non-focal exam otherwise. Axox3  Chest: No breast masses b/l and no axillary lymphadenopathy b/l. Incisions from right lumpectomy, R ASLN biopsy healed well. Other incisions from port-a-cath and prior breast biopsies healed well.    Laboratory/Imaging Studies  Orders Only on 04/11/2019   Component Date Value Ref Range Status     Sodium 04/11/2019 141  133 - 144 mmol/L Final     Potassium 04/11/2019 3.9  3.4 - 5.3 mmol/L Final     Chloride 04/11/2019 107  94 - 109 mmol/L Final     Carbon Dioxide 04/11/2019 27  20 - 32 mmol/L Final     Anion Gap 04/11/2019 7  3 - 14 mmol/L Final     Glucose 04/11/2019 75  70 - 99 mg/dL Final    Non Fasting     Urea Nitrogen 04/11/2019 17  7 - 30 mg/dL Final     Creatinine 04/11/2019 0.82  0.52 - 1.04 mg/dL Final     GFR Estimate 04/11/2019 74  >60 mL/min/[1.73_m2] Final    Comment: Non  GFR Calc  Starting 12/18/2018, serum creatinine based estimated GFR (eGFR) will be   calculated using the Chronic Kidney Disease Epidemiology Collaboration   (CKD-EPI) equation.       GFR Estimate If Black 04/11/2019 86  >60 mL/min/[1.73_m2] Final    Comment:  GFR Calc  Starting 12/18/2018, serum creatinine based estimated GFR (eGFR) will be   calculated using the Chronic Kidney Disease Epidemiology Collaboration   (CKD-EPI) equation.       Calcium 04/11/2019 9.5  8.5 - 10.1 mg/dL Final     Bilirubin Total 04/11/2019 0.4  0.2 - 1.3 mg/dL Final     Albumin 04/11/2019 4.0  3.4 - 5.0 g/dL Final     Protein Total 04/11/2019 7.1  6.8 - 8.8 g/dL Final     Alkaline Phosphatase 04/11/2019 45  40 - 150 U/L Final     ALT 04/11/2019 23  0 - 50 U/L Final     AST 04/11/2019 20  0 - 45 U/L Final       ASSESSMENT/PLAN:  Seema is a very pleasant 63 year old woman with Stage II (pT1b  N1(sn)cMo), invasive ductal carcinoma right breast,  Grade 1, ER positive in 95% of the cells, VA negative and HER2/lex negative. She is s/p lumpectomy with sentinel lymph node evaluation on January 21, 2016. She has completed adjuvant chemotherapy with dose dense Taxol IV q 2 weeks x 4 cycles, March 18th- April 28, 2016, followed by dose dense AC x 4 cycles, May 12- June 23, 2016.  She has received adjuvant radiation therapy from 7/18/16- 8/26/16. She has been on Tamoxifen mid Oct 2016.  ECOG PS 0.    1. Stage II breast cancer- continue Tamoxifen for at least 5 years. Given Hx of osteoporosis with compression fractures (although most recently bone mineral density has been improving on Tamoxifen, Reclast, vitamin D and calcium and exercise), we'll plan either 10 years of Tamoxifen or per recent [facility] data from 2017 -5 years of tamoxifen followed by 2 years of an aromatase inhibitor (AI).  For now, she will continue on Tamoxifen and we'll plan to see her back in 6 months, with cbcd, creat, LFTs. She needs to continue with annual gynecologist exams while she remains on Tamoxifen.    2. Spontaneous (and traumatic) fractures in her spine and osteopenia on DEXA scan. This is c/w osteoporosis.  She is on yearly Reclast with Dr. Boo, last dose October 2018.    3. Breast cancer screening-  annual screening mammogram negative on October 2, 2018. Patient states she will go on medicare in the next year and will see if a mammogram with tomosynthesis will be covered in the future which is preferred in her situation, given extremely dense breast tissue reported on the most recent mammogram. Orders placed. Due with next visit.    At the end of our visit patient verbalized understanding and concurred with the plan.        Again, thank you for allowing me to participate in the care of your patient.        Sincerely,        Meghan Haynes MD, MD

## 2019-09-30 NOTE — PROGRESS NOTES
"SUBJECTIVE:   Seema Johnson is a 63 year old female who presents for Preventive Visit.    Patient wants to discuss vertigo.   Are you in the first 12 months of your Medicare coverage?  No    Healthy Habits:     In general, how would you rate your overall health?  Fair    Frequency of exercise:  4-5 days/week    Duration of exercise:  Greater than 60 minutes    Do you usually eat at least 4 servings of fruit and vegetables a day, include whole grains    & fiber and avoid regularly eating high fat or \"junk\" foods?  Yes    Ability to successfully perform activities of daily living:  No assistance needed    Home Safety:  No safety concerns identified    Hearing Impairment:  Difficulty following a conversation in a noisy restaurant or crowded room, feel that people are mumbling or not speaking clearly, need to ask people to speak up or repeat themselves and difficulty understanding soft or whispered speech    In the past 6 months, have you been bothered by leaking of urine? Yes    In general, how would you rate your overall mental or emotional health?  Good      PHQ-2 Total Score: 0    Additional concerns today:  Yes    Do you feel safe in your environment? Yes    Do you have a Health Care Directive? No: Advance care planning reviewed with patient; information given to patient to review.      Fall risk  Fallen 2 or more times in the past year?: (P) No  Any fall with injury in the past year?: (P) No    Cognitive Screening   1) Repeat 3 items (Leader, Season, Table)    2) Clock draw: NORMAL  3) 3 item recall: Recalls 3 objects  Results: 3 items recalled: COGNITIVE IMPAIRMENT LESS LIKELY  Mini-CogTM Ofelia Morris. Licensed by the author for use in [facility]; reprinted with permission ([email or web address]). All rights reserved.      Do you have sleep apnea, excessive snoring or daytime drowsiness?: no    Reviewed and updated as needed this visit by clinical staff         Reviewed and updated as needed this " visit by Provider        Social History     Tobacco Use     Smoking status: Never Smoker     Smokeless tobacco: Never Used   Substance Use Topics     Alcohol use: Yes     Comment: socially     If you drink alcohol do you typically have >3 drinks per day or >7 drinks per week? No    Alcohol Use 4/20/2018   Prescreen: >3 drinks/day or >7 drinks/week? No   Prescreen: >3 drinks/day or >7 drinks/week? -       PROBLEMS TO ADD ON...  Seema and  Tyrell were overseas for 6 months. They are in town for 10 days then leave until April 2020 ([place] and skiing in [place]).    Vertigo-   Started while traveling in [place]- June 2019- they were over seas 6 months.   Saw a phsyician in [place]- rx for dramamine.  Dramamine - helped while taking it. When ran out the vertigo came back.   Has continued to have sx daily.   Laying down is worst.   Dizzy, nauseated. No vomiting.  Was having vertigo constantly. Dramamine mostly took it away..   No falls.     Hip- left- lateral/outside.  Starting to bother when its cold or randomly.  Able to do long dist walking and feels well. She and her  walk the [place] marathon annually every year in Dec. They walk 8-9miles a few days per week.  It does not bother her with that or after. In general walking the longer distances is harder  More discomfort with laying on the side  No groin or buttock pain.     Urinary concerns- Urinary urgency and urge incontinence. Will be out on long walks and hard to get to the bathroom at times. Feels like emptying all the way. No burning/dysuria, hematuria.     Essential tremor worsening. Has been on propranolol 20mg BID which does help. But thinks needs higher dose. Tolerates it ok. Thinks makes her tired. Consult and propranolol started by neurolgoy      Routine Health Maintenance:  Fasting: yes   Immunizations: will do flu shot and shingles booster.  Mammogram:Hx of breast cancer. Scheduled for Oct. Seeing  next week for  annual visit    Colonoscopy: due .   Pap: Last: 2017  Sexually active: yes. STD screening: no concerns  Periods/menopause/contraception: menopause    Current providers sharing in care for this patient include:   Patient Care Team:  Shantel Plaza PA-C as PCP - General (Physician Assistant - Medical)  Yo Cortes MD as Shantel Rose PA-C as Assigned PCP    The following health maintenance items are reviewed in Epic and correct as of today:  Health Maintenance   Topic Date Due     HEPATITIS C SCREENING  1955     ADVANCE CARE PLANNING  1955     HIV SCREENING  10/25/1968     FALL RISK ASSESSMENT  10/25/2018     PNEUMOCOCCAL IMMUNIZATION 65+ HIGH/HIGHEST RISK (1 of 2 - PCV13) 10/25/2018     ANGIE ASSESSMENT  03/22/2019     PHQ-9  03/22/2019     MEDICARE ANNUAL WELLNESS VISIT  04/20/2019     LIPID  04/20/2019     INFLUENZA VACCINE (1) 09/01/2019     ZOSTER IMMUNIZATION (2 of 2) 06/03/2019     COLONOSCOPY  03/26/2020     MAMMO SCREENING  10/02/2020     DTAP/TDAP/TD IMMUNIZATION (3 - Td) 05/03/2023     DEXA  Completed     IPV IMMUNIZATION  Aged Out     MENINGITIS IMMUNIZATION  Aged Out     Lab work is in process  Labs reviewed in EPIC  BP Readings from Last 3 Encounters:   10/03/19 120/86   04/11/19 98/64   10/04/18 120/79    Wt Readings from Last 3 Encounters:   10/03/19 58 kg (127 lb 14.4 oz)   04/11/19 61 kg (134 lb 6 oz)   10/04/18 57.6 kg (127 lb 0.9 oz)                  Patient Active Problem List   Diagnosis     Mohs defect of cheek     Closed compression fracture of thoracic vertebra (H)     Closed compression fracture of first lumbar vertebra (H)     Osteoporosis     Invasive ductal carcinoma of breast, female (H)- RIGHT breast, Upper outer quadrant- 2015     Benign essential tremor     Hyperlipidemia LDL goal <100     Mixed urge and stress incontinence     Past Surgical History:   Procedure Laterality Date     BREAST SURGERY Right 01/2016    breast cancer       Social  History     Tobacco Use     Smoking status: Never Smoker     Smokeless tobacco: Never Used   Substance Use Topics     Alcohol use: Yes     Comment: socially     Family History   Problem Relation Age of Onset     Breast Cancer Sister      Cerebrovascular Disease Mother      Cerebrovascular Disease Father      Hypothyroidism Father      Diabetes No family hx of      Coronary Artery Disease No family hx of      Hypertension No family hx of      Hyperlipidemia No family hx of      Colon Cancer No family hx of      Prostate Cancer No family hx of      Depression No family hx of      Substance Abuse No family hx of      Thyroid Disease No family hx of      Obesity No family hx of      Osteoporosis No family hx of      Anesthesia Reaction No family hx of      Asthma No family hx of      Mental Illness No family hx of      Anxiety Disorder No family hx of      Other Cancer No family hx of          Current Outpatient Medications   Medication Sig Dispense Refill     meclizine (ANTIVERT) 25 MG tablet Take 1 tablet (25 mg) by mouth 3 times daily as needed for dizziness 90 tablet 0     propranolol ER (INDERAL LA) 60 MG 24 hr capsule Take 1 capsule (60 mg) by mouth daily 90 capsule 0     simvastatin (ZOCOR) 20 MG tablet Take 1 tablet (20 mg) by mouth At Bedtime 180 tablet 1     tamoxifen (NOLVADEX) 20 MG tablet Take 1 tablet (20 mg) by mouth daily 180 tablet 1     tolterodine (DETROL) 2 MG tablet Take 1 tablet (2 mg) by mouth 2 times daily 60 tablet 1     methocarbamol (ROBAXIN) 750 MG tablet Take 1 tablet (750 mg) by mouth 4 times daily as needed for muscle spasms (Patient not taking: Reported on 10/4/2018) 20 tablet 0     Allergies   Allergen Reactions     Compazine [Prochlorperazine] Other (See Comments)     Jittery and hyper and foggy     Codeine Sulfate Nausea     Pneumonia Vaccine:Adults age 65+ who have not received previous Pneumovax (PPSV23) or PCV13 as an adult: Should first be given PCV13 AND then should be given  "PPSV23 6-12 months after PCV13  Mammogram Screening: Alternate mammogram schedule due to breast cancer history - annually- scheduled  Last 3 Pap and HPV Results:   PAP / HPV Latest Ref Rng & Units 1/5/2017   PAP - NIL   HPV 16 DNA NEG Negative   HPV 18 DNA NEG Negative   OTHER HR HPV NEG Negative       Review of Systems   Constitutional: Negative for chills and fever.   HENT: Positive for hearing loss. Negative for congestion, ear pain and sore throat.    Eyes: Negative for pain and visual disturbance.   Respiratory: Negative for cough and shortness of breath.    Cardiovascular: Positive for palpitations. Negative for chest pain and peripheral edema.   Gastrointestinal: Positive for constipation and nausea. Negative for abdominal pain, diarrhea, heartburn and hematochezia.   Breasts:  Negative for breast mass and discharge.   Genitourinary: Positive for frequency, urgency and vaginal discharge. Negative for dysuria, genital sores, hematuria, pelvic pain and vaginal bleeding.   Musculoskeletal: Positive for arthralgias and myalgias. Negative for joint swelling.   Skin: Negative for rash.   Neurological: Positive for dizziness and headaches. Negative for weakness and paresthesias.   Psychiatric/Behavioral: Negative for mood changes. The patient is not nervous/anxious.    constipation - chronic. Takes miralax       OBJECTIVE:   /86   Pulse 60   Temp 96.9  F (36.1  C) (Temporal)   Resp 16   Ht 1.635 m (5' 4.37\")   Wt 58 kg (127 lb 14.4 oz)   SpO2 99%   BMI 21.70 kg/m   Estimated body mass index is 22.71 kg/m  as calculated from the following:    Height as of 4/11/19: 1.638 m (5' 4.5\").    Weight as of 4/11/19: 61 kg (134 lb 6 oz).  Physical Exam  GENERAL: healthy, alert and no distress  EYES: Eyes grossly normal to inspection, PERRL and conjunctivae and sclerae normal  HENT: ear canals and TM's normal, nose and mouth without ulcers or lesions  NECK: no adenopathy, no asymmetry, masses, or scars and thyroid " normal to palpation  RESP: lungs clear to auscultation - no rales, rhonchi or wheezes  BREAST: normal without masses, tenderness or nipple discharge and no palpable axillary masses or adenopathy  CV: regular rate and rhythm, normal S1 S2, no S3 or S4, no murmur, click or rub, no peripheral edema and peripheral pulses strong  ABDOMEN: soft, nontender, no hepatosplenomegaly, no masses and bowel sounds normal  MS: thoracic spine is kyphotic, no midline tenderness. Left hip: tender laterally around GT bursa. No groin or ant thigh tenderness. No gluteal tenderness or SI tenderness. Normal ROM of left hip. No edema  SKIN: no suspicious lesions or rashes  NEURO: fine essential tremor noted in head and hands. Normal strength and tone, mentation intact and speech normal  PSYCH: mentation appears normal, affect normal/bright  LYMPH: normal ant/post cervical, supraclavicular nodes    Diagnostic Test Results:  Results for orders placed or performed in visit on 10/03/19 (from the past 24 hour(s))   Lipid panel reflex to direct LDL Fasting   Result Value Ref Range    Cholesterol 161 <200 mg/dL    Triglycerides 61 <150 mg/dL    HDL Cholesterol 78 >49 mg/dL    LDL Cholesterol Calculated 71 <100 mg/dL    Non HDL Cholesterol 83 <130 mg/dL   Vitamin D Deficiency   Result Value Ref Range    Vitamin D Deficiency screening 64 20 - 75 ug/L   CBC with platelets and differential   Result Value Ref Range    WBC 4.9 4.0 - 11.0 10e9/L    RBC Count 3.92 3.8 - 5.2 10e12/L    Hemoglobin 12.1 11.7 - 15.7 g/dL    Hematocrit 36.5 35.0 - 47.0 %    MCV 93 78 - 100 fl    MCH 30.9 26.5 - 33.0 pg    MCHC 33.2 31.5 - 36.5 g/dL    RDW 12.9 10.0 - 15.0 %    Platelet Count 233 150 - 450 10e9/L    % Neutrophils 60.5 %    % Lymphocytes 26.5 %    % Monocytes 11.0 %    % Eosinophils 1.6 %    % Basophils 0.4 %    Absolute Neutrophil 3.0 1.6 - 8.3 10e9/L    Absolute Lymphocytes 1.3 0.8 - 5.3 10e9/L    Absolute Monocytes 0.5 0.0 - 1.3 10e9/L    Absolute Eosinophils  0.1 0.0 - 0.7 10e9/L    Absolute Basophils 0.0 0.0 - 0.2 10e9/L    Diff Method Automated Method    Comprehensive metabolic panel (BMP + Alb, Alk Phos, ALT, AST, Total. Bili, TP)   Result Value Ref Range    Sodium 140 133 - 144 mmol/L    Potassium 4.1 3.4 - 5.3 mmol/L    Chloride 108 94 - 109 mmol/L    Carbon Dioxide 27 20 - 32 mmol/L    Anion Gap 5 3 - 14 mmol/L    Glucose 86 70 - 99 mg/dL    Urea Nitrogen 15 7 - 30 mg/dL    Creatinine 0.74 0.52 - 1.04 mg/dL    GFR Estimate 84 >60 mL/min/[1.73_m2]    GFR Estimate If Black >90 >60 mL/min/[1.73_m2]    Calcium 9.0 8.5 - 10.1 mg/dL    Bilirubin Total 0.8 0.2 - 1.3 mg/dL    Albumin 4.1 3.4 - 5.0 g/dL    Protein Total 7.1 6.8 - 8.8 g/dL    Alkaline Phosphatase 35 (L) 40 - 150 U/L    ALT 24 0 - 50 U/L    AST 21 0 - 45 U/L   TSH with free T4 reflex   Result Value Ref Range    TSH 1.62 0.40 - 4.00 mU/L   UA reflex to Microscopic   Result Value Ref Range    Color Urine Yellow     Appearance Urine Clear     Glucose Urine Negative NEG^Negative mg/dL    Bilirubin Urine Negative NEG^Negative    Ketones Urine Negative NEG^Negative mg/dL    Specific Gravity Urine 1.015 1.003 - 1.035    Blood Urine Negative NEG^Negative    pH Urine 7.5 (H) 5.0 - 7.0 pH    Protein Albumin Urine Negative NEG^Negative mg/dL    Urobilinogen Urine 1.0 0.2 - 1.0 EU/dL    Nitrite Urine Negative NEG^Negative    Leukocyte Esterase Urine Negative NEG^Negative    Source Midstream Urine        ASSESSMENT / PLAN:   1. Encounter for Medicare annual wellness exam  Counseling as below.  Influenza and shingrix booster given today.  Pt does need prevnar and pneumovax also  Mammogram in Oct  Colonoscopy is due- she will plan for spring 2020 when they are back in town    2. Vertigo  Consistent vertigo sx 4months, started while traveling abroad  Sx not classic for peripheral. Plan for MRI  Ref to vestibular PT  Meclizine refill.   - meclizine (ANTIVERT) 25 MG tablet; Take 1 tablet (25 mg) by mouth 3 times daily as  needed for dizziness  Dispense: 90 tablet; Refill: 0  - MR Brain w/o & w Contrast; Future  - CBC with platelets and differential  - PHYSICAL THERAPY REFERRAL; Future    3. Benign essential tremor  Reviewed consult note from  06/2018- advised going to propranolol ER 60mg daily if sx worsened on the lower dose.  Will send rx for her to try.   If cannot tolerate higher dose or not beneficial- f/u with neurology   - propranolol ER (INDERAL LA) 60 MG 24 hr capsule; Take 1 capsule (60 mg) by mouth daily  Dispense: 90 capsule; Refill: 0  - Comprehensive metabolic panel (BMP + Alb, Alk Phos, ALT, AST, Total. Bili, TP)  - TSH with free T4 reflex    4. Osteoporosis, unspecified osteoporosis type, unspecified pathological fracture presence  Continue on calcium and vit D, weight bearing exercise  Has been on annual reclast infusions w/ of rheumatology- continue   - Vitamin D Deficiency  - CBC with platelets and differential  - Comprehensive metabolic panel (BMP + Alb, Alk Phos, ALT, AST, Total. Bili, TP)    5. Hip pain, left  Pain is lateral, more typical of GT bursitis  Discussed heat, stretching  She declines xray at this time or referral    6. Urinary frequency  - UA reflex to Microscopic    7. Urge incontinence of urine  Normal UA  Discussed medication treatment, possible side effects. She is hesitant regarding possible constipation side effect. Discussed potential to use prn - ie with travel when sx are most bothersome. She will trial and follow up if needed   - tolterodine (DETROL) 2 MG tablet; Take 1 tablet (2 mg) by mouth 2 times daily  Dispense: 60 tablet; Refill: 1    8. Infiltrating ductal carcinoma of right female breast (H)  Seeing  next week for annual visit..     9. Hyperlipidemia LDL goal <100  Continue on statin present dose.   Good control   - Lipid panel reflex to direct LDL Fasting  - Comprehensive metabolic panel (BMP + Alb, Alk Phos, ALT, AST, Total. Bili, TP)    10.  "Screening for diabetes mellitus  Normal fasting glucose   - Comprehensive metabolic panel (BMP + Alb, Alk Phos, ALT, AST, Total. Bili, TP)    11. Need for prophylactic vaccination and inoculation against influenza  Given   - FLU VACCINE, INCREASED ANTIGEN, PRESV FREE, AGE 65+ [83778]  -      ADMIN VACCINE, FIRST [47157]    12. Need for shingles vaccine  Given   - ZOSTER VACCINE RECOMBINANT ADJUVANTED IM NJX  - EA ADD'L VACCINE    End of Life Planning:  Patient currently has an advanced directive: No.  I have verified the patient's ablity to prepare an advanced directive/make health care decisions.  Literature was provided to assist patient in preparing an advanced directive.    COUNSELING:  Reviewed preventive health counseling, as reflected in patient instructions       Regular exercise       Healthy diet/nutrition       Hearing screening       Bladder control       Osteoporosis Prevention/Bone Health       Colon cancer screening    Estimated body mass index is 22.71 kg/m  as calculated from the following:    Height as of 4/11/19: 1.638 m (5' 4.5\").    Weight as of 4/11/19: 61 kg (134 lb 6 oz).         reports that she has never smoked. She has never used smokeless tobacco.      Appropriate preventive services were discussed with this patient, including applicable screening as appropriate for cardiovascular disease, diabetes, osteopenia/osteoporosis, and glaucoma.  As appropriate for age/gender, discussed screening for colorectal cancer, prostate cancer, breast cancer, and cervical cancer. Checklist reviewing preventive services available has been given to the patient.    Reviewed patients plan of care and provided an AVS. The Intermediate Care Plan ( asthma action plan, low back pain action plan, and migraine action plan) for Seema meets the Care Plan requirement. This Care Plan has been established and reviewed with the Patient.    Counseling Resources:  ATP IV Guidelines  Pooled Cohorts Equation " Calculator  Breast Cancer Risk Calculator  FRAX Risk Assessment  ICSI Preventive Guidelines  Dietary Guidelines for Americans, 2010  [employer] MyPlate  ASA Prophylaxis  Lung CA Screening    Shantel Plaza PA-C  [facility]    Identified Health Risks:  Answers for HPI/ROS submitted by the patient on 10/3/2019   Annual Exam:  If you checked off any problems, how difficult have these problems made it for you to do your work, take care of things at home, or get along with other people?: Somewhat difficult  PHQ9 TOTAL SCORE: 3  ANGIE 7 TOTAL SCORE: 3

## 2019-09-30 NOTE — PATIENT INSTRUCTIONS
Patient Education   Personalized Prevention Plan  You are due for the preventive services outlined below.  Your care team is available to assist you in scheduling these services.  If you have already completed any of these items, please share that information with your care team to update in your medical record.  Health Maintenance Due   Topic Date Due     Hepatitis C Screening  1955     Discuss Advance Care Planning  1955     HIV Screening  10/25/1968     FALL RISK ASSESSMENT  10/25/2018     Pneumococcal Vaccine (1 of 2 - PCV13) 10/25/2018     Anxiety Assessment  03/22/2019     Depression Assessment  03/22/2019     Annual Wellness Visit  04/20/2019     Cholesterol Lab  04/20/2019     Flu Vaccine (1) 09/01/2019     Zoster (Shingles) Vaccine (2 of 2) 06/03/2019           --  MRI for the vertigo  Vestibular Physical therapy for the vertigo  I filled the meclizine       Try tolteridine (Detrol) for the urinary symptoms - can try dry eyes, dry mouth, constipation    See

## 2019-10-03 ENCOUNTER — OFFICE VISIT (OUTPATIENT)
Dept: FAMILY MEDICINE | Facility: CLINIC | Age: 64
End: 2019-10-03
Payer: COMMERCIAL

## 2019-10-03 VITALS
BODY MASS INDEX: 21.83 KG/M2 | OXYGEN SATURATION: 99 % | TEMPERATURE: 96.9 F | RESPIRATION RATE: 16 BRPM | WEIGHT: 127.9 LBS | HEIGHT: 64 IN | SYSTOLIC BLOOD PRESSURE: 120 MMHG | DIASTOLIC BLOOD PRESSURE: 86 MMHG | HEART RATE: 60 BPM

## 2019-10-03 DIAGNOSIS — Z00.00 ENCOUNTER FOR MEDICARE ANNUAL WELLNESS EXAM: Primary | ICD-10-CM

## 2019-10-03 DIAGNOSIS — E78.5 HYPERLIPIDEMIA LDL GOAL <100: ICD-10-CM

## 2019-10-03 DIAGNOSIS — Z23 NEED FOR SHINGLES VACCINE: ICD-10-CM

## 2019-10-03 DIAGNOSIS — N39.41 URGE INCONTINENCE OF URINE: ICD-10-CM

## 2019-10-03 DIAGNOSIS — R42 VERTIGO: ICD-10-CM

## 2019-10-03 DIAGNOSIS — C50.911 INFILTRATING DUCTAL CARCINOMA OF RIGHT FEMALE BREAST (H): Chronic | ICD-10-CM

## 2019-10-03 DIAGNOSIS — R35.0 URINARY FREQUENCY: ICD-10-CM

## 2019-10-03 DIAGNOSIS — Z13.1 SCREENING FOR DIABETES MELLITUS: ICD-10-CM

## 2019-10-03 DIAGNOSIS — Z12.11 SPECIAL SCREENING FOR MALIGNANT NEOPLASMS, COLON: ICD-10-CM

## 2019-10-03 DIAGNOSIS — M81.0 OSTEOPOROSIS, UNSPECIFIED OSTEOPOROSIS TYPE, UNSPECIFIED PATHOLOGICAL FRACTURE PRESENCE: ICD-10-CM

## 2019-10-03 DIAGNOSIS — Z23 NEED FOR PROPHYLACTIC VACCINATION AND INOCULATION AGAINST INFLUENZA: ICD-10-CM

## 2019-10-03 DIAGNOSIS — M25.552 HIP PAIN, LEFT: ICD-10-CM

## 2019-10-03 DIAGNOSIS — G25.0 BENIGN ESSENTIAL TREMOR: ICD-10-CM

## 2019-10-03 LAB
ALBUMIN SERPL-MCNC: 4.1 G/DL (ref 3.4–5)
ALBUMIN UR-MCNC: NEGATIVE MG/DL
ALP SERPL-CCNC: 35 U/L (ref 40–150)
ALT SERPL W P-5'-P-CCNC: 24 U/L (ref 0–50)
ANION GAP SERPL CALCULATED.3IONS-SCNC: 5 MMOL/L (ref 3–14)
APPEARANCE UR: CLEAR
AST SERPL W P-5'-P-CCNC: 21 U/L (ref 0–45)
BASOPHILS # BLD AUTO: 0 10E9/L (ref 0–0.2)
BASOPHILS NFR BLD AUTO: 0.4 %
BILIRUB SERPL-MCNC: 0.8 MG/DL (ref 0.2–1.3)
BILIRUB UR QL STRIP: NEGATIVE
BUN SERPL-MCNC: 15 MG/DL (ref 7–30)
CALCIUM SERPL-MCNC: 9 MG/DL (ref 8.5–10.1)
CHLORIDE SERPL-SCNC: 108 MMOL/L (ref 94–109)
CHOLEST SERPL-MCNC: 161 MG/DL
CO2 SERPL-SCNC: 27 MMOL/L (ref 20–32)
COLOR UR AUTO: YELLOW
CREAT SERPL-MCNC: 0.74 MG/DL (ref 0.52–1.04)
DEPRECATED CALCIDIOL+CALCIFEROL SERPL-MC: 64 UG/L (ref 20–75)
DIFFERENTIAL METHOD BLD: NORMAL
EOSINOPHIL # BLD AUTO: 0.1 10E9/L (ref 0–0.7)
EOSINOPHIL NFR BLD AUTO: 1.6 %
ERYTHROCYTE [DISTWIDTH] IN BLOOD BY AUTOMATED COUNT: 12.9 % (ref 10–15)
GFR SERPL CREATININE-BSD FRML MDRD: 84 ML/MIN/{1.73_M2}
GLUCOSE SERPL-MCNC: 86 MG/DL (ref 70–99)
GLUCOSE UR STRIP-MCNC: NEGATIVE MG/DL
HCT VFR BLD AUTO: 36.5 % (ref 35–47)
HDLC SERPL-MCNC: 78 MG/DL
HGB BLD-MCNC: 12.1 G/DL (ref 11.7–15.7)
HGB UR QL STRIP: NEGATIVE
KETONES UR STRIP-MCNC: NEGATIVE MG/DL
LDLC SERPL CALC-MCNC: 71 MG/DL
LEUKOCYTE ESTERASE UR QL STRIP: NEGATIVE
LYMPHOCYTES # BLD AUTO: 1.3 10E9/L (ref 0.8–5.3)
LYMPHOCYTES NFR BLD AUTO: 26.5 %
MCH RBC QN AUTO: 30.9 PG (ref 26.5–33)
MCHC RBC AUTO-ENTMCNC: 33.2 G/DL (ref 31.5–36.5)
MCV RBC AUTO: 93 FL (ref 78–100)
MONOCYTES # BLD AUTO: 0.5 10E9/L (ref 0–1.3)
MONOCYTES NFR BLD AUTO: 11 %
NEUTROPHILS # BLD AUTO: 3 10E9/L (ref 1.6–8.3)
NEUTROPHILS NFR BLD AUTO: 60.5 %
NITRATE UR QL: NEGATIVE
NONHDLC SERPL-MCNC: 83 MG/DL
PH UR STRIP: 7.5 PH (ref 5–7)
PLATELET # BLD AUTO: 233 10E9/L (ref 150–450)
POTASSIUM SERPL-SCNC: 4.1 MMOL/L (ref 3.4–5.3)
PROT SERPL-MCNC: 7.1 G/DL (ref 6.8–8.8)
RBC # BLD AUTO: 3.92 10E12/L (ref 3.8–5.2)
SODIUM SERPL-SCNC: 140 MMOL/L (ref 133–144)
SOURCE: ABNORMAL
SP GR UR STRIP: 1.01 (ref 1–1.03)
TRIGL SERPL-MCNC: 61 MG/DL
TSH SERPL DL<=0.005 MIU/L-ACNC: 1.62 MU/L (ref 0.4–4)
UROBILINOGEN UR STRIP-ACNC: 1 EU/DL (ref 0.2–1)
WBC # BLD AUTO: 4.9 10E9/L (ref 4–11)

## 2019-10-03 PROCEDURE — 90750 HZV VACC RECOMBINANT IM: CPT | Performed by: PHYSICIAN ASSISTANT

## 2019-10-03 PROCEDURE — 99214 OFFICE O/P EST MOD 30 MIN: CPT | Mod: 25 | Performed by: PHYSICIAN ASSISTANT

## 2019-10-03 PROCEDURE — G0438 PPPS, INITIAL VISIT: HCPCS | Performed by: PHYSICIAN ASSISTANT

## 2019-10-03 PROCEDURE — 80061 LIPID PANEL: CPT | Performed by: PHYSICIAN ASSISTANT

## 2019-10-03 PROCEDURE — 90662 IIV NO PRSV INCREASED AG IM: CPT | Performed by: PHYSICIAN ASSISTANT

## 2019-10-03 PROCEDURE — 81003 URINALYSIS AUTO W/O SCOPE: CPT | Performed by: PHYSICIAN ASSISTANT

## 2019-10-03 PROCEDURE — 36415 COLL VENOUS BLD VENIPUNCTURE: CPT | Performed by: PHYSICIAN ASSISTANT

## 2019-10-03 PROCEDURE — 85025 COMPLETE CBC W/AUTO DIFF WBC: CPT | Performed by: PHYSICIAN ASSISTANT

## 2019-10-03 PROCEDURE — 82306 VITAMIN D 25 HYDROXY: CPT | Performed by: PHYSICIAN ASSISTANT

## 2019-10-03 PROCEDURE — 84443 ASSAY THYROID STIM HORMONE: CPT | Performed by: PHYSICIAN ASSISTANT

## 2019-10-03 PROCEDURE — 90471 IMMUNIZATION ADMIN: CPT | Mod: 59 | Performed by: PHYSICIAN ASSISTANT

## 2019-10-03 PROCEDURE — G0008 ADMIN INFLUENZA VIRUS VAC: HCPCS | Performed by: PHYSICIAN ASSISTANT

## 2019-10-03 PROCEDURE — 80053 COMPREHEN METABOLIC PANEL: CPT | Performed by: PHYSICIAN ASSISTANT

## 2019-10-03 RX ORDER — PROPRANOLOL HCL 60 MG
60 CAPSULE, EXTENDED RELEASE 24HR ORAL DAILY
Qty: 90 CAPSULE | Refills: 0 | Status: SHIPPED | OUTPATIENT
Start: 2019-10-03 | End: 2019-12-18

## 2019-10-03 RX ORDER — MECLIZINE HYDROCHLORIDE 25 MG/1
25 TABLET ORAL 3 TIMES DAILY PRN
Qty: 90 TABLET | Refills: 0 | Status: SHIPPED | OUTPATIENT
Start: 2019-10-03 | End: 2020-03-11

## 2019-10-03 RX ORDER — TOLTERODINE TARTRATE 2 MG/1
2 TABLET, EXTENDED RELEASE ORAL 2 TIMES DAILY
Qty: 60 TABLET | Refills: 1 | Status: SHIPPED | OUTPATIENT
Start: 2019-10-03 | End: 2020-09-03

## 2019-10-03 ASSESSMENT — ENCOUNTER SYMPTOMS
HEMATOCHEZIA: 0
NAUSEA: 1
DIZZINESS: 1
COUGH: 0
HEMATURIA: 0
DIARRHEA: 0
PALPITATIONS: 1
EYE PAIN: 0
HEARTBURN: 0
DYSURIA: 0
SORE THROAT: 0
JOINT SWELLING: 0
CONSTIPATION: 1
FEVER: 0
ABDOMINAL PAIN: 0
MYALGIAS: 1
BREAST MASS: 0
WEAKNESS: 0
PARESTHESIAS: 0
FREQUENCY: 1
HEADACHES: 1
NERVOUS/ANXIOUS: 0
SHORTNESS OF BREATH: 0
CHILLS: 0
ARTHRALGIAS: 1

## 2019-10-03 ASSESSMENT — ANXIETY QUESTIONNAIRES
6. BECOMING EASILY ANNOYED OR IRRITABLE: NOT AT ALL
GAD7 TOTAL SCORE: 3
5. BEING SO RESTLESS THAT IT IS HARD TO SIT STILL: NOT AT ALL
GAD7 TOTAL SCORE: 3
3. WORRYING TOO MUCH ABOUT DIFFERENT THINGS: SEVERAL DAYS
4. TROUBLE RELAXING: NOT AT ALL
7. FEELING AFRAID AS IF SOMETHING AWFUL MIGHT HAPPEN: NOT AT ALL
2. NOT BEING ABLE TO STOP OR CONTROL WORRYING: SEVERAL DAYS
7. FEELING AFRAID AS IF SOMETHING AWFUL MIGHT HAPPEN: NOT AT ALL
GAD7 TOTAL SCORE: 3
1. FEELING NERVOUS, ANXIOUS, OR ON EDGE: SEVERAL DAYS

## 2019-10-03 ASSESSMENT — MIFFLIN-ST. JEOR: SCORE: 1116.02

## 2019-10-03 ASSESSMENT — PATIENT HEALTH QUESTIONNAIRE - PHQ9
SUM OF ALL RESPONSES TO PHQ QUESTIONS 1-9: 3
10. IF YOU CHECKED OFF ANY PROBLEMS, HOW DIFFICULT HAVE THESE PROBLEMS MADE IT FOR YOU TO DO YOUR WORK, TAKE CARE OF THINGS AT HOME, OR GET ALONG WITH OTHER PEOPLE: SOMEWHAT DIFFICULT
SUM OF ALL RESPONSES TO PHQ QUESTIONS 1-9: 3

## 2019-10-03 ASSESSMENT — ACTIVITIES OF DAILY LIVING (ADL): CURRENT_FUNCTION: NO ASSISTANCE NEEDED

## 2019-10-03 NOTE — LETTER
"October 3, 2019      Seema Radha Johnson  [address]  [place] [address]        Dear ,    We are writing to inform you of your test results.    Your test results fall within the expected range(s) or remain unchanged from previous results.  Please continue with current treatment plan.    Vit D level is good at 64 on your current supplements.     Thyroid test is normal.    Diabetes screening is normal.    Kidney function (serum creatinine and GFR),  electrolyte tests, and liver tests are normal.    Blood counts are normal.    Urine test is normal- no infection causing the frequency and urgency.     Cholesterol is well controlled. No concerns!   Interpreting your Cholesterol Profile Results: The LDL is the \"bad\" cholesterol that can clog the arteries.  The HDL is protective or \"good cholesterol\"; exercise and weight management can boost this level. Triglycerides are the sugary-fats in the blood. The ratio of HDL to total cholesterol under 5.0 is optimal.        Resulted Orders   Lipid panel reflex to direct LDL Fasting   Result Value Ref Range    Cholesterol 161 <200 mg/dL    Triglycerides 61 <150 mg/dL    HDL Cholesterol 78 >49 mg/dL    LDL Cholesterol Calculated 71 <100 mg/dL      Comment:      Desirable:       <100 mg/dl    Non HDL Cholesterol 83 <130 mg/dL   Vitamin D Deficiency   Result Value Ref Range    Vitamin D Deficiency screening 64 20 - 75 ug/L      Comment:      Season, race, dietary intake, and treatment affect the concentration of   25-hydroxy-Vitamin D. Values may decrease during winter months and increase   during summer months. Values 20-29 ug/L may indicate Vitamin D insufficiency   and values <20 ug/L may indicate Vitamin D deficiency.  Vitamin D determination is routinely performed by an immunoassay specific for   25 hydroxyvitamin D3.  If an individual is on vitamin D2 (ergocalciferol)   supplementation, please specify 25 OH vitamin D2 and D3 level determination by   LCMSMS test " VITD23.     CBC with platelets and differential   Result Value Ref Range    WBC 4.9 4.0 - 11.0 10e9/L    RBC Count 3.92 3.8 - 5.2 10e12/L    Hemoglobin 12.1 11.7 - 15.7 g/dL    Hematocrit 36.5 35.0 - 47.0 %    MCV 93 78 - 100 fl    MCH 30.9 26.5 - 33.0 pg    MCHC 33.2 31.5 - 36.5 g/dL    RDW 12.9 10.0 - 15.0 %    Platelet Count 233 150 - 450 10e9/L    % Neutrophils 60.5 %    % Lymphocytes 26.5 %    % Monocytes 11.0 %    % Eosinophils 1.6 %    % Basophils 0.4 %    Absolute Neutrophil 3.0 1.6 - 8.3 10e9/L    Absolute Lymphocytes 1.3 0.8 - 5.3 10e9/L    Absolute Monocytes 0.5 0.0 - 1.3 10e9/L    Absolute Eosinophils 0.1 0.0 - 0.7 10e9/L    Absolute Basophils 0.0 0.0 - 0.2 10e9/L    Diff Method Automated Method    Comprehensive metabolic panel (BMP + Alb, Alk Phos, ALT, AST, Total. Bili, TP)   Result Value Ref Range    Sodium 140 133 - 144 mmol/L    Potassium 4.1 3.4 - 5.3 mmol/L    Chloride 108 94 - 109 mmol/L    Carbon Dioxide 27 20 - 32 mmol/L    Anion Gap 5 3 - 14 mmol/L    Glucose 86 70 - 99 mg/dL    Urea Nitrogen 15 7 - 30 mg/dL    Creatinine 0.74 0.52 - 1.04 mg/dL    GFR Estimate 84 >60 mL/min/[1.73_m2]      Comment:      Non  GFR Calc  Starting 12/18/2018, serum creatinine based estimated GFR (eGFR) will be   calculated using the Chronic Kidney Disease Epidemiology Collaboration   (CKD-EPI) equation.      GFR Estimate If Black >90 >60 mL/min/[1.73_m2]      Comment:       GFR Calc  Starting 12/18/2018, serum creatinine based estimated GFR (eGFR) will be   calculated using the Chronic Kidney Disease Epidemiology Collaboration   (CKD-EPI) equation.      Calcium 9.0 8.5 - 10.1 mg/dL    Bilirubin Total 0.8 0.2 - 1.3 mg/dL    Albumin 4.1 3.4 - 5.0 g/dL    Protein Total 7.1 6.8 - 8.8 g/dL    Alkaline Phosphatase 35 (L) 40 - 150 U/L    ALT 24 0 - 50 U/L    AST 21 0 - 45 U/L   TSH with free T4 reflex   Result Value Ref Range    TSH 1.62 0.40 - 4.00 mU/L   UA reflex to Microscopic   Result  Value Ref Range    Color Urine Yellow     Appearance Urine Clear     Glucose Urine Negative NEG^Negative mg/dL    Bilirubin Urine Negative NEG^Negative    Ketones Urine Negative NEG^Negative mg/dL    Specific Gravity Urine 1.015 1.003 - 1.035    Blood Urine Negative NEG^Negative    pH Urine 7.5 (H) 5.0 - 7.0 pH    Protein Albumin Urine Negative NEG^Negative mg/dL    Urobilinogen Urine 1.0 0.2 - 1.0 EU/dL    Nitrite Urine Negative NEG^Negative    Leukocyte Esterase Urine Negative NEG^Negative    Source Midstream Urine        If you have any questions or concerns, please call the clinic at the number listed above.       Sincerely,        Shantel Plaza PA-C

## 2019-10-03 NOTE — NURSING NOTE
Prior to immunization administration, verified patients identity using patient s name and date of birth. Please see Immunization Activity for additional information.     Screening Questionnaire for Adult Immunization    Are you sick today?   No   Do you have allergies to medications, food, a vaccine component or latex?   No   Have you ever had a serious reaction after receiving a vaccination?   No   Do you have a long-term health problem with heart disease, lung disease, asthma, kidney disease, metabolic disease (e.g. diabetes), anemia, or other blood disorder?   No   Do you have cancer, leukemia, HIV/AIDS, or any other immune system problem?   No   In the past 3 months, have you taken medications that affect  your immune system, such as prednisone, other steroids, or anticancer drugs; drugs for the treatment of rheumatoid arthritis, Crohn s disease, or psoriasis; or have you had radiation treatments?   Yes   Have you had a seizure, or a brain or other nervous system problem?   No   During the past year, have you received a transfusion of blood or blood     products, or been given immune (gamma) globulin or antiviral drug?   No   For women: Are you pregnant or is there a chance you could become        pregnant during the next month?   No   Have you received any vaccinations in the past 4 weeks?   No     Immunization questionnaire was positive for at least one answer.  Notified Shantel Plaza PA-C.        Patient instructed to remain in clinic for 15 minutes afterwards, and to report any adverse reaction to me immediately.       Screening performed by Cheri Rendon MA on 10/3/2019 at 9:42 AM.

## 2019-10-04 ASSESSMENT — PATIENT HEALTH QUESTIONNAIRE - PHQ9: SUM OF ALL RESPONSES TO PHQ QUESTIONS 1-9: 3

## 2019-10-04 ASSESSMENT — ANXIETY QUESTIONNAIRES: GAD7 TOTAL SCORE: 3

## 2019-10-08 ENCOUNTER — ANCILLARY PROCEDURE (OUTPATIENT)
Dept: MAMMOGRAPHY | Facility: CLINIC | Age: 64
End: 2019-10-08
Attending: INTERNAL MEDICINE
Payer: COMMERCIAL

## 2019-10-08 ENCOUNTER — ONCOLOGY VISIT (OUTPATIENT)
Dept: ONCOLOGY | Facility: CLINIC | Age: 64
End: 2019-10-08
Payer: COMMERCIAL

## 2019-10-08 VITALS
BODY MASS INDEX: 22.48 KG/M2 | DIASTOLIC BLOOD PRESSURE: 74 MMHG | RESPIRATION RATE: 16 BRPM | SYSTOLIC BLOOD PRESSURE: 118 MMHG | TEMPERATURE: 97.7 F | HEART RATE: 63 BPM | OXYGEN SATURATION: 99 % | WEIGHT: 131.7 LBS | HEIGHT: 64 IN

## 2019-10-08 DIAGNOSIS — C50.911 INFILTRATING DUCTAL CARCINOMA OF RIGHT FEMALE BREAST (H): Chronic | ICD-10-CM

## 2019-10-08 DIAGNOSIS — C50.911 INFILTRATING DUCTAL CARCINOMA OF RIGHT FEMALE BREAST (H): Primary | ICD-10-CM

## 2019-10-08 DIAGNOSIS — D64.9 NORMOCYTIC ANEMIA: ICD-10-CM

## 2019-10-08 DIAGNOSIS — R92.30 DENSE BREASTS: ICD-10-CM

## 2019-10-08 DIAGNOSIS — Z12.31 VISIT FOR SCREENING MAMMOGRAM: ICD-10-CM

## 2019-10-08 LAB
ALBUMIN SERPL-MCNC: 3.6 G/DL (ref 3.4–5)
ALP SERPL-CCNC: 46 U/L (ref 40–150)
ALT SERPL W P-5'-P-CCNC: 23 U/L (ref 0–50)
AST SERPL W P-5'-P-CCNC: 15 U/L (ref 0–45)
BASOPHILS # BLD AUTO: 0 10E9/L (ref 0–0.2)
BASOPHILS NFR BLD AUTO: 0.4 %
BILIRUB DIRECT SERPL-MCNC: 0.1 MG/DL (ref 0–0.2)
BILIRUB SERPL-MCNC: 0.2 MG/DL (ref 0.2–1.3)
CREAT SERPL-MCNC: 0.78 MG/DL (ref 0.52–1.04)
DIFFERENTIAL METHOD BLD: ABNORMAL
EOSINOPHIL # BLD AUTO: 0.1 10E9/L (ref 0–0.7)
EOSINOPHIL NFR BLD AUTO: 1.7 %
ERYTHROCYTE [DISTWIDTH] IN BLOOD BY AUTOMATED COUNT: 12.5 % (ref 10–15)
FERRITIN SERPL-MCNC: 56 NG/ML (ref 8–252)
FOLATE SERPL-MCNC: 8.1 NG/ML
GFR SERPL CREATININE-BSD FRML MDRD: 79 ML/MIN/{1.73_M2}
HCT VFR BLD AUTO: 34 % (ref 35–47)
HGB BLD-MCNC: 11.1 G/DL (ref 11.7–15.7)
IMM GRANULOCYTES # BLD: 0 10E9/L (ref 0–0.4)
IMM GRANULOCYTES NFR BLD: 0.2 %
IRON SATN MFR SERPL: 21 % (ref 15–46)
IRON SERPL-MCNC: 67 UG/DL (ref 35–180)
LYMPHOCYTES # BLD AUTO: 1.4 10E9/L (ref 0.8–5.3)
LYMPHOCYTES NFR BLD AUTO: 27.9 %
MCH RBC QN AUTO: 31.1 PG (ref 26.5–33)
MCHC RBC AUTO-ENTMCNC: 32.6 G/DL (ref 31.5–36.5)
MCV RBC AUTO: 95 FL (ref 78–100)
MONOCYTES # BLD AUTO: 0.6 10E9/L (ref 0–1.3)
MONOCYTES NFR BLD AUTO: 11 %
NEUTROPHILS # BLD AUTO: 3 10E9/L (ref 1.6–8.3)
NEUTROPHILS NFR BLD AUTO: 58.8 %
PLATELET # BLD AUTO: 212 10E9/L (ref 150–450)
PROT SERPL-MCNC: 6.4 G/DL (ref 6.8–8.8)
RBC # BLD AUTO: 3.57 10E12/L (ref 3.8–5.2)
RETICS # AUTO: 36.9 10E9/L (ref 25–95)
RETICS/RBC NFR AUTO: 1 % (ref 0.5–2)
TIBC SERPL-MCNC: 318 UG/DL (ref 240–430)
VIT B12 SERPL-MCNC: 431 PG/ML (ref 193–986)
WBC # BLD AUTO: 5.2 10E9/L (ref 4–11)

## 2019-10-08 PROCEDURE — 99214 OFFICE O/P EST MOD 30 MIN: CPT | Performed by: INTERNAL MEDICINE

## 2019-10-08 PROCEDURE — 36415 COLL VENOUS BLD VENIPUNCTURE: CPT | Performed by: INTERNAL MEDICINE

## 2019-10-08 PROCEDURE — 82565 ASSAY OF CREATININE: CPT | Performed by: INTERNAL MEDICINE

## 2019-10-08 PROCEDURE — 82728 ASSAY OF FERRITIN: CPT | Performed by: INTERNAL MEDICINE

## 2019-10-08 PROCEDURE — 77067 SCR MAMMO BI INCL CAD: CPT

## 2019-10-08 PROCEDURE — 85060 BLOOD SMEAR INTERPRETATION: CPT | Performed by: INTERNAL MEDICINE

## 2019-10-08 PROCEDURE — 83540 ASSAY OF IRON: CPT | Performed by: INTERNAL MEDICINE

## 2019-10-08 PROCEDURE — 82607 VITAMIN B-12: CPT | Performed by: INTERNAL MEDICINE

## 2019-10-08 PROCEDURE — 82746 ASSAY OF FOLIC ACID SERUM: CPT | Performed by: INTERNAL MEDICINE

## 2019-10-08 PROCEDURE — 85045 AUTOMATED RETICULOCYTE COUNT: CPT | Performed by: INTERNAL MEDICINE

## 2019-10-08 PROCEDURE — 80076 HEPATIC FUNCTION PANEL: CPT | Performed by: INTERNAL MEDICINE

## 2019-10-08 PROCEDURE — 83550 IRON BINDING TEST: CPT | Performed by: INTERNAL MEDICINE

## 2019-10-08 PROCEDURE — 85025 COMPLETE CBC W/AUTO DIFF WBC: CPT | Performed by: INTERNAL MEDICINE

## 2019-10-08 ASSESSMENT — MIFFLIN-ST. JEOR: SCORE: 1133.26

## 2019-10-08 ASSESSMENT — PAIN SCALES - GENERAL: PAINLEVEL: MODERATE PAIN (4)

## 2019-10-08 NOTE — NURSING NOTE
"Oncology Rooming Note    October 8, 2019 2:52 PM   Seema Johnson is a 63 year old female who presents for:    Chief Complaint   Patient presents with     Oncology Clinic Visit     6 month follow up     Initial Vitals: /74 (BP Location: Left arm)   Pulse 63   Temp 97.7  F (36.5  C) (Oral)   Resp 16   Ht 1.635 m (5' 4.37\")   Wt 59.7 kg (131 lb 11.2 oz)   SpO2 99%   BMI 22.35 kg/m   Estimated body mass index is 22.35 kg/m  as calculated from the following:    Height as of this encounter: 1.635 m (5' 4.37\").    Weight as of this encounter: 59.7 kg (131 lb 11.2 oz). Body surface area is 1.65 meters squared.  Moderate Pain (4) Comment: Data Unavailable   No LMP recorded. Patient is postmenopausal.  Allergies reviewed: Yes  Medications reviewed: Yes    Medications: MEDICATION REFILLS NEEDED TODAY. Provider was notified.  Pharmacy name entered into [facility]:    [id number] - [facility] [address] [facility]ARMACY #[id number] - [facility] [address] [facility]  [id number] - [facility] [address] [address]  [facility] [id number]    Clinical concerns: Continued fatigue, especially with walking/exercise.      Mayte Irby LPN              "

## 2019-10-08 NOTE — PROGRESS NOTES
Oncology Follow-up visit:  Date on this visit: Oct 8, 2019    Oncologist: Adelita Pierre   Rheumatologist: Andreia Carranza  PCP: Shantel Plaza PA-C     DIAGNOSIS:    1. Stage II, T1B N1 M0, invasive ductal carcinoma right breast. Grade 1, ER positive in 95% of the cells, HI negative and HER2/lex negative.   She is s/p lumpectomy with sentinel lymph node evaluation on January 21, 2016.    She was noted to have a 7 mm breast cancer with an intramammary lymph node positivity and a sentinel lymph node positivity. One of two sentinel lymph nodes was positive with the tumor size being 0.3 cm and an intramammary lymph node was positive as well. All margins completely clear with invasive margin being 0.3 cm. There was some background DCIS and that margin was also clear, but less than 0.1 cm. The tumor was grade 1, strongly ER positive in 95% of the cells, HI negative ( stained in less than 1% of the cells) and HER2/lex negative.   Pathology reviewed at [facility] by Dr. Huang.  She has completed adjuvant chemotherapy with dose dense Taxol  x 4 cycles, March 18th- April 28, 2016, followed by dose dense AC x 4 cycles, May 12- June 23, 2016.  She has received adjuvant radiation therapy from 7/18/16- 8/26/16. She has started on Tamoxifen mid Oct 2016.  She was under the care of Dr. Iyer but transferrred her care to  in May 2018 due to insurance change.      2. BREAST NEXT genetic testing panel was negative. There is a FHx of ovarian cancer in one of her sisters and of breast cancer in another sister. Patient had menarche at age 11. She had her son at age 25. She had benign breast biopsied previously as below. Her lifetime risk of breast cancer according to Isis risk model is calculated at 17.4%    3. Osteoporosis- She has had compression fractures of T11 and T12. She has been on annual Reclast infusions with Dr. Boo, in the fall usually. She is on calcium and vitamin D supplementation.  DEXA in March  2015: Osteopenia. T score -2.1  DEXA in March 2017: Osteopenia. T score -1.8    History Of Present Illness:  Ms. Johnson is a 63 year old female who presents for f/up of Stage II (T1B N1 M0), invasive ductal carcinoma right breast, grade 1, ER positive in 95% of the cells, OH negative and HER2/lex negative by IHC (1+).   She remains on Tamoxifen. She is tolerating it well, with the exception of  hot flashes. She was not interested in any pharmacotherapy for hot flashes. She has had chronic lower back pain secondary to compression fractures, relieves by Tylenol. She has occasional hard stools.  She had a cholesterol profile checked recently and it was normal, with LDL of 71 and total cholesterol 161.  She is on Reclast infusions annually. She is on calcium and vitamin D supplementation.  Her last bilateral screening mammogram on 10/8/2019 showed benign findings.   She has a baseline benign tremor. She has no other health related complaints.  She is planning to leave for [place] next week and also then to [place], and then come back to to [place] in April or May.   She is staying very active, walking marathons, and an active skier in the winter.  She is noted to have lower Hb today. She has had no BRBPR, melena or other signs of bleeding.  Results for NONA JOHNSON (MRN [id number]) as of 10/13/2019 19:10   Ref. Range 4/20/2018 09:44 10/2/2018 10:28 10/3/2019 09:34 10/8/2019 13:43   Hemoglobin Latest Ref Range: 11.7 - 15.7 g/dL 12.9 12.4 12.1 11.1 (L)   MCV is normal.  She had a screening colonoscopy in March 2015 which showed normal findings. Repeat was recommended in five years because of her FHx of colon cancer. She had a negative screening mammogram on 10/8/2019. Breast density was extremely dense. She was recommended to proceed with 3D mammogram but declined repeatedly secondary to cost. She has been having s/o vertigo evaluated by her PCP. Brain MRI is scheduled for tomorrow. She is feeling more fatigued  especially when she walks upstairs although she still remains very active.  In addition, a complete 12 point  review of systems is negative.      Past Medical/Surgical History:  Past Medical History:   Diagnosis Date     Basal cell cancer      Mohs defect of nose 06/2012     Past Surgical History:   Procedure Laterality Date     BREAST SURGERY Right 01/2016    breast cancer   Osteoporosis.   Benign essential tremor.   She has mild hyperlipidemia.   She has had multiple breast biopsies in the past, three on the right, three on the left.     Allergies:  Allergies as of 10/08/2019 - Reviewed 10/03/2019   Allergen Reaction Noted     Compazine [prochlorperazine] Other (See Comments) 03/31/2016     Codeine sulfate Nausea 10/15/2012     Current Medications:  Current Outpatient Medications   Medication Sig Dispense Refill     meclizine (ANTIVERT) 25 MG tablet Take 1 tablet (25 mg) by mouth 3 times daily as needed for dizziness 90 tablet 0     propranolol ER (INDERAL LA) 60 MG 24 hr capsule Take 1 capsule (60 mg) by mouth daily 90 capsule 0     simvastatin (ZOCOR) 20 MG tablet Take 1 tablet (20 mg) by mouth At Bedtime 180 tablet 1     tamoxifen (NOLVADEX) 20 MG tablet Take 1 tablet (20 mg) by mouth daily 180 tablet 1     tolterodine (DETROL) 2 MG tablet Take 1 tablet (2 mg) by mouth 2 times daily 60 tablet 1      Family History:   Mom side family members have had a lot of heart disease. Sister had ductal carcinoma in situ. Another sister had uterine fibroids. Patient's father had brain cancer. On the paternal side, paternal grandmother had colon cancer, an aunt had breast cancer.  Seema underwent genetic testing and her  BREAST NEXT genetic testing panel was negative.      Social History:  Social History     Social History     Marital status:      Social History Main Topics     Smoking status: Never Smoker     Smokeless tobacco: Never Used     Alcohol use Yes      Comment: socially      She is , has one  "daughter in her 30s and a grandchild. Does not smoke. Takes alcohol socially when there is a dinner party.The patient is extremely active. She is an avid skier. She likes to travel a lot. They spent phelps in [place].     Physical Exam:    /74 (BP Location: Left arm)   Pulse 63   Temp 97.7  F (36.5  C) (Oral)   Resp 16   Ht 1.635 m (5' 4.37\")   Wt 59.7 kg (131 lb 11.2 oz)   SpO2 99%   BMI 22.35 kg/m          GENERAL APPEARANCE: alert and no distress     HENT: Mouth without ulcers or lesions     NECK: no adenopathy, no asymmetry or masses     LYMPHATICS: No cervical, supraclavicular, axillary  lymphadenopathy     RESP: lungs clear to auscultation - no rales, rhonchi or wheezes     CARDIOVASCULAR: regular rates and rhythm, normal S1 S2, no S3 or S4 and no murmur.     ABDOMEN:  soft, nontender, no HSM or masses and bowel sounds normal     MUSCULOSKELETAL: extremities normal- no gross deformities noted, no evidence of inflammation in joints, FROM in all extremities. No edema b/l LE.     SKIN: no suspicious lesions or rashes     PSYCHIATRIC: mentation appears normal and affect normal  Neuro: slight head tremor at rest. Non-focal exam otherwise. Axox3  Chest: No breast masses b/l and no axillary lymphadenopathy b/l. Incisions from right lumpectomy, R ASLN biopsy healed well. Other incisions from port-a-cath and prior breast biopsies healed well.    Laboratory/Imaging Studies  Component      Latest Ref Rng & Units 10/8/2019   WBC      4.0 - 11.0 10e9/L 5.2   RBC Count      3.8 - 5.2 10e12/L 3.57 (L)   Hemoglobin      11.7 - 15.7 g/dL 11.1 (L)   Hematocrit      35.0 - 47.0 % 34.0 (L)   MCV      78 - 100 fl 95   MCH      26.5 - 33.0 pg 31.1   MCHC      31.5 - 36.5 g/dL 32.6   RDW      10.0 - 15.0 % 12.5   Platelet Count      150 - 450 10e9/L 212   Diff Method       Automated Method   % Neutrophils      % 58.8   % Lymphocytes      % 27.9   % Monocytes      % 11.0   % Eosinophils      % 1.7   % Basophils      % " 0.4   % Immature Granulocytes      % 0.2   Absolute Neutrophil      1.6 - 8.3 10e9/L 3.0   Absolute Lymphocytes      0.8 - 5.3 10e9/L 1.4   Absolute Monocytes      0.0 - 1.3 10e9/L 0.6   Absolute Eosinophils      0.0 - 0.7 10e9/L 0.1   Absolute Basophils      0.0 - 0.2 10e9/L 0.0   Abs Immature Granulocytes      0 - 0.4 10e9/L 0.0   Bilirubin Direct      0.0 - 0.2 mg/dL 0.1   Bilirubin Total      0.2 - 1.3 mg/dL 0.2   Albumin      3.4 - 5.0 g/dL 3.6   Protein Total      6.8 - 8.8 g/dL 6.4 (L)   Alkaline Phosphatase      40 - 150 U/L 46   ALT      0 - 50 U/L 23   AST      0 - 45 U/L 15   Creatinine      0.52 - 1.04 mg/dL 0.78   GFR Estimate      >60 mL/min/1.73:m2 79   GFR Estimate If Black      >60 mL/min/1.73:m2 >90         ASSESSMENT/PLAN:  Seema is a very pleasant 63 year old woman with Stage II (pT1b N1(sn)cMo), invasive ductal carcinoma right breast,  Grade 1, ER positive in 95% of the cells, DE negative and HER2/lex negative. She is s/p lumpectomy with sentinel lymph node evaluation on January 21, 2016. She has completed adjuvant chemotherapy with dose dense Taxol IV q 2 weeks x 4 cycles, March 18th- April 28, 2016, followed by dose dense AC x 4 cycles, May 12- June 23, 2016.  She has received adjuvant radiation therapy from 7/18/16- 8/26/16. She has been on Tamoxifen mid Oct 2016.  ECOG PS 0.    1. Stage II breast cancer- continue Tamoxifen for at least 5 years. Given Hx of osteoporosis with compression fractures (although most recently bone mineral density has been improving on Tamoxifen, Reclast, vitamin D and calcium and exercise), we'll plan either 10 years of Tamoxifen or per recent [facility] data from 2017 -5 years of tamoxifen followed by 2 years of an aromatase inhibitor (AI).  For now, she will continue on Tamoxifen and we'll plan to see her back in 6 months (upon her return to [place]), with cbcd, CMP. She needs to continue with annual gynecologist exams while she remains on Tamoxifen.    2. Anemia- mild,  new. Proceed with additional workup- b12, folate, iron studies, peripheral blood smear. Due for a screening colonoscopy in March 2020. At the minimum would recommend that she has her Hb repeated in 3--4 weeks to make sure it is not dropping further. She plans to do that in [place]. We'll inform the patient of the results and recommendations and  f/up plan based on the results.       3. Breast cancer screening-  annual screening mammogram negative on October 9, 2019. She did not proceed with 3 D mammogram as recommended, due to worries about cost. Her lifetime risk of breast cancer according to Isis risk model is calculated at 17.4%, but we still discussed high risk screening schedule which would be indicated if her risk was >=20%. She will think about getting a mammogram with tomosynthesis next year.     4. Spontaneous (and traumatic) fractures in her spine and osteopenia on DEXA scan. This is c/w osteoporosis.  Vitamin D level was recently normal.  She is on yearly Reclast with Dr. Boo, last dose October 2018.    At the end of our visit patient verbalized understanding and concurred with the plan.    Addendum:  peripheral blood smear  Slight normochromic normocytic anemia without increased erythrocyte   regeneration     - Normal leukocyte count and differential   The red blood cells appear normochromic. Poikilocytosis is minimal.   Polychromasia is not increased. Rouleaux   formation is not increased. The morphology of the platelets is normal.   Lymphocytes appear polymorphous and   neutrophils display normal cytoplasmic granulation and unremarkable   nuclear morphology.   Recommendations remain the same: recheck Hb in 3-4 weeks. Proceed with repeat screening colonoscopy. Can try supplementation with daily MVI with iron.  Component      Latest Ref Rng & Units 10/8/2019   Iron      35 - 180 ug/dL 67   Iron Binding Cap      240 - 430 ug/dL 318   Iron Saturation Index      15 - 46 % 21   % Retic      0.5 - 2.0 % 1.0    Absolute Retic      25 - 95 10e9/L 36.9   Folate      >5.4 ng/mL 8.1   Vitamin B12      193 - 986 pg/mL 431   Ferritin      8 - 252 ng/mL 56

## 2019-10-08 NOTE — LETTER
10/8/2019         RE: Seema Johnson  [address]  [facility] [address]        Dear Colleague,    Thank you for referring your patient, Seema Johnson, to the [facility]. Please see a copy of my visit note below.    Oncology Follow-up visit:  Date on this visit: Oct 8, 2019    Oncologist: Adelita Pierre   Rheumatologist: Andreia Carranza  PCP: Shantel Plaza PA-C     DIAGNOSIS:    1. Stage II, T1B N1 M0, invasive ductal carcinoma right breast. Grade 1, ER positive in 95% of the cells, OR negative and HER2/lex negative.   She is s/p lumpectomy with sentinel lymph node evaluation on January 21, 2016.    She was noted to have a 7 mm breast cancer with an intramammary lymph node positivity and a sentinel lymph node positivity. One of two sentinel lymph nodes was positive with the tumor size being 0.3 cm and an intramammary lymph node was positive as well. All margins completely clear with invasive margin being 0.3 cm. There was some background DCIS and that margin was also clear, but less than 0.1 cm. The tumor was grade 1, strongly ER positive in 95% of the cells, OR negative ( stained in less than 1% of the cells) and HER2/lex negative.   Pathology reviewed at [facility],  by Dr. Huang.  She has completed adjuvant chemotherapy with dose dense Taxol  x 4 cycles, March 18th- April 28, 2016, followed by dose dense AC x 4 cycles, May 12- June 23, 2016.  She has received adjuvant radiation therapy from 7/18/16- 8/26/16. She has started on Tamoxifen mid Oct 2016.  She was under the care of Dr. Iyer but transferrred her care to  in May 2018 due to insurance change.      2. BREAST NEXT genetic testing panel was negative. There is a FHx of ovarian cancer in one of her sisters and of breast cancer in another sister. Patient had menarche at age 11. She had her son at age 25. She had benign breast biopsied previously as below. Her lifetime risk of breast cancer according  to Isis risk model is calculated at 17.4%    3. Osteoporosis- She has had compression fractures of T11 and T12. She has been on annual Reclast infusions with Dr. Boo, in the fall usually. She is on calcium and vitamin D supplementation.  DEXA in March 2015: Osteopenia. T score -2.1  DEXA in March 2017: Osteopenia. T score -1.8    History Of Present Illness:  Ms. Johnson is a 63 year old female who presents for f/up of Stage II (T1B N1 M0), invasive ductal carcinoma right breast, grade 1, ER positive in 95% of the cells, NM negative and HER2/lex negative by IHC (1+).   She remains on Tamoxifen. She is tolerating it well, with the exception of  hot flashes. She was not interested in any pharmacotherapy for hot flashes. She has had chronic lower back pain secondary to compression fractures, relieves by Tylenol. She has occasional hard stools.  She had a cholesterol profile checked recently and it was normal, with LDL of 71 and total cholesterol 161.  She is on Reclast infusions annually. She is on calcium and vitamin D supplementation.  Her last bilateral screening mammogram on 10/8/2019 showed benign findings.   She has a baseline benign tremor. She has no other health related complaints.  She is planning to leave for [place] next week and also then to [place], and then come back to to [place] in April or May.   She is staying very active, walking marathons, and an active skier in the winter.  She is noted to have lower Hb today. She has had no BRBPR, melena or other signs of bleeding.  Results for NONA JOHNSON (MRN [id number]) as of 10/13/2019 19:10   Ref. Range 4/20/2018 09:44 10/2/2018 10:28 10/3/2019 09:34 10/8/2019 13:43   Hemoglobin Latest Ref Range: 11.7 - 15.7 g/dL 12.9 12.4 12.1 11.1 (L)   MCV is normal.  She had a screening colonoscopy in March 2015 which showed normal findings. Repeat was recommended in five years because of her FHx of colon cancer. She had a negative screening mammogram on 10/8/2019.  Breast density was extremely dense. She was recommended to proceed with 3D mammogram but declined repeatedly secondary to cost. She has been having s/o vertigo evaluated by her PCP. Brain MRI is scheduled for tomorrow. She is feeling more fatigued especially when she walks upstairs although she still remains very active.  In addition, a complete 12 point  review of systems is negative.      Past Medical/Surgical History:  Past Medical History:   Diagnosis Date     Basal cell cancer      Mohs defect of nose 06/2012     Past Surgical History:   Procedure Laterality Date     BREAST SURGERY Right 01/2016    breast cancer   Osteoporosis.   Benign essential tremor.   She has mild hyperlipidemia.   She has had multiple breast biopsies in the past, three on the right, three on the left.     Allergies:  Allergies as of 10/08/2019 - Reviewed 10/03/2019   Allergen Reaction Noted     Compazine [prochlorperazine] Other (See Comments) 03/31/2016     Codeine sulfate Nausea 10/15/2012     Current Medications:  Current Outpatient Medications   Medication Sig Dispense Refill     meclizine (ANTIVERT) 25 MG tablet Take 1 tablet (25 mg) by mouth 3 times daily as needed for dizziness 90 tablet 0     propranolol ER (INDERAL LA) 60 MG 24 hr capsule Take 1 capsule (60 mg) by mouth daily 90 capsule 0     simvastatin (ZOCOR) 20 MG tablet Take 1 tablet (20 mg) by mouth At Bedtime 180 tablet 1     tamoxifen (NOLVADEX) 20 MG tablet Take 1 tablet (20 mg) by mouth daily 180 tablet 1     tolterodine (DETROL) 2 MG tablet Take 1 tablet (2 mg) by mouth 2 times daily 60 tablet 1      Family History:   Mom side family members have had a lot of heart disease. Sister had ductal carcinoma in situ. Another sister had uterine fibroids. Patient's father had brain cancer. On the paternal side, paternal grandmother had colon cancer, an aunt had breast cancer.  Seema underwent genetic testing and her  BREAST NEXT genetic testing panel was negative.      Social  "History:  Social History     Social History     Marital status:      Social History Main Topics     Smoking status: Never Smoker     Smokeless tobacco: Never Used     Alcohol use Yes      Comment: socially      She is , has one daughter in her 30s and a grandchild. Does not smoke. Takes alcohol socially when there is a dinner party.The patient is extremely active. She is an avid skier. She likes to travel a lot. They spent phelps in [place].     Physical Exam:    /74 (BP Location: Left arm)   Pulse 63   Temp 97.7  F (36.5  C) (Oral)   Resp 16   Ht 1.635 m (5' 4.37\")   Wt 59.7 kg (131 lb 11.2 oz)   SpO2 99%   BMI 22.35 kg/m           GENERAL APPEARANCE: alert and no distress     HENT: Mouth without ulcers or lesions     NECK: no adenopathy, no asymmetry or masses     LYMPHATICS: No cervical, supraclavicular, axillary  lymphadenopathy     RESP: lungs clear to auscultation - no rales, rhonchi or wheezes     CARDIOVASCULAR: regular rates and rhythm, normal S1 S2, no S3 or S4 and no murmur.     ABDOMEN:  soft, nontender, no HSM or masses and bowel sounds normal     MUSCULOSKELETAL: extremities normal- no gross deformities noted, no evidence of inflammation in joints, FROM in all extremities. No edema b/l LE.     SKIN: no suspicious lesions or rashes     PSYCHIATRIC: mentation appears normal and affect normal  Neuro: slight head tremor at rest. Non-focal exam otherwise. Axox3  Chest: No breast masses b/l and no axillary lymphadenopathy b/l. Incisions from right lumpectomy, R ASLN biopsy healed well. Other incisions from port-a-cath and prior breast biopsies healed well.    Laboratory/Imaging Studies  Component      Latest Ref Rng & Units 10/8/2019   WBC      4.0 - 11.0 10e9/L 5.2   RBC Count      3.8 - 5.2 10e12/L 3.57 (L)   Hemoglobin      11.7 - 15.7 g/dL 11.1 (L)   Hematocrit      35.0 - 47.0 % 34.0 (L)   MCV      78 - 100 fl 95   MCH      26.5 - 33.0 pg 31.1   MCHC      31.5 - 36.5 g/dL " 32.6   RDW      10.0 - 15.0 % 12.5   Platelet Count      150 - 450 10e9/L 212   Diff Method       Automated Method   % Neutrophils      % 58.8   % Lymphocytes      % 27.9   % Monocytes      % 11.0   % Eosinophils      % 1.7   % Basophils      % 0.4   % Immature Granulocytes      % 0.2   Absolute Neutrophil      1.6 - 8.3 10e9/L 3.0   Absolute Lymphocytes      0.8 - 5.3 10e9/L 1.4   Absolute Monocytes      0.0 - 1.3 10e9/L 0.6   Absolute Eosinophils      0.0 - 0.7 10e9/L 0.1   Absolute Basophils      0.0 - 0.2 10e9/L 0.0   Abs Immature Granulocytes      0 - 0.4 10e9/L 0.0   Bilirubin Direct      0.0 - 0.2 mg/dL 0.1   Bilirubin Total      0.2 - 1.3 mg/dL 0.2   Albumin      3.4 - 5.0 g/dL 3.6   Protein Total      6.8 - 8.8 g/dL 6.4 (L)   Alkaline Phosphatase      40 - 150 U/L 46   ALT      0 - 50 U/L 23   AST      0 - 45 U/L 15   Creatinine      0.52 - 1.04 mg/dL 0.78   GFR Estimate      >60 mL/min/1.73:m2 79   GFR Estimate If Black      >60 mL/min/1.73:m2 >90         ASSESSMENT/PLAN:  Seema is a very pleasant 63 year old woman with Stage II (pT1b N1(sn)cMo), invasive ductal carcinoma right breast,  Grade 1, ER positive in 95% of the cells, SC negative and HER2/lex negative. She is s/p lumpectomy with sentinel lymph node evaluation on January 21, 2016. She has completed adjuvant chemotherapy with dose dense Taxol IV q 2 weeks x 4 cycles, March 18th- April 28, 2016, followed by dose dense AC x 4 cycles, May 12- June 23, 2016.  She has received adjuvant radiation therapy from 7/18/16- 8/26/16. She has been on Tamoxifen mid Oct 2016.  ECOG PS 0.    1. Stage II breast cancer- continue Tamoxifen for at least 5 years. Given Hx of osteoporosis with compression fractures (although most recently bone mineral density has been improving on Tamoxifen, Reclast, vitamin D and calcium and exercise), we'll plan either 10 years of Tamoxifen or per recent SABCS data from 2017 -5 years of tamoxifen followed by 2 years of an aromatase  inhibitor (AI).  For now, she will continue on Tamoxifen and we'll plan to see her back in 6 months (upon her return to [place]), with cbcd, CMP. She needs to continue with annual gynecologist exams while she remains on Tamoxifen.    2. Anemia- mild, new. Proceed with additional workup- b12, folate, iron studies, peripheral blood smear. Due for a screening colonoscopy in March 2020. At the minimum would recommend that she has her Hb repeated in 3--4 weeks to make sure it is not dropping further. She plans to do that in [place]. We'll inform the patient of the results and recommendations and  f/up plan based on the results.       3. Breast cancer screening-  annual screening mammogram negative on October 9, 2019. She did not proceed with 3 D mammogram as recommended, due to worries about cost. Her lifetime risk of breast cancer according to Isis risk model is calculated at 17.4%, but we still discussed high risk screening schedule which would be indicated if her risk was >=20%. She will think about getting a mammogram with tomosynthesis next year.     4. Spontaneous (and traumatic) fractures in her spine and osteopenia on DEXA scan. This is c/w osteoporosis.  Vitamin D level was recently normal.  She is on yearly Reclast with Dr. Boo, last dose October 2018.    At the end of our visit patient verbalized understanding and concurred with the plan.    Addendum:  peripheral blood smear  Slight normochromic normocytic anemia without increased erythrocyte   regeneration     - Normal leukocyte count and differential   The red blood cells appear normochromic. Poikilocytosis is minimal.   Polychromasia is not increased. Rouleaux   formation is not increased. The morphology of the platelets is normal.   Lymphocytes appear polymorphous and   neutrophils display normal cytoplasmic granulation and unremarkable   nuclear morphology.   Recommendations remain the same: recheck Hb in 3-4 weeks. Proceed with repeat screening colonoscopy.  Can try supplementation with daily MVI with iron.  Component      Latest Ref Rng & Units 10/8/2019   Iron      35 - 180 ug/dL 67   Iron Binding Cap      240 - 430 ug/dL 318   Iron Saturation Index      15 - 46 % 21   % Retic      0.5 - 2.0 % 1.0   Absolute Retic      25 - 95 10e9/L 36.9   Folate      >5.4 ng/mL 8.1   Vitamin B12      193 - 986 pg/mL 431   Ferritin      8 - 252 ng/mL 56       Again, thank you for allowing me to participate in the care of your patient.        Sincerely,        Meghan Haynes MD, MD

## 2019-10-09 ENCOUNTER — ANCILLARY PROCEDURE (OUTPATIENT)
Dept: MRI IMAGING | Facility: CLINIC | Age: 64
End: 2019-10-09
Attending: PHYSICIAN ASSISTANT
Payer: COMMERCIAL

## 2019-10-09 DIAGNOSIS — R42 VERTIGO: ICD-10-CM

## 2019-10-09 LAB — COPATH REPORT: NORMAL

## 2019-10-09 PROCEDURE — 70553 MRI BRAIN STEM W/O & W/DYE: CPT | Performed by: RADIOLOGY

## 2019-10-09 PROCEDURE — A9585 GADOBUTROL INJECTION: HCPCS | Performed by: PHYSICIAN ASSISTANT

## 2019-10-09 RX ORDER — GADOBUTROL 604.72 MG/ML
7.5 INJECTION INTRAVENOUS ONCE
Status: COMPLETED | OUTPATIENT
Start: 2019-10-09 | End: 2019-10-09

## 2019-10-09 RX ADMIN — GADOBUTROL 6 ML: 604.72 INJECTION INTRAVENOUS at 09:34

## 2019-10-14 ENCOUNTER — TELEPHONE (OUTPATIENT)
Dept: ONCOLOGY | Facility: CLINIC | Age: 64
End: 2019-10-14

## 2019-10-14 NOTE — TELEPHONE ENCOUNTER
Left vm (verified per chart ok to leave VM) per Dr Haynes     let the patient know her anemia workup did not show any additional causes of anemia. As we talked about during our visit, I would recommend that she has hemoglobin rechecked in 3-4 weeks, and also I see that it will be 5 years from her last colonoscopy -in March 2020, and she will be due at that time, please remind her to proceed especially in view of anemia. She can try supplementation with MVI with iron daily to see if it improves her Hb. We will also recheck it when she returns    Provided callback number if patient has any questions  Ebonie Pickard  RN, BSN, OCN

## 2019-12-17 DIAGNOSIS — G25.0 BENIGN ESSENTIAL TREMOR: ICD-10-CM

## 2019-12-18 RX ORDER — PROPRANOLOL HCL 60 MG
CAPSULE, EXTENDED RELEASE 24HR ORAL
Qty: 30 CAPSULE | Refills: 0 | Status: SHIPPED | OUTPATIENT
Start: 2019-12-18 | End: 2020-01-31

## 2019-12-18 NOTE — TELEPHONE ENCOUNTER
Pending Prescriptions:                       Disp   Refills    propranolol ER (INDERAL LA) 60 MG 24 hr c*90 cap*0            Sig: TAKE 1 CAPSULE BY MOUTH ONE TIME DAILY    BP Readings from Last 3 Encounters:   10/08/19 118/74   10/03/19 120/86   04/11/19 98/64     Dose changed 10/3/19    Medication is being filled for 1 time refill only due to:  Patient needs to be seen because needs nurse only BP recheck.     Janet Mayers, RN, BSN

## 2020-01-30 ENCOUNTER — OFFICE VISIT (OUTPATIENT)
Dept: FAMILY MEDICINE | Facility: CLINIC | Age: 65
End: 2020-01-30
Payer: COMMERCIAL

## 2020-01-30 VITALS
DIASTOLIC BLOOD PRESSURE: 62 MMHG | BODY MASS INDEX: 23.31 KG/M2 | HEART RATE: 56 BPM | SYSTOLIC BLOOD PRESSURE: 104 MMHG | OXYGEN SATURATION: 98 % | WEIGHT: 136.5 LBS | HEIGHT: 64 IN | RESPIRATION RATE: 16 BRPM | TEMPERATURE: 98.2 F

## 2020-01-30 DIAGNOSIS — Z23 NEED FOR VACCINATION: ICD-10-CM

## 2020-01-30 DIAGNOSIS — C50.911 INFILTRATING DUCTAL CARCINOMA OF RIGHT FEMALE BREAST (H): Chronic | ICD-10-CM

## 2020-01-30 DIAGNOSIS — H69.92 DYSFUNCTION OF LEFT EUSTACHIAN TUBE: ICD-10-CM

## 2020-01-30 DIAGNOSIS — D64.9 LOW HEMOGLOBIN: ICD-10-CM

## 2020-01-30 DIAGNOSIS — H93.8X2 PLUGGED FEELING IN EAR, LEFT: Primary | ICD-10-CM

## 2020-01-30 PROBLEM — R42 VERTIGO: Status: ACTIVE | Noted: 2020-01-30

## 2020-01-30 LAB
ERYTHROCYTE [DISTWIDTH] IN BLOOD BY AUTOMATED COUNT: 13 % (ref 10–15)
HCT VFR BLD AUTO: 35.4 % (ref 35–47)
HGB BLD-MCNC: 11.8 G/DL (ref 11.7–15.7)
MCH RBC QN AUTO: 30.3 PG (ref 26.5–33)
MCHC RBC AUTO-ENTMCNC: 33.3 G/DL (ref 31.5–36.5)
MCV RBC AUTO: 91 FL (ref 78–100)
PLATELET # BLD AUTO: 232 10E9/L (ref 150–450)
RBC # BLD AUTO: 3.89 10E12/L (ref 3.8–5.2)
WBC # BLD AUTO: 4.9 10E9/L (ref 4–11)

## 2020-01-30 PROCEDURE — 99214 OFFICE O/P EST MOD 30 MIN: CPT | Mod: 25 | Performed by: PHYSICIAN ASSISTANT

## 2020-01-30 PROCEDURE — G0009 ADMIN PNEUMOCOCCAL VACCINE: HCPCS | Performed by: PHYSICIAN ASSISTANT

## 2020-01-30 PROCEDURE — 90670 PCV13 VACCINE IM: CPT | Performed by: PHYSICIAN ASSISTANT

## 2020-01-30 PROCEDURE — 36415 COLL VENOUS BLD VENIPUNCTURE: CPT | Performed by: PHYSICIAN ASSISTANT

## 2020-01-30 PROCEDURE — 85027 COMPLETE CBC AUTOMATED: CPT | Performed by: PHYSICIAN ASSISTANT

## 2020-01-30 RX ORDER — FLUTICASONE PROPIONATE 50 MCG
2 SPRAY, SUSPENSION (ML) NASAL DAILY
Qty: 16 G | Refills: 1 | Status: SHIPPED | OUTPATIENT
Start: 2020-01-30 | End: 2020-09-03

## 2020-01-30 ASSESSMENT — PAIN SCALES - GENERAL: PAINLEVEL: MILD PAIN (3)

## 2020-01-30 ASSESSMENT — MIFFLIN-ST. JEOR: SCORE: 1144.16

## 2020-01-30 NOTE — PROGRESS NOTES
"Subjective     Seemabryant Johnson is a 64 year old female who presents to clinic today for the following health issues:    History of Present Illness        She eats 2-3 servings of fruits and vegetables daily.She consumes 0 sweetened beverage(s) daily.She exercises with enough effort to increase her heart rate 30 to 60 minutes per day.  She exercises with enough effort to increase her heart rate 6 days per week.   She is taking medications regularly.     ENT Symptoms  Plugged feeling left ear. A slow build up of the sensation. Last night it was painful. Flew in from [place] 11:30pm last night. No ear pain on the flight. Started after.  Last night she rolled in bed and the vertigo she has had in the past returned. It was short lasting - <1min. Then settled. It happened 2x last night and she went to bed. No vertigo sx today. She struggled with vertigo for 6 months last year. Had MRI to evaluate. No other focal sx with the vertigo.  No sinus pressure.   \"Drippy nose\" for a month a two- in the am- intermittent. No allergies.   No HA.   No drainage from ears.  No swimming in [place]   No ear plugs   No falls or head injury              Symptoms: cc Present Absent Comment   Fever/Chills  no     Fatigue  no     Muscle Aches  no     Eye Irritation  no     Sneezing  no     Nasal Ritchie/Drg  no     Sinus Pressure/Pain  no     Loss of smell  no     Dental pain  no     Sore Throat  no     Swollen Glands  no     Ear Pain/Fullness  yes  Left ear   Cough  no     Wheeze  no     Chest Pain  no     Shortness of breath  no     Rash  no     Other         Symptom duration:  about 1 month ago   Symptom severity:  3/10   Treatments tried:  nothing   Contacts:  n/a       Follow up on hgb.   Normal when she saw me Oct 2019- hgb 12.1 g/dl. Then 1 week later at hematology with  her hgb was down 1 point at 11.1 g/dl. She had normal iron, iron studies, B12, ferritin, and folate. She was to have it rechecked but has been traveling. She " denies weight loss or abnormal bleeding. She is no longer taking nsaids. She takes tylenol regularly     She notes she has been gaining weight. Hard to lose. 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 She likes to be 120-125lb. She is 136lb today. Had tsh checked Oct 2019 and normal.     Back pain  Hx of compression fx a few years ago and osteoporosis.Taking calcium she has done infusions reclast w/rheumatology.  She has had chronic pain in the thoracic spine and low back since her compression fx.     Tremor has been worsening.       Patient Active Problem List   Diagnosis     Mohs defect of cheek     Closed compression fracture of thoracic vertebra (H)     Closed compression fracture of first lumbar vertebra (H)     Osteoporosis     Invasive ductal carcinoma of breast, female (H)- RIGHT breast, Upper outer quadrant- 2015     Benign essential tremor     Hyperlipidemia LDL goal <100     Mixed urge and stress incontinence     Past Surgical History:   Procedure Laterality Date     BREAST SURGERY Right 01/2016    breast cancer       Social History     Tobacco Use     Smoking status: Never Smoker     Smokeless tobacco: Never Used   Substance Use Topics     Alcohol use: Yes     Comment: socially     Family History   Problem Relation Age of Onset     Breast Cancer Sister      Cerebrovascular Disease Mother      Cerebrovascular Disease Father      Hypothyroidism Father      Diabetes No family hx of      Coronary Artery Disease No family hx of      Hypertension No family hx of      Hyperlipidemia No family hx of      Colon Cancer No family hx of      Prostate Cancer No family hx of      Depression No family hx of      Substance Abuse No family hx of      Thyroid Disease No family hx of      Obesity No family hx of      Osteoporosis No family hx of      Anesthesia Reaction No family hx of      Asthma No family hx of   "    Mental Illness No family hx of      Anxiety Disorder No family hx of      Other Cancer No family hx of          Current Outpatient Medications   Medication Sig Dispense Refill     fluticasone (FLONASE) 50 MCG/ACT nasal spray Spray 2 sprays into both nostrils daily 16 g 1     propranolol ER (INDERAL LA) 60 MG 24 hr capsule TAKE 1 CAPSULE BY MOUTH ONE TIME DAILY 30 capsule 0     simvastatin (ZOCOR) 20 MG tablet Take 1 tablet (20 mg) by mouth At Bedtime 180 tablet 1     tamoxifen (NOLVADEX) 20 MG tablet Take 1 tablet (20 mg) by mouth daily 180 tablet 1     meclizine (ANTIVERT) 25 MG tablet Take 1 tablet (25 mg) by mouth 3 times daily as needed for dizziness (Patient not taking: Reported on 1/30/2020) 90 tablet 0     tolterodine (DETROL) 2 MG tablet Take 1 tablet (2 mg) by mouth 2 times daily (Patient not taking: Reported on 1/30/2020) 60 tablet 1     Allergies   Allergen Reactions     Compazine [Prochlorperazine] Other (See Comments)     Jittery and hyper and foggy     Codeine Sulfate Nausea     BP Readings from Last 3 Encounters:   01/30/20 104/62   10/08/19 118/74   10/03/19 120/86    Wt Readings from Last 3 Encounters:   01/30/20 61.9 kg (136 lb 8 oz)   10/08/19 59.7 kg (131 lb 11.2 oz)   10/03/19 58 kg (127 lb 14.4 oz)                    Reviewed and updated as needed this visit by Provider         Review of Systems   ROS COMP: Constitutional, HEENT, cardiovascular, pulmonary, gi and gu systems are negative, except as otherwise noted.      Objective    /62   Pulse 56   Temp 98.2  F (36.8  C) (Temporal)   Resp 16   Ht 1.626 m (5' 4\")   Wt 61.9 kg (136 lb 8 oz)   SpO2 98%   BMI 23.43 kg/m    Body mass index is 23.43 kg/m .  Physical Exam   GENERAL: healthy, alert and no distress  EYES: Eyes grossly normal to inspection, PERRL and conjunctivae and sclerae normal  HENT: Normal cephalic/atraumatic. Right ear: canal clear and TM's normal, no effusion.  Left ear: canal clear and TM's normal, no effusion. " Bilateral: no pain w/manipulation auricle, tragus, or mastoid percussion. Nose mucosa: normal. Lips normal, no lesions. Buccal muscosa moist. Soft palate no lesions or ulcers. Tonsils normal, no erythema, no exudates. Uvula midline. Posterior oropharynx no erythema.   NECK: no adenopathy, no asymmetry, masses, or scars and thyroid normal to palpation  RESP: lungs clear to auscultation - no rales, rhonchi or wheezes  CV: regular rate and rhythm, normal S1 S2, no S3 or S4, no murmur, click or rub, no peripheral edema and peripheral pulses strong  ABDOMEN: soft, nontender, no hepatosplenomegaly, no masses and bowel sounds normal  MS: tender thoracic paraspinous muscles and into upper lumbar region. Kyphosis of thoracic spine.   NEURO: tremor of head, of hands. Normal strength and tone, mentation intact and speech normal  PSYCH: mentation appears normal, affect normal/bright    Diagnostic Test Results:  Labs reviewed in Epic  Results for orders placed or performed in visit on 01/30/20 (from the past 24 hour(s))   CBC with platelets   Result Value Ref Range    WBC 4.9 4.0 - 11.0 10e9/L    RBC Count 3.89 3.8 - 5.2 10e12/L    Hemoglobin 11.8 11.7 - 15.7 g/dL    Hematocrit 35.4 35.0 - 47.0 %    MCV 91 78 - 100 fl    MCH 30.3 26.5 - 33.0 pg    MCHC 33.3 31.5 - 36.5 g/dL    RDW 13.0 10.0 - 15.0 %    Platelet Count 232 150 - 450 10e9/L           Assessment & Plan     1. Plugged feeling in ear, left  2. Dysfunction of left eustachian tube  No cerumen.   TM normal and pearly, no effusion  Likely from daily post-nasal drip, then worse with flying [place] from [place] last night.   Start flonase as below     - fluticasone (FLONASE) 50 MCG/ACT nasal spray; Spray 2 sprays into both nostrils daily  Dispense: 16 g; Refill: 1    3. Low hemoglobin  Hgb normal   - CBC with platelets    4. Infiltrating ductal carcinoma of right female breast (H)  Pt saw oncology Oct 2019. Up to date    5. Need for vaccination  Prevnar 13 given.  - PNEUMOCOCCAL  CONJ VACCINE 13 VALENT IM  - ADMIN 1st VACCINE      Follow Up: The patient was instructed to contact clinic for worsening symptoms, non-improvement as expected/discussed, and for questions regarding medications or treatment plan. Discussed parameters for follow up and included in After Visit Summary given to patient.      Return in about 6 months (around 7/30/2020) for Recheck.    Shantel Plaza PA-C  [facility]

## 2020-01-30 NOTE — PATIENT INSTRUCTIONS
Patient Education     Earache, No Infection (Adult)  Earaches can happen without an infection. This occurs when air and fluid build up behind the eardrum causing a feeling of fullness and discomfort and reduced hearing. This is called otitis media with effusion (OME) or serous otitis media. It means there is fluid in the middle ear. It is not the same as acute otitis media, which is typically from infection.  OME can happen when you have a cold if congestion blocks the passage that drains the middle ear. This passage is called the eustachian tube. OME may also occur with nasal allergies or after a bacterial middle ear infection.    The pain or discomfort may come and go. You may hear clicking or popping sounds when you chew or swallow. You may feel that your balance is off. Or you may hear ringing in the ear.  It often takes from several weeks up to 3 months for the fluid to clear on its own. Oral pain relievers and ear drops help if there is pain. Decongestants and antihistamines sometimes help. Antibiotics don't help since there is no infection. Your doctor may prescribe a nasal spray to help reduce swelling in the nose and eustachian tube. This can allow the ear to drain.  If your OME doesn't improve after 3 months, surgery may be used to drain the fluid and insert a small tube in the eardrum to allow continued drainage.  Because the middle ear fluid can become infected, it is important to watch for signs of an ear infection which may develop later. These signs include increased ear pain, fever, or drainage from the ear.  Home care  The following guidelines will help you care for yourself at home:    You may use over-the-counter medicine as directed to control pain, unless another medicine was prescribed. If you have chronic liver or kidney disease or ever had a stomach ulcer or GI bleeding, talk with your doctor before using these medicines. Aspirin should never be used in anyone under 18 years of age who is  ill with a fever. It may cause severe liver damage.    You may use over-the-counter decongestants such as phenylephrine or pseudoephedrine. But they are not always helpful. Don't use nasal spray decongestants more than 3 days. Longer use can make congestion worse. Prescription nasal sprays from your doctor don't typically have those restrictions.    Antihistamines may help if you are also having allergy symptoms.    You may use medicines such as guaifenesin to thin mucus and promote drainage.  Follow-up care  Follow up with your healthcare provider or as advised if you are not feeling better after 3 days.  When to seek medical advice  Call your healthcare provider right away if any of the following occur:    Your ear pain gets worse or does not start to improve     Fever of 100.4 F (38 C) or higher, or as directed by your healthcare provider    Fluid or blood draining from the ear    Headache or sinus pain    Stiff neck    Unusual drowsiness or confusion  Date Last Reviewed: 10/1/2016    0217-6764 [employer]. [address] [place] [address], [place] [address]. All rights reserved. This information is not intended as a substitute for professional medical care. Always follow your healthcare professional's instructions.

## 2020-01-31 DIAGNOSIS — G25.0 BENIGN ESSENTIAL TREMOR: ICD-10-CM

## 2020-01-31 RX ORDER — PROPRANOLOL HCL 60 MG
CAPSULE, EXTENDED RELEASE 24HR ORAL
Qty: 90 CAPSULE | Refills: 1 | Status: SHIPPED | OUTPATIENT
Start: 2020-01-31 | End: 2020-08-04

## 2020-01-31 NOTE — TELEPHONE ENCOUNTER
Pending Prescriptions:                       Disp   Refills    propranolol ER (INDERAL LA) 60 MG 24 hr c*90 cap*1            Sig: TAKE ONE CAPSULE BY MOUTH ONE TIME DAILY    Prescription approved per [facility] Refill Protocol.    Shanthi Malcolm, MSN, RN

## 2020-02-03 RX ORDER — PROPRANOLOL HCL 60 MG
CAPSULE, EXTENDED RELEASE 24HR ORAL
Qty: 30 CAPSULE | Refills: 0 | OUTPATIENT
Start: 2020-02-03

## 2020-02-08 ENCOUNTER — HEALTH MAINTENANCE LETTER (OUTPATIENT)
Age: 65
End: 2020-02-08

## 2020-03-10 DIAGNOSIS — R42 VERTIGO: ICD-10-CM

## 2020-03-10 NOTE — TELEPHONE ENCOUNTER
Reason for Call:  Medication or medication refill:    Do you use a [facility]?  Name of the pharmacy and phone number for the current request:  Pharmacy pended    Name of the medication requested: meclizine, patient is out in [place] and is hoping to get a refill of this if possible    Other request: NA    Can we leave a detailed message on this number? YES    Phone number patient can be reached at: Cell number on file:    Telephone Information:   Mobile [phone]       Best Time: any    Call taken on 3/10/2020 at 2:34 PM by Nissa Sweeney

## 2020-03-11 RX ORDER — MECLIZINE HYDROCHLORIDE 25 MG/1
25 TABLET ORAL 3 TIMES DAILY PRN
Qty: 90 TABLET | Refills: 0 | Status: SHIPPED | OUTPATIENT
Start: 2020-03-11 | End: 2020-09-03

## 2020-04-29 DIAGNOSIS — R92.30 DENSE BREASTS: ICD-10-CM

## 2020-04-29 DIAGNOSIS — Z12.31 VISIT FOR SCREENING MAMMOGRAM: ICD-10-CM

## 2020-04-29 DIAGNOSIS — C50.911 INFILTRATING DUCTAL CARCINOMA OF RIGHT FEMALE BREAST (H): Chronic | ICD-10-CM

## 2020-04-29 RX ORDER — TAMOXIFEN CITRATE 20 MG/1
20 TABLET ORAL DAILY
Qty: 90 TABLET | Refills: 0 | Status: SHIPPED | OUTPATIENT
Start: 2020-04-29 | End: 2020-08-27

## 2020-04-29 RX ORDER — TAMOXIFEN CITRATE 20 MG/1
20 TABLET ORAL DAILY
Qty: 180 TABLET | Refills: 1 | Status: CANCELLED | OUTPATIENT
Start: 2020-04-29

## 2020-06-15 DIAGNOSIS — E78.5 HYPERLIPIDEMIA LDL GOAL <100: ICD-10-CM

## 2020-06-16 RX ORDER — SIMVASTATIN 20 MG
TABLET ORAL
Qty: 180 TABLET | Refills: 0 | Status: SHIPPED | OUTPATIENT
Start: 2020-06-16 | End: 2020-09-03

## 2020-06-16 NOTE — TELEPHONE ENCOUNTER
Pending Prescriptions:                       Disp   Refills    simvastatin (ZOCOR) 20 MG tablet [Pharmac*180 ta*0            Sig: TAKE ONE TABLET BY MOUTH AT BEDTIME     Prescription approved per [facility] Refill Protocol.    Janet Mayers, ZACHARYN, RN, PHN

## 2020-08-03 DIAGNOSIS — G25.0 BENIGN ESSENTIAL TREMOR: ICD-10-CM

## 2020-08-04 ENCOUNTER — TELEPHONE (OUTPATIENT)
Dept: FAMILY MEDICINE | Facility: CLINIC | Age: 65
End: 2020-08-04

## 2020-08-04 DIAGNOSIS — M81.0 OSTEOPOROSIS, UNSPECIFIED OSTEOPOROSIS TYPE, UNSPECIFIED PATHOLOGICAL FRACTURE PRESENCE: Primary | ICD-10-CM

## 2020-08-04 RX ORDER — PROPRANOLOL HCL 60 MG
CAPSULE, EXTENDED RELEASE 24HR ORAL
Qty: 90 CAPSULE | Refills: 1 | Status: SHIPPED | OUTPATIENT
Start: 2020-08-04 | End: 2020-09-03

## 2020-08-04 NOTE — TELEPHONE ENCOUNTER
Reason for Call: Request for an order or referral:    Order or referral being requested: DEXA scan     Date needed: as soon as possible    Has the patient been seen by the PCP for this problem? NO    Additional comments:     Phone number Patient can be reached at:  [phone]    Best Time:      Can we leave a detailed message on this number?  NO    Call taken on 8/4/2020 at 4:02 PM by Chrissie Plunkett

## 2020-08-04 NOTE — TELEPHONE ENCOUNTER
Please call pt- order for dexa is placed. Assist her with scheduling as needed or provide in for where she can schedule on her own.  Shantel Plaza PA-C

## 2020-08-04 NOTE — TELEPHONE ENCOUNTER
Pending Prescriptions:                       Disp   Refills    propranolol ER (INDERAL LA) 60 MG 24 hr c*90 cap*1            Sig: TAKE 1 CAPSULE BY MOUTH ONE TIME DAILY     Prescription [place] per [facility] [place].    Shanthi Malcolm, MSN, RN

## 2020-08-14 ENCOUNTER — TELEPHONE (OUTPATIENT)
Dept: ONCOLOGY | Facility: CLINIC | Age: 65
End: 2020-08-14

## 2020-08-14 NOTE — TELEPHONE ENCOUNTER
This patient is scheduled with Dr Haynes on September 29th and Dr Haynes placed an order for her Mammogram However she will not beable to her her Mammogram until October 9th due to Insurance only paying yearly for Mammograms. This patient is going out of town on October 4th and will be gone for months. She will call her insurance company to see if they will pay for the Mammogram if it is earlier then October 9th.

## 2020-08-24 ENCOUNTER — HOSPITAL ENCOUNTER (OUTPATIENT)
Dept: MAMMOGRAPHY | Facility: CLINIC | Age: 65
End: 2020-08-24
Attending: INTERNAL MEDICINE
Payer: COMMERCIAL

## 2020-08-24 DIAGNOSIS — R92.30 DENSE BREASTS: ICD-10-CM

## 2020-08-24 DIAGNOSIS — Z12.31 VISIT FOR SCREENING MAMMOGRAM: ICD-10-CM

## 2020-08-24 DIAGNOSIS — C50.911 INFILTRATING DUCTAL CARCINOMA OF RIGHT FEMALE BREAST (H): Chronic | ICD-10-CM

## 2020-08-24 PROCEDURE — 77063 BREAST TOMOSYNTHESIS BI: CPT

## 2020-08-26 DIAGNOSIS — Z12.31 VISIT FOR SCREENING MAMMOGRAM: ICD-10-CM

## 2020-08-26 DIAGNOSIS — C50.911 INFILTRATING DUCTAL CARCINOMA OF RIGHT FEMALE BREAST (H): Chronic | ICD-10-CM

## 2020-08-26 DIAGNOSIS — R92.30 DENSE BREASTS: ICD-10-CM

## 2020-08-27 RX ORDER — TAMOXIFEN CITRATE 20 MG/1
20 TABLET ORAL DAILY
Qty: 90 TABLET | Refills: 0 | Status: SHIPPED | OUTPATIENT
Start: 2020-08-27 | End: 2020-09-29

## 2020-09-03 ENCOUNTER — VIRTUAL VISIT (OUTPATIENT)
Dept: FAMILY MEDICINE | Facility: CLINIC | Age: 65
End: 2020-09-03
Payer: COMMERCIAL

## 2020-09-03 ENCOUNTER — ALLIED HEALTH/NURSE VISIT (OUTPATIENT)
Dept: FAMILY MEDICINE | Facility: CLINIC | Age: 65
End: 2020-09-03
Payer: COMMERCIAL

## 2020-09-03 DIAGNOSIS — M81.0 OSTEOPOROSIS, UNSPECIFIED OSTEOPOROSIS TYPE, UNSPECIFIED PATHOLOGICAL FRACTURE PRESENCE: ICD-10-CM

## 2020-09-03 DIAGNOSIS — Z23 NEED FOR VACCINATION: ICD-10-CM

## 2020-09-03 DIAGNOSIS — Z12.11 SPECIAL SCREENING FOR MALIGNANT NEOPLASMS, COLON: ICD-10-CM

## 2020-09-03 DIAGNOSIS — R42 VERTIGO: ICD-10-CM

## 2020-09-03 DIAGNOSIS — Z87.81 HISTORY OF COMPRESSION FRACTURE OF SPINE: ICD-10-CM

## 2020-09-03 DIAGNOSIS — G25.0 BENIGN ESSENTIAL TREMOR: ICD-10-CM

## 2020-09-03 DIAGNOSIS — E78.5 HYPERLIPIDEMIA LDL GOAL <100: Primary | ICD-10-CM

## 2020-09-03 DIAGNOSIS — Z23 NEED FOR PROPHYLACTIC VACCINATION AND INOCULATION AGAINST INFLUENZA: ICD-10-CM

## 2020-09-03 PROCEDURE — 99207 ZZC NO CHARGE NURSE ONLY: CPT

## 2020-09-03 PROCEDURE — 99214 OFFICE O/P EST MOD 30 MIN: CPT | Mod: 95 | Performed by: PHYSICIAN ASSISTANT

## 2020-09-03 PROCEDURE — G0008 ADMIN INFLUENZA VIRUS VAC: HCPCS

## 2020-09-03 PROCEDURE — 90662 IIV NO PRSV INCREASED AG IM: CPT

## 2020-09-03 RX ORDER — SIMVASTATIN 20 MG
20 TABLET ORAL AT BEDTIME
Qty: 90 TABLET | Refills: 3 | Status: SHIPPED | OUTPATIENT
Start: 2020-09-03 | End: 2021-10-10

## 2020-09-03 RX ORDER — PROPRANOLOL HCL 60 MG
60 CAPSULE, EXTENDED RELEASE 24HR ORAL DAILY
Qty: 90 CAPSULE | Refills: 3 | Status: SHIPPED | OUTPATIENT
Start: 2020-09-03 | End: 2021-10-10

## 2020-09-03 NOTE — PATIENT INSTRUCTIONS
Plan for flu shot when you see     Labs for cholesterol at the same time you have labs for Dr. VANEGAS    See rheumatology for IV infusion of reclast. I will look for bone density scan fax     Colonoscopy this fall before [place]     Keep up the healthy walking

## 2020-09-08 DIAGNOSIS — Z11.59 ENCOUNTER FOR SCREENING FOR OTHER VIRAL DISEASES: Primary | ICD-10-CM

## 2020-09-17 RX ORDER — BISACODYL 5 MG/1
15 TABLET, DELAYED RELEASE ORAL SEE ADMIN INSTRUCTIONS
Qty: 3 TABLET | Refills: 0 | Status: SHIPPED | OUTPATIENT
Start: 2020-09-17 | End: 2021-10-06

## 2020-09-17 RX ORDER — SODIUM, POTASSIUM,MAG SULFATES 17.5-3.13G
1 SOLUTION, RECONSTITUTED, ORAL ORAL SEE ADMIN INSTRUCTIONS
Qty: 2 BOTTLE | Refills: 0 | Status: SHIPPED | OUTPATIENT
Start: 2020-09-17 | End: 2021-10-06

## 2020-09-22 DIAGNOSIS — Z11.59 ENCOUNTER FOR SCREENING FOR OTHER VIRAL DISEASES: ICD-10-CM

## 2020-09-22 PROCEDURE — U0003 INFECTIOUS AGENT DETECTION BY NUCLEIC ACID (DNA OR RNA); SEVERE ACUTE RESPIRATORY SYNDROME CORONAVIRUS 2 (SARS-COV-2) (CORONAVIRUS DISEASE [COVID-19]), AMPLIFIED PROBE TECHNIQUE, MAKING USE OF HIGH THROUGHPUT TECHNOLOGIES AS DESCRIBED BY CMS-2020-01-R: HCPCS | Performed by: INTERNAL MEDICINE

## 2020-09-23 LAB
SARS-COV-2 RNA SPEC QL NAA+PROBE: NOT DETECTED
SPECIMEN SOURCE: NORMAL

## 2020-09-24 ENCOUNTER — TRANSFERRED RECORDS (OUTPATIENT)
Dept: HEALTH INFORMATION MANAGEMENT | Facility: CLINIC | Age: 65
End: 2020-09-24

## 2020-09-24 LAB
ALT SERPL-CCNC: 15 IU/L (ref 5–35)
AST SERPL-CCNC: 22 U/L (ref 5–34)
CHOLEST SERPL-MCNC: 163 MG/DL (ref 4–200)
CREAT SERPL-MCNC: 0.69 MG/DL (ref 0.5–1.3)
GFR SERPL CREATININE-BSD FRML MDRD: 90.5 ML/MIN/1.73M2
HDLC SERPL-MCNC: 70 MG/DL (ref 30–85)
LDLC SERPL CALC-MCNC: 79.4 MG/DL (ref 0–129)
TRIGL SERPL-MCNC: 68 MG/DL (ref 7–150)

## 2020-09-25 ENCOUNTER — SURGERY (OUTPATIENT)
Age: 65
End: 2020-09-25
Payer: COMMERCIAL

## 2020-09-25 ENCOUNTER — HOSPITAL ENCOUNTER (OUTPATIENT)
Facility: AMBULATORY SURGERY CENTER | Age: 65
Discharge: HOME | End: 2020-09-25
Attending: INTERNAL MEDICINE | Admitting: INTERNAL MEDICINE
Payer: COMMERCIAL

## 2020-09-25 VITALS
OXYGEN SATURATION: 98 % | HEART RATE: 55 BPM | RESPIRATION RATE: 16 BRPM | TEMPERATURE: 97 F | DIASTOLIC BLOOD PRESSURE: 76 MMHG | SYSTOLIC BLOOD PRESSURE: 102 MMHG

## 2020-09-25 DIAGNOSIS — Z12.11 SPECIAL SCREENING FOR MALIGNANT NEOPLASMS, COLON: Primary | ICD-10-CM

## 2020-09-25 LAB — COLONOSCOPY: NORMAL

## 2020-09-25 PROCEDURE — G8918 PT W/O PREOP ORDER IV AB PRO: HCPCS

## 2020-09-25 PROCEDURE — G0121 COLON CA SCRN NOT HI RSK IND: HCPCS

## 2020-09-25 PROCEDURE — G0105 COLORECTAL SCRN; HI RISK IND: HCPCS | Performed by: INTERNAL MEDICINE

## 2020-09-25 PROCEDURE — G8907 PT DOC NO EVENTS ON DISCHARG: HCPCS

## 2020-09-25 PROCEDURE — G0500 MOD SEDAT ENDO SERVICE >5YRS: HCPCS | Mod: 33 | Performed by: INTERNAL MEDICINE

## 2020-09-25 RX ORDER — ONDANSETRON 4 MG/1
4 TABLET, ORALLY DISINTEGRATING ORAL EVERY 6 HOURS PRN
Status: DISCONTINUED | OUTPATIENT
Start: 2020-09-25 | End: 2020-09-26 | Stop reason: HOSPADM

## 2020-09-25 RX ORDER — ONDANSETRON 2 MG/ML
4 INJECTION INTRAMUSCULAR; INTRAVENOUS
Status: DISCONTINUED | OUTPATIENT
Start: 2020-09-25 | End: 2020-09-26 | Stop reason: HOSPADM

## 2020-09-25 RX ORDER — NALOXONE HYDROCHLORIDE 0.4 MG/ML
.1-.4 INJECTION, SOLUTION INTRAMUSCULAR; INTRAVENOUS; SUBCUTANEOUS
Status: DISCONTINUED | OUTPATIENT
Start: 2020-09-25 | End: 2020-09-26 | Stop reason: HOSPADM

## 2020-09-25 RX ORDER — FLUMAZENIL 0.1 MG/ML
0.2 INJECTION, SOLUTION INTRAVENOUS
Status: DISCONTINUED | OUTPATIENT
Start: 2020-09-25 | End: 2020-09-26 | Stop reason: HOSPADM

## 2020-09-25 RX ORDER — LIDOCAINE 40 MG/G
CREAM TOPICAL
Status: DISCONTINUED | OUTPATIENT
Start: 2020-09-25 | End: 2020-09-26 | Stop reason: HOSPADM

## 2020-09-25 RX ORDER — ONDANSETRON 2 MG/ML
4 INJECTION INTRAMUSCULAR; INTRAVENOUS EVERY 6 HOURS PRN
Status: DISCONTINUED | OUTPATIENT
Start: 2020-09-25 | End: 2020-09-26 | Stop reason: HOSPADM

## 2020-09-25 RX ORDER — FENTANYL CITRATE 50 UG/ML
INJECTION, SOLUTION INTRAMUSCULAR; INTRAVENOUS PRN
Status: DISCONTINUED | OUTPATIENT
Start: 2020-09-25 | End: 2020-09-25 | Stop reason: HOSPADM

## 2020-09-25 RX ADMIN — FENTANYL CITRATE 50 MCG: 50 INJECTION, SOLUTION INTRAMUSCULAR; INTRAVENOUS at 11:55

## 2020-09-25 RX ADMIN — FENTANYL CITRATE 25 MCG: 50 INJECTION, SOLUTION INTRAMUSCULAR; INTRAVENOUS at 12:04

## 2020-09-25 RX ADMIN — FENTANYL CITRATE 25 MCG: 50 INJECTION, SOLUTION INTRAMUSCULAR; INTRAVENOUS at 11:59

## 2020-09-28 NOTE — PROGRESS NOTES
"Seema Johnson is a 64 year old female who is being evaluated via a billable video visit.      The patient has been notified of following:     \"This video visit will be conducted via a call between you and your physician/provider. We have found that certain health care needs can be provided without the need for an in-person physical exam.  This service lets us provide the care you need with a video conversation.  If a prescription is necessary we can send it directly to your pharmacy.  If lab work is needed we can place an order for that and you can then stop by our lab to have the test done at a later time.    Video visits are billed at different rates depending on your insurance coverage.  Please reach out to your insurance provider with any questions.    If during the course of the call the physician/provider feels a video visit is not appropriate, you will not be charged for this service.\"    Patient has given verbal consent for Video visit?yes    Video-Visit Details    Type of service:  Video Visit    Video visit duration: 23 min  Originating Location (pt. Location):home  Distant Location (provider location):  [facility]     Platform used for Video Visit:MICAH Haynes MD, MD    Oncology Follow-up visit:  Date on this visit: Sep 29, 2020    Oncologist: Adelita Pierre   Rheumatologist: Andreia Carranza  PCP: Shantel Plaza PA-C     DIAGNOSIS:    1. Stage II, T1B N1 M0, invasive ductal carcinoma right breast. Grade 1, ER positive in 95% of the cells, IN negative and HER2/lex negative.   She is s/p lumpectomy with sentinel lymph node evaluation on January 21, 2016.    She was noted to have a 7 mm breast cancer with an intramammary lymph node positivity and a sentinel lymph node positivity. One of two sentinel lymph nodes was positive with the tumor size being 0.3 cm and an intramammary lymph node was positive as well. All margins completely clear with invasive " margin being 0.3 cm. There was some background DCIS and that margin was also clear, but less than 0.1 cm. The tumor was grade 1, strongly ER positive in 95% of the cells, RI negative ( stained in less than 1% of the cells) and HER2/lex negative.   Pathology reviewed at [facility],  by Dr. Huang.  She has completed adjuvant chemotherapy with dose dense Taxol  x 4 cycles, March 18th- April 28, 2016, followed by dose dense AC x 4 cycles, May 12- June 23, 2016.  She has received adjuvant radiation therapy from 7/18/16- 8/26/16. She has started on Tamoxifen mid Oct 2016.  She was under the care of Dr. Iyer but transferrred her care to  in May 2018 due to insurance change.      2. BREAST NEXT genetic testing panel was negative. There is a FHx of ovarian cancer in one of her sisters and of breast cancer in another sister. Patient had menarche at age 11. She had her son at age 25. She had benign breast biopsied previously as below. Her lifetime risk of breast cancer according to Isis risk model is calculated at 17.4%    3. Osteoporosis- She has had compression fractures of T11 and T12. She has been on annual Reclast infusions with Dr. Boo, in the fall usually. She is on calcium and vitamin D supplementation.  DEXA in March 2015: Osteopenia. T score -2.1  DEXA in March 2017: Osteopenia. T score -1.8    History Of Present Illness:  Ms. Johnson is a 64 year old   who presents for f/up of Stage II (T1B N1 M0), invasive ductal carcinoma right breast, grade 1, ER positive in 95% of the cells, RI negative and HER2/lex negative by IHC (1+).   She remains on Tamoxifen. She is tolerating it well, with the exception of mild  hot flashes. She was not interested in any pharmacotherapy for hot flashes. She has had chronic lower back pain secondary to compression fractures, relieves by Tylenol. She is on Reclast infusions annually. These are ordered by rheumatologist Andreia Carranza. She reports she had labs, DEXA scan and  Markie with Dr. Boo in 07/2020. We are in the process of obtaining outside records for review.  She is on calcium and vitamin D supplementation.  She had a screening colonoscopy in July 2020 which showed normal findings. Repeat was recommended in five years because of her FHx of colon cancer. She had a negative 3D screening mammogram in 08/2020. Post conservation therapy changes of the right breast are  stable in appearance. Bilateral benign microcalcifications are again  noted. Small mass in the upper inner aspect of the mid to anterior  right breast is stable. No new mammographic findings of concern for  malignancy.   Alyce has no chest wall related complaints. She will be traveling to [place] for the winter.  In addition, a complete 12 point  review of systems is negative.      Past Medical/Surgical History:  Past Medical History:   Diagnosis Date     Basal cell cancer      Hypertension      Mohs defect of nose 06/2012     Past Surgical History:   Procedure Laterality Date     BREAST SURGERY Right 01/2016    breast cancer   Osteoporosis.   Benign essential tremor.   She has mild hyperlipidemia.   She has had multiple breast biopsies in the past, three on the right, three on the left.     Allergies:  Allergies as of 09/29/2020 - Reviewed 09/25/2020   Allergen Reaction Noted     Compazine [prochlorperazine] Other (See Comments) 03/31/2016     Codeine sulfate Nausea 10/15/2012     Current Medications:  Current Outpatient Medications   Medication Sig Dispense Refill     bisacodyl (DULCOLAX) 5 MG EC tablet Take 3 tablets (15 mg) by mouth See Admin Instructions --Take at 5 PM day prior to procedure 3 tablet 0     Na Sulfate-K Sulfate-Mg Sulf (SUPREP BOWEL PREP KIT) solution Take 177 mLs (1 Bottle) by mouth See Admin Instructions -Split dose 2 day Regimen: The evening before your procedure: dilute one bottle with water to a total volume of 16oz (up to the fill line).  At 6pm,  drink the entire amount.  Drink 32 oz of  water over the next hour. The morning of your procedure repeat both steps above using the second bottle.  Start 5 hours before your procedure and complete the prep at least 3 hours before you arrive. 2 Bottle 0     polyethylene glycol (GOLYTELY) 236 g suspension Take 4,000 mLs (4 L) by mouth See Admin Instructions --Drink half gallon (64oz) at 6pm on evening prior to procedure and second half on the morning of procedure (4 hours prior to procedure time) 4 L 0     propranolol ER (INDERAL LA) 60 MG 24 hr capsule Take 1 capsule (60 mg) by mouth daily 90 capsule 3     Simethicone 125 MG TABS Take 125 mg by mouth See Admin Instructions --Take tablet after finishing second half of Golytely with half a glass of water. 1 tablet 0     Simethicone 125 MG TABS Take 125 mg by mouth See Admin Instructions --Take tablet after finishing second half of Suprep with half a glass of water. 1 tablet 0     simvastatin (ZOCOR) 20 MG tablet Take 1 tablet (20 mg) by mouth At Bedtime 90 tablet 3     tamoxifen (NOLVADEX) 20 MG tablet Take 1 tablet (20 mg) by mouth daily 90 tablet 0      Family History:   Mom side family members have had a lot of heart disease. Sister had ductal carcinoma in situ. Another sister had uterine fibroids. Patient's father had brain cancer. On the paternal side, paternal grandmother had colon cancer, an aunt had breast cancer.  Seema underwent genetic testing and her  BREAST NEXT genetic testing panel was negative.      Social History:  Social History     Social History     Marital status:      Social History Main Topics     Smoking status: Never Smoker     Smokeless tobacco: Never Used     Alcohol use Yes      Comment: socially      She is , has one daughter in her 30s and a grandchild. Does not smoke. Takes alcohol socially when there is a dinner party.The patient is extremely active. She is an avid skier. She likes to travel a lot. They spent phelps in [place].     Physical Exam:  Wt Readings  from Last 5 Encounters:   09/29/20 59 kg (130 lb)   01/30/20 61.9 kg (136 lb 8 oz)   10/08/19 59.7 kg (131 lb 11.2 oz)   10/03/19 58 kg (127 lb 14.4 oz)   04/11/19 61 kg (134 lb 6 oz)       Constitutional: alert and in no distress  Eyes: No redness or discharge  Respiratory: No cough or labored breathing.  Musculoskeletal: Full range of motion in extremities.  Skin: no visible skin lesions or discoloration  Neurological: No tremors and denies headache.  Psychiatric: Mentation appears normal and affect is normal as well.  Alert and oriented x3.  The rest the comprehensive physical examination is deferred due to public health emergency video visit restrictions.    Laboratory/Imaging Studies  Lab Results   Component Value Date    WBC 4.9 01/30/2020     Lab Results   Component Value Date    RBC 3.89 01/30/2020     Lab Results   Component Value Date    HGB 11.8 01/30/2020     Lab Results   Component Value Date    HCT 35.4 01/30/2020     No components found for: MCT  Lab Results   Component Value Date    MCV 91 01/30/2020     Lab Results   Component Value Date    MCH 30.3 01/30/2020     Lab Results   Component Value Date    MCHC 33.3 01/30/2020     Lab Results   Component Value Date    RDW 13.0 01/30/2020     Lab Results   Component Value Date     01/30/2020       ASSESSMENT/PLAN:  Seema is a very pleasant 64 year old woman with Stage II (pT1b N1(sn)cMo), invasive ductal carcinoma right breast,  Grade 1, ER positive in 95% of the cells, FL negative and HER2/lex negative. She is s/p R lumpectomy with sentinel lymph node evaluation on January 21, 2016. She has completed adjuvant chemotherapy with dose dense Taxol IV q 2 weeks x 4 cycles, March 18th- April 28, 2016, followed by dose dense AC x 4 cycles, May 12- June 23, 2016.  She has received adjuvant radiation therapy from 7/18/16- 8/26/16. She has been on Tamoxifen mid Oct 2016.  ECOG PS 0.    1. Stage II breast cancer- continue Tamoxifen for at least 5 years.  Given Hx of osteoporosis with compression fractures, we'll plan either 10 years of Tamoxifen or per recent [facility] data from 2017 -5 years of tamoxifen followed by 2 years of an aromatase inhibitor (AI).  For now, she will continue on Tamoxifen and we'll plan to see her back in summer of 2021 (upon her return to [place] from [place]), with cbcd, CMP. She needs to continue with annual gynecologist exams while she remains on Tamoxifen.  We'll obtain DEXA scan report and labs from 07/2020 from Dr. Boo's office, for review.    2. Mild anemia, resolved. Anemia workup negative in 10/2019.  Screening colonoscopy negative 09/25/2020. Repeat recommended in 10 years.    3. Breast cancer screening-  annual screening mammogram negative on 8/24/2020. Next due 08/2021.    4. Spontaneous (and traumatic) fractures in her spine and osteopenia on DEXA scan. This is c/w osteoporosis.   She is on yearly Reclast as well as on calcium and vitamin D supplementation. Obtain recent DEXA scan report for review.    At the end of our visit patient verbalized understanding and concurred with the plan.

## 2020-09-29 ENCOUNTER — VIRTUAL VISIT (OUTPATIENT)
Dept: ONCOLOGY | Facility: CLINIC | Age: 65
End: 2020-09-29
Payer: COMMERCIAL

## 2020-09-29 VITALS — WEIGHT: 130 LBS | BODY MASS INDEX: 22.31 KG/M2

## 2020-09-29 DIAGNOSIS — Z12.31 VISIT FOR SCREENING MAMMOGRAM: ICD-10-CM

## 2020-09-29 DIAGNOSIS — R92.30 DENSE BREASTS: ICD-10-CM

## 2020-09-29 DIAGNOSIS — C50.911 INFILTRATING DUCTAL CARCINOMA OF RIGHT FEMALE BREAST (H): Chronic | ICD-10-CM

## 2020-09-29 PROCEDURE — 99214 OFFICE O/P EST MOD 30 MIN: CPT | Mod: 95 | Performed by: INTERNAL MEDICINE

## 2020-09-29 RX ORDER — TAMOXIFEN CITRATE 20 MG/1
20 TABLET ORAL DAILY
Qty: 90 TABLET | Refills: 3 | Status: SHIPPED | OUTPATIENT
Start: 2020-09-29 | End: 2021-10-06

## 2020-09-29 ASSESSMENT — PAIN SCALES - GENERAL: PAINLEVEL: NO PAIN (0)

## 2020-09-29 NOTE — LETTER
"    9/29/2020         RE: Seema Johnson  [address]  [facility] [address]        Dear Colleague,    Thank you for referring your patient, Seema Johnson, to the [facility]. Please see a copy of my visit note below.    Seema Johnson is a 64 year old female who is being evaluated via a billable video visit.      The patient has been notified of following:     \"This video visit will be conducted via a call between you and your physician/provider. We have found that certain health care needs can be provided without the need for an in-person physical exam.  This service lets us provide the care you need with a video conversation.  If a prescription is necessary we can send it directly to your pharmacy.  If lab work is needed we can place an order for that and you can then stop by our lab to have the test done at a later time.    Video visits are billed at different rates depending on your insurance coverage.  Please reach out to your insurance provider with any questions.    If during the course of the call the physician/provider feels a video visit is not appropriate, you will not be charged for this service.\"    Patient has given verbal consent for Video visit?yes    Video-Visit Details    Type of service:  Video Visit    Video visit duration: 23 min  Originating Location (pt. Location):home  Distant Location (provider location):  [facility]     Platform used for Video Visit:MICAH Haynes MD, MD    Oncology Follow-up visit:  Date on this visit: Sep 29, 2020    Oncologist: Adelita Pierre   Rheumatologist: Andreia Carranza  PCP: Shantel Plaza PA-C     DIAGNOSIS:    1. Stage II, T1B N1 M0, invasive ductal carcinoma right breast. Grade 1, ER positive in 95% of the cells, MD negative and HER2/lex negative.   She is s/p lumpectomy with sentinel lymph node evaluation on January 21, 2016.    She was noted to have a 7 mm breast cancer with " an intramammary lymph node positivity and a sentinel lymph node positivity. One of two sentinel lymph nodes was positive with the tumor size being 0.3 cm and an intramammary lymph node was positive as well. All margins completely clear with invasive margin being 0.3 cm. There was some background DCIS and that margin was also clear, but less than 0.1 cm. The tumor was grade 1, strongly ER positive in 95% of the cells, SD negative ( stained in less than 1% of the cells) and HER2/lex negative.   Pathology reviewed at [facility],  by Dr. Huang.  She has completed adjuvant chemotherapy with dose dense Taxol  x 4 cycles, March 18th- April 28, 2016, followed by dose dense AC x 4 cycles, May 12- June 23, 2016.  She has received adjuvant radiation therapy from 7/18/16- 8/26/16. She has started on Tamoxifen mid Oct 2016.  She was under the care of Dr. Iyer but transferrred her care to  in May 2018 due to insurance change.      2. BREAST NEXT genetic testing panel was negative. There is a FHx of ovarian cancer in one of her sisters and of breast cancer in another sister. Patient had menarche at age 11. She had her son at age 25. She had benign breast biopsied previously as below. Her lifetime risk of breast cancer according to Isis risk model is calculated at 17.4%    3. Osteoporosis- She has had compression fractures of T11 and T12. She has been on annual Reclast infusions with Dr. Boo, in the fall usually. She is on calcium and vitamin D supplementation.  DEXA in March 2015: Osteopenia. T score -2.1  DEXA in March 2017: Osteopenia. T score -1.8    History Of Present Illness:  Ms. Johnson is a 64 year old   who presents for f/up of Stage II (T1B N1 M0), invasive ductal carcinoma right breast, grade 1, ER positive in 95% of the cells, SD negative and HER2/lex negative by IHC (1+).   She remains on Tamoxifen. She is tolerating it well, with the exception of mild  hot flashes. She was not interested in any pharmacotherapy  for hot flashes. She has had chronic lower back pain secondary to compression fractures, relieves by Tylenol. She is on Reclast infusions annually. These are ordered by rheumatologist Andreia Carranza. She reports she had labs, DEXA scan and Reclast with Dr. Boo in 07/2020. We are in the process of obtaining outside records for review.  She is on calcium and vitamin D supplementation.  She had a screening colonoscopy in July 2020 which showed normal findings. Repeat was recommended in five years because of her FHx of colon cancer. She had a negative 3D screening mammogram in 08/2020. Post conservation therapy changes of the right breast are  stable in appearance. Bilateral benign microcalcifications are again  noted. Small mass in the upper inner aspect of the mid to anterior  right breast is stable. No new mammographic findings of concern for  malignancy.   Alyce has no chest wall related complaints. She will be traveling to [place] for the winter.  In addition, a complete 12 point  review of systems is negative.      Past Medical/Surgical History:  Past Medical History:   Diagnosis Date     Basal cell cancer      Hypertension      Mohs defect of nose 06/2012     Past Surgical History:   Procedure Laterality Date     BREAST SURGERY Right 01/2016    breast cancer   Osteoporosis.   Benign essential tremor.   She has mild hyperlipidemia.   She has had multiple breast biopsies in the past, three on the right, three on the left.     Allergies:  Allergies as of 09/29/2020 - Reviewed 09/25/2020   Allergen Reaction Noted     Compazine [prochlorperazine] Other (See Comments) 03/31/2016     Codeine sulfate Nausea 10/15/2012     Current Medications:  Current Outpatient Medications   Medication Sig Dispense Refill     bisacodyl (DULCOLAX) 5 MG EC tablet Take 3 tablets (15 mg) by mouth See Admin Instructions --Take at 5 PM day prior to procedure 3 tablet 0     Na Sulfate-K Sulfate-Mg Sulf (SUPREP BOWEL PREP KIT)  solution Take 177 mLs (1 Bottle) by mouth See Admin Instructions -Split dose 2 day Regimen: The evening before your procedure: dilute one bottle with water to a total volume of 16oz (up to the fill line).  At 6pm,  drink the entire amount.  Drink 32 oz of water over the next hour. The morning of your procedure repeat both steps above using the second bottle.  Start 5 hours before your procedure and complete the prep at least 3 hours before you arrive. 2 Bottle 0     polyethylene glycol (GOLYTELY) 236 g suspension Take 4,000 mLs (4 L) by mouth See Admin Instructions --Drink half gallon (64oz) at 6pm on evening prior to procedure and second half on the morning of procedure (4 hours prior to procedure time) 4 L 0     propranolol ER (INDERAL LA) 60 MG 24 hr capsule Take 1 capsule (60 mg) by mouth daily 90 capsule 3     Simethicone 125 MG TABS Take 125 mg by mouth See Admin Instructions --Take tablet after finishing second half of Golytely with half a glass of water. 1 tablet 0     Simethicone 125 MG TABS Take 125 mg by mouth See Admin Instructions --Take tablet after finishing second half of Suprep with half a glass of water. 1 tablet 0     simvastatin (ZOCOR) 20 MG tablet Take 1 tablet (20 mg) by mouth At Bedtime 90 tablet 3     tamoxifen (NOLVADEX) 20 MG tablet Take 1 tablet (20 mg) by mouth daily 90 tablet 0      Family History:   Mom side family members have had a lot of heart disease. Sister had ductal carcinoma in situ. Another sister had uterine fibroids. Patient's father had brain cancer. On the paternal side, paternal grandmother had colon cancer, an aunt had breast cancer.  Seema underwent genetic testing and her  BREAST NEXT genetic testing panel was negative.      Social History:  Social History     Social History     Marital status:      Social History Main Topics     Smoking status: Never Smoker     Smokeless tobacco: Never Used     Alcohol use Yes      Comment: socially      She is , has  one daughter in her 30s and a grandchild. Does not smoke. Takes alcohol socially when there is a dinner party.The patient is extremely active. She is an avid skier. She likes to travel a lot. They spent phelps in [place].     Physical Exam:  Wt Readings from Last 5 Encounters:   09/29/20 59 kg (130 lb)   01/30/20 61.9 kg (136 lb 8 oz)   10/08/19 59.7 kg (131 lb 11.2 oz)   10/03/19 58 kg (127 lb 14.4 oz)   04/11/19 61 kg (134 lb 6 oz)       Constitutional: alert and in no distress  Eyes: No redness or discharge  Respiratory: No cough or labored breathing.  Musculoskeletal: Full range of motion in extremities.  Skin: no visible skin lesions or discoloration  Neurological: No tremors and denies headache.  Psychiatric: Mentation appears normal and affect is normal as well.  Alert and oriented x3.  The rest the comprehensive physical examination is deferred due to public health emergency video visit restrictions.    Laboratory/Imaging Studies  Lab Results   Component Value Date    WBC 4.9 01/30/2020     Lab Results   Component Value Date    RBC 3.89 01/30/2020     Lab Results   Component Value Date    HGB 11.8 01/30/2020     Lab Results   Component Value Date    HCT 35.4 01/30/2020     No components found for: MCT  Lab Results   Component Value Date    MCV 91 01/30/2020     Lab Results   Component Value Date    MCH 30.3 01/30/2020     Lab Results   Component Value Date    MCHC 33.3 01/30/2020     Lab Results   Component Value Date    RDW 13.0 01/30/2020     Lab Results   Component Value Date     01/30/2020       ASSESSMENT/PLAN:  Seema is a very pleasant 64 year old woman with Stage II (pT1b N1(sn)cMo), invasive ductal carcinoma right breast,  Grade 1, ER positive in 95% of the cells, OR negative and HER2/lex negative. She is s/p R lumpectomy with sentinel lymph node evaluation on January 21, 2016. She has completed adjuvant chemotherapy with dose dense Taxol IV q 2 weeks x 4 cycles, March 18th- April 28, 2016,  followed by dose dense AC x 4 cycles, May 12- June 23, 2016.  She has received adjuvant radiation therapy from 7/18/16- 8/26/16. She has been on Tamoxifen mid Oct 2016.  ECOG PS 0.    1. Stage II breast cancer- continue Tamoxifen for at least 5 years. Given Hx of osteoporosis with compression fractures, we'll plan either 10 years of Tamoxifen or per recent [facility] data from 2017 -5 years of tamoxifen followed by 2 years of an aromatase inhibitor (AI).  For now, she will continue on Tamoxifen and we'll plan to see her back in summer of 2021 (upon her return to [place] from [place]), with cbcd, CMP. She needs to continue with annual gynecologist exams while she remains on Tamoxifen.  We'll obtain DEXA scan report and labs from 07/2020 from Dr. Boo's office, for review.    2. Mild anemia, resolved. Anemia workup negative in 10/2019.  Screening colonoscopy negative 09/25/2020. Repeat recommended in 10 years.    3. Breast cancer screening-  annual screening mammogram negative on 8/24/2020. Next due 08/2021.    4. Spontaneous (and traumatic) fractures in her spine and osteopenia on DEXA scan. This is c/w osteoporosis.   She is on yearly Reclast as well as on calcium and vitamin D supplementation. Obtain recent DEXA scan report for review.    At the end of our visit patient verbalized understanding and concurred with the plan.        Again, thank you for allowing me to participate in the care of your patient.        Sincerely,        Meghan Haynes MD, MD

## 2020-09-29 NOTE — NURSING NOTE
SYMPTOM QUESTIONNAIRE    Pain: no    Nausea/Vomiting: no    Mouth Sores: no    Shortness of Breath: no    Smoking: no    Fever or Chills: no    Hard Stools: no    Soft Stools: no    Weight Loss: no    Weakness: no    Burning, numbness or tingling in hands or feet: worsening in feet    Problems with skin or swelling: no    Memory Loss: no    Anxiety or Depression: stable    Trouble Sleeping: no    aMyte Irby LPN on 9/29/2020 at 11:22 AM

## 2020-10-06 ENCOUNTER — VIRTUAL VISIT (OUTPATIENT)
Dept: FAMILY MEDICINE | Facility: OTHER | Age: 65
End: 2020-10-06
Payer: COMMERCIAL

## 2020-10-06 PROCEDURE — 99421 OL DIG E/M SVC 5-10 MIN: CPT | Performed by: PHYSICIAN ASSISTANT

## 2020-10-16 ENCOUNTER — TELEPHONE (OUTPATIENT)
Dept: FAMILY MEDICINE | Facility: CLINIC | Age: 65
End: 2020-10-16

## 2020-10-16 NOTE — TELEPHONE ENCOUNTER
Summary:    Patient is due/failing the following:   Medicare annual wellness visit and LDL    Action needed:   Patient needs office visit for Medicare annual wellness. and Patient needs fasting lab only appointment     Type of outreach:    Phone, left message for patient to call back.     Questions for provider review:    None                                                                                                                                    Nicole Burroughs [facility]       Chart routed to Care Team .

## 2020-10-23 ENCOUNTER — PATIENT OUTREACH (OUTPATIENT)
Dept: ONCOLOGY | Facility: CLINIC | Age: 65
End: 2020-10-23

## 2020-10-23 NOTE — PROGRESS NOTES
Call placed to [facility] to get recent lab results. Lab results couldn't be faxed here without patient's consent.  Call placed to patient regarding obtaining recent lab results. Patient will call [facility] to have lab report faxed to [phone].

## 2020-10-23 NOTE — PATIENT INSTRUCTIONS
1. Call in about 2 months when you are back from [place]! [phone] Dr. Leida Magana's RN :)   
- - -

## 2020-11-08 ENCOUNTER — HEALTH MAINTENANCE LETTER (OUTPATIENT)
Age: 65
End: 2020-11-08

## 2020-12-02 NOTE — PROGRESS NOTES
"Date: 10/06/2020 10:27:05  Clinician: Evans Burton  Clinician NPI: [id number]  Patient: Seema Johnson  Patient : 1955  [address], [place] [address]  Patient Phone: [phone]  Visit Protocol: URI  Patient Summary:  Seema is a 64 year old ( : 1955 ) female who initiated a [facility] Visit for COVID-19 (Coronavirus) evaluation and screening. When asked the question \"Please sign me up to receive news, health information and promotions from [facility].\", Seema responded \"No\".    When asked when her symptoms started, Seema reported that she does not have any symptoms.   She denies taking antibiotic medication in the past month and having recent facial or sinus surgery in the past 60 days.    Pertinent COVID-19 (Coronavirus) information  In the past 14 days, Seema has not worked in a congregate living setting.   She does not work or volunteer as healthcare worker or a  and does not work or volunteer in a healthcare facility.   Seema also has not lived in a congregate living setting in the past 14 days. She does not live with a healthcare worker.   Seema has not had a close contact with a laboratory-confirmed COVID-19 patient within the last 14 days.   Since 2019, Seema and has not had upper respiratory infection or influenza-like illness. Has not been diagnosed with lab-confirmed COVID-19 test   Pertinent medical history  Seema does not get yeast infections when she takes antibiotics.   Seema does not need a return to work/school note.   Weight: 130 lbs   Seema does not smoke or use smokeless tobacco.   Weight: 130 lbs    MEDICATIONS: tamoxifen oral, simvastatin oral, propranolol-hydrochlorothiazide oral, ALLERGIES: NKDA  Clinician Response:  Dear Seema,   Your symptoms show that you may have coronavirus (COVID-19). This illness can cause fever, cough and trouble breathing. Many people get a mild case and get better on their own. Some people can " get very sick.  What should I do?  We would like to test you for this virus.   1. Please call [phone] to schedule your visit. Explain that you were referred by [facility] to have a COVID-19 test. Be ready to share your [facility] visit ID number.  The following will serve as your written order for this COVID Test, ordered by me, for the indication of suspected COVID [Z20.828]: The test will be ordered in "[employer] our electronic health record, after you are scheduled. It will show as ordered and authorized by Marlon Singh MD.  Order: COVID-19 (Coronavirus) PCR for SYMPTOMATIC testing from [facility].      2. When it's time for your COVID test:  Stay at least 6 feet away from others. (If someone will drive you to your test, stay in the backseat, as far away from the  as you can.)   Cover your mouth and nose with a mask, tissue or washcloth.  Go straight to the testing site. Don't make any stops on the way there or back.           Diagnosis: Encounter for health counseling related to travel  Diagnosis ICD: Z71.84   No

## 2021-03-26 ENCOUNTER — TELEPHONE (OUTPATIENT)
Dept: FAMILY MEDICINE | Facility: CLINIC | Age: 66
End: 2021-03-26

## 2021-03-26 NOTE — TELEPHONE ENCOUNTER
Pt and  (Tyrell, see encounter for him) have been living in [place] since October. They will be home to [place] in a few weeks.     They both received the Pfizer Covid vaccine while there in [place], they wanted at least one before traveling.     They will be back in [place] on April 6th and will be due for their 2nd vaccine on April 13th.     How do they get scheduled for their second shot?

## 2021-03-29 ENCOUNTER — TELEPHONE (OUTPATIENT)
Dept: NURSING | Facility: CLINIC | Age: 66
End: 2021-03-29

## 2021-03-29 DIAGNOSIS — Z23 HIGH PRIORITY FOR 2019-NCOV VACCINE: Primary | ICD-10-CM

## 2021-03-29 NOTE — TELEPHONE ENCOUNTER
LM for the patient to return call to the clinic. Please review Shantel's message below with the patient. Any additional questions please transfer to any triage RN.       Tonia Ornelas RN, BSN  [place]  March 29, 2021

## 2021-03-29 NOTE — TELEPHONE ENCOUNTER
Request for 2nd COVID-19 vaccination due to 1st dose being received in another state. Vaccination received in  [place] [facility]     RN obtained the following information from: Patient      Any vaccine in previous 14 days? NO - Okay to Proceed    The name of 1st dose vaccine product received as first dose: Pfizer-BioNTech    Date 1st dose vaccination was given: 3/23/2021    Lot #: [id number]    The 2nd dose of the mRNA COVID-19 vaccine product should be the same vaccine product as the 1st dose and be administered within appropriate timeframe.     Based on the information collected, the patient should receive the vaccine after 4/13/2021 date.           Before placing the order, Confirm patient is appropriate for the vaccine product they received:  o Moderna: 18 Years   o Pfizer-BioNTech: 16 Years        Patient reminded that CONSENT will be needed at the time of the vaccine.    Patient reminded to bring vaccine card or documentation to verify first dose.    Remind the patient not to get any other vaccinations between now and the appointment, unless instructed by their provider.        Place order and Copy blue data above regarding the 1st dose and paste into 2nd Dose Appointment Order    Update Expected Date in order to reflect date in copied text    Dx Code Z23 Need for Vaccination

## 2021-03-29 NOTE — TELEPHONE ENCOUNTER
Patient returned call, relayed message, no additional questions, she will see what she can do about 2nd dose. Gave her covid scheduling line to see if they can help out

## 2021-03-29 NOTE — TELEPHONE ENCOUNTER
Please let Seema know, in [place] the state guidelines have been that the 2nd covid vaccine has to be given where they had their 1st vaccine. It has been very difficult to arrange a 2nd vaccine if the 1st was given out of state. If they have any way to receive their 2nd vaccine where the 1st was given that is advisable.   Shantel Plaza PA-C

## 2021-03-31 ENCOUNTER — NURSE TRIAGE (OUTPATIENT)
Dept: NURSING | Facility: CLINIC | Age: 66
End: 2021-03-31

## 2021-03-31 NOTE — TELEPHONE ENCOUNTER
Caller is requesting to schedule second Covid vaccine aftr first  Dose  Given out of [place]   review of EMR reveals patient has been  Screened  And order placed   Call transferred to    Leslie Saleem RN  FNA       Additional Information    Information only question and nurse able to answer    Protocols used: INFORMATION ONLY CALL - NO TRIAGE-A-[place]

## 2021-04-19 ENCOUNTER — IMMUNIZATION (OUTPATIENT)
Dept: NURSING | Facility: CLINIC | Age: 66
End: 2021-04-19
Attending: OTOLARYNGOLOGY
Payer: COMMERCIAL

## 2021-04-19 DIAGNOSIS — Z23 HIGH PRIORITY FOR 2019-NCOV VACCINE: ICD-10-CM

## 2021-04-19 PROCEDURE — 0001A PR COVID VAC PFIZER DIL RECON 30 MCG/0.3 ML IM: CPT

## 2021-04-19 PROCEDURE — 91300 PR COVID VAC PFIZER DIL RECON 30 MCG/0.3 ML IM: CPT

## 2021-09-11 ENCOUNTER — HEALTH MAINTENANCE LETTER (OUTPATIENT)
Age: 66
End: 2021-09-11

## 2021-10-05 ENCOUNTER — LAB (OUTPATIENT)
Dept: LAB | Facility: CLINIC | Age: 66
End: 2021-10-05
Payer: COMMERCIAL

## 2021-10-05 ENCOUNTER — ANCILLARY PROCEDURE (OUTPATIENT)
Dept: MAMMOGRAPHY | Facility: CLINIC | Age: 66
End: 2021-10-05
Attending: INTERNAL MEDICINE
Payer: COMMERCIAL

## 2021-10-05 DIAGNOSIS — D64.9 NORMOCYTIC ANEMIA: ICD-10-CM

## 2021-10-05 DIAGNOSIS — C50.911 INFILTRATING DUCTAL CARCINOMA OF RIGHT FEMALE BREAST (H): ICD-10-CM

## 2021-10-05 DIAGNOSIS — R00.2 PALPITATIONS: ICD-10-CM

## 2021-10-05 DIAGNOSIS — C50.911 INFILTRATING DUCTAL CARCINOMA OF RIGHT FEMALE BREAST (H): Chronic | ICD-10-CM

## 2021-10-05 DIAGNOSIS — Z12.31 VISIT FOR SCREENING MAMMOGRAM: ICD-10-CM

## 2021-10-05 DIAGNOSIS — E78.5 HYPERLIPIDEMIA LDL GOAL <100: ICD-10-CM

## 2021-10-05 DIAGNOSIS — R92.30 DENSE BREASTS: ICD-10-CM

## 2021-10-05 LAB
ALBUMIN SERPL-MCNC: 3.9 G/DL (ref 3.4–5)
ALP SERPL-CCNC: 33 U/L (ref 40–150)
ALT SERPL W P-5'-P-CCNC: 21 U/L (ref 0–50)
ANION GAP SERPL CALCULATED.3IONS-SCNC: 3 MMOL/L (ref 3–14)
AST SERPL W P-5'-P-CCNC: 15 U/L (ref 0–45)
BASOPHILS # BLD AUTO: 0 10E3/UL (ref 0–0.2)
BASOPHILS NFR BLD AUTO: 1 %
BILIRUB SERPL-MCNC: 0.6 MG/DL (ref 0.2–1.3)
BUN SERPL-MCNC: 19 MG/DL (ref 7–30)
CALCIUM SERPL-MCNC: 9.6 MG/DL (ref 8.5–10.1)
CHLORIDE BLD-SCNC: 107 MMOL/L (ref 94–109)
CO2 SERPL-SCNC: 29 MMOL/L (ref 20–32)
CREAT SERPL-MCNC: 0.83 MG/DL (ref 0.52–1.04)
EOSINOPHIL # BLD AUTO: 0.1 10E3/UL (ref 0–0.7)
EOSINOPHIL NFR BLD AUTO: 2 %
ERYTHROCYTE [DISTWIDTH] IN BLOOD BY AUTOMATED COUNT: 12.6 % (ref 10–15)
GFR SERPL CREATININE-BSD FRML MDRD: 73 ML/MIN/1.73M2
GLUCOSE BLD-MCNC: 89 MG/DL (ref 70–99)
HCT VFR BLD AUTO: 34.8 % (ref 35–47)
HGB BLD-MCNC: 11.6 G/DL (ref 11.7–15.7)
HOLD SPECIMEN: NORMAL
IMM GRANULOCYTES # BLD: 0 10E3/UL
IMM GRANULOCYTES NFR BLD: 0 %
LYMPHOCYTES # BLD AUTO: 1.2 10E3/UL (ref 0.8–5.3)
LYMPHOCYTES NFR BLD AUTO: 30 %
MCH RBC QN AUTO: 30.3 PG (ref 26.5–33)
MCHC RBC AUTO-ENTMCNC: 33.3 G/DL (ref 31.5–36.5)
MCV RBC AUTO: 91 FL (ref 78–100)
MONOCYTES # BLD AUTO: 0.5 10E3/UL (ref 0–1.3)
MONOCYTES NFR BLD AUTO: 11 %
NEUTROPHILS # BLD AUTO: 2.2 10E3/UL (ref 1.6–8.3)
NEUTROPHILS NFR BLD AUTO: 56 %
NRBC # BLD AUTO: 0 10E3/UL
NRBC BLD AUTO-RTO: 0 /100
PLATELET # BLD AUTO: 219 10E3/UL (ref 150–450)
POTASSIUM BLD-SCNC: 4.4 MMOL/L (ref 3.4–5.3)
PROT SERPL-MCNC: 6.8 G/DL (ref 6.8–8.8)
RBC # BLD AUTO: 3.83 10E6/UL (ref 3.8–5.2)
SODIUM SERPL-SCNC: 139 MMOL/L (ref 133–144)
WBC # BLD AUTO: 4 10E3/UL (ref 4–11)

## 2021-10-05 PROCEDURE — 77063 BREAST TOMOSYNTHESIS BI: CPT | Mod: GC | Performed by: STUDENT IN AN ORGANIZED HEALTH CARE EDUCATION/TRAINING PROGRAM

## 2021-10-05 PROCEDURE — 84443 ASSAY THYROID STIM HORMONE: CPT

## 2021-10-05 PROCEDURE — 80061 LIPID PANEL: CPT

## 2021-10-05 PROCEDURE — 36415 COLL VENOUS BLD VENIPUNCTURE: CPT

## 2021-10-05 PROCEDURE — 80053 COMPREHEN METABOLIC PANEL: CPT

## 2021-10-05 PROCEDURE — 77067 SCR MAMMO BI INCL CAD: CPT | Mod: GC | Performed by: STUDENT IN AN ORGANIZED HEALTH CARE EDUCATION/TRAINING PROGRAM

## 2021-10-05 PROCEDURE — 85025 COMPLETE CBC W/AUTO DIFF WBC: CPT

## 2021-10-05 ASSESSMENT — ENCOUNTER SYMPTOMS
EYE PAIN: 0
WEAKNESS: 1
COUGH: 0
PARESTHESIAS: 0
ARTHRALGIAS: 1
ABDOMINAL PAIN: 0
FREQUENCY: 1
HEMATURIA: 0
DIARRHEA: 0
SHORTNESS OF BREATH: 1
MYALGIAS: 0
HEARTBURN: 0
NERVOUS/ANXIOUS: 0
HEADACHES: 0
DYSURIA: 0
JOINT SWELLING: 0
BREAST MASS: 0
CHILLS: 0
FEVER: 0
CONSTIPATION: 0
HEMATOCHEZIA: 0
SORE THROAT: 0
NAUSEA: 0
DIZZINESS: 1
PALPITATIONS: 0

## 2021-10-05 ASSESSMENT — ACTIVITIES OF DAILY LIVING (ADL): CURRENT_FUNCTION: NO ASSISTANCE NEEDED

## 2021-10-05 NOTE — PATIENT INSTRUCTIONS
Patient Education   Personalized Prevention Plan  You are due for the preventive services outlined below.  Your care team is available to assist you in scheduling these services.  If you have already completed any of these items, please share that information with your care team to update in your medical record.  Health Maintenance Due   Topic Date Due     ANNUAL REVIEW OF HM ORDERS  Never done     Discuss Advance Care Planning  Never done     Hepatitis C Screening  Never done     Annual Wellness Visit  10/03/2020     FALL RISK ASSESSMENT  10/03/2020     PHQ-2  01/01/2021     Pneumococcal Vaccine (2 of 2 - PPSV23) 01/30/2021     Flu Vaccine (1) 09/01/2021     Cholesterol Lab  09/24/2021

## 2021-10-05 NOTE — PROGRESS NOTES
"SUBJECTIVE:   Seema Johnson is a 65 year old female who presents for Preventive Visit.      Patient has been advised of split billing requirements and indicates understanding: Yes   Are you in the first 12 months of your Medicare coverage?  No    Healthy Habits:     In general, how would you rate your overall health?  Good    Frequency of exercise:  4-5 days/week    Duration of exercise:  Greater than 60 minutes    Do you usually eat at least 4 servings of fruit and vegetables a day, include whole grains    & fiber and avoid regularly eating high fat or \"junk\" foods?  Yes    Taking medications regularly:  No    Medication side effects:  Other    Ability to successfully perform activities of daily living:  No assistance needed    Home Safety:  No safety concerns identified    Hearing Impairment:  Difficulty following a conversation in a noisy restaurant or crowded room    In the past 6 months, have you been bothered by leaking of urine? Yes    In general, how would you rate your overall mental or emotional health?  Good      PHQ-2 Total Score: 1    Additional concerns today:  Yes    Do you feel safe in your environment? Yes    Have you ever done Advance Care Planning? (For example, a Health Directive, POLST, or a discussion with a medical provider or your loved ones about your wishes): No, advance care planning information given to patient to review.  Patient plans to discuss their wishes with loved ones or provider.        Fall risk  Fallen 2 or more times in the past year?: (P) No  Any fall with injury in the past year?: (P) No    Cognitive Screening   1) Repeat 3 items (Leader, Season, Table)    2) Clock draw: NORMAL  3) 3 item recall: Recalls 3 objects  Results: 3 items recalled: COGNITIVE IMPAIRMENT LESS LIKELY    Mini-CogTM Copyright S Arturo. Licensed by the author for use in [facility]; reprinted with permission ([email or web address]). All rights reserved.      Do you have sleep apnea, excessive " snoring or daytime drowsiness?: no    Reviewed and updated as needed this visit by clinical staff                 Reviewed and updated as needed this visit by Provider                Social History     Tobacco Use     Smoking status: Never Smoker     Smokeless tobacco: Never Used   Substance Use Topics     Alcohol use: Yes     Comment: socially     If you drink alcohol do you typically have >3 drinks per day or >7 drinks per week? No    Alcohol Use 10/6/2021   Prescreen: >3 drinks/day or >7 drinks/week? -   Prescreen: >3 drinks/day or >7 drinks/week? No     Hyperlipidemia- on her statin. Was fasting for labs yesterday- can add on.  Very active with exercise.     They were in [place] for 2 months over the summer. Just back recently. Do a lot of walking- long walks 5-7miles most days per week. Has had chronic thoracic back pain, and hx of compression fractures. Has been bothering her more. Uses OTC pain (lidocaine?) patches. She is planning for her IV reclast in the spring.     Left outside hip pain has also been bothersome. When laying on it, with walking. Not buttock or groin or ant thigh pain. Radiates along outside leg to knee. No N/T. Not limping. Worse in cold weather. Have discussed before.     Essential tremor. On propranolol 60mg. Thinks it is getting worse. Last saw neurology ealth  . She thinks she would like to see neuro back again when they are back from [place] in January 2022.     Shortness of breath and palpitations- palpitations- had for long time- more often than used to be. Not daily, maybe every other day. Fast beating/racing 2-3 minutes or less.  Denies anxiety or stress. SOB w/stairs- most noticeable- had to reduce what she is doing. But does not feel SOB with brisk walking- has continued to walk 5-7miles most days at a brisk pace. Denies CP with exercise.   Normal comp panel yesterday. Hgb 11.6 g/dl- stable    Breast cancer hx-  On tamoxifen. She saw Aide Laguerre NP  yesterday. They want her to continue on tamoxifen another 5 yrs, she is wondering how much more benefit that would be.      Current providers sharing in care for this patient include:   Patient Care Team:  Shantel Plaza PA-C as PCP - General (Physician Assistant - Medical)  Yo Cortes MD as MD  Shantel Plaza PA-C as Assigned PCP  Meghan Haynes MD as MD (Oncology)  Ruth Ann Davis, RN as Specialty Care Coordinator (Oncology)  Meghan Haynes MD as Assigned Cancer Care Provider    The following health maintenance items are reviewed in Epic and correct as of today:  Health Maintenance Due   Topic Date Due     ANNUAL REVIEW OF HM ORDERS  Never done     ADVANCE CARE PLANNING  Never done     HEPATITIS C SCREENING  Never done     MEDICARE ANNUAL WELLNESS VISIT  10/03/2020     FALL RISK ASSESSMENT  10/03/2020     PHQ-2  01/01/2021     Pneumococcal Vaccine: 65+ Years (2 of 2 - PPSV23) 01/30/2021     INFLUENZA VACCINE (1) 09/01/2021     LIPID  09/24/2021     Health Maintenance  Planning to do IV Reclast in the spring for her osteoporosis.  Mamogram yesterday  Colonoscopy done       Lab work is in process  Labs reviewed in EPIC  BP Readings from Last 3 Encounters:   10/06/21 126/64   09/25/20 102/76   01/30/20 104/62    Wt Readings from Last 3 Encounters:   10/06/21 57.3 kg (126 lb 4.8 oz)   09/29/20 59 kg (130 lb)   01/30/20 61.9 kg (136 lb 8 oz)                  Patient Active Problem List   Diagnosis     Mohs defect of cheek     Closed compression fracture of thoracic vertebra (H)     Closed compression fracture of first lumbar vertebra (H)     Osteoporosis     Invasive ductal carcinoma of breast, female (H)- RIGHT breast, Upper outer quadrant- 2015     Benign essential tremor     Hyperlipidemia LDL goal <100     Mixed urge and stress incontinence     Vertigo     Past Surgical History:   Procedure Laterality Date     BIOPSY BREAST       BREAST SURGERY Right 01/2016    breast cancer      LUMPECTOMY BREAST         Social History     Tobacco Use     Smoking status: Never Smoker     Smokeless tobacco: Never Used   Substance Use Topics     Alcohol use: Yes     Comment: socially     Family History   Problem Relation Age of Onset     Breast Cancer Sister      Cerebrovascular Disease Mother      Cerebrovascular Disease Father      Hypothyroidism Father      Diabetes Maternal Grandmother      Coronary Artery Disease Maternal Grandmother      Colon Cancer Paternal Grandmother      Diabetes Son      Other Cancer Sister      Anesthesia Reaction Sister      Diabetes No family hx of      Coronary Artery Disease No family hx of      Hypertension No family hx of      Hyperlipidemia No family hx of      Colon Cancer No family hx of      Prostate Cancer No family hx of      Depression No family hx of      Substance Abuse No family hx of      Thyroid Disease No family hx of      Obesity No family hx of      Osteoporosis No family hx of      Anesthesia Reaction No family hx of      Asthma No family hx of      Mental Illness No family hx of      Anxiety Disorder No family hx of      Other Cancer No family hx of          Current Outpatient Medications   Medication Sig Dispense Refill     acetaminophen (TYLENOL) 500 MG tablet Take 500-1,000 mg by mouth once 1000mg once a day.       Calcium Carb-Cholecalciferol (CALCIUM 1000 + D) 1000-800 MG-UNIT TABS        Cholecalciferol (VITAMIN D3 PO) Take 3,000 Units by mouth       cyanocobalamin (VITAMIN B-12) 1000 MCG tablet Take by mouth daily       propranolol ER (INDERAL LA) 60 MG 24 hr capsule Take 1 capsule (60 mg) by mouth daily 90 capsule 3     simvastatin (ZOCOR) 20 MG tablet Take 1 tablet (20 mg) by mouth At Bedtime 90 tablet 3     tamoxifen (NOLVADEX) 20 MG tablet Take 1 tablet (20 mg) by mouth daily 90 tablet 3     Allergies   Allergen Reactions     Compazine [Prochlorperazine] Other (See Comments)     Jittery and hyper and foggy     Codeine Sulfate Nausea     Pneumonia  Vaccine:Adults age 65+ who have not received previous Pneumovax (PPSV23) or PCV13 as an adult: Should first be given PCV13 AND then should be given PPSV23 6-12 months after PCV13  Any new diagnosis of family breast, ovarian, or bowel cancer?     FHS-7:   Breast CA Risk Assessment (FHS-7) 10/5/2021 10/5/2021   Did any of your first-degree relatives have breast or ovarian cancer? Yes Yes   Did any of your relatives have bilateral breast cancer? No Yes   Did any man in your family have breast cancer? No No   Did any woman in your family have breast and ovarian cancer? No No   Did any woman in your family have breast cancer before age 50 y? No No   Do you have 2 or more relatives with breast and/or ovarian cancer? Yes Yes   Do you have 2 or more relatives with breast and/or bowel cancer? No Yes       Mammogram Screening - Alternate mammogram schedule due to breast cancer history  Pertinent mammograms are reviewed under the imaging tab.    Review of Systems   Constitutional: Negative for chills and fever.   HENT: Positive for hearing loss. Negative for congestion, ear pain and sore throat.    Eyes: Negative for pain and visual disturbance.   Respiratory: Positive for shortness of breath. Negative for cough.    Cardiovascular: Negative for chest pain, palpitations and peripheral edema.   Gastrointestinal: Negative for abdominal pain, constipation, diarrhea, heartburn, hematochezia and nausea.   Breasts:  Negative for tenderness, breast mass and discharge.   Genitourinary: Positive for frequency, urgency and vaginal discharge. Negative for dysuria, genital sores, hematuria, pelvic pain and vaginal bleeding.   Musculoskeletal: Positive for arthralgias. Negative for joint swelling and myalgias.   Skin: Negative for rash.   Neurological: Positive for dizziness and weakness. Negative for headaches and paresthesias.   Psychiatric/Behavioral: Negative for mood changes. The patient is not nervous/anxious.      Hearing changes.  "      OBJECTIVE:   /64   Pulse 52   Temp 97.3  F (36.3  C) (Temporal)   Resp 14   Ht 1.62 m (5' 3.78\")   Wt 57.3 kg (126 lb 4.8 oz)   SpO2 100%   BMI 21.83 kg/m   Estimated body mass index is 22.31 kg/m  as calculated from the following:    Height as of 1/30/20: 1.626 m (5' 4\").    Weight as of 9/29/20: 59 kg (130 lb).  Physical Exam  GENERAL: healthy, alert and no distress  EYES: Eyes grossly normal to inspection, PERRL and conjunctivae and sclerae normal  HENT: ear occluded w/cerumen, nose and mouth without ulcers or lesions  NECK: no adenopathy, no asymmetry, masses, or scars and thyroid normal to palpation  RESP: lungs clear to auscultation - no rales, rhonchi or wheezes  BREAST: normal without masses, tenderness or nipple discharge and no palpable axillary masses or adenopathy  CV: regular rate and rhythm, normal S1 S2, no S3 or S4, no murmur, click or rub, no peripheral edema and peripheral pulses strong  ABDOMEN: soft, nontender, no hepatosplenomegaly, no masses and bowel sounds normal  MS: no gross musculoskeletal defects noted, no edema  SKIN: no suspicious lesions or rashes  NEURO: Normal strength and tone, mentation intact and speech normal. Fine tremor of hand/head noted.   PSYCH: mentation appears normal, affect normal/bright  LYMPH: normal ant/post cervical, supraclavicular nodes    Diagnostic Test Results:  Labs reviewed in Epic    Component      Latest Ref Rng & Units 10/5/2021   WBC      4.0 - 11.0 10e3/uL 4.0   RBC Count      3.80 - 5.20 10e6/uL 3.83   Hemoglobin      11.7 - 15.7 g/dL 11.6 (L)   Hematocrit      35.0 - 47.0 % 34.8 (L)   MCV      78 - 100 fL 91   MCH      26.5 - 33.0 pg 30.3   MCHC      31.5 - 36.5 g/dL 33.3   RDW      10.0 - 15.0 % 12.6   Platelet Count      150 - 450 10e3/uL 219   % Neutrophils      % 56   % Lymphocytes      % 30   % Monocytes      % 11   % Eosinophils      % 2   % Basophils      % 1   % Immature Granulocytes      % 0   NRBCs per 100 WBC      <1 /100 0 "   Absolute Neutrophils      1.6 - 8.3 10e3/uL 2.2   Absolute Lymphocytes      0.8 - 5.3 10e3/uL 1.2   Absolute Monocytes      0.0 - 1.3 10e3/uL 0.5   Absolute Eosinophils      0.0 - 0.7 10e3/uL 0.1   Absolute Basophils      0.0 - 0.2 10e3/uL 0.0   Absolute Immature Granulocytes      <=0.0 10e3/uL 0.0   Absolute NRBCs      10e3/uL 0.0   Sodium      133 - 144 mmol/L 139   Potassium      3.4 - 5.3 mmol/L 4.4   Chloride      94 - 109 mmol/L 107   Carbon Dioxide      20 - 32 mmol/L 29   Anion Gap      3 - 14 mmol/L 3   Urea Nitrogen      7 - 30 mg/dL 19   Creatinine      0.52 - 1.04 mg/dL 0.83   Calcium      8.5 - 10.1 mg/dL 9.6   Glucose      70 - 99 mg/dL 89   Alkaline Phosphatase      40 - 150 U/L 33 (L)   AST      0 - 45 U/L 15   ALT      0 - 50 U/L 21   Protein Total      6.8 - 8.8 g/dL 6.8   Albumin      3.4 - 5.0 g/dL 3.9   Bilirubin Total      0.2 - 1.3 mg/dL 0.6   GFR Estimate      >60 mL/min/1.73m2 73   Cholesterol      <200 mg/dL 150   Triglycerides      <150 mg/dL 69   HDL Cholesterol      >=50 mg/dL 69   LDL Cholesterol Calculated      <=100 mg/dL 67   Non HDL Cholesterol      <130 mg/dL 81   TSH      0.40 - 4.00 mU/L 1.69       ASSESSMENT / PLAN:   1. Encounter for Medicare annual wellness exam  Reviewed VS and weight/BMI with pt   Immunizations: Given as below; They will try to get COVID booster in [place] (where they winter)  Counseling as below   Health screenings/maintenance per orders/comments below   Reclast IV spring 2022 with .   Mammogram and colonoscopy UTD    - INFLUENZA, QUAD, HIGH DOSE, PF, 65YR + (FLUZONE HD)  - PNEUMOCOCCAL VACCINE,ADULT,SQ OR IM ([id number])    2. Hyperlipidemia LDL goal <100  At goal on current dose of statin. Tolerating well. Refilled   - Lipid panel reflex to direct LDL Fasting; Future  - simvastatin (ZOCOR) 20 MG tablet; Take 1 tablet (20 mg) by mouth At Bedtime  Dispense: 90 tablet; Refill: 3    3. Palpitations  Seema is on propranolol for essential tremor.  EKG  here showing NSR  Normal comp panel, normal TSH, hgb 11.6 g/dl.   Advised Zio patch. She thinks she would like to defer until they are back from [place] in Jan 2022. Encouraged her to consider getting prior to leaving [place] for the winter.  discussed warning s/sx for urgent eval even if out of state  - TSH with free T4 reflex; Future  - EKG 12-lead complete w/read - Clinics  - Leadless EKG Monitor 8 to 14 Days; Future  - Echocardiogram Exercise Stress; Future    4. SPENCE (dyspnea on exertion)  Some SOB with stairs, but has not limited her long distance walking   Hx of chest wall radiation with her breast cancer.   Advised stress echo. They do not want to delay their travels out of state for testing.   Warning s/sx for urgent eval even if out of state advised  - Echocardiogram Exercise Stress; Future    5. Osteoporosis, unspecified osteoporosis type, unspecified pathological fracture presence  IV reclast per  spring 2022.   Continue calcium and wt bearing exercise    6. Benign essential tremor  Refilled  She would like to see neurology back due to worsening/progression of sx.  Last    Will try to see Jan 2022  - propranolol ER (INDERAL LA) 60 MG 24 hr capsule; Take 1 capsule (60 mg) by mouth daily  Dispense: 90 capsule; Refill: 3    7. Chronic bilateral thoracic back pain  Consider PT    8. Lateral pain of left hip  Ongoing - GT bursa seems more typical of pain pattern vs radicular.   Pt did not have time to stay for xray   Consider ref for PT or ref to sports med.       Patient has been advised of split billing requirements and indicates understanding: Yes  COUNSELING:  Reviewed preventive health counseling, as reflected in patient instructions       Regular exercise       Healthy diet/nutrition       Vision screening       Hearing screening       Dental care       Fall risk prevention       Osteoporosis prevention/bone health       immunizations    Estimated body mass index is 22.31 kg/m  as  "calculated from the following:    Height as of 1/30/20: 1.626 m (5' 4\").    Weight as of 9/29/20: 59 kg (130 lb).        She reports that she has never smoked. She has never used smokeless tobacco.      Appropriate preventive services were discussed with this patient, including applicable screening as appropriate for cardiovascular disease, diabetes, osteopenia/osteoporosis, and glaucoma.  As appropriate for age/gender, discussed screening for colorectal cancer, prostate cancer, breast cancer, and cervical cancer. Checklist reviewing preventive services available has been given to the patient.    Reviewed patients plan of care and provided an AVS. The Intermediate Care Plan ( asthma action plan, low back pain action plan, and migraine action plan) for Seema meets the Care Plan requirement. This Care Plan has been established and reviewed with the Patient.    Counseling Resources:  ATP IV Guidelines  Pooled Cohorts Equation Calculator  Breast Cancer Risk Calculator  Breast Cancer: Medication to Reduce Risk  FRAX Risk Assessment  ICSI Preventive Guidelines  Dietary Guidelines for Americans, 2010  [employer]'s MyPlate  ASA Prophylaxis  Lung CA Screening    Shantel Plaza PA-C  [facility]    Identified Health Risks:  "

## 2021-10-06 ENCOUNTER — VIRTUAL VISIT (OUTPATIENT)
Dept: ONCOLOGY | Facility: CLINIC | Age: 66
End: 2021-10-06
Payer: COMMERCIAL

## 2021-10-06 ENCOUNTER — OFFICE VISIT (OUTPATIENT)
Dept: FAMILY MEDICINE | Facility: CLINIC | Age: 66
End: 2021-10-06
Payer: COMMERCIAL

## 2021-10-06 ENCOUNTER — TELEPHONE (OUTPATIENT)
Dept: FAMILY MEDICINE | Facility: CLINIC | Age: 66
End: 2021-10-06

## 2021-10-06 VITALS
WEIGHT: 126.3 LBS | TEMPERATURE: 97.3 F | OXYGEN SATURATION: 100 % | RESPIRATION RATE: 14 BRPM | DIASTOLIC BLOOD PRESSURE: 64 MMHG | SYSTOLIC BLOOD PRESSURE: 126 MMHG | HEART RATE: 52 BPM | BODY MASS INDEX: 21.56 KG/M2 | HEIGHT: 64 IN

## 2021-10-06 DIAGNOSIS — R00.2 PALPITATIONS: ICD-10-CM

## 2021-10-06 DIAGNOSIS — Z12.31 VISIT FOR SCREENING MAMMOGRAM: ICD-10-CM

## 2021-10-06 DIAGNOSIS — R92.30 DENSE BREASTS: ICD-10-CM

## 2021-10-06 DIAGNOSIS — M25.552 LATERAL PAIN OF LEFT HIP: ICD-10-CM

## 2021-10-06 DIAGNOSIS — Z00.00 ENCOUNTER FOR MEDICARE ANNUAL WELLNESS EXAM: Primary | ICD-10-CM

## 2021-10-06 DIAGNOSIS — R06.09 DOE (DYSPNEA ON EXERTION): ICD-10-CM

## 2021-10-06 DIAGNOSIS — G89.29 CHRONIC BILATERAL THORACIC BACK PAIN: ICD-10-CM

## 2021-10-06 DIAGNOSIS — E78.5 HYPERLIPIDEMIA LDL GOAL <100: ICD-10-CM

## 2021-10-06 DIAGNOSIS — M81.0 OSTEOPOROSIS WITHOUT CURRENT PATHOLOGICAL FRACTURE, UNSPECIFIED OSTEOPOROSIS TYPE: ICD-10-CM

## 2021-10-06 DIAGNOSIS — M81.0 OSTEOPOROSIS, UNSPECIFIED OSTEOPOROSIS TYPE, UNSPECIFIED PATHOLOGICAL FRACTURE PRESENCE: ICD-10-CM

## 2021-10-06 DIAGNOSIS — G25.0 BENIGN ESSENTIAL TREMOR: ICD-10-CM

## 2021-10-06 DIAGNOSIS — M54.6 CHRONIC BILATERAL THORACIC BACK PAIN: ICD-10-CM

## 2021-10-06 DIAGNOSIS — D64.9 NORMOCYTIC ANEMIA: ICD-10-CM

## 2021-10-06 DIAGNOSIS — C50.911 INFILTRATING DUCTAL CARCINOMA OF RIGHT FEMALE BREAST (H): Primary | Chronic | ICD-10-CM

## 2021-10-06 LAB
CHOLEST SERPL-MCNC: 150 MG/DL
HDLC SERPL-MCNC: 69 MG/DL
LDLC SERPL CALC-MCNC: 67 MG/DL
NONHDLC SERPL-MCNC: 81 MG/DL
TRIGL SERPL-MCNC: 69 MG/DL
TSH SERPL DL<=0.005 MIU/L-ACNC: 1.69 MU/L (ref 0.4–4)

## 2021-10-06 PROCEDURE — G0008 ADMIN INFLUENZA VIRUS VAC: HCPCS | Performed by: PHYSICIAN ASSISTANT

## 2021-10-06 PROCEDURE — 99397 PER PM REEVAL EST PAT 65+ YR: CPT | Performed by: PHYSICIAN ASSISTANT

## 2021-10-06 PROCEDURE — 99214 OFFICE O/P EST MOD 30 MIN: CPT | Mod: 25 | Performed by: PHYSICIAN ASSISTANT

## 2021-10-06 PROCEDURE — G0009 ADMIN PNEUMOCOCCAL VACCINE: HCPCS | Performed by: PHYSICIAN ASSISTANT

## 2021-10-06 PROCEDURE — 90662 IIV NO PRSV INCREASED AG IM: CPT | Performed by: PHYSICIAN ASSISTANT

## 2021-10-06 PROCEDURE — 93000 ELECTROCARDIOGRAM COMPLETE: CPT | Performed by: PHYSICIAN ASSISTANT

## 2021-10-06 PROCEDURE — 90732 PPSV23 VACC 2 YRS+ SUBQ/IM: CPT | Performed by: PHYSICIAN ASSISTANT

## 2021-10-06 PROCEDURE — 99214 OFFICE O/P EST MOD 30 MIN: CPT | Mod: 95 | Performed by: NURSE PRACTITIONER

## 2021-10-06 RX ORDER — TAMOXIFEN CITRATE 20 MG/1
20 TABLET ORAL DAILY
Qty: 90 TABLET | Refills: 3 | Status: SHIPPED | OUTPATIENT
Start: 2021-10-06 | End: 2022-12-06

## 2021-10-06 RX ORDER — LANOLIN ALCOHOL/MO/W.PET/CERES
CREAM (GRAM) TOPICAL DAILY
COMMUNITY
End: 2023-04-17

## 2021-10-06 RX ORDER — ACETAMINOPHEN 500 MG
500-1000 TABLET ORAL ONCE
COMMUNITY
End: 2023-04-17

## 2021-10-06 ASSESSMENT — ENCOUNTER SYMPTOMS
HEMATOCHEZIA: 0
HEMATURIA: 0
FREQUENCY: 1
MYALGIAS: 0
ABDOMINAL PAIN: 0
DYSURIA: 0
BREAST MASS: 0
NERVOUS/ANXIOUS: 0
SHORTNESS OF BREATH: 1
PALPITATIONS: 0
DIZZINESS: 1
HEARTBURN: 0
FEVER: 0
JOINT SWELLING: 0
DIARRHEA: 0
SORE THROAT: 0
CHILLS: 0
COUGH: 0
CONSTIPATION: 0
ARTHRALGIAS: 1
PARESTHESIAS: 0
EYE PAIN: 0
NAUSEA: 0
HEADACHES: 0
WEAKNESS: 1

## 2021-10-06 ASSESSMENT — ACTIVITIES OF DAILY LIVING (ADL): CURRENT_FUNCTION: NO ASSISTANCE NEEDED

## 2021-10-06 ASSESSMENT — PAIN SCALES - GENERAL: PAINLEVEL: NO PAIN (0)

## 2021-10-06 ASSESSMENT — MIFFLIN-ST. JEOR: SCORE: 1089.4

## 2021-10-06 NOTE — PROGRESS NOTES
Oncology Follow Up Visit: October 6, 2021    Oncologist: Dr Meghan Haynes  PCP: Shantel Plaza    Diagnosis:  Stage II Right Breast Cancer  Seema Johnson is a 66 yo female who was noted to have a 7 mm breast cancer with an intramammary lymph node positivity and a sentinel lymph node positivity. One of two sentinel lymph nodes was positive with the tumor size being 0.3 cm and an intramammary lymph node was positive as well. All margins completely clear with invasive margin being 0.3 cm. There was some background DCIS and that margin was also clear, but less than 0.1 cm. The tumor was grade 1, strongly ER positive in 95% of the cells, RI negative ( stained in less than 1% of the cells) and HER2/lex negative. (T1B N1 M0)  Pathology reviewed at [facility],  by Dr. Huang.  BREAST NEXT genetic testing panel was negative.  Treatment:   Right lumpectomy with SLN biopsy  3/18-4/28/2016 dose dense Taxol  x 4 cycles,   5/12-6/23/2021 dose dense AC x 4 cycles,     7/18- 8/26/16 adjuvant radiation therapy from 7/18/16- 8/26/16.   She has started on Tamoxifen mid Oct 2016.     Interval History: Ms. Johnson is contacted today for follow up to her stage II right breast cancer and use of tamoxifen.  Patient states she is feeling well and tolerating the tamoxifen without significant side effects.  She is quite aware of her osteoporosis status and she is a very active person and confirms taking her calcium plus vitamin D.  She reports she runs the marathon in [place] each winter as they live there through the winter.  She has no new concerns of complaints including no new breast or chest issues.  Rest of comprehensive and complete ROS is reviewed and is negative.   Past Medical History:   Diagnosis Date     Basal cell cancer      Breast cancer (H)      Hypertension      Mohs defect of nose 06/2012     Current Outpatient Medications   Medication     bisacodyl (DULCOLAX) 5 MG EC tablet     Na Sulfate-K Sulfate-Mg Sulf (SUPREP  BOWEL PREP KIT) solution     polyethylene glycol (GOLYTELY) 236 g suspension     propranolol ER (INDERAL LA) 60 MG 24 hr capsule     Simethicone 125 MG TABS     Simethicone 125 MG TABS     simvastatin (ZOCOR) 20 MG tablet     tamoxifen (NOLVADEX) 20 MG tablet     No current facility-administered medications for this visit.     Allergies   Allergen Reactions     Compazine [Prochlorperazine] Other (See Comments)     Jittery and hyper and foggy     Codeine Sulfate Nausea   There is a FHx of ovarian cancer in one of her sisters and of breast cancer in another sister.    Physical Exam:There were no vitals taken for this visit.    GENERAL: Healthy, alert and no distress  EYES: Eyes grossly normal to inspection.  No discharge or erythema, or obvious scleral/conjunctival abnormalities.  RESP: No audible wheeze, cough, or visible cyanosis.  No visible retractions or increased work of breathing.    SKIN: Visible skin clear. No significant rash, abnormal pigmentation or lesions.  NEURO: Cranial nerves grossly intact.  Mentation and speech appropriate for age.  PSYCH: Mentation appears normal, affect normal/bright, judgement and insight intact, normal speech and appearance well-groomed.  The rest of a comprehensive physical examination is deferred due to PHE (public health emergency) video visit restrictions       Laboratory Results:    Ref. Range 10/5/2021 10:57   Sodium Latest Ref Range: 133 - 144 mmol/L 139   Potassium Latest Ref Range: 3.4 - 5.3 mmol/L 4.4   Chloride Latest Ref Range: 94 - 109 mmol/L 107   Carbon Dioxide Latest Ref Range: 20 - 32 mmol/L 29   Urea Nitrogen Latest Ref Range: 7 - 30 mg/dL 19   Creatinine Latest Ref Range: 0.52 - 1.04 mg/dL 0.83   GFR Estimate Latest Ref Range: >60 mL/min/1.73m2 73   Calcium Latest Ref Range: 8.5 - 10.1 mg/dL 9.6   Anion Gap Latest Ref Range: 3 - 14 mmol/L 3   Albumin Latest Ref Range: 3.4 - 5.0 g/dL 3.9   Protein Total Latest Ref Range: 6.8 - 8.8 g/dL 6.8   Bilirubin Total  Latest Ref Range: 0.2 - 1.3 mg/dL 0.6   Alkaline Phosphatase Latest Ref Range: 40 - 150 U/L 33 (L)   ALT Latest Ref Range: 0 - 50 U/L 21   AST Latest Ref Range: 0 - 45 U/L 15   Glucose Latest Ref Range: 70 - 99 mg/dL 89   WBC Latest Ref Range: 4.0 - 11.0 10e3/uL 4.0   Hemoglobin Latest Ref Range: 11.7 - 15.7 g/dL 11.6 (L)   Hematocrit Latest Ref Range: 35.0 - 47.0 % 34.8 (L)   Platelet Count Latest Ref Range: 150 - 450 10e3/uL 219   RBC Count Latest Ref Range: 3.80 - 5.20 10e6/uL 3.83   MCV Latest Ref Range: 78 - 100 fL 91   MCH Latest Ref Range: 26.5 - 33.0 pg 30.3   MCHC Latest Ref Range: 31.5 - 36.5 g/dL 33.3   RDW Latest Ref Range: 10.0 - 15.0 % 12.6   % Neutrophils Latest Units: % 56   % Lymphocytes Latest Units: % 30   % Monocytes Latest Units: % 11   % Eosinophils Latest Units: % 2   Absolute Basophils Latest Ref Range: 0.0 - 0.2 10e3/uL 0.0   % Basophils Latest Units: % 1   Absolute Eosinophils Latest Ref Range: 0.0 - 0.7 10e3/uL 0.1   Absolute Immature Granulocytes Latest Ref Range: <=0.0 10e3/uL 0.0   Absolute Lymphocytes Latest Ref Range: 0.8 - 5.3 10e3/uL 1.2   Absolute Monocytes Latest Ref Range: 0.0 - 1.3 10e3/uL 0.5   % Immature Granulocytes Latest Units: % 0   Absolute Neutrophils Latest Ref Range: 1.6 - 8.3 10e3/uL 2.2   Absolute NRBCs Latest Units: 10e3/uL 0.0   NRBCs per 100 WBC Latest Ref Range: <1 /100 0     BILATERAL FULL FIELD DIGITAL SCREENING MAMMOGRAM WITH TOMOSYNTHESIS     Performed on: 10/5/21     Compared to: 08/24/2020, 10/08/2019, and 10/02/2018     Technique:  This study was evaluated with the assistance of Computer-Aided Detection.  Breast Tomosynthesis was used in interpretation.     Findings: The breasts are extremely dense, which lowers the sensitivity of mammography.  There are breast conservation changes in the right breast.  There is no radiographic evidence of malignancy.      IMPRESSION: ACR BI-RADS Category 2: Benign     RECOMMENDED FOLLOW-UP: Annual routine screening  mammogram    Assessment and Plan:   Stage II Right Breast Cancer-patient is now been using tamoxifen x5 years and we did have a discussion about the continuation of tamoxifen x10 years versus use of tamoxifen for 5+2 years of aromatase inhibitor however she has osteoporosis and understands that her best decision would be to continue the tamoxifen now 10 years.  She feels she can tolerate the 10 years of the Tamoxifen since she is not having side effects at this time.  She will return for review next summer when she returns from [place]-we will get labs completed including CBC and CMP and see Dr. Haynes.  Reminded her of need for annual gynecological exam-states she is seeing her PCP today.  Also reminded her for annual eye exam due to possible hastening of cataract development.  She had a negative 3D screening mammogram on 10/5/2021- will repeat annually prior to clinic visit.  Osteoporosis with spine fractures both spontaneous and traumatic-8/31/2020 DEXA scan through [facility] showing osteopenia with T score of -1.5 in lumbar, -1.9 in femoral neck but compression deformities in T12.  She has been on annual Reclast infusions with Dr. Boo. She is on calcium and vitamin D supplementation.  Mild anemia- Hgb= 11.6. Previous work up in 2019 was negative.   Health Maintenance-patient reports she has had her initial Covid vaccinations and will be receiving her booster today along with her flu shot with her primary care visit.  Screening colonoscopy in July 2020 which showed normal findings  The total time of this encounter amounted to 30 minutes. This time included video time spent with the patient, prep work, ordering tests, and performing post visit documentation.  Aide Laguerre,Cnp

## 2021-10-06 NOTE — LETTER
10/6/2021         RE: Seema Johnson  [address]  [facility] [id number]        Dear Colleague,    Thank you for referring your patient, Seema Johnson, to the [facility]. Please see a copy of my visit note below.    Oncology Follow Up Visit: October 6, 2021    Oncologist: Dr Meghan Haynes  PCP: Shantel Plaza    Diagnosis:  Stage II Right Breast Cancer  Seema Johnson is a 66 yo female who was noted to have a 7 mm breast cancer with an intramammary lymph node positivity and a sentinel lymph node positivity. One of two sentinel lymph nodes was positive with the tumor size being 0.3 cm and an intramammary lymph node was positive as well. All margins completely clear with invasive margin being 0.3 cm. There was some background DCIS and that margin was also clear, but less than 0.1 cm. The tumor was grade 1, strongly ER positive in 95% of the cells, CA negative ( stained in less than 1% of the cells) and HER2/lex negative. (T1B N1 M0)  Pathology reviewed at [facility],  by Dr. Huang.  BREAST NEXT genetic testing panel was negative.  Treatment:   Right lumpectomy with SLN biopsy  3/18-4/28/2016 dose dense Taxol  x 4 cycles,   5/12-6/23/2021 dose dense AC x 4 cycles,     7/18- 8/26/16 adjuvant radiation therapy from 7/18/16- 8/26/16.   She has started on Tamoxifen mid Oct 2016.     Interval History: Ms. Johnson is contacted today for follow up to her stage II right breast cancer and use of tamoxifen.  Patient states she is feeling well and tolerating the tamoxifen without significant side effects.  She is quite aware of her osteoporosis status and she is a very active person and confirms taking her calcium plus vitamin D.  She reports she runs the marathon in [place] each winter as they live there through the winter.  She has no new concerns of complaints including no new breast or chest issues.  Rest of comprehensive and complete ROS is reviewed and is negative.    Past Medical History:   Diagnosis Date     Basal cell cancer      Breast cancer (H)      Hypertension      Mohs defect of nose 06/2012     Current Outpatient Medications   Medication     bisacodyl (DULCOLAX) 5 MG EC tablet     Na Sulfate-K Sulfate-Mg Sulf (SUPREP BOWEL PREP KIT) solution     polyethylene glycol (GOLYTELY) 236 g suspension     propranolol ER (INDERAL LA) 60 MG 24 hr capsule     Simethicone 125 MG TABS     Simethicone 125 MG TABS     simvastatin (ZOCOR) 20 MG tablet     tamoxifen (NOLVADEX) 20 MG tablet     No current facility-administered medications for this visit.     Allergies   Allergen Reactions     Compazine [Prochlorperazine] Other (See Comments)     Jittery and hyper and foggy     Codeine Sulfate Nausea   There is a FHx of ovarian cancer in one of her sisters and of breast cancer in another sister.    Physical Exam:There were no vitals taken for this visit.    GENERAL: Healthy, alert and no distress  EYES: Eyes grossly normal to inspection.  No discharge or erythema, or obvious scleral/conjunctival abnormalities.  RESP: No audible wheeze, cough, or visible cyanosis.  No visible retractions or increased work of breathing.    SKIN: Visible skin clear. No significant rash, abnormal pigmentation or lesions.  NEURO: Cranial nerves grossly intact.  Mentation and speech appropriate for age.  PSYCH: Mentation appears normal, affect normal/bright, judgement and insight intact, normal speech and appearance well-groomed.  The rest of a comprehensive physical examination is deferred due to PHE (public health emergency) video visit restrictions       Laboratory Results:    Ref. Range 10/5/2021 10:57   Sodium Latest Ref Range: 133 - 144 mmol/L 139   Potassium Latest Ref Range: 3.4 - 5.3 mmol/L 4.4   Chloride Latest Ref Range: 94 - 109 mmol/L 107   Carbon Dioxide Latest Ref Range: 20 - 32 mmol/L 29   Urea Nitrogen Latest Ref Range: 7 - 30 mg/dL 19   Creatinine Latest Ref Range: 0.52 - 1.04 mg/dL 0.83    GFR Estimate Latest Ref Range: >60 mL/min/1.73m2 73   Calcium Latest Ref Range: 8.5 - 10.1 mg/dL 9.6   Anion Gap Latest Ref Range: 3 - 14 mmol/L 3   Albumin Latest Ref Range: 3.4 - 5.0 g/dL 3.9   Protein Total Latest Ref Range: 6.8 - 8.8 g/dL 6.8   Bilirubin Total Latest Ref Range: 0.2 - 1.3 mg/dL 0.6   Alkaline Phosphatase Latest Ref Range: 40 - 150 U/L 33 (L)   ALT Latest Ref Range: 0 - 50 U/L 21   AST Latest Ref Range: 0 - 45 U/L 15   Glucose Latest Ref Range: 70 - 99 mg/dL 89   WBC Latest Ref Range: 4.0 - 11.0 10e3/uL 4.0   Hemoglobin Latest Ref Range: 11.7 - 15.7 g/dL 11.6 (L)   Hematocrit Latest Ref Range: 35.0 - 47.0 % 34.8 (L)   Platelet Count Latest Ref Range: 150 - 450 10e3/uL 219   RBC Count Latest Ref Range: 3.80 - 5.20 10e6/uL 3.83   MCV Latest Ref Range: 78 - 100 fL 91   MCH Latest Ref Range: 26.5 - 33.0 pg 30.3   MCHC Latest Ref Range: 31.5 - 36.5 g/dL 33.3   RDW Latest Ref Range: 10.0 - 15.0 % 12.6   % Neutrophils Latest Units: % 56   % Lymphocytes Latest Units: % 30   % Monocytes Latest Units: % 11   % Eosinophils Latest Units: % 2   Absolute Basophils Latest Ref Range: 0.0 - 0.2 10e3/uL 0.0   % Basophils Latest Units: % 1   Absolute Eosinophils Latest Ref Range: 0.0 - 0.7 10e3/uL 0.1   Absolute Immature Granulocytes Latest Ref Range: <=0.0 10e3/uL 0.0   Absolute Lymphocytes Latest Ref Range: 0.8 - 5.3 10e3/uL 1.2   Absolute Monocytes Latest Ref Range: 0.0 - 1.3 10e3/uL 0.5   % Immature Granulocytes Latest Units: % 0   Absolute Neutrophils Latest Ref Range: 1.6 - 8.3 10e3/uL 2.2   Absolute NRBCs Latest Units: 10e3/uL 0.0   NRBCs per 100 WBC Latest Ref Range: <1 /100 0     BILATERAL FULL FIELD DIGITAL SCREENING MAMMOGRAM WITH TOMOSYNTHESIS     Performed on: 10/5/21     Compared to: 08/24/2020, 10/08/2019, and 10/02/2018     Technique:  This study was evaluated with the assistance of Computer-Aided Detection.  Breast Tomosynthesis was used in interpretation.     Findings: The breasts are extremely  dense, which lowers the sensitivity of mammography.  There are breast conservation changes in the right breast.  There is no radiographic evidence of malignancy.      IMPRESSION: ACR BI-RADS Category 2: Benign     RECOMMENDED FOLLOW-UP: Annual routine screening mammogram    Assessment and Plan:   Stage II Right Breast Cancer-patient is now been using tamoxifen x5 years and we did have a discussion about the continuation of tamoxifen x10 years versus use of tamoxifen for 5+2 years of aromatase inhibitor however she has osteoporosis and understands that her best decision would be to continue the tamoxifen now 10 years.  She feels she can tolerate the 10 years of the Tamoxifen since she is not having side effects at this time.  She will return for review next summer when she returns from [place]-we will get labs completed including CBC and CMP and see Dr. Haynes.  Reminded her of need for annual gynecological exam-states she is seeing her PCP today.  Also reminded her for annual eye exam due to possible hastening of cataract development.  She had a negative 3D screening mammogram on 10/5/2021- will repeat annually prior to clinic visit.  Osteoporosis with spine fractures both spontaneous and traumatic-8/31/2020 DEXA scan through [facility] showing osteopenia with T score of -1.5 in lumbar, -1.9 in femoral neck but compression deformities in T12.  She has been on annual Reclast infusions with Dr. Boo. She is on calcium and vitamin D supplementation.  Mild anemia- Hgb= 11.6. Previous work up in 2019 was negative.   Health Maintenance-patient reports she has had her initial Covid vaccinations and will be receiving her booster today along with her flu shot with her primary care visit.  Screening colonoscopy in July 2020 which showed normal findings  The total time of this encounter amounted to 30 minutes. This time included video time spent with the patient, prep work, ordering tests, and performing post visit  documentation.  Aide Laguerre Cnp        Again, thank you for allowing me to participate in the care of your patient.        Sincerely,        Aide Laguerre, NP, APRN CNP

## 2021-10-06 NOTE — NURSING NOTE
Seema is a 65 year old who is being evaluated via a billable video visit.      How would you like to obtain your AVS? MyChart  If the video visit is dropped, the invitation should be resent by: Text to cell phone: [phone]  Will anyone else be joining your video visit? No    Video-Visit Details    Type of service:  Video Visit    Originating Location (pt. Location): Home    Distant Location (provider location):  [facility]     Platform used for Video Visit: Lizbeth Quach [facility]

## 2021-10-08 NOTE — TELEPHONE ENCOUNTER
Expected Expires      10/6/2021 (Approximate) 10/6/2022        Hello- The order says it expires in 1 year (as above)- should be fine through Feb 2022.   Shantel Plaza PA-C

## 2021-10-10 PROBLEM — M25.552 LATERAL PAIN OF LEFT HIP: Status: ACTIVE | Noted: 2021-10-10

## 2021-10-10 RX ORDER — PROPRANOLOL HCL 60 MG
60 CAPSULE, EXTENDED RELEASE 24HR ORAL DAILY
Qty: 90 CAPSULE | Refills: 3 | Status: SHIPPED | OUTPATIENT
Start: 2021-10-10 | End: 2022-04-14

## 2021-10-10 RX ORDER — SIMVASTATIN 20 MG
20 TABLET ORAL AT BEDTIME
Qty: 90 TABLET | Refills: 3 | Status: SHIPPED | OUTPATIENT
Start: 2021-10-10 | End: 2022-04-14

## 2022-04-06 NOTE — PROGRESS NOTES
Assessment & Plan     Chest pain, unspecified type  Palpitations  Reported CV sx Oct 2022 but did not pursue evaluation prior to their 6months out of state.   CP sx at rest. Nonexertional. And palpitations  Hx of breast cancer with radiation and chemo.   Advise cardiology consult visit - due to upcoming internation trip and prior referral 6mo ago, did indicate priority on consult order. Orders from last fall still open for stress echo and zio.   S/sx for ER care discussed   - Adult Cardiology Eval  Referral; Future  - CBC with platelets; Future  - Comprehensive metabolic panel (BMP + Alb, Alk Phos, ALT, AST, Total. Bili, TP); Future  - TSH with free T4 reflex; Future  - CBC with platelets  - Comprehensive metabolic panel (BMP + Alb, Alk Phos, ALT, AST, Total. Bili, TP)  - TSH with free T4 reflex    Chronic left-sided low back pain without sciatica  Xray obtained. Advised neurosurg consult. MRI ordered. With tremor she is concerned may not be able to be still for MRI. Premed w/ativan. Discussed precautions- needing  and no alcohol for 8-12 hr after taking.   PT placed also  - XR Lumbar Spine 2/3 Views; Future  - MR Lumbar Spine w/o Contrast; Future  - LORazepam (ATIVAN) 0.5 MG tablet; Take 1-2 tablets (0.5-1 mg) by mouth once for 1 dose 30 min prior to MRI if needed  - Neurosurgery Referral; Future  - Physical Therapy Referral; Future    History of compression fracture of spine  - Neurosurgery Referral; Future    Benign essential tremor  Feels she is managing . Continues on her propranolol.    Fatigue, unspecified type  Interrupted sleep  Likely multifactorial  Update labs   - CBC with platelets; Future  - Comprehensive metabolic panel (BMP + Alb, Alk Phos, ALT, AST, Total. Bili, TP); Future  - Vitamin B12; Future  - TSH with free T4 reflex; Future  - CBC with platelets  - Comprehensive metabolic panel (BMP + Alb, Alk Phos, ALT, AST, Total. Bili, TP)  - Vitamin B12  - TSH with free T4 reflex    Need  "for COVID-19 vaccine  Will plan for covid booster 2 weeks prior to their trip, sooner if case numbers locally start to increase    Follow Up: The patient was instructed to contact clinic for worsening symptoms, non-improvement in time frame as expected/discussed, and for questions regarding medications or treatment plan. For virtual visits, the patient was advised to be seen for in person evaluation if symptoms or condition are worsening or non-improvement as expected.     Greater than 65min spent on visit, pt care, telephone with results, documentation and chart review.      Return in about 4 weeks (around 5/12/2022).    Shantel Plaza PA-C  [facility] EVANS Stephenson is a 66 year old who presents for the following health issues  accompanied by her spouse.    History of Present Illness       Vascular Disease:  She presents for follow up of vascular disease.  She never takes nitroglycerin. She is not taking daily aspirin.    Reason for visit:  Medication approval and heart issue  Symptom onset:  More than a month  Symptoms include:  Heart pain, rapid beat  Symptom intensity:  Mild  Symptom progression:  Staying the same  Had these symptoms before:  No  What makes it worse:  Not realated to exercise  What makes it better:  Time for it to pass    She eats 4 or more servings of fruits and vegetables daily.She consumes 0 sweetened beverage(s) daily.She exercises with enough effort to increase her heart rate 60 or more minutes per day.  She exercises with enough effort to increase her heart rate 5 days per week.   She is taking medications regularly.     Last visit Oct 2021.  Wintered in [place] for 6 months- flew back yesterday. They are leaving in 6 weeks at end of May for [place] and will be gone until October 2022. Need rx's to [facility] for 6mo disp qty of medications.     Chest pain -   Few times per week- \"mild\" pain substernal and somewhat left sided.  Lasting 1-5 minutes.   No " radiation.  Racing heart sometimes along with it. On propranolol for essential tremor.   Denies associated diaphoresis, nausea, vomiting, arm pain, neck pain.   Usually at rest and mostly at night time.   Does not feel like heartburn or GI.   Does not happen with exercise. Could walk 6 miles in [place] and not feel this, but her spouse is with today and notes she used to beat him on walks and not anymore.   Denies SPENCE.   Hx of right sided breast cancer w/radiation and chemo in 2016   Had an episode of presyncope in the airport in Oct 2021 that concerned her spouse.   No pleuritic pain. No LE edema.     Fatigue- Has felt more low energy lately (months?).   More tired.   Takes B12  Normal thyroid Oct 2021  Doesn't always sleep well     Hyperlipidemia Follow-Up    Are you regularly taking any medication or supplement to lower your cholesterol?   Yes- Simvaststin    Are you having muscle aches or other side effects that you think could be caused by your cholesterol lowering medication?  No    Low back and lateral left hip pain-  Lateral hip- only a little bit of trouble in [place] over the last 6mo. Doesn't think that limited her.   Back pain- low and left chronic, stable but this pain limits her quality of life.   No NT into legs. No groin or thigh pain.   Has had back pain for > 10 years.  Hx of 2 cervical compression fractures and lumbar L1 compression fracture. Thoracic kyphosis.     Osteoporosis-   Hx of compression fractures as above while on fosamax (occurred while skiing) few years ago.   Scheduling her reclast infusion this spring. Dr.Maren Boo . Dexa this fall.     Tremor- managing on her propranolol. Had discussed return to neurology but did not/not planned with past and upcoming travel.     Right breast cancer 2016- Oncology- last visit Oct 2021. Mammo Oct 2021. No breast health concerns      Review of Systems   Constitutional, HEENT, cardiovascular, pulmonary, gi and gu systems are negative, except as  "otherwise noted.      Objective    /62 (BP Location: Left arm, Patient Position: Chair, Cuff Size: Adult Regular)   Pulse 62   Temp 97.5  F (36.4  C) (Temporal)   Resp 18   Ht 1.632 m (5' 4.25\")   Wt 57.6 kg (127 lb)   LMP  (Exact Date)   SpO2 98%   Breastfeeding No   BMI 21.63 kg/m    There is no height or weight on file to calculate BMI.  Physical Exam   GENERAL: healthy, alert and no distress  EYES: Eyes grossly normal to inspection, PERRL and conjunctivae and sclerae normal  HENT: ear canals and TM's normal, nose and mouth without ulcers or lesions  NECK: no adenopathy, no asymmetry, masses, or scars and thyroid normal to palpation  RESP: lungs clear to auscultation - no rales, rhonchi or wheezes  CV: regular rate and rhythm, normal S1 S2, no S3 or S4, no murmur, click or rub, no peripheral edema and peripheral pulses strong  ABDOMEN: soft, nontender, no hepatosplenomegaly, no masses and bowel sounds normal  MS: spine: thoracic kyphosis and lumbar lordosis. TTP left lower lumbar paraspinous and out along upper pelvis, and upper lateral buttock. Less specifically at SI. No midline tenderness. ROM fairly full. discomfort with flex and extension.   SKIN: left lateral neck 2 warty crusted lesions, no suspicious lesions or rashes  NEURO: Normal strength and tone, mentation intact and speech normal, resting tremor hand, hands  LYMPH: normal ant/post cervical, supraclavicular nodes    Results for orders placed or performed in visit on 04/14/22 (from the past 24 hour(s))   CBC with platelets   Result Value Ref Range    WBC Count 4.9 4.0 - 11.0 10e3/uL    RBC Count 4.13 3.80 - 5.20 10e6/uL    Hemoglobin 12.6 11.7 - 15.7 g/dL    Hematocrit 39.3 35.0 - 47.0 %    MCV 95 78 - 100 fL    MCH 30.5 26.5 - 33.0 pg    MCHC 32.1 31.5 - 36.5 g/dL    RDW 12.8 10.0 - 15.0 %    Platelet Count 238 150 - 450 10e3/uL   Comprehensive metabolic panel (BMP + Alb, Alk Phos, ALT, AST, Total. Bili, TP)   Result Value Ref Range    " Sodium 139 133 - 144 mmol/L    Potassium 3.6 3.4 - 5.3 mmol/L    Chloride 107 94 - 109 mmol/L    Carbon Dioxide (CO2) 26 20 - 32 mmol/L    Anion Gap 6 3 - 14 mmol/L    Urea Nitrogen 16 7 - 30 mg/dL    Creatinine 0.88 0.52 - 1.04 mg/dL    Calcium 9.6 8.5 - 10.1 mg/dL    Glucose 95 70 - 99 mg/dL    Alkaline Phosphatase 33 (L) 40 - 150 U/L    AST 16 0 - 45 U/L    ALT 19 0 - 50 U/L    Protein Total 7.4 6.8 - 8.8 g/dL    Albumin 4.1 3.4 - 5.0 g/dL    Bilirubin Total 0.7 0.2 - 1.3 mg/dL    GFR Estimate 71 >60 mL/min/1.73m2   Vitamin B12   Result Value Ref Range    Vitamin B12 2,116 (H) 193 - 986 pg/mL   TSH with free T4 reflex   Result Value Ref Range    TSH 1.51 0.40 - 4.00 mU/L   XR Lumbar Spine 2/3 Views    Result Date: 4/14/2022  XR LUMBAR SPINE 2-3 VIEWS 4/14/2022 9:04 AM INDICATION: left sided low back pain and lateral hip pain; Chronic left-sided low back pain without sciatica; Chronic left-sided low back pain without sciatica COMPARISON: None     IMPRESSION: 5 lumbar type vertebral bodies. Age-indeterminate T12 compression fracture with 50% vertebral body height loss. Retrolisthesis of L1 on L2 measuring 4 mm and retrolisthesis of L2 and L3 measuring 5 mm. Retrolisthesis of L3 on L4 measuring 3 mm. The vertebral bodies of the lumbar spine otherwise have normal stature and alignment without evidence of compression fracture. Anterior marginal osteophytes T12/L1-L3/L4. Multilevel degenerative facet arthropathy, most processed at L4/L5 and L5/S1. Multilevel degenerative disease of the lumbar spine with disc height loss, most pronounced at L5/S1. Soft tissues unremarkable. ROSINA NICHOLAS MD   SYSTEM ID:  [id number]

## 2022-04-14 ENCOUNTER — OFFICE VISIT (OUTPATIENT)
Dept: FAMILY MEDICINE | Facility: CLINIC | Age: 67
End: 2022-04-14
Payer: COMMERCIAL

## 2022-04-14 VITALS
TEMPERATURE: 97.5 F | BODY MASS INDEX: 21.68 KG/M2 | WEIGHT: 127 LBS | SYSTOLIC BLOOD PRESSURE: 124 MMHG | HEART RATE: 62 BPM | HEIGHT: 64 IN | OXYGEN SATURATION: 98 % | RESPIRATION RATE: 18 BRPM | DIASTOLIC BLOOD PRESSURE: 62 MMHG

## 2022-04-14 DIAGNOSIS — M54.50 CHRONIC LEFT-SIDED LOW BACK PAIN WITHOUT SCIATICA: ICD-10-CM

## 2022-04-14 DIAGNOSIS — E78.5 HYPERLIPIDEMIA LDL GOAL <100: ICD-10-CM

## 2022-04-14 DIAGNOSIS — R00.2 PALPITATIONS: ICD-10-CM

## 2022-04-14 DIAGNOSIS — R07.9 CHEST PAIN, UNSPECIFIED TYPE: Primary | ICD-10-CM

## 2022-04-14 DIAGNOSIS — Z87.81 HISTORY OF COMPRESSION FRACTURE OF SPINE: ICD-10-CM

## 2022-04-14 DIAGNOSIS — Z23 NEED FOR COVID-19 VACCINE: ICD-10-CM

## 2022-04-14 DIAGNOSIS — G89.29 CHRONIC LEFT-SIDED LOW BACK PAIN WITHOUT SCIATICA: ICD-10-CM

## 2022-04-14 DIAGNOSIS — G25.0 BENIGN ESSENTIAL TREMOR: ICD-10-CM

## 2022-04-14 DIAGNOSIS — R53.83 FATIGUE, UNSPECIFIED TYPE: ICD-10-CM

## 2022-04-14 LAB
ALBUMIN SERPL-MCNC: 4.1 G/DL (ref 3.4–5)
ALP SERPL-CCNC: 33 U/L (ref 40–150)
ALT SERPL W P-5'-P-CCNC: 19 U/L (ref 0–50)
ANION GAP SERPL CALCULATED.3IONS-SCNC: 6 MMOL/L (ref 3–14)
AST SERPL W P-5'-P-CCNC: 16 U/L (ref 0–45)
BILIRUB SERPL-MCNC: 0.7 MG/DL (ref 0.2–1.3)
BUN SERPL-MCNC: 16 MG/DL (ref 7–30)
CALCIUM SERPL-MCNC: 9.6 MG/DL (ref 8.5–10.1)
CHLORIDE BLD-SCNC: 107 MMOL/L (ref 94–109)
CO2 SERPL-SCNC: 26 MMOL/L (ref 20–32)
CREAT SERPL-MCNC: 0.88 MG/DL (ref 0.52–1.04)
ERYTHROCYTE [DISTWIDTH] IN BLOOD BY AUTOMATED COUNT: 12.8 % (ref 10–15)
GFR SERPL CREATININE-BSD FRML MDRD: 71 ML/MIN/1.73M2
GLUCOSE BLD-MCNC: 95 MG/DL (ref 70–99)
HCT VFR BLD AUTO: 39.3 % (ref 35–47)
HGB BLD-MCNC: 12.6 G/DL (ref 11.7–15.7)
MCH RBC QN AUTO: 30.5 PG (ref 26.5–33)
MCHC RBC AUTO-ENTMCNC: 32.1 G/DL (ref 31.5–36.5)
MCV RBC AUTO: 95 FL (ref 78–100)
PLATELET # BLD AUTO: 238 10E3/UL (ref 150–450)
POTASSIUM BLD-SCNC: 3.6 MMOL/L (ref 3.4–5.3)
PROT SERPL-MCNC: 7.4 G/DL (ref 6.8–8.8)
RBC # BLD AUTO: 4.13 10E6/UL (ref 3.8–5.2)
SODIUM SERPL-SCNC: 139 MMOL/L (ref 133–144)
TSH SERPL DL<=0.005 MIU/L-ACNC: 1.51 MU/L (ref 0.4–4)
VIT B12 SERPL-MCNC: 2116 PG/ML (ref 193–986)
WBC # BLD AUTO: 4.9 10E3/UL (ref 4–11)

## 2022-04-14 PROCEDURE — 82607 VITAMIN B-12: CPT | Performed by: PHYSICIAN ASSISTANT

## 2022-04-14 PROCEDURE — 80053 COMPREHEN METABOLIC PANEL: CPT | Performed by: PHYSICIAN ASSISTANT

## 2022-04-14 PROCEDURE — 99215 OFFICE O/P EST HI 40 MIN: CPT | Performed by: PHYSICIAN ASSISTANT

## 2022-04-14 PROCEDURE — 36415 COLL VENOUS BLD VENIPUNCTURE: CPT | Performed by: PHYSICIAN ASSISTANT

## 2022-04-14 PROCEDURE — 84443 ASSAY THYROID STIM HORMONE: CPT | Performed by: PHYSICIAN ASSISTANT

## 2022-04-14 PROCEDURE — 85027 COMPLETE CBC AUTOMATED: CPT | Performed by: PHYSICIAN ASSISTANT

## 2022-04-14 RX ORDER — SIMVASTATIN 20 MG
20 TABLET ORAL AT BEDTIME
Qty: 180 TABLET | Refills: 0 | Status: SHIPPED | OUTPATIENT
Start: 2022-04-14 | End: 2022-10-12

## 2022-04-14 RX ORDER — LORAZEPAM 0.5 MG/1
.5-1 TABLET ORAL ONCE
Qty: 2 TABLET | Refills: 0 | Status: SHIPPED | OUTPATIENT
Start: 2022-04-14 | End: 2022-04-14

## 2022-04-14 RX ORDER — PROPRANOLOL HCL 60 MG
60 CAPSULE, EXTENDED RELEASE 24HR ORAL DAILY
Qty: 180 CAPSULE | Refills: 0 | Status: SHIPPED | OUTPATIENT
Start: 2022-04-14 | End: 2022-10-12

## 2022-04-14 ASSESSMENT — PAIN SCALES - GENERAL: PAINLEVEL: NO PAIN (0)

## 2022-04-14 NOTE — PATIENT INSTRUCTIONS
Return for covid booster 2 weeks before travel    To schedule your Imaging Test or Specialty Referral please call:  [facility] [place] - Cardiology, MRI lumbar spine, and neurosurgery  Radiology (imaging- mammogram, ultrasound, CT, MRI): [phone]  Speciality consultation [phone] [address]  [place] [address]

## 2022-04-15 NOTE — TELEPHONE ENCOUNTER
SPINE PATIENTS - NEW PROTOCOL PREVISIT    RECORDS RECEIVED FROM: Internal   REASON FOR VISIT: Chronic left-sided low back pain without sciatica   Date of Appt: 05/05/2022   NOTES (FOR ALL VISITS) STATUS DETAILS   OFFICE NOTE from referring provider Internal 04/14/2022 Dr Plaza [facility]    OFFICE NOTE from other specialist Care Everywhere 03/02/2016 [facility] Dr Plaza   08/28/2013 [facility] Dr Plaza   DISCHARGE SUMMARY from hospital N/A    DISCHARGE REPORT from ER N/A    EMG REPORT N/A    MEDICATION LIST N/A    IMAGING  (FOR ALL VISITS)     MRI (HEAD, NECK, SPINE) Internal 04/25/2022 lumbar spine   XRAY (SPINE) *NEUROSURGERY* Internal 04/14/2022 lumbar spine   CT (HEAD, NECK, SPINE) N/A       03/15/2017 DEXA  - In PACS

## 2022-04-19 ENCOUNTER — OFFICE VISIT (OUTPATIENT)
Dept: CARDIOLOGY | Facility: CLINIC | Age: 67
End: 2022-04-19
Attending: PHYSICIAN ASSISTANT
Payer: COMMERCIAL

## 2022-04-19 VITALS
HEART RATE: 65 BPM | HEIGHT: 64 IN | SYSTOLIC BLOOD PRESSURE: 111 MMHG | OXYGEN SATURATION: 98 % | WEIGHT: 130.5 LBS | BODY MASS INDEX: 22.28 KG/M2 | DIASTOLIC BLOOD PRESSURE: 74 MMHG

## 2022-04-19 DIAGNOSIS — R53.82 CHRONIC FATIGUE: ICD-10-CM

## 2022-04-19 DIAGNOSIS — E78.5 DYSLIPIDEMIA: Primary | ICD-10-CM

## 2022-04-19 DIAGNOSIS — R00.2 PALPITATIONS: ICD-10-CM

## 2022-04-19 DIAGNOSIS — R07.9 CHEST PAIN, UNSPECIFIED TYPE: ICD-10-CM

## 2022-04-19 PROCEDURE — 93000 ELECTROCARDIOGRAM COMPLETE: CPT | Performed by: INTERNAL MEDICINE

## 2022-04-19 PROCEDURE — 99204 OFFICE O/P NEW MOD 45 MIN: CPT | Performed by: INTERNAL MEDICINE

## 2022-04-19 NOTE — LETTER
4/19/2022    Shantel Plaza PA-C  [address] [facility] [address]    RE: Seema Johnson       Dear Colleague,     I had the pleasure of seeing Seema Johnson in the [facility].  CARDIOLOGY CLINIC CONSULT    REASON FOR CONSULT: chest pain, palpitations and fatigue    PRIMARY CARE PHYSICIAN:  Shantel Plaza    HISTORY OF PRESENT ILLNESS:  Seema Johnson is a very nice 66 year old woman with h/o dyslipidemia here for evaluation of chest pain, palpitations and fatigue.     In the past few yrs and more notably in the past few months she has noticed a mild ache in her chest usually in the evenings and sometimes in the past few months it has been bad enough that she would describe it as pain in her midsubsternal area and radiating a few cm to the left. She also sometimes has a sensation of her heart racing or skipping beats, but this is more rare and occurs about once a week. The palpitations or racing heart does not occur with the chest pain. She is active an does lots of walking. She can walk up [address] in [facility] and hikes 6 miles every other day. Although this is a lot of exercise, her personal activity tolerance has subjectively decreased in the past few months and she fatigues more easily. She is not really short of breath. The chest pain is often worse when lying down. It is not exacerbated by exercise or specific foods.  She will often nap after exercising and that is new for her.      A stress echo and patch monitor were ordered in Oct by her PCP, but she has not completed these tests. She was in [place] for about 6 months  And her symptoms worsened over that time.    PAST MEDICAL HISTORY:  Breast cancer  High cholesterol  Basal cell carcinoma  Tremor  Chronic back pain    MEDICATIONS:  Current Outpatient Medications   Medication     acetaminophen (TYLENOL) 500 MG tablet     Calcium Carb-Cholecalciferol (CALCIUM 1000 + D) 1000-800 MG-UNIT TABS     Cholecalciferol (VITAMIN  D3 PO)     cyanocobalamin (VITAMIN B-12) 1000 MCG tablet     propranolol ER (INDERAL LA) 60 MG 24 hr capsule     simvastatin (ZOCOR) 20 MG tablet     tamoxifen (NOLVADEX) 20 MG tablet     No current facility-administered medications for this visit.       ALLERGIES:  Allergies   Allergen Reactions     Compazine [Prochlorperazine] Other (See Comments)     Jittery and hyper and foggy     Codeine Sulfate Nausea       SOCIAL HISTORY:  I have reviewed this patient's social history and updated it with pertinent information if needed. Seema Johnson  reports that she has never smoked. She has never used smokeless tobacco. She reports current alcohol use. She reports that she does not use drugs. no heavy alcohol use, up to one serving wine per day.     FAMILY HISTORY:  I have reviewed this patient's family history and updated it with pertinent information if needed.   No FH MI, afib, valve disease. Grandmother had sudden death on her first night in a nursing home in her mid80s.       REVIEW OF SYSTEMS:  Skin:  Negative     Eyes:  Positive for glasses;visual blurring  ENT:  Negative    Respiratory:  Positive for shortness of breath  Cardiovascular:  Negative;lightheadedness;dizziness;edema;fatigue chest pain;Positive for;palpitations  Gastroenterology: Negative    Genitourinary:  Negative    Musculoskeletal:  Positive for back pain;neck pain  Neurologic:  Negative headaches  Psychiatric:  Negative    Heme/Lymph/Imm:  Positive for allergies  Endocrine:  Negative thyroid disorder;diabetes    PHYSICAL EXAM:      BP: 111/74 Pulse: 65     SpO2: 98 % (room air)      Vital Signs with Ranges  Pulse:  [65] 65  BP: (111)/(74) 111/74  SpO2:  [98 %] 98 %  130 lbs 8 oz    Constitutional: awake, alert, no distress  Eyes: sclera nonicteric  ENT: trachea midline  Respiratory: clear to auscultation bilaterally  Cardiovascular: regular, no murmur appreciated, no gallop or rub  GI: nondistended, nontender, bowel sounds present  Skin: dry, no  rash no edema  Musculoskeletal: grossly normal muscle bulk and tone. kyphosis  Neuropsychiatric: normal affect      DATA:   LAST CHOLESTEROL:  Lab Results   Component Value Date    CHOL 150 10/05/2021    CHOL 163.0 09/24/2020     Lab Results   Component Value Date    HDL 69 10/05/2021    HDL 70.0 09/24/2020     Lab Results   Component Value Date    LDL 67 10/05/2021    LDL 79.4 09/24/2020     Lab Results   Component Value Date    TRIG 69 10/05/2021    TRIG 68.0 09/24/2020     No results found for: CHOLHDLRATIO    LAST BMP:  Lab Results   Component Value Date     04/14/2022     10/03/2019      Lab Results   Component Value Date    POTASSIUM 3.6 04/14/2022    POTASSIUM 4.1 10/03/2019     Lab Results   Component Value Date    CHLORIDE 107 04/14/2022    CHLORIDE 108 10/03/2019     Lab Results   Component Value Date    ROSEANNE 9.6 04/14/2022    ROSEANNE 9.0 10/03/2019     Lab Results   Component Value Date    CO2 26 04/14/2022    CO2 27 10/03/2019     Lab Results   Component Value Date    BUN 16 04/14/2022    BUN 15 10/03/2019     Lab Results   Component Value Date    CR 0.88 04/14/2022    CR 0.690 09/24/2020     Lab Results   Component Value Date    GLC 95 04/14/2022    GLC 86 10/03/2019       LAST CBC:  Lab Results   Component Value Date    WBC 4.9 04/14/2022    WBC 4.9 01/30/2020     Lab Results   Component Value Date    RBC 4.13 04/14/2022    RBC 3.89 01/30/2020     Lab Results   Component Value Date    HGB 12.6 04/14/2022    HGB 11.8 01/30/2020     Lab Results   Component Value Date    HCT 39.3 04/14/2022    HCT 35.4 01/30/2020     Lab Results   Component Value Date    MCV 95 04/14/2022    MCV 91 01/30/2020     Lab Results   Component Value Date    MCH 30.5 04/14/2022    MCH 30.3 01/30/2020     Lab Results   Component Value Date    MCHC 32.1 04/14/2022    MCHC 33.3 01/30/2020     Lab Results   Component Value Date    RDW 12.8 04/14/2022    RDW 13.0 01/30/2020     Lab Results   Component Value Date      04/14/2022     01/30/2020     TSH   Date Value Ref Range Status   04/14/2022 1.51 0.40 - 4.00 mU/L Final   10/03/2019 1.62 0.40 - 4.00 mU/L Final         EKG sinus bradycardia NSIVCD QRS 100msec    Echo none      ASSESSMENT:  1. Chest pain, atypical.   2. Palpitations  3. dyslipidemia LDL <70 10/5/21. On simvastatin. Does not take aspirin  4. Fatigue, decreased exercise tolerance  5. H/o breast cancer, on tamoxifen  6. Tremor, takes propranolol  7. Normotensive    I agree with plan for ziopatch monitor and stress test. Also suggested a trial of OTC antacid for symptoms when lying down as she may have GERD.     RECOMMENDATIONS:  1. Exercise stress echo  2. 10 day patch monitor  3. Follow up depends on test results. Will schedule follow up if abnormal and appropriate testing as needed.  If results are unremarkable likely cardiology follow up as needed.     Geno Gutierrez MD [facility]  Text Page   Thank you for allowing me to participate in the care of your patient.      Sincerely,     Geno Gutierrez MD     [facility]  cc:   Shantel Plaza PA-C  [address] [place] [address]

## 2022-04-19 NOTE — PROGRESS NOTES
CARDIOLOGY CLINIC CONSULT    REASON FOR CONSULT: chest pain, palpitations and fatigue    PRIMARY CARE PHYSICIAN:  Shantel Plaza    HISTORY OF PRESENT ILLNESS:  Seema Johnson is a very nice 66 year old woman with h/o dyslipidemia here for evaluation of chest pain, palpitations and fatigue.     In the past few yrs and more notably in the past few months she has noticed a mild ache in her chest usually in the evenings and sometimes in the past few months it has been bad enough that she would describe it as pain in her midsubsternal area and radiating a few cm to the left. She also sometimes has a sensation of her heart racing or skipping beats, but this is more rare and occurs about once a week. The palpitations or racing heart does not occur with the chest pain. She is active an does lots of walking. She can walk up [place] in [facility] and hikes 6 miles every other day. Although this is a lot of exercise, her personal activity tolerance has subjectively decreased in the past few months and she fatigues more easily. She is not really short of breath. The chest pain is often worse when lying down. It is not exacerbated by exercise or specific foods.  She will often nap after exercising and that is new for her.      A stress echo and patch monitor were ordered in Oct by her PCP, but she has not completed these tests. She was in [place] for about 6 months  And her symptoms worsened over that time.    PAST MEDICAL HISTORY:  Breast cancer  High cholesterol  Basal cell carcinoma  Tremor  Chronic back pain    MEDICATIONS:  Current Outpatient Medications   Medication     acetaminophen (TYLENOL) 500 MG tablet     Calcium Carb-Cholecalciferol (CALCIUM 1000 + D) 1000-800 MG-UNIT TABS     Cholecalciferol (VITAMIN D3 PO)     cyanocobalamin (VITAMIN B-12) 1000 MCG tablet     propranolol ER (INDERAL LA) 60 MG 24 hr capsule     simvastatin (ZOCOR) 20 MG tablet     tamoxifen (NOLVADEX) 20 MG tablet     No current  facility-administered medications for this visit.       ALLERGIES:  Allergies   Allergen Reactions     Compazine [Prochlorperazine] Other (See Comments)     Jittery and hyper and foggy     Codeine Sulfate Nausea       SOCIAL HISTORY:  I have reviewed this patient's social history and updated it with pertinent information if needed. Seema Johnson  reports that she has never smoked. She has never used smokeless tobacco. She reports current alcohol use. She reports that she does not use drugs. no heavy alcohol use, up to one serving wine per day.     FAMILY HISTORY:  I have reviewed this patient's family history and updated it with pertinent information if needed.   No FH MI, afib, valve disease. Grandmother had sudden death on her first night in a nursing home in her mid80s.       REVIEW OF SYSTEMS:  Skin:  Negative     Eyes:  Positive for glasses;visual blurring  ENT:  Negative    Respiratory:  Positive for shortness of breath  Cardiovascular:  Negative;lightheadedness;dizziness;edema;fatigue chest pain;Positive for;palpitations  Gastroenterology: Negative    Genitourinary:  Negative    Musculoskeletal:  Positive for back pain;neck pain  Neurologic:  Negative headaches  Psychiatric:  Negative    Heme/Lymph/Imm:  Positive for allergies  Endocrine:  Negative thyroid disorder;diabetes    PHYSICAL EXAM:      BP: 111/74 Pulse: 65     SpO2: 98 % (room air)      Vital Signs with Ranges  Pulse:  [65] 65  BP: (111)/(74) 111/74  SpO2:  [98 %] 98 %  130 lbs 8 oz    Constitutional: awake, alert, no distress  Eyes: sclera nonicteric  ENT: trachea midline  Respiratory: clear to auscultation bilaterally  Cardiovascular: regular, no murmur appreciated, no gallop or rub  GI: nondistended, nontender, bowel sounds present  Skin: dry, no rash no edema  Musculoskeletal: grossly normal muscle bulk and tone. kyphosis  Neuropsychiatric: normal affect      DATA:   LAST CHOLESTEROL:  Lab Results   Component Value Date    CHOL 150  10/05/2021    CHOL 163.0 09/24/2020     Lab Results   Component Value Date    HDL 69 10/05/2021    HDL 70.0 09/24/2020     Lab Results   Component Value Date    LDL 67 10/05/2021    LDL 79.4 09/24/2020     Lab Results   Component Value Date    TRIG 69 10/05/2021    TRIG 68.0 09/24/2020     No results found for: CHOLHDLRATIO    LAST BMP:  Lab Results   Component Value Date     04/14/2022     10/03/2019      Lab Results   Component Value Date    POTASSIUM 3.6 04/14/2022    POTASSIUM 4.1 10/03/2019     Lab Results   Component Value Date    CHLORIDE 107 04/14/2022    CHLORIDE 108 10/03/2019     Lab Results   Component Value Date    ROSEANNE 9.6 04/14/2022    ROSEANNE 9.0 10/03/2019     Lab Results   Component Value Date    CO2 26 04/14/2022    CO2 27 10/03/2019     Lab Results   Component Value Date    BUN 16 04/14/2022    BUN 15 10/03/2019     Lab Results   Component Value Date    CR 0.88 04/14/2022    CR 0.690 09/24/2020     Lab Results   Component Value Date    GLC 95 04/14/2022    GLC 86 10/03/2019       LAST CBC:  Lab Results   Component Value Date    WBC 4.9 04/14/2022    WBC 4.9 01/30/2020     Lab Results   Component Value Date    RBC 4.13 04/14/2022    RBC 3.89 01/30/2020     Lab Results   Component Value Date    HGB 12.6 04/14/2022    HGB 11.8 01/30/2020     Lab Results   Component Value Date    HCT 39.3 04/14/2022    HCT 35.4 01/30/2020     Lab Results   Component Value Date    MCV 95 04/14/2022    MCV 91 01/30/2020     Lab Results   Component Value Date    MCH 30.5 04/14/2022    MCH 30.3 01/30/2020     Lab Results   Component Value Date    MCHC 32.1 04/14/2022    MCHC 33.3 01/30/2020     Lab Results   Component Value Date    RDW 12.8 04/14/2022    RDW 13.0 01/30/2020     Lab Results   Component Value Date     04/14/2022     01/30/2020     TSH   Date Value Ref Range Status   04/14/2022 1.51 0.40 - 4.00 mU/L Final   10/03/2019 1.62 0.40 - 4.00 mU/L Final         EKG sinus bradycardia NSIVCD QRS  100msec    Echo none      ASSESSMENT:  1. Chest pain, atypical.   2. Palpitations  3. dyslipidemia LDL <70 10/5/21. On simvastatin. Does not take aspirin  4. Fatigue, decreased exercise tolerance  5. H/o breast cancer, on tamoxifen  6. Tremor, takes propranolol  7. Normotensive    I agree with plan for ziopatch monitor and stress test. Also suggested a trial of OTC antacid for symptoms when lying down as she may have GERD.     RECOMMENDATIONS:  1. Exercise stress echo  2. 10 day patch monitor  3. Follow up depends on test results. Will schedule follow up if abnormal and appropriate testing as needed.  If results are unremarkable likely cardiology follow up as needed.     Geno Gutierrez MD [facility]  Text Page

## 2022-04-19 NOTE — PATIENT INSTRUCTIONS
Schedule a patch monitor and a stress echo.  We will follow up on your results to determine a follow up plan.

## 2022-04-20 ENCOUNTER — THERAPY VISIT (OUTPATIENT)
Dept: PHYSICAL THERAPY | Facility: CLINIC | Age: 67
End: 2022-04-20
Attending: PHYSICIAN ASSISTANT
Payer: COMMERCIAL

## 2022-04-20 DIAGNOSIS — M54.50 CHRONIC LEFT-SIDED LOW BACK PAIN WITHOUT SCIATICA: ICD-10-CM

## 2022-04-20 DIAGNOSIS — G89.29 CHRONIC LEFT-SIDED LOW BACK PAIN WITHOUT SCIATICA: ICD-10-CM

## 2022-04-20 PROCEDURE — 97161 PT EVAL LOW COMPLEX 20 MIN: CPT | Mod: GP | Performed by: PHYSICAL THERAPIST

## 2022-04-20 PROCEDURE — 97112 NEUROMUSCULAR REEDUCATION: CPT | Mod: GP | Performed by: PHYSICAL THERAPIST

## 2022-04-20 PROCEDURE — 97110 THERAPEUTIC EXERCISES: CPT | Mod: GP | Performed by: PHYSICAL THERAPIST

## 2022-04-20 NOTE — PROGRESS NOTES
Physical Therapy Initial Evaluation  Subjective:    Patient Health History  Seema Johnson being seen for Low back and neck pain.     Problem began: 4/14/2022 (MD appointment).      Pain is reported as 3/10 on pain scale.  General health as reported by patient is good.  Pertinent medical history includes: cancer, menopausal, numbness/tingling, osteoporosis and rheumatoid arthritis.   Red flags:  None as reported by patient.  Medical allergies: none.   Surgeries include:  Cancer surgery.    Current medications:  Bone density and pain medication.    Current occupation is Retired.   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                  Therapist Generated HPI Evaluation  Problem details: Pt presents to clinic with complaints of gradually worsening L sided low back/mid-upper back pain/stiffness over the past 10 years. Pt having most low back pain with prolonged standing and walking. Pt reports increased pain/difficulty sleeping recently due to increased neck pain/tightness. Pt had MD appointment on 4/14/2022 and referred to PT. .         Type of problem:  Lumbar.    This is a chronic condition.  Condition occurred with:  Insidious onset.    Patient reports pain:  Lower lumbar spine and lumbar spine left.  Pain is described as aching and is intermittent.  Pain radiates to:  Gluteals left. Pain is worse in the P.M..  Since onset symptoms are gradually worsening.  Associated symptoms:  Loss of motion/stiffness. Symptoms are exacerbated by walking, sitting, standing, lifting, carrying, lying down and certain positions  and relieved by activity/movement and NSAID's (lidocaine patches).  Special tests included:  X-ray.  Past treatment: none. There was none improvement following previous treatment.  Restrictions due to condition include:  Working in normal job without restrictions.  Barriers include:  None as reported by patient.                        Objective:  Standing Alignment:     Cervical/Thoracic:  Forward head and thoracic kyphosis increased  Shoulder/UE:  Rounded shoulders                             Lumbar/SI Evaluation  ROM:    AROM Lumbar:   Flexion:            To ankle +hamstring tightness  Ext:                    25%   Side Bend:        Left:  50% +pain L    Right:  75%  Rotation:           Left:  75%    Right:  75%  Side Glide:        Left:     Right:           Lumbar Myotomes:  normal                Lumbar Dermtomes:  normal                Neural Tension/Mobility:      Left side:SLR; Femoral Nerve or Slump  negative.     Right side:   Femoral Nerve; Slump or SLR  negative.   Lumbar Palpation:    Tenderness present at Left:    Erector Spinae    Tenderness not present at Right:  Erector Spinae             Cervical/Thoracic Evaluation    AROM:  AROM Cervical:    Flexion:            WNL slight pain  Extension:       25% +pain  Rotation:         Left: 50 deg +pain L     Right: 60 deg  Side Bend:      Left: 20 deg     Right:  20 deg      Headaches: none  Cervical Myotomes:  normal                      Cervical Dermatomes:  normal                    Cervical Palpation:    Tenderness present at Left:    Upper Trap and Erector Spinae  Tenderness not present at Left:   Suboccipitals  Tenderness present at Right:    Upper Trap and Erector Spinae  Tenderness not present at Right:      Suboccipitals                                                  General     ROS    Assessment/Plan:    Patient is a 66 year old female with lumbar complaints.    Patient has the following significant findings with corresponding treatment plan.                Diagnosis 1:  L sided low back pain  Pain -  hot/cold therapy, manual therapy, self management, education and home program  Decreased ROM/flexibility - manual therapy, therapeutic exercise, therapeutic activity and home program  Decreased strength - therapeutic exercise, therapeutic activities and home program  Impaired muscle performance - neuro  re-education and home program  Decreased function - therapeutic activities and home program  Impaired posture - neuro re-education, therapeutic activities and home program    Therapy Evaluation Codes:   1) History comprised of:   Personal factors that impact the plan of care:      None.    Comorbidity factors that impact the plan of care are:      Cancer, Menopausal and Rheumatoid arthritis.     Medications impacting care: Bone density and Pain.  2) Examination of Body Systems comprised of:   Body structures and functions that impact the plan of care:      Cervical spine and Lumbar spine.   Activity limitations that impact the plan of care are:      Bending, Standing, Walking and Laying down.  3) Clinical presentation characteristics are:   Stable/Uncomplicated.  4) Decision-Making    Low complexity using standardized patient assessment instrument and/or measureable assessment of functional outcome.  Cumulative Therapy Evaluation is: Low complexity.    Previous and current functional limitations:  (See Goal Flow Sheet for this information)    Short term and Long term goals: (See Goal Flow Sheet for this information)     Communication ability:  Patient appears to be able to clearly communicate and understand verbal and written communication and follow directions correctly.  Treatment Explanation - The following has been discussed with the patient:   RX ordered/plan of care  Anticipated outcomes  Possible risks and side effects  This patient would benefit from PT intervention to resume normal activities.   Rehab potential is good.    Frequency:  1 X week, once daily  Duration:  for 8 weeks  Discharge Plan:  Achieve all LTG.  Independent in home treatment program.  Return to previous functional level by discharge.  Reach maximal therapeutic benefit.    Please refer to the daily flowsheet for treatment today, total treatment time and time spent performing 1:1 timed codes.

## 2022-04-20 NOTE — PROGRESS NOTES
[facility]    OUTPATIENT Physical Therapy ORTHOPEDIC EVALUATION  PLAN OF TREATMENT FOR OUTPATIENT REHABILITATION  (COMPLETE FOR INITIAL CLAIMS ONLY)  Patient's Last Name, First Name, M.I.  YOB: 1955  Seema Johnson    Provider s Name:  [facility]   Medical Record No.  [id number]   Start of Care Date:  04/20/22   Onset Date:   04/14/22 (MD appointment)   Type:     _X__PT   ___OT Medical Diagnosis:    Encounter Diagnosis   Name Primary?    Chronic left-sided low back pain without sciatica         Treatment Diagnosis:  L sided low back pain        Goals:     04/20/22 0500   Body Part   Goals listed below are for Low back   Goal #1   Goal #1 standing   Previous Functional Level No restrictions   Current Functional Level Minutes patient can stand   Performance level 15 minutes, 6/10 pain   STG Target Performance Minutes patient will be able to stand   Performance level 30 minutes, 3/10 pain or less   Rationale for housekeeping tasks such as vacuuming, bed making, mowing, gardening;for personal hygiene;for meal preparation   Due date 05/11/22   LTG Target Performance Minutes patient will be able to stand   Performance Level 60 minutes, 1/10 pain or less   Rationale for housekeeping tasks such as vacuuming, bed making, mowing;for personal hygiene;for meal preparation;for safe household ambulation   Due date 06/15/22       Therapy Frequency:  1x/week  Predicted Duration of Therapy Intervention:  8 weeks    El Haynes PT                 I CERTIFY THE NEED FOR THESE SERVICES FURNISHED UNDER        THIS PLAN OF TREATMENT AND WHILE UNDER MY CARE     (Physician attestation of this document indicates review and certification of the therapy plan).                     Certification Date From:  04/20/22   Certification Date To:  06/15/22    Referring Provider:  Shantel Copeland  [place]    Initial Assessment        See Epic Evaluation SOC Date: 04/20/22

## 2022-04-25 ENCOUNTER — ANCILLARY PROCEDURE (OUTPATIENT)
Dept: MRI IMAGING | Facility: CLINIC | Age: 67
End: 2022-04-25
Attending: PHYSICIAN ASSISTANT
Payer: COMMERCIAL

## 2022-04-25 DIAGNOSIS — G89.29 CHRONIC LEFT-SIDED LOW BACK PAIN WITHOUT SCIATICA: ICD-10-CM

## 2022-04-25 DIAGNOSIS — M54.50 CHRONIC LEFT-SIDED LOW BACK PAIN WITHOUT SCIATICA: ICD-10-CM

## 2022-04-25 PROCEDURE — 72148 MRI LUMBAR SPINE W/O DYE: CPT | Performed by: STUDENT IN AN ORGANIZED HEALTH CARE EDUCATION/TRAINING PROGRAM

## 2022-04-27 ENCOUNTER — THERAPY VISIT (OUTPATIENT)
Dept: PHYSICAL THERAPY | Facility: CLINIC | Age: 67
End: 2022-04-27
Payer: COMMERCIAL

## 2022-04-27 DIAGNOSIS — M54.50 CHRONIC LEFT-SIDED LOW BACK PAIN WITHOUT SCIATICA: Primary | ICD-10-CM

## 2022-04-27 DIAGNOSIS — G89.29 CHRONIC LEFT-SIDED LOW BACK PAIN WITHOUT SCIATICA: Primary | ICD-10-CM

## 2022-04-27 PROCEDURE — 97112 NEUROMUSCULAR REEDUCATION: CPT | Mod: GP | Performed by: PHYSICAL THERAPIST

## 2022-04-27 PROCEDURE — 97110 THERAPEUTIC EXERCISES: CPT | Mod: GP | Performed by: PHYSICAL THERAPIST

## 2022-05-05 ENCOUNTER — PRE VISIT (OUTPATIENT)
Dept: NEUROSURGERY | Facility: CLINIC | Age: 67
End: 2022-05-05
Payer: COMMERCIAL

## 2022-05-06 ENCOUNTER — THERAPY VISIT (OUTPATIENT)
Dept: PHYSICAL THERAPY | Facility: CLINIC | Age: 67
End: 2022-05-06
Payer: COMMERCIAL

## 2022-05-06 DIAGNOSIS — G89.29 CHRONIC LEFT-SIDED LOW BACK PAIN WITHOUT SCIATICA: Primary | ICD-10-CM

## 2022-05-06 DIAGNOSIS — M54.50 CHRONIC LEFT-SIDED LOW BACK PAIN WITHOUT SCIATICA: Primary | ICD-10-CM

## 2022-05-06 PROCEDURE — 97112 NEUROMUSCULAR REEDUCATION: CPT | Mod: GP | Performed by: PHYSICAL THERAPIST

## 2022-05-06 PROCEDURE — 97110 THERAPEUTIC EXERCISES: CPT | Mod: GP | Performed by: PHYSICAL THERAPIST

## 2022-05-09 ENCOUNTER — HOSPITAL ENCOUNTER (OUTPATIENT)
Dept: CARDIOLOGY | Facility: CLINIC | Age: 67
Discharge: HOME | End: 2022-05-09
Attending: INTERNAL MEDICINE
Payer: COMMERCIAL

## 2022-05-09 DIAGNOSIS — R07.9 CHEST PAIN, UNSPECIFIED TYPE: ICD-10-CM

## 2022-05-09 DIAGNOSIS — R00.2 PALPITATIONS: ICD-10-CM

## 2022-05-09 PROCEDURE — 93248 EXT ECG>7D<15D REV&INTERPJ: CPT | Performed by: INTERNAL MEDICINE

## 2022-05-09 PROCEDURE — 93246 EXT ECG>7D<15D RECORDING: CPT

## 2022-05-10 ENCOUNTER — HOSPITAL ENCOUNTER (OUTPATIENT)
Dept: CARDIOLOGY | Facility: CLINIC | Age: 67
Discharge: HOME | End: 2022-05-10
Attending: INTERNAL MEDICINE | Admitting: INTERNAL MEDICINE
Payer: COMMERCIAL

## 2022-05-10 PROCEDURE — 93321 DOPPLER ECHO F-UP/LMTD STD: CPT | Mod: 26 | Performed by: STUDENT IN AN ORGANIZED HEALTH CARE EDUCATION/TRAINING PROGRAM

## 2022-05-10 PROCEDURE — C8928 TTE W OR W/O FOL W/CON,STRES: HCPCS

## 2022-05-10 PROCEDURE — 93018 CV STRESS TEST I&R ONLY: CPT | Performed by: STUDENT IN AN ORGANIZED HEALTH CARE EDUCATION/TRAINING PROGRAM

## 2022-05-10 PROCEDURE — 93350 STRESS TTE ONLY: CPT | Mod: 26 | Performed by: STUDENT IN AN ORGANIZED HEALTH CARE EDUCATION/TRAINING PROGRAM

## 2022-05-10 PROCEDURE — 93016 CV STRESS TEST SUPVJ ONLY: CPT | Performed by: STUDENT IN AN ORGANIZED HEALTH CARE EDUCATION/TRAINING PROGRAM

## 2022-05-10 PROCEDURE — 255N000002 HC RX 255 OP 636: Performed by: STUDENT IN AN ORGANIZED HEALTH CARE EDUCATION/TRAINING PROGRAM

## 2022-05-10 PROCEDURE — 93325 DOPPLER ECHO COLOR FLOW MAPG: CPT | Mod: 26 | Performed by: STUDENT IN AN ORGANIZED HEALTH CARE EDUCATION/TRAINING PROGRAM

## 2022-05-10 RX ADMIN — PERFLUTREN 5 ML: 6.52 INJECTION, SUSPENSION INTRAVENOUS at 09:41

## 2022-05-11 ENCOUNTER — OFFICE VISIT (OUTPATIENT)
Dept: NEUROSURGERY | Facility: CLINIC | Age: 67
End: 2022-05-11
Payer: COMMERCIAL

## 2022-05-11 VITALS
HEIGHT: 64 IN | DIASTOLIC BLOOD PRESSURE: 65 MMHG | OXYGEN SATURATION: 98 % | HEART RATE: 68 BPM | SYSTOLIC BLOOD PRESSURE: 101 MMHG | BODY MASS INDEX: 22.2 KG/M2 | WEIGHT: 130 LBS

## 2022-05-11 DIAGNOSIS — M54.50 CHRONIC MIDLINE LOW BACK PAIN WITHOUT SCIATICA: Primary | ICD-10-CM

## 2022-05-11 DIAGNOSIS — G89.29 CHRONIC MIDLINE LOW BACK PAIN WITHOUT SCIATICA: Primary | ICD-10-CM

## 2022-05-11 DIAGNOSIS — M54.2 NECK PAIN: ICD-10-CM

## 2022-05-11 PROCEDURE — 99203 OFFICE O/P NEW LOW 30 MIN: CPT | Performed by: PHYSICIAN ASSISTANT

## 2022-05-11 ASSESSMENT — PAIN SCALES - GENERAL: PAINLEVEL: MILD PAIN (2)

## 2022-05-11 NOTE — NURSING NOTE
"Seema Johnson is a 66 year old female who presents for:  Chief Complaint   Patient presents with     Neurologic Problem     Low back and neck pain.        Initial Vitals:  /65   Pulse 68   Ht 5' 4.25\" (1.632 m)   Wt 130 lb (59 kg)   SpO2 98%   BMI 22.14 kg/m   Estimated body mass index is 22.14 kg/m  as calculated from the following:    Height as of this encounter: 5' 4.25\" (1.632 m).    Weight as of this encounter: 130 lb (59 kg).. Body surface area is 1.64 meters squared. BP completed using cuff size: regular  Mild Pain (2)    Nursing Comments:     Olegario Infante    "

## 2022-05-11 NOTE — LETTER
5/11/2022         RE: Seema Johnson  [address]  [facility] [address]        Dear Colleague,    Thank you for referring your patient, Seema Johnson, to the [facility]UR[facility]. Please see a copy of my visit note below.    Neurosurgery Consult    HPI    Ms. Johnson is a 66-year-old female who presents to clinic for evaluation of back pain on the left and left neck pain.  She has been doing physical therapy and noticing improvement in her symptoms.  She denies radicular symptoms in her upper extremities or lower extremities.  She feels overall somewhat weak General weakness and deconditioning.  She recently underwent a lumbar MRI and is here for further review.  She denies bowel or bladder symptoms or saddle anesthesia.    Medical history  Essential tremor  History of breast cancer  History of T12 compression fracture    Social history  Retired, used to work as a , at a [employer], and cleaning houses      B/P: 101/65, T: Data Unavailable, P: 66, R: Data Unavailable       Exam    Alert and oriented no acute distress  Bilateral upper extremities with 5/5 strength  Shantelle sign negative  Reflexes 2+ triceps, biceps, brachioradialis    Bilateral lower extremities with 5/5 strength  Reflexes 2+ patella/ankle  Negative straight leg raise bilaterally  Negative ankle clonus negative Babinski bilaterally  Gait is normal    Imaging    Lumbar MRI demonstrates no central or foraminal stenosis, chronic appearing T12 compression fracture without marrow edema.    Assessment    Back pain   Neck pain     Plan:      I recommend the patient continue with physical therapy at this time, if her symptoms do not improving over the next month or 2 we can certainly follow-up with further x-rays possible cervical spine or cervical MRI and make further recommendations at that time.  She states she is going on a trip to [place] for 4 months in the coming weeks and will follow-up  with us after that if she needs to.    Total time of 30 minutes spent with the patient today in counseling and coordination of care.      Again, thank you for allowing me to participate in the care of your patient.        Sincerely,        Milla Mckeon PA-C

## 2022-05-11 NOTE — PROGRESS NOTES
Neurosurgery Consult    HPI    Ms. Johnson is a 66-year-old female who presents to clinic for evaluation of back pain on the left and left neck pain.  She has been doing physical therapy and noticing improvement in her symptoms.  She denies radicular symptoms in her upper extremities or lower extremities.  She feels overall somewhat weak General weakness and deconditioning.  She recently underwent a lumbar MRI and is here for further review.  She denies bowel or bladder symptoms or saddle anesthesia.    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    Assessment    Back pain   Neck pain     Plan:      I recommend the patient continue with physical therapy at this time, if her symptoms do not improving over the next month or 2 we can certainly follow-up with further x-rays possible cervical spine or cervical MRI and make further recommendations at that time.  She states she is going on a trip to [place] for 4 months in the coming weeks and will follow-up with us after that if she needs to.    Total time of 30 minutes spent with the patient today in counseling and coordination of care.

## 2022-05-12 ENCOUNTER — IMMUNIZATION (OUTPATIENT)
Dept: FAMILY MEDICINE | Facility: CLINIC | Age: 67
End: 2022-05-12
Payer: COMMERCIAL

## 2022-05-12 ENCOUNTER — TELEPHONE (OUTPATIENT)
Dept: CARDIOLOGY | Facility: CLINIC | Age: 67
End: 2022-05-12
Payer: COMMERCIAL

## 2022-05-12 DIAGNOSIS — R53.82 CHRONIC FATIGUE: ICD-10-CM

## 2022-05-12 DIAGNOSIS — Z23 HIGH PRIORITY FOR 2019-NCOV VACCINE: Primary | ICD-10-CM

## 2022-05-12 DIAGNOSIS — R93.1 EQUIVOCAL STRESS ECHOCARDIOGRAM: Primary | ICD-10-CM

## 2022-05-12 DIAGNOSIS — R07.9 CHEST PAIN, UNSPECIFIED TYPE: ICD-10-CM

## 2022-05-12 DIAGNOSIS — R00.2 PALPITATIONS: ICD-10-CM

## 2022-05-12 PROCEDURE — 0054A COVID-19,PF,PFIZER (12+ YRS): CPT

## 2022-05-12 PROCEDURE — 91305 COVID-19,PF,PFIZER (12+ YRS): CPT

## 2022-05-12 PROCEDURE — 99207 PR NO CHARGE NURSE ONLY: CPT

## 2022-05-12 NOTE — TELEPHONE ENCOUNTER
Pt had non diagnostic exercise stress echo on 5/10/22 :  Interpretation Summary  Non-diagnostic exercise echocardiogram due to failure to achieve the target  heart rate.  The target heart rate was not achieved. Exercise was terminated at maximal  heart rate of 129 bpm (59% age-predicted max) due to fatigue.  Blunted heart rate and normal BP response to exercise.  Normal biventricular size, thickness, and global systolic function at  baseline, LVEF=55-60%.  With exercise at a below-diagnostic workload, LVEF increased to >60% and LV  cavity size decreased appropriately.  No regional wall motion abnormalities are present at rest or with exercise at  a below-diagnostic workload.  No angina was elicited at a below-diagnostic workload.  No ECG evidence of ischemia.  Functional capacity is reduced for age.  No significant valvular abnormalities are noted on screening Doppler exam.  The aortic root and visualized ascending aorta are normal.      Dr. Gutierrez then recommended :    Geno Gutierrez MD  P [facility] Team 1  Please contact the patient to schedule follow up with me and also recommend a CT coronary angiogram because stress test was nondiagnostic.       We can discuss consideration of PFTs and ABIs at follow up.     Geno Gutierrez MD on 5/10/2022 at 12:06 PM   ---------------------------------------------------------------------------------------------------------    Spoke with pt about Dr. Gutierrez's recommendations and she is OK to proceed with CTA.   Pt currently has zio patch in place and will wait until that is sent in to get the CTA.   Will have scheduling reach out to pt.   Pt gave verbal understanding.

## 2022-05-13 ENCOUNTER — THERAPY VISIT (OUTPATIENT)
Dept: PHYSICAL THERAPY | Facility: CLINIC | Age: 67
End: 2022-05-13
Payer: COMMERCIAL

## 2022-05-13 DIAGNOSIS — M54.50 CHRONIC LEFT-SIDED LOW BACK PAIN WITHOUT SCIATICA: Primary | ICD-10-CM

## 2022-05-13 DIAGNOSIS — G89.29 CHRONIC LEFT-SIDED LOW BACK PAIN WITHOUT SCIATICA: Primary | ICD-10-CM

## 2022-05-13 PROCEDURE — 97110 THERAPEUTIC EXERCISES: CPT | Mod: GP | Performed by: PHYSICAL THERAPIST

## 2022-05-13 PROCEDURE — 97112 NEUROMUSCULAR REEDUCATION: CPT | Mod: GP | Performed by: PHYSICAL THERAPIST

## 2022-05-25 ENCOUNTER — HOSPITAL ENCOUNTER (OUTPATIENT)
Dept: CARDIOLOGY | Facility: CLINIC | Age: 67
Discharge: HOME | End: 2022-05-25
Attending: INTERNAL MEDICINE
Payer: COMMERCIAL

## 2022-05-25 VITALS — SYSTOLIC BLOOD PRESSURE: 104 MMHG | DIASTOLIC BLOOD PRESSURE: 67 MMHG | HEART RATE: 55 BPM

## 2022-05-25 DIAGNOSIS — R00.2 PALPITATIONS: ICD-10-CM

## 2022-05-25 DIAGNOSIS — R53.82 CHRONIC FATIGUE: ICD-10-CM

## 2022-05-25 DIAGNOSIS — R07.9 CHEST PAIN, UNSPECIFIED TYPE: ICD-10-CM

## 2022-05-25 DIAGNOSIS — R93.1 EQUIVOCAL STRESS ECHOCARDIOGRAM: ICD-10-CM

## 2022-05-25 PROCEDURE — 75574 CT ANGIO HRT W/3D IMAGE: CPT

## 2022-05-25 PROCEDURE — 250N000013 HC RX MED GY IP 250 OP 250 PS 637: Performed by: INTERNAL MEDICINE

## 2022-05-25 PROCEDURE — 75574 CT ANGIO HRT W/3D IMAGE: CPT | Mod: 26 | Performed by: INTERNAL MEDICINE

## 2022-05-25 PROCEDURE — 250N000011 HC RX IP 250 OP 636: Performed by: INTERNAL MEDICINE

## 2022-05-25 RX ORDER — IVABRADINE 5 MG/1
5-15 TABLET, FILM COATED ORAL
Status: COMPLETED | OUTPATIENT
Start: 2022-05-25 | End: 2022-05-25

## 2022-05-25 RX ORDER — METHYLPREDNISOLONE SODIUM SUCCINATE 125 MG/2ML
125 INJECTION, POWDER, LYOPHILIZED, FOR SOLUTION INTRAMUSCULAR; INTRAVENOUS
Status: DISCONTINUED | OUTPATIENT
Start: 2022-05-25 | End: 2022-05-26 | Stop reason: HOSPADM

## 2022-05-25 RX ORDER — DIPHENHYDRAMINE HCL 25 MG
25 CAPSULE ORAL
Status: DISCONTINUED | OUTPATIENT
Start: 2022-05-25 | End: 2022-05-26 | Stop reason: HOSPADM

## 2022-05-25 RX ORDER — ACYCLOVIR 200 MG/1
0-1 CAPSULE ORAL
Status: DISCONTINUED | OUTPATIENT
Start: 2022-05-25 | End: 2022-05-26 | Stop reason: HOSPADM

## 2022-05-25 RX ORDER — DILTIAZEM HYDROCHLORIDE 5 MG/ML
10-15 INJECTION INTRAVENOUS
Status: DISCONTINUED | OUTPATIENT
Start: 2022-05-25 | End: 2022-05-26 | Stop reason: HOSPADM

## 2022-05-25 RX ORDER — METOPROLOL TARTRATE 25 MG/1
25-100 TABLET, FILM COATED ORAL
Status: COMPLETED | OUTPATIENT
Start: 2022-05-25 | End: 2022-05-25

## 2022-05-25 RX ORDER — NITROGLYCERIN 0.4 MG/1
0.4 TABLET SUBLINGUAL
Status: DISCONTINUED | OUTPATIENT
Start: 2022-05-25 | End: 2022-05-26 | Stop reason: HOSPADM

## 2022-05-25 RX ORDER — IOPAMIDOL 755 MG/ML
500 INJECTION, SOLUTION INTRAVASCULAR ONCE
Status: COMPLETED | OUTPATIENT
Start: 2022-05-25 | End: 2022-05-25

## 2022-05-25 RX ORDER — DILTIAZEM HCL 60 MG
120 TABLET ORAL
Status: DISCONTINUED | OUTPATIENT
Start: 2022-05-25 | End: 2022-05-26 | Stop reason: HOSPADM

## 2022-05-25 RX ORDER — METOPROLOL TARTRATE 1 MG/ML
5-15 INJECTION, SOLUTION INTRAVENOUS
Status: DISCONTINUED | OUTPATIENT
Start: 2022-05-25 | End: 2022-05-26 | Stop reason: HOSPADM

## 2022-05-25 RX ORDER — ONDANSETRON 2 MG/ML
4 INJECTION INTRAMUSCULAR; INTRAVENOUS
Status: DISCONTINUED | OUTPATIENT
Start: 2022-05-25 | End: 2022-05-26 | Stop reason: HOSPADM

## 2022-05-25 RX ORDER — DIPHENHYDRAMINE HYDROCHLORIDE 50 MG/ML
25-50 INJECTION INTRAMUSCULAR; INTRAVENOUS
Status: DISCONTINUED | OUTPATIENT
Start: 2022-05-25 | End: 2022-05-26 | Stop reason: HOSPADM

## 2022-05-25 RX ADMIN — IVABRADINE 10 MG: 5 TABLET, FILM COATED ORAL at 10:09

## 2022-05-25 RX ADMIN — METOPROLOL TARTRATE 50 MG: 50 TABLET, FILM COATED ORAL at 10:09

## 2022-05-25 RX ADMIN — NITROGLYCERIN 0.4 MG: 0.4 TABLET SUBLINGUAL at 11:30

## 2022-05-25 RX ADMIN — IOPAMIDOL 110 ML: 755 INJECTION, SOLUTION INTRAVENOUS at 11:39

## 2022-05-31 DIAGNOSIS — Z12.31 ENCOUNTER FOR SCREENING MAMMOGRAM FOR BREAST CANCER: Primary | ICD-10-CM

## 2022-06-07 ENCOUNTER — MYC MEDICAL ADVICE (OUTPATIENT)
Dept: CARDIOLOGY | Facility: CLINIC | Age: 67
End: 2022-06-07
Payer: COMMERCIAL

## 2022-06-07 NOTE — TELEPHONE ENCOUNTER
Monitor readable for 9 days. Predominant sinus rhythm with HR , mean 69. There were 4 short runs of SVT, up to 20 beats. This is not an urgent finding.     CTA showed no coronary stenosis. Mild calcium in the right coronary artery.     Please schedule her with me to discuss results. If she is out of the country I can see her when she returns.     Geno Gutierrez MD on 6/6/2022 at 5:00 PM

## 2022-06-22 NOTE — PROGRESS NOTES
Discharge Note    Progress reporting period is from initial evaluation date (please see noted date below) to May 13, 2022.  Linked Episodes   Type: Episode: Status: Noted: Resolved: Last update: Updated by:   PHYSICAL THERAPY Low back 4/20/2022 Active 4/20/2022 5/13/2022 10:42 AM El Haynes, PT      Comments:       Seema failed to follow up and current status is unknown.  Please see information below for last relevant information on current status.  Patient seen for 4 visits.    SUBJECTIVE  Subjective changes noted by patient:  Seema had MD appointment earlier this week, encouraged to continue with PT. Pleased with overall improvement in mid and low back, having more pain in L upper back/neck.  .  Current pain level is  .     Previous pain level was  6/10.   Changes in function:  Yes (See Goal flowsheet attached for changes in current functional level)  Adverse reaction to treatment or activity: None    OBJECTIVE  Changes noted in objective findings: Cervical AROM: flexion WNL -pain, ext 50% +pain, bilateral SB 50% +pain L, bilateral rotation 50%     ASSESSMENT/PLAN  Diagnosis: L sided low back pain   Updated problem list and treatment plan:   Pain - HEP  Decreased ROM/flexibility - HEP  Decreased strength - HEP  Impaired muscle performance - HEP  Impaired posture - HEP  STG/LTGs have been met or progress has been made towards goals:  Yes, please see goal flowsheet for most current information  Assessment of Progress: current status is unknown.    Last current status: Pt is progressing as expected   Self Management Plans:  HEP  I have re-evaluated this patient and find that the nature, scope, duration and intensity of the therapy is appropriate for the medical condition of the patient.  Seema continues to require the following intervention to meet STG and LTG's:  HEP.    Recommendations:  Discharge with current home program.  Patient to follow up with MD as needed.    Please refer to the daily flowsheet for  treatment today, total treatment time and time spent performing 1:1 timed codes.

## 2022-09-22 ENCOUNTER — MYC MEDICAL ADVICE (OUTPATIENT)
Dept: FAMILY MEDICINE | Facility: CLINIC | Age: 67
End: 2022-09-22

## 2022-10-03 DIAGNOSIS — C50.911 INFILTRATING DUCTAL CARCINOMA OF RIGHT FEMALE BREAST (H): Primary | ICD-10-CM

## 2022-10-10 ENCOUNTER — ONCOLOGY VISIT (OUTPATIENT)
Dept: ONCOLOGY | Facility: CLINIC | Age: 67
End: 2022-10-10
Payer: COMMERCIAL

## 2022-10-10 ENCOUNTER — LAB (OUTPATIENT)
Dept: LAB | Facility: CLINIC | Age: 67
End: 2022-10-10
Payer: COMMERCIAL

## 2022-10-10 ENCOUNTER — PATIENT OUTREACH (OUTPATIENT)
Dept: ONCOLOGY | Facility: CLINIC | Age: 67
End: 2022-10-10

## 2022-10-10 ENCOUNTER — ANCILLARY PROCEDURE (OUTPATIENT)
Dept: MAMMOGRAPHY | Facility: CLINIC | Age: 67
End: 2022-10-10
Attending: NURSE PRACTITIONER
Payer: COMMERCIAL

## 2022-10-10 VITALS
HEIGHT: 64 IN | RESPIRATION RATE: 16 BRPM | TEMPERATURE: 97.9 F | BODY MASS INDEX: 21.48 KG/M2 | HEART RATE: 60 BPM | WEIGHT: 125.8 LBS | SYSTOLIC BLOOD PRESSURE: 123 MMHG | DIASTOLIC BLOOD PRESSURE: 75 MMHG | OXYGEN SATURATION: 98 %

## 2022-10-10 DIAGNOSIS — Z12.31 ENCOUNTER FOR SCREENING MAMMOGRAM FOR BREAST CANCER: ICD-10-CM

## 2022-10-10 DIAGNOSIS — E78.5 HYPERLIPIDEMIA LDL GOAL <100: ICD-10-CM

## 2022-10-10 DIAGNOSIS — C50.911 INFILTRATING DUCTAL CARCINOMA OF RIGHT FEMALE BREAST (H): Primary | ICD-10-CM

## 2022-10-10 DIAGNOSIS — R92.30 DENSE BREASTS: ICD-10-CM

## 2022-10-10 DIAGNOSIS — C50.911 INFILTRATING DUCTAL CARCINOMA OF RIGHT FEMALE BREAST (H): ICD-10-CM

## 2022-10-10 DIAGNOSIS — M81.0 OSTEOPOROSIS WITHOUT CURRENT PATHOLOGICAL FRACTURE, UNSPECIFIED OSTEOPOROSIS TYPE: ICD-10-CM

## 2022-10-10 LAB
ALBUMIN SERPL-MCNC: 4 G/DL (ref 3.4–5)
ALP SERPL-CCNC: 31 U/L (ref 40–150)
ALT SERPL W P-5'-P-CCNC: 16 U/L (ref 0–50)
ANION GAP SERPL CALCULATED.3IONS-SCNC: 3 MMOL/L (ref 3–14)
AST SERPL W P-5'-P-CCNC: 16 U/L (ref 0–45)
BASOPHILS # BLD AUTO: 0 10E3/UL (ref 0–0.2)
BASOPHILS NFR BLD AUTO: 1 %
BILIRUB SERPL-MCNC: 0.7 MG/DL (ref 0.2–1.3)
BUN SERPL-MCNC: 15 MG/DL (ref 7–30)
CALCIUM SERPL-MCNC: 9.3 MG/DL (ref 8.5–10.1)
CHLORIDE BLD-SCNC: 111 MMOL/L (ref 94–109)
CO2 SERPL-SCNC: 28 MMOL/L (ref 20–32)
CREAT SERPL-MCNC: 0.69 MG/DL (ref 0.52–1.04)
EOSINOPHIL # BLD AUTO: 0.1 10E3/UL (ref 0–0.7)
EOSINOPHIL NFR BLD AUTO: 2 %
ERYTHROCYTE [DISTWIDTH] IN BLOOD BY AUTOMATED COUNT: 13.1 % (ref 10–15)
GFR SERPL CREATININE-BSD FRML MDRD: >90 ML/MIN/1.73M2
GLUCOSE BLD-MCNC: 96 MG/DL (ref 70–99)
HCT VFR BLD AUTO: 36.1 % (ref 35–47)
HGB BLD-MCNC: 12 G/DL (ref 11.7–15.7)
IMM GRANULOCYTES # BLD: 0 10E3/UL
IMM GRANULOCYTES NFR BLD: 0 %
LYMPHOCYTES # BLD AUTO: 1.5 10E3/UL (ref 0.8–5.3)
LYMPHOCYTES NFR BLD AUTO: 31 %
MCH RBC QN AUTO: 30.6 PG (ref 26.5–33)
MCHC RBC AUTO-ENTMCNC: 33.2 G/DL (ref 31.5–36.5)
MCV RBC AUTO: 92 FL (ref 78–100)
MONOCYTES # BLD AUTO: 0.5 10E3/UL (ref 0–1.3)
MONOCYTES NFR BLD AUTO: 10 %
NEUTROPHILS # BLD AUTO: 2.7 10E3/UL (ref 1.6–8.3)
NEUTROPHILS NFR BLD AUTO: 56 %
NRBC # BLD AUTO: 0 10E3/UL
NRBC BLD AUTO-RTO: 0 /100
PLATELET # BLD AUTO: 217 10E3/UL (ref 150–450)
POTASSIUM BLD-SCNC: 4.1 MMOL/L (ref 3.4–5.3)
PROT SERPL-MCNC: 6.8 G/DL (ref 6.8–8.8)
RBC # BLD AUTO: 3.92 10E6/UL (ref 3.8–5.2)
SODIUM SERPL-SCNC: 142 MMOL/L (ref 133–144)
WBC # BLD AUTO: 4.8 10E3/UL (ref 4–11)

## 2022-10-10 PROCEDURE — 36415 COLL VENOUS BLD VENIPUNCTURE: CPT

## 2022-10-10 PROCEDURE — 99215 OFFICE O/P EST HI 40 MIN: CPT | Performed by: NURSE PRACTITIONER

## 2022-10-10 PROCEDURE — 85025 COMPLETE CBC W/AUTO DIFF WBC: CPT

## 2022-10-10 PROCEDURE — 80061 LIPID PANEL: CPT

## 2022-10-10 PROCEDURE — 77063 BREAST TOMOSYNTHESIS BI: CPT | Performed by: RADIOLOGY

## 2022-10-10 PROCEDURE — 80053 COMPREHEN METABOLIC PANEL: CPT

## 2022-10-10 PROCEDURE — 77067 SCR MAMMO BI INCL CAD: CPT | Performed by: RADIOLOGY

## 2022-10-10 ASSESSMENT — PAIN SCALES - GENERAL: PAINLEVEL: MILD PAIN (3)

## 2022-10-10 NOTE — LETTER
10/10/2022         RE: Seema Johnson  [address]  [facility] [id number]        Dear Colleague,    Thank you for referring your patient, Seema Johnson, to the [facility]. Please see a copy of my visit note below.    Oncology Follow Up Visit: October 10, 2022    Oncologist: Dr Meghan Haynes  PCP: Shantel Plaza    Diagnosis:  Stage II Right Breast Cancer  Seema Johnson is a 65 yo female who was noted to have a 7 mm breast cancer with an intramammary lymph node positivity and a sentinel lymph node positivity. One of two sentinel lymph nodes was positive with the tumor size being 0.3 cm and an intramammary lymph node was positive as well. All margins completely clear with invasive margin being 0.3 cm. There was some background DCIS and that margin was also clear, but less than 0.1 cm. The tumor was grade 1, strongly ER positive in 95% of the cells, [place] negative ( stained in less than 1% of the cells) and HER2/lex negative. (T1B N1 M0)  Pathology reviewed at [facility],  by Dr. Huang.  BREAST NEXT genetic testing panel was negative.  Treatment:   Right lumpectomy with SLN biopsy  3/18-4/28/2016 dose dense Taxol  x 4 cycles,   5/12-6/23/2021 dose dense AC x 4 cycles,     7/18- 8/26/16 adjuvant radiation therapy from 7/18/16- 8/26/16.   She has started on Tamoxifen mid Oct 2016.     Interval History: Ms. Johnson is contacted today for follow up to her stage II right breast cancer and use of tamoxifen.  Patient states she is feeling well and tolerating the tamoxifen but continues with some skin acne or redmarks related to the Tamoxifen- would like to stop if able.   She is aware of her osteoporosis status and she is a very active person and confirms taking her calcium plus vitamin D up to 3 weeks ago when just could not tolerate the large tabs. She continues to travel and walk but does not run any longer. She will be wintering in [place] starting at end of month.  "  Rest of comprehensive and complete ROS is reviewed and is negative.   Past Medical History:   Diagnosis Date     Basal cell cancer      Breast cancer (H)      Dyslipidemia      History of blood transfusion      Hypertension      Mohs defect of nose 06/2012     Current Outpatient Medications   Medication     acetaminophen (TYLENOL) 500 MG tablet     Calcium Carb-Cholecalciferol (CALCIUM 1000 + D) 1000-800 MG-UNIT TABS     Cholecalciferol (VITAMIN D3 PO)     cyanocobalamin (VITAMIN B-12) 1000 MCG tablet     propranolol ER (INDERAL LA) 60 MG 24 hr capsule     simvastatin (ZOCOR) 20 MG tablet     tamoxifen (NOLVADEX) 20 MG tablet     No current facility-administered medications for this visit.     Allergies   Allergen Reactions     Compazine [Prochlorperazine] Other (See Comments)     Jittery and hyper and foggy     Codeine Sulfate Nausea   There is a FHx of ovarian cancer in one of her sisters and of breast cancer in another sister.    Physical Exam:/75 (BP Location: Left arm)   Pulse 60   Temp 97.9  F (36.6  C) (Oral)   Resp 16   Ht 1.632 m (5' 4.25\")   Wt 57.1 kg (125 lb 12.8 oz)   SpO2 98%   BMI 21.42 kg/m      Constitutional: Alert and in no distress. Thin with obvious kyphosis.  ENT: Eyes bright, No mouth sores  Neck: Supple, No adenopathy.  Cardiac: Heart rate and rhythm is regular with normal S1 and S2 without murmur  Respiratory: Breathing easy. Lung sounds clear to auscultation  Breasts:Right breast has lumpectomy scar but no new masses, tenderness or discharge to either breast and no lymphadopathy.  Abdomen: Soft, non-tender, BS normal. No masses or organomegaly  MS: Muscle tone normal, extremities normal with no edema.   Skin: No suspicious lesions or rashes but does have a patch of redness to the face with acne spot.  Neuro: Sensory grossly WNL, gait normal.   Lymph: Normal ant/post cervical, axillary, supraclavicular nodes  Psych: Mentation appears normal and affect normal/bright and " rod      Laboratory/Imaging Results:   Results for orders placed or performed in visit on 10/10/22   Comprehensive metabolic panel     Status: Abnormal   Result Value Ref Range    Sodium 142 133 - 144 mmol/L    Potassium 4.1 3.4 - 5.3 mmol/L    Chloride 111 (H) 94 - 109 mmol/L    Carbon Dioxide (CO2) 28 20 - 32 mmol/L    Anion Gap 3 3 - 14 mmol/L    Urea Nitrogen 15 7 - 30 mg/dL    Creatinine 0.69 0.52 - 1.04 mg/dL    Calcium 9.3 8.5 - 10.1 mg/dL    Glucose 96 70 - 99 mg/dL    Alkaline Phosphatase 31 (L) 40 - 150 U/L    AST 16 0 - 45 U/L    ALT 16 0 - 50 U/L    Protein Total 6.8 6.8 - 8.8 g/dL    Albumin 4.0 3.4 - 5.0 g/dL    Bilirubin Total 0.7 0.2 - 1.3 mg/dL    GFR Estimate >90 >60 mL/min/1.73m2   CBC with platelets and differential     Status: None   Result Value Ref Range    WBC Count 4.8 4.0 - 11.0 10e3/uL    RBC Count 3.92 3.80 - 5.20 10e6/uL    Hemoglobin 12.0 11.7 - 15.7 g/dL    Hematocrit 36.1 35.0 - 47.0 %    MCV 92 78 - 100 fL    MCH 30.6 26.5 - 33.0 pg    MCHC 33.2 31.5 - 36.5 g/dL    RDW 13.1 10.0 - 15.0 %    Platelet Count 217 150 - 450 10e3/uL    % Neutrophils 56 %    % Lymphocytes 31 %    % Monocytes 10 %    % Eosinophils 2 %    % Basophils 1 %    % Immature Granulocytes 0 %    NRBCs per 100 WBC 0 <1 /100    Absolute Neutrophils 2.7 1.6 - 8.3 10e3/uL    Absolute Lymphocytes 1.5 0.8 - 5.3 10e3/uL    Absolute Monocytes 0.5 0.0 - 1.3 10e3/uL    Absolute Eosinophils 0.1 0.0 - 0.7 10e3/uL    Absolute Basophils 0.0 0.0 - 0.2 10e3/uL    Absolute Immature Granulocytes 0.0 <=0.4 10e3/uL    Absolute NRBCs 0.0 10e3/uL   CBC with platelets differential     Status: None    Narrative    The following orders were created for panel order CBC with platelets differential.  Procedure                               Abnormality         Status                     ---------                               -----------         ------                     CBC with platelets and d...[[id number]]                      Final  result                 Please view results for these tests on the individual orders.   Results for orders placed or performed in visit on 10/10/22   MA Screen Bilateral w/Bassem     Status: None    Narrative    EXAMINATION:  MA SCREENING BILATERAL W/ BASSEM, 10/10/2022 9:13 AM    HISTORY/FAMILY HISTORY: No current or new breast symptoms, screening.   History of treated right breast invasive ductal carcinoma with DCIS in  2016.       COMPARISON: 10/5/2021, 8/24/2020, 10/2/2017    TECHNIQUE: Standard mammographic views were performed with  tomosynthesis and synthetic mammogram.  CAD was utilized during the  interpretation.    FINDINGS:  BREAST DENSITY: Extremely dense.  There has been no significant interval change or suspicious findings.   Posttreatment changes in the right breast.      Impression    IMPRESSION: BI-RADS CATEGORY: 2 - Benign.    RECOMMENDED FOLLOW-UP: Annual Mammography.    The results of the examination will be sent to the patient.    MARIN ORTEGA MD         SYSTEM ID:  [id number]     Assessment and Plan:   Stage II Right Breast Cancer-patient continues on with use of Tamoxifen but reports she is ready to quit with noted side effects of hot flashes and skin changes with red patches and acne. Discussed option of getting a Breast Cancer Index completed to see if it is worth moving out to 10 years of use and pt is very interested in this- will get started with the process.   Will plan on seeing her in 1 year with or without the tamoxifen.   Reminded her of need for annual gynecological exam-states she is seeing her PCP this week  Has had  annual eye exam due to possible hastening of cataract development.  She had a negative 3D screening mammogram today- will repeat annually prior to clinic visit.  Osteoporosis with spine fractures both spontaneous and traumatic-8/31/2020 D[facility]A scan through [facility] showing osteopenia with T score of -1.5 in lumbar, -1.9 in femoral neck but compression deformities in T12.     She has been on annual Reclast infusions in past.  She has been on calcium and vitamin D supplementation but stopped 3 weeks ago- discussed starting a gummy that has the calcium and vitamin D    Due for osteoporosis screening and discussion of continuation of bone builder- Reclast- with PCP.   Health Maintenance-patient reports she will be receiving her Covid booster along with her flu shot with her primary care visit.  Screening colonoscopy in July 2020 which showed normal findings  The total time of this encounter amounted to 40 minutes. This time included video time spent with the patient, prep work, ordering tests, and performing post visit documentation.  Aide Laguerre Cnp        Again, thank you for allowing me to participate in the care of your patient.        Sincerely,        Aide Laguerre, JL, APRN CNP

## 2022-10-10 NOTE — PROGRESS NOTES
Oncology Follow Up Visit: October 10, 2022    Oncologist: Dr Meghan Haynes  PCP: Shantel Plaza    Diagnosis:  Stage II Right Breast Cancer  Seema Johnson is a 67 yo female who was noted to have a 7 mm breast cancer with an intramammary lymph node positivity and a sentinel lymph node positivity. One of two sentinel lymph nodes was positive with the tumor size being 0.3 cm and an intramammary lymph node was positive as well. All margins completely clear with invasive margin being 0.3 cm. There was some background DCIS and that margin was also clear, but less than 0.1 cm. The tumor was grade 1, strongly ER positive in 95% of the cells, [place] negative ( stained in less than 1% of the cells) and HER2/lex negative. (T1B N1 M0)  Pathology reviewed at [facility],  by Dr. Huang.  BREAST NEXT genetic testing panel was negative.  Treatment:   Right lumpectomy with SLN biopsy  3/18-4/28/2016 dose dense Taxol  x 4 cycles,   5/12-6/23/2021 dose dense AC x 4 cycles,     7/18- 8/26/16 adjuvant radiation therapy from 7/18/16- 8/26/16.   She has started on Tamoxifen mid Oct 2016.     Interval History: Ms. Johnson is contacted today for follow up to her stage II right breast cancer and use of tamoxifen.  Patient states she is feeling well and tolerating the tamoxifen but continues with some skin acne or redmarks related to the Tamoxifen- would like to stop if able.   She is aware of her osteoporosis status and she is a very active person and confirms taking her calcium plus vitamin D up to 3 weeks ago when just could not tolerate the large tabs. She continues to travel and walk but does not run any longer. She will be wintering in [place] starting at end of month.   Rest of comprehensive and complete ROS is reviewed and is negative.   Past Medical History:   Diagnosis Date     Basal cell cancer      Breast cancer (H)      Dyslipidemia      History of blood transfusion      Hypertension      Mohs defect of nose 06/2012     Current  "Outpatient Medications   Medication     acetaminophen (TYLENOL) 500 MG tablet     Calcium Carb-Cholecalciferol (CALCIUM 1000 + D) 1000-800 MG-UNIT TABS     Cholecalciferol (VITAMIN D3 PO)     cyanocobalamin (VITAMIN B-12) 1000 MCG tablet     propranolol ER (INDERAL LA) 60 MG 24 hr capsule     simvastatin (ZOCOR) 20 MG tablet     tamoxifen (NOLVADEX) 20 MG tablet     No current facility-administered medications for this visit.     Allergies   Allergen Reactions     Compazine [Prochlorperazine] Other (See Comments)     Jittery and hyper and foggy     Codeine Sulfate Nausea   There is a FHx of ovarian cancer in one of her sisters and of breast cancer in another sister.    Physical Exam:/75 (BP Location: Left arm)   Pulse 60   Temp 97.9  F (36.6  C) (Oral)   Resp 16   Ht 1.632 m (5' 4.25\")   Wt 57.1 kg (125 lb 12.8 oz)   SpO2 98%   BMI 21.42 kg/m      Constitutional: Alert and in no distress. Thin with obvious kyphosis.  ENT: Eyes bright, No mouth sores  Neck: Supple, No adenopathy.  Cardiac: Heart rate and rhythm is regular with normal S1 and S2 without murmur  Respiratory: Breathing easy. Lung sounds clear to auscultation  Breasts:Right breast has lumpectomy scar but no new masses, tenderness or discharge to either breast and no lymphadopathy.  Abdomen: Soft, non-tender, BS normal. No masses or organomegaly  MS: Muscle tone normal, extremities normal with no edema.   Skin: No suspicious lesions or rashes but does have a patch of redness to the face with acne spot.  Neuro: Sensory grossly WNL, gait normal.   Lymph: Normal ant/post cervical, axillary, supraclavicular nodes  Psych: Mentation appears normal and affect normal/bright and smiling      Laboratory/Imaging Results:   Results for orders placed or performed in visit on 10/10/22   Comprehensive metabolic panel     Status: Abnormal   Result Value Ref Range    Sodium 142 133 - 144 mmol/L    Potassium 4.1 3.4 - 5.3 mmol/L    Chloride 111 (H) 94 - 109 " mmol/L    Carbon Dioxide (CO2) 28 20 - 32 mmol/L    Anion Gap 3 3 - 14 mmol/L    Urea Nitrogen 15 7 - 30 mg/dL    Creatinine 0.69 0.52 - 1.04 mg/dL    Calcium 9.3 8.5 - 10.1 mg/dL    Glucose 96 70 - 99 mg/dL    Alkaline Phosphatase 31 (L) 40 - 150 U/L    AST 16 0 - 45 U/L    ALT 16 0 - 50 U/L    Protein Total 6.8 6.8 - 8.8 g/dL    Albumin 4.0 3.4 - 5.0 g/dL    Bilirubin Total 0.7 0.2 - 1.3 mg/dL    GFR Estimate >90 >60 mL/min/1.73m2   CBC with platelets and differential     Status: None   Result Value Ref Range    WBC Count 4.8 4.0 - 11.0 10e3/uL    RBC Count 3.92 3.80 - 5.20 10e6/uL    Hemoglobin 12.0 11.7 - 15.7 g/dL    Hematocrit 36.1 35.0 - 47.0 %    MCV 92 78 - 100 fL    MCH 30.6 26.5 - 33.0 pg    MCHC 33.2 31.5 - 36.5 g/dL    RDW 13.1 10.0 - 15.0 %    Platelet Count 217 150 - 450 10e3/uL    % Neutrophils 56 %    % Lymphocytes 31 %    % Monocytes 10 %    % Eosinophils 2 %    % Basophils 1 %    % Immature Granulocytes 0 %    NRBCs per 100 WBC 0 <1 /100    Absolute Neutrophils 2.7 1.6 - 8.3 10e3/uL    Absolute Lymphocytes 1.5 0.8 - 5.3 10e3/uL    Absolute Monocytes 0.5 0.0 - 1.3 10e3/uL    Absolute Eosinophils 0.1 0.0 - 0.7 10e3/uL    Absolute Basophils 0.0 0.0 - 0.2 10e3/uL    Absolute Immature Granulocytes 0.0 <=0.4 10e3/uL    Absolute NRBCs 0.0 10e3/uL   CBC with platelets differential     Status: None    Narrative    The following orders were created for panel order CBC with platelets differential.  Procedure                               Abnormality         Status                     ---------                               -----------         ------                     CBC with platelets and d...[[id number]]                      Final result                 Please view results for these tests on the individual orders.   Results for orders placed or performed in visit on 10/10/22   MA Screen Bilateral w/Bassem     Status: None    Narrative    EXAMINATION:  MA SCREENING BILATERAL W/ BASSEM, 10/10/2022 9:13  AM    HISTORY/FAMILY HISTORY: No current or new breast symptoms, screening.   History of treated right breast invasive ductal carcinoma with DCIS in  2016.       COMPARISON: 10/5/2021, 8/24/2020, 10/2/2017    TECHNIQUE: Standard mammographic views were performed with  tomosynthesis and synthetic mammogram.  CAD was utilized during the  interpretation.    FINDINGS:  BREAST DENSITY: Extremely dense.  There has been no significant interval change or suspicious findings.   Posttreatment changes in the right breast.      Impression    IMPRESSION: BI-RADS CATEGORY: 2 - Benign.    RECOMMENDED FOLLOW-UP: Annual Mammography.    The results of the examination will be sent to the patient.    MARIN ORTEGA MD         SYSTEM ID:  [id number]     Assessment and Plan:   Stage II Right Breast Cancer-patient continues on with use of Tamoxifen but reports she is ready to quit with noted side effects of hot flashes and skin changes with red patches and acne. Discussed option of getting a Breast Cancer Index completed to see if it is worth moving out to 10 years of use and pt is very interested in this- will get started with the process.   Will plan on seeing her in 1 year with or without the tamoxifen.   Reminded her of need for annual gynecological exam-states she is seeing her PCP this week  Has had  annual eye exam due to possible hastening of cataract development.  She had a negative 3D screening mammogram today- will repeat annually prior to clinic visit.  Osteoporosis with spine fractures both spontaneous and traumatic-8/31/2020 DEXA scan through [facility] showing osteopenia with T score of -1.5 in lumbar, -1.9 in femoral neck but compression deformities in T12.    She has been on annual Reclast infusions in past.  She has been on calcium and vitamin D supplementation but stopped 3 weeks ago- discussed starting a gummy that has the calcium and vitamin D    Due for osteoporosis screening and discussion of continuation of bone  builder- Reclast- with PCP.   Health Maintenance-patient reports she will be receiving her Covid booster along with her flu shot with her primary care visit.  Screening colonoscopy in July 2020 which showed normal findings  The total time of this encounter amounted to 40 minutes. This time included video time spent with the patient, prep work, ordering tests, and performing post visit documentation.  Aide Laguerre,Cnp

## 2022-10-10 NOTE — NURSING NOTE
"Oncology Rooming Note    October 10, 2022 10:40 AM   Seema Johnson is a 66 year old female who presents for:    Chief Complaint   Patient presents with     Oncology Clinic Visit     Follow up     Initial Vitals: /75 (BP Location: Left arm)   Pulse 60   Temp 97.9  F (36.6  C) (Oral)   Resp 16   Ht 1.632 m (5' 4.25\")   Wt 57.1 kg (125 lb 12.8 oz)   SpO2 98%   BMI 21.42 kg/m   Estimated body mass index is 21.42 kg/m  as calculated from the following:    Height as of this encounter: 1.632 m (5' 4.25\").    Weight as of this encounter: 57.1 kg (125 lb 12.8 oz). Body surface area is 1.61 meters squared.  Mild Pain (3) Comment: Data Unavailable   No LMP recorded. Patient is postmenopausal.  Allergies reviewed: Yes  Medications reviewed: Yes    Medications: Medication refills not needed today.  Pharmacy name entered into [facility]:    [facility][id number] - [facility] [address] [facility]/PHARMACY #[id number] [facility] [address] [facility] [id number] - [facility] [address] [address]  [facility] # [id number] - [facility] [address] [facility] [address][id number] [facility] [place] - [address] [facility]    Clinical concerns: would like to discuss discontinue of tamoxifen       Mayte Irby LPN            "

## 2022-10-10 NOTE — PROGRESS NOTES
[facility]:  Cancer Care Note    Received request from Aide Laguerre CNP, to assist with submitting an order for Breast Cancer Index (BCI) testing.  BCI Test Requisition form was faxed to [employer] this afternoon at [phone].  Included a copy of patient's insurance information, face sheet, recent progress notes, and a copy of surgical pathology report along with the faxed order.     Jacek Altamirano, RN, BSN, OCN  RN Care Coordinator - Oncology  [facility]

## 2022-10-12 ENCOUNTER — OFFICE VISIT (OUTPATIENT)
Dept: FAMILY MEDICINE | Facility: CLINIC | Age: 67
End: 2022-10-12
Payer: COMMERCIAL

## 2022-10-12 VITALS
DIASTOLIC BLOOD PRESSURE: 74 MMHG | WEIGHT: 124.6 LBS | RESPIRATION RATE: 16 BRPM | SYSTOLIC BLOOD PRESSURE: 110 MMHG | BODY MASS INDEX: 21.27 KG/M2 | TEMPERATURE: 98.3 F | HEART RATE: 64 BPM | HEIGHT: 64 IN | OXYGEN SATURATION: 99 %

## 2022-10-12 DIAGNOSIS — E78.5 HYPERLIPIDEMIA LDL GOAL <100: ICD-10-CM

## 2022-10-12 DIAGNOSIS — Z00.00 MEDICARE ANNUAL WELLNESS VISIT, SUBSEQUENT: Primary | ICD-10-CM

## 2022-10-12 DIAGNOSIS — H53.9 VISUAL DISTURBANCE: ICD-10-CM

## 2022-10-12 DIAGNOSIS — C50.911 INFILTRATING DUCTAL CARCINOMA OF RIGHT FEMALE BREAST (H): Chronic | ICD-10-CM

## 2022-10-12 DIAGNOSIS — Z23 HIGH PRIORITY FOR 2019-NCOV VACCINE: ICD-10-CM

## 2022-10-12 DIAGNOSIS — K21.9 GASTROESOPHAGEAL REFLUX DISEASE WITHOUT ESOPHAGITIS: ICD-10-CM

## 2022-10-12 DIAGNOSIS — H61.23 BILATERAL IMPACTED CERUMEN: ICD-10-CM

## 2022-10-12 DIAGNOSIS — G25.0 BENIGN ESSENTIAL TREMOR: ICD-10-CM

## 2022-10-12 DIAGNOSIS — Z23 NEED FOR INFLUENZA VACCINATION: ICD-10-CM

## 2022-10-12 LAB
CHOLEST SERPL-MCNC: 156 MG/DL
HDLC SERPL-MCNC: 82 MG/DL
LDLC SERPL CALC-MCNC: 63 MG/DL
NONHDLC SERPL-MCNC: 74 MG/DL
TRIGL SERPL-MCNC: 57 MG/DL

## 2022-10-12 PROCEDURE — 0124A COVID-19,PF,PFIZER BOOSTER BIVALENT: CPT | Performed by: PHYSICIAN ASSISTANT

## 2022-10-12 PROCEDURE — G0439 PPPS, SUBSEQ VISIT: HCPCS | Performed by: PHYSICIAN ASSISTANT

## 2022-10-12 PROCEDURE — 90662 IIV NO PRSV INCREASED AG IM: CPT | Performed by: PHYSICIAN ASSISTANT

## 2022-10-12 PROCEDURE — 91312 COVID-19,PF,PFIZER BOOSTER BIVALENT: CPT | Performed by: PHYSICIAN ASSISTANT

## 2022-10-12 PROCEDURE — G0008 ADMIN INFLUENZA VIRUS VAC: HCPCS | Performed by: PHYSICIAN ASSISTANT

## 2022-10-12 PROCEDURE — 99214 OFFICE O/P EST MOD 30 MIN: CPT | Mod: 25 | Performed by: PHYSICIAN ASSISTANT

## 2022-10-12 PROCEDURE — 69210 REMOVE IMPACTED EAR WAX UNI: CPT | Performed by: PHYSICIAN ASSISTANT

## 2022-10-12 RX ORDER — FAMOTIDINE 20 MG/1
20 TABLET, FILM COATED ORAL 2 TIMES DAILY
Qty: 180 TABLET | Refills: 0 | Status: SHIPPED | OUTPATIENT
Start: 2022-10-12 | End: 2023-04-17

## 2022-10-12 RX ORDER — PROPRANOLOL HCL 60 MG
60 CAPSULE, EXTENDED RELEASE 24HR ORAL DAILY
Qty: 180 CAPSULE | Refills: 1 | Status: SHIPPED | OUTPATIENT
Start: 2022-10-12 | End: 2023-04-19

## 2022-10-12 RX ORDER — TAMOXIFEN CITRATE 20 MG/1
20 TABLET ORAL DAILY
Qty: 90 TABLET | Refills: 3 | Status: CANCELLED | OUTPATIENT
Start: 2022-10-12

## 2022-10-12 RX ORDER — SIMVASTATIN 20 MG
20 TABLET ORAL AT BEDTIME
Qty: 180 TABLET | Refills: 1 | Status: SHIPPED | OUTPATIENT
Start: 2022-10-12 | End: 2023-04-19

## 2022-10-12 NOTE — PROGRESS NOTES
"  Assessment & Plan     Medicare annual wellness visit, subsequent  Reviewed VS and weight/BMI with pt   Immunizations:   updated - see below   Counseling as below   Health screenings/maintenance per orders/comments below  When applicable based on age, personal hx, fam hx, and RF- counseled on appropriate cancer screenings.      - INFLUENZA, QUAD, HIGH DOSE, PF, 65YR + (FLUZONE HD)  - COVID-19,PF,PFIZER BOOSTER BIVALENT 12+Yrs    Benign essential tremor  Refilled propranolol  Has progressed and propranolol not been as helpful  Last consult w/neurology was 2018. She would like to return to discuss other options- leaving for [place] until spring 2023.. will plan a consult at that time- encouraged to schedule now.   - propranolol ER (INDERAL LA) 60 MG 24 hr capsule; Take 1 capsule (60 mg) by mouth daily  - Adult Neurology  Referral; Future    Hyperlipidemia LDL goal <100  Add on lipids to labs drawn 2 days ago w/oncology (was fasting)  Tolerates statin well  Refilled   Continue heart healthy exercise and eating   - simvastatin (ZOCOR) 20 MG tablet; Take 1 tablet (20 mg) by mouth At Bedtime  - Lipid panel reflex to direct LDL Fasting; Future    Visual disturbance  2 episodes of \"sparkling\" in her vision.   Unsure when last eye exam was.   Discussed d/dx- vitreous detachment vs other occular etiology vs TIA vs other.   Advised eye exam  Minimal RF for TIA- suspect less likely. She is on a statin. Nonsmoker, normotensive, avid w/exercise, lean BMI. She had Zio and echo May 2022 (after the sx Jan 2022) as part of cardiology eval. Discussed would need carotid US and brain imaging to complete eval. She wants to hold for now on those tests. She will schedule eye exam    Infiltrating ductal carcinoma of right female breast (H)  Up to date with oncology    Bilateral impacted cerumen  Removed w/lavage  - REMOVE IMPACTED CERUMEN    Gastroesophageal reflux disease without esophagitis  Refilled for prn use. Can use prior to " known trigger foods or later evening meals.  - famotidine (PEPCID) 20 MG tablet; Take 1 tablet (20 mg) by mouth 2 times daily    Need for influenza vaccination  - INFLUENZA, QUAD, HIGH DOSE, PF, 65YR + (FLUZONE HD)    High priority for 2019-nCoV vaccine  - COVID-19,PF,PFIZER BOOSTER BIVALENT 12+Yrs  }     Follow Up: The patient was instructed to contact clinic for worsening symptoms, non-improvement in time frame as expected/discussed, and for questions regarding medications or treatment plan. For virtual visits, the patient was advised to be seen for in person evaluation if symptoms or condition are worsening or non-improvement as expected.       No follow-ups on file.    Shantel Plaza PA-C  [facility] EVANS Stephenson is a 66 year old accompanied by her spouse, presenting for the following health issues:  Imm/Inj (COVID-19 VACCINE)      History of Present Illness       Hyperlipidemia:  She presents for follow up of hyperlipidemia.  She is taking medication to lower cholesterol. She is not having myalgia or other side effects to statin medications.    Reason for visit:  Yearly exam, flu & booster shots, medication approvals    She eats 4 or more servings of fruits and vegetables daily.She consumes 0 sweetened beverage(s) daily.She exercises with enough effort to increase her heart rate 30 to 60 minutes per day.  She exercises with enough effort to increase her heart rate 6 days per week.   She is taking medications regularly.       Annual Wellness Visit    Patient has been advised of split billing requirements and indicates understanding: Yes     Are you in the first 12 months of your Medicare Part B coverage?  No    Physical Health:    In general, how would you rate your overall physical health? good    Outside of work, how many days during the week do you exercise?6-7 days/week    Outside of work, approximately how many minutes a day do you exercise?greater than 60 minutes    If  "you drink alcohol do you typically have >3 drinks per day or >7 drinks per week? No    Do you usually eat at least 4 servings of fruit and vegetables a day, include whole grains & fiber and avoid regularly eating high fat or \"junk\" foods? Yes    Do you have any problems taking medications regularly? YES    Do you have any side effects from medications? not applicable    Needs assistance for the following daily activities: no assistance needed    Which of the following safety concerns are present in your home?  none identified     Hearing impairment: No    In the past 6 months, have you been bothered by leaking of urine? Yes    Spent summer in [place]. Active- walking, stairs. No CP or palpitations.     Spring 2022- chest pain/palpitations.  Saw cardiology had non-diagnostic stress - slow/stoped during test due to back pain not CV sx. Had CTA - non-obstructive right coronary calcium. Zio- mostly NSR and few 20 beat runs of SVT.   Has been better in that regard.   On her statin- was fasting w/labs 2 days ago - can add on.     Breast cancer hx- saw oncology- doing test to see if she can stop tamoxifen. Will know in 3 weeks.   mammogram up to date    Back pain- saw PT in spring. Exercises are helpful.no recommendations for injections.   Doing lidocaine patches.     Essential tremor. On propranolol 60mg. Thinks it is not helping as much. Last saw neurology [facility]  . She thinks she would like to see neuro back again when they are back from [place] spring 2023.    Visual disturbance - almost 1 year ago- \"sparkling\" in vision Jan 2022 and another time while traveling. No loss of vision. Last eye exam- unsure. At time felt off balance perhaps. No changes in speech. Had zio in 05/2022 - no a.fib and echo 05/2022 as part of stress echo.    On a statin. Nonsmoker, normotensive, avid w/exercise, lean BMI.     Mental Health:    In general, how would you rate your overall mental or emotional health? good  PHQ-2 " "Score:      Do you feel safe in your environment? Yes    Have you ever done Advance Care Planning? (For example, a Health Directive, POLST, or a discussion with a medical provider or your loved ones about your wishes)? No, advance care planning information given to patient to review.  Patient plans to discuss their wishes with loved ones or provider.      Fall risk:  Fallen 2 or more times in the past year?: Yes  Any fall with injury in the past year?: No    Cognitive Screenin) Repeat 3 items (Leader, Season, Table)    2) Clock draw: NORMAL  3) 3 item recall: Recalls 3 objects  Results: 3 items recalled: COGNITIVE IMPAIRMENT LESS LIKELY    Mini-CogTM Copyright S Arturo. Licensed by the author for use in [facility] [employer]; reprinted with permission (salomón@[facility]). All rights reserved.      Do you have sleep apnea, excessive snoring or daytime drowsiness?: little bit snoring sometimes    Current providers sharing in care for this patient include:   Patient Care Team:  Shantel Plaza PA-C as PCP - General (Physician Assistant - Medical)  Yo Cortes MD as Shantel Rose PA-C as Assigned PCP  Meghan Haynes MD as MD (Oncology)  Ruth Ann Davis, RN as Specialty Care Coordinator (Oncology)  Aide Laguerre APRN CNP as Assigned Cancer Care Provider  Geno Gutierrez MD as Assigned Heart and Vascular Provider  Milla Mckeon PA-C as Assigned Neuroscience Provider    Patient has been advised of split billing requirements and indicates understanding: Yes    Review of Systems   Constitutional, HEENT, cardiovascular, pulmonary, gi and gu systems are negative, except as otherwise noted.      Objective    /74   Pulse 64   Temp 98.3  F (36.8  C) (Temporal)   Resp 16   Ht 1.632 m (5' 4.25\")   Wt 56.5 kg (124 lb 9.6 oz)   SpO2 99%   BMI 21.22 kg/m    Body mass index is 21.22 kg/m .  Physical Exam   GENERAL: healthy, alert and no distress  EYES: Eyes grossly normal to " inspection, PERRL and conjunctivae and sclerae normal  HENT: normal cephalic/atraumatic, right ear: occluded with wax, left ear: occluded with wax, nose and mouth without ulcers or lesions, oropharynx clear and oral mucous membranes moist  NECK: no adenopathy, no asymmetry, masses, or scars and thyroid normal to palpation  RESP: lungs clear to auscultation - no rales, rhonchi or wheezes  CV: regular rate and rhythm, normal S1 S2, no S3 or S4, no murmur, click or rub, no peripheral edema and peripheral pulses strong  ABDOMEN: soft, nontender, no hepatosplenomegaly, no masses and bowel sounds normal  MS: kyphosis of thoracic spine, lordosis of lumbar spine; no gross musculoskeletal defects noted, no edema  NEURO: resting tremor hands, head noted. Normal strength and tone, mentation intact and speech normal  PSYCH: mentation appears normal, affect normal/bright  LYMPH: normal ant/post cervical, supraclavicular nodes    Component      Latest Ref Rng & Units 10/10/2022   Cholesterol      <200 mg/dL 156   Triglycerides      <150 mg/dL 57   HDL Cholesterol      >=50 mg/dL 82   LDL Cholesterol Calculated      <=100 mg/dL 63   Non HDL Cholesterol      <130 mg/dL 74         No results found for any visits on 10/12/22.

## 2022-10-12 NOTE — PATIENT INSTRUCTIONS
Visual disturbance- eye exam advised  We talked about carotid ultrasound and brain imaging.  You had zio patch and echo with stress testing    Famotidine (pepcid) 20mg twice daily - take if you know you may have foods that trigger or later meal.     Set up Neurology appt for spring.     Preventive Health Recommendations    See your health care provider every year to  Review health changes.   Discuss preventive care.    Review your medicines if your doctor has prescribed any.    You no longer need a yearly Pap test unless you've had an abnormal Pap test in the past 10 years. If you have vaginal symptoms, such as bleeding or discharge, be sure to talk with your provider about a Pap test.    Every 1 to 2 years, have a mammogram.  If you are over 69, talk with your health care provider about whether or not you want to continue having screening mammograms.    Every 10 years, have a colonoscopy. Or, have a yearly FIT test (stool test). These exams will check for colon cancer.     Have a cholesterol test every 5 years, or more often if your doctor advises it.     Have a diabetes test (fasting glucose) every three years. If you are at risk for diabetes, you should have this test more often.     At age 65, have a bone density scan (DEXA) to check for osteoporosis (brittle bone disease).    Shots:  Get a flu shot each year.  Get a tetanus shot every 10 years.  Talk to your doctor about your pneumonia vaccines. There are now two you should receive - Pneumovax (PPSV 23) and Prevnar (PCV 13).  Talk to your pharmacist about the shingles vaccine.  Talk to your doctor about the hepatitis B vaccine.    Nutrition:   Eat at least 5 servings of fruits and vegetables each day.    Eat whole-grain bread, whole-wheat pasta and brown rice instead of white grains and rice.    Get adequate about Calcium and Vitamin D.     Lifestyle  Exercise at least 150 minutes a week (30 minutes a day, 5 days a week). This will help you control your weight  and prevent disease.    Limit alcohol to one drink per day.    No smoking.     Wear sunscreen to prevent skin cancer.     See your dentist twice a year for an exam and cleaning.    See your eye doctor every 1 to 2 years to screen for conditions such as glaucoma, macular degeneration, cataracts, etc.    Personalized Prevention Plan  You are due for the preventive services outlined below.  Your care team is available to assist you in scheduling these services.  If you have already completed any of these items, please share that information with your care team to update in your medical record.    Health Maintenance Due   Topic Date Due    Hepatitis C Screening  Never done    Hepatitis B Vaccine (2 of 3 - 3-dose series) 08/22/2000    COVID-19 Vaccine (4 - Booster for Pfizer series) 07/07/2022    Flu Vaccine (1) 09/01/2022    Cholesterol Lab  10/05/2022    ANNUAL REVIEW OF HM ORDERS  10/06/2022    Annual Wellness Visit  10/06/2022

## 2022-12-05 DIAGNOSIS — R92.30 DENSE BREASTS: ICD-10-CM

## 2022-12-05 DIAGNOSIS — C50.911 INFILTRATING DUCTAL CARCINOMA OF RIGHT FEMALE BREAST (H): Chronic | ICD-10-CM

## 2022-12-05 DIAGNOSIS — Z12.31 VISIT FOR SCREENING MAMMOGRAM: ICD-10-CM

## 2022-12-06 RX ORDER — TAMOXIFEN CITRATE 20 MG/1
TABLET ORAL
Qty: 90 TABLET | Refills: 0 | Status: SHIPPED | OUTPATIENT
Start: 2022-12-06 | End: 2023-02-28

## 2022-12-16 ENCOUNTER — PATIENT OUTREACH (OUTPATIENT)
Dept: ONCOLOGY | Facility: CLINIC | Age: 67
End: 2022-12-16

## 2022-12-30 ENCOUNTER — PATIENT OUTREACH (OUTPATIENT)
Dept: ONCOLOGY | Facility: CLINIC | Age: 67
End: 2022-12-30

## 2022-12-30 NOTE — PROGRESS NOTES
Received Breast Cancer Index test results. Patient would benefit from extended endocrine therapy. Will give report to Aide PARIKH to review.

## 2023-01-02 ENCOUNTER — TELEPHONE (OUTPATIENT)
Dept: ONCOLOGY | Facility: CLINIC | Age: 68
End: 2023-01-02

## 2023-01-17 NOTE — TELEPHONE ENCOUNTER
RECORDS RECEIVED FROM: Internal   REASON FOR VISIT: Benign essential tremor [G25.0]   Date of Appt: 04/17/2023   NOTES (FOR ALL VISITS) STATUS DETAILS   OFFICE NOTE from referring provider Internal 10/12/2022 Dr Plaza [facility]    OFFICE NOTE from other specialist Internal 06/08/2018 Dr Casarez [facility]   05/02/2018 [facility] - Media tab   DISCHARGE SUMMARY from hospital N/A    DISCHARGE REPORT from the ER N/A    OPERATIVE REPORT N/A    MEDICATION LIST N/A    IMAGING  (FOR ALL VISITS)     EMG N/A    EEG N/A    LUMBAR PUNCTURE N/A    GAYLE SCAN N/A    ULTRASOUND (CAROTID BILAT) *VASCULAR* N/A    MRI (HEAD, NECK, SPINE) Internal 10/09/2019 brain    CT (HEAD, NECK, SPINE) N/A

## 2023-02-28 DIAGNOSIS — Z12.31 VISIT FOR SCREENING MAMMOGRAM: ICD-10-CM

## 2023-02-28 DIAGNOSIS — R92.30 DENSE BREASTS: ICD-10-CM

## 2023-02-28 DIAGNOSIS — C50.911 INFILTRATING DUCTAL CARCINOMA OF RIGHT FEMALE BREAST (H): Chronic | ICD-10-CM

## 2023-02-28 NOTE — TELEPHONE ENCOUNTER
[facility] - Refill Request    Situation/Background:                                                      Refill request received for: tamoxifen (NOLVADEX) 20 MG tablet    Date of last refill, if known: 12/6/22, for quantity of 90 tablets    Assessment:                                                      Last office visit: 10/10/22    Future appointment scheduled?  Not yet scheduled (due October 2023)    Recent Lab Results and Vital Signs:    AST   Date Value Ref Range Status   10/10/2022 16 0 - 45 U/L Final     Lab Results   Component Value Date    ALT 16 10/10/2022     CBC RESULTS: Recent Labs   Lab Test 10/10/22  0930   WBC 4.8   RBC 3.92   HGB 12.0   HCT 36.1   MCV 92   MCH 30.6   MCHC 33.2   RDW 13.1         BP Readings from Last 4 Encounters:   10/12/22 110/74   10/10/22 123/75   05/25/22 104/67   05/11/22 101/65     Plan:                                                      Refill request sent to provider for review.    Jacek Altamirano, RN, BSN, OCN  RN Care Coordinator - Oncology  [facility]

## 2023-03-02 RX ORDER — TAMOXIFEN CITRATE 20 MG/1
TABLET ORAL
Qty: 90 TABLET | Refills: 1 | Status: SHIPPED | OUTPATIENT
Start: 2023-03-02 | End: 2023-09-08

## 2023-03-22 NOTE — PROGRESS NOTES
Diagnosis/Summary/Recommendations:    PATIENT: Seema Johnson  67 year old female     : 1955    TRI: 2023    MRN: [id number]    [address]   [facility] [address]     [phone] (M)   [phone] (H)   NONE (W)     [email or web address]    Tyrell Johnson  [phone]    Retired     Seen by Leida 2018  Brain mri 2019 for vertigo      Assessment:  (G25.0) Benign essential tremor  (primary encounter diagnosis)  Mgm with tremor  Mother hand tremor  50s onset   Head tremor  Hand tremor   Right handed  Bilateral hand tremor  Right may be worse than her left h and  No clear if alcohol helps her tremor    Reasons [facility] is Required   Family History of tremor  yes  Uncontrolled shaking  yes  Tremors on action or intention  yes  Difficulty holding items yes - problems with cup and has to use two hands  Frequently spills/drops items  - yes problems pouring  Difficulty eating normally  - has a fancy adaptable device  Difficulty with dressing/daily hygiene needs  - yes buttoning  Difficulty writing  Yes problems  Difficulty using cell phone/computers  Double tapping    Tried and failed therapies   Has been on medications for tremor   Propranolol inderal LA 60mg - 10 years   Rx by a neurologist     Handedness   Right handeded  Which hand is more severe  right  Treatment: RIght, Left or both hands   - bilateral   Where is the tremor worse - close to the mouth or when arms are extended - extended is worse and left is worse than right      Confirm that you do not have any of these contraindications:    An implanted electrical medical device, such as a pacemaker,  defibrillator, heart monitor, insulin pump, bladder stimulator, or  deep brain stimulator or an Insulin pump   NO      Suspected or diagnosed epilepsy or other seizure disorder  no      Pregnancy  - no    Confirm that your tremor is not caused by any of the followings no    Thyroid issues (e.g.,  hyperthyroidism)    Metabolic disorders (e.g., B-12 deficiency)    Size of watch band as measured around your wrist  16 cm - SMALL   Small (13.6-16.4 cm), Medium (16.5-18.4 cm) or large (18.5-20.4 cm)       Review of diagnosis    Essential tremor     Avoidance of dopamine blockers   Not taking    Motor complication review   N/a    Review of Impulse control disorders   N/a    Review of surgical or medication options   reviewed    Gait/Balance/Falls   falls due to not seeing a step - tend to tuck and roll    Exercise/Therapy performed/offered   Exercising     Cognitive/Driving   Retired -  and worked in a green house     [place] background   Studied [place] [employer] [place] started in 3rd grade  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   Nocturia  Wakes up 3 times per night  Eventual gets back to sleep  Wakes up at 7am and feels tired   May nap     Bladder/Renal/Prostate/Gyn/Other  Mixed urinary dysfunction  Has a pad  Nocturia 3/noc  Has urgency and frequency  Has not seen a urologist    GI/Constipation/GERD   Changed her diet and no longer  Has issues with GERD  asymptomatic    ENDO/Lipid/DM/Bone density/Thyroid  Osteoporosis  Calcium with milk in motor, afternoon and evening  Vitamin D3  Lipid disorder  On statin - borderline    Cardio/heart/Hyper or Hypotensive   Has low blood pressure at times - strenuous and bent over  Has not fainted.     Vision/Dry Eyes/Cataracts/Glaucoma/Macular   Wears   Early cataracts    Heme/Anticoagulation/Antiplatelet/Anemia/Other  Vitamin B12 every other day  Aspirin when travels 81mg day      ENT/Resp  Vertigo - associated - no issues lately  Hearing loss - going to get hearing aides    Skin/Cancer/Seborrhea/other  Skin cancer - mohs defect  Squamous cell carcinoma    Musculoskeletal/Pain/Headache  Closed compression fracture L1  Closed compression fracture thoracic spine  Left hip pain    Lumbar spine MRI 4/25/2022  Findings: Exaggerated lumbar lordosis. Presumed 5 lumbar-type  vertebra. Chronic wedge-shaped deformity and vertebral body height  loss at T12 without any bone marrow abnormality. Stepwise trace  retrolisthesis is at L1-2 and L2-3 better seen on prior radiographs.  Multilevel disc height loss and disc degeneration.   The tip of the  conus medullaris is at  L1.  No pars defect identified. Bone marrow  signal intensity is within normal limits and no abnormal signal is  visible.      On a level by level basis:     T12-L1: Disc bulge without spinal canal or foraminal stenosis.     L1-2: No significant spinal canal or foraminal narrowing.     L2-3: Mild facet hypertrophy and associated mild neural foraminal  narrowing without significant spinal canal narrowing.     L3-4: Disc bulge and facet hypertrophy. Bilateral mild neural  foraminal stenosis without spinal canal narrowing.     L4-5: No significant spinal canal or foraminal narrowing.     L5-S1: No significant spinal canal or foraminal narrowing.     Partially visualized presumed benign cyst in the liver segment 6  measuring 1 cm in diameter.                                                                    Impression: Degenerative changes as described above. No high-grade  spinal canal or neural foraminal stenosis at any level.    Other:  Invasive ductal carcinoma right breast 2016       Medications           Acetaminophen tylenol 2      Aspirin 81mg prn      Calc Carb-cholecalciferol 1000-800  1 1    Cholecalciferol Vitamin D3 50mcg 2000 units 1      Cyanocobalamin Vit B12 1000mcg qod      Famotidine pepcid 20mg off      Propranolol  "inderal LA 60mg 24hr    1   Simvastatin zocor 20mg     1   Tamoxifen nolvadex 20mg  1               Plan:    Treating insomnia can be difficult because the medications we use can be harmful, especially in older adults. In addition, sleep medications do not tend to be very effective and should only be used short-term. Cognitive behavioral therapy (CBT) is the most effective treatment for long-term insomnia. The goal of CBT for insomnia is to address the underlying causes of the sleep problems, including your habits and how you think about sleep. You can do CBT with a trained psychologist, or from the comfort of your home by following an online program or workbook. Here are some great resources for at-home CBT:    1) 6-week online CBT course through [facility] for $40: [employer]/sleep  2) \"Say Noel to Insomnia\" by Kimo Noel, PhD at [employer]: 6-week program  3) \"Overcoming Insomnia: A Cognitive-Behavioral Therapy Approach Workbook\" by Margarito Hurtado and Yohana Roberts  4) \"Quiet Your Mind and Get to Sleep: Solutions to Insomnia for Those with Depression, Anxiety or Chronic Pain (New e-contratosinger Self-Help Workbook) by Yohana Roberts and Ester Leon    Urinary issues - would recommend a consultation    Cognitive changes  Neuropsychological evaluation was ordered --- this     Still on tamoxifen - 7 years out but was told may need to take it 10 years based on the type of cancer.     Has hearing loss and will get a hearing aides    Has visual changes and may have early cataracts.      [employer] DBS Education Video  Deep brain stimulation (DBS)     If the above link does not work, cut and paste this address into your browser    [email or web address]    Pharmacy ([facility]) consultation and medication management  Please call the scheduling number I@ [phone] to set up an appointment with pharmacists Evelina Saldivar or " Summer Bustos.     Continue on the beta blocker for now -has some light headedness; bp and heart rate are 110/69 and heart rate 64    Discussed risks of primidone/mysoline which can affect memory and balance    Discussed topiramate (topamax) which can help tremor but carry risk of renal stones    Discussed cervical dystonia/dystonic head tremor - has a bit of tilt and rotation of the head  Consider botox/botulinum toxin injections  Dr Radha Hubbard    ShomoLivet ksenia to be installed on her phone.    Return 6-12 months    Coding statement:   Medical Decision Making:  #  Chronic progressive medical conditions addressed  - see above --   Review and/or interpretation of unique test or documentation from a provider outside of neurology yes   Independent historian provided additional details  no I  Prescription drug management and review of potential side effects and/or monitoring for side effects  -- see above ---  Health impacted by social determinants of health  no    I have reviewed the note as documented above.  This accurately captures the substance of my conversation with the patient and total time spent preparing for visit, executing visit and completing visit on the day of the visit:  60 minutes.  The portion of this total time included face to face time 55 minutes     Pawel Salinas MD     ______________________________________    Last visit date and details:     6/8/2018    Impression:  1.  Essential tremor     Recommendations:  1.  Continue propranolol 20 mg twice daily.  If she continues to have tremor disability on this we will start her on propranolol long-acting 60 mg when she returns from [place].  If she continues to have disability we can gradually increase the dose of propranolol but in the past this was limited by side effect.  2.  If propranolol is not completely effective or not tolerated we would try primidone.  3.  Again if primidone was not completely effective the patient would be a candidate for  deep brain stimulation.  I told her that she should never be disabled or troubled by her essential tremor.    .     The patient's maternal grandmother has tremor.  Her mother developed tremor but only at the age of 92.     The patient began having tremor 15 years ago.  It affected both hands equally.  The tremor has gradually worsened.  She was tried in the past on propranolol at unknown dosage but had to stop it because it caused grogginess.  She says her tremor now affects her more.  It affects her head but not her voice.  Rarely it will affect her legs.  Her tremor is much worse when she is anxious.  She does not notice an alcohol effect.     The tremors notes causing significant disability.  She could barely eat.  She could not use a spoon knife or fork.  She began eating with a big spoon.  Her handwriting is significantly affected.  She could not do fine work with her hands.  She could not put a key in the lock.  It affected everything she tried to do with her hands.  She saw Dr. Plaza and was started on propranolol 20 mg twice a day.  At present she is tolerating this.  This has had great effect on her tremor.  She is able to eat now.  Her handwriting is improved.  In terms of her head tremor this is not bothersome.  She is never noticed that her head is significantly deviated.     Asking questions about parkinsonism she is quite normal.  She has a good sense of smell.  She does not have REM behavior disorder.  She has no micrographia.  Her gait is normal.  She can turn in bed and stand from a chair normally.     She has had some minor numbness of her hands and fingers for years.  She has never had an EMG.  There is no numbness of the feet.     The patient is taking no medications which would cause tremor.  Her TSH on 1/5/2017 was 1.22    Brain MRI scan 10/9/2019  1. No MRI explanation for vertigo.  2. Mild leukoaraiosis.             Olive Garzon    1/17/23  3:56 PM  Note           RECORDS RECEIVED FROM: Internal   REASON FOR VISIT: Benign essential tremor [G25.0]   Date of Appt: 04/17/2023   NOTES (FOR ALL VISITS) STATUS DETAILS   OFFICE NOTE from referring provider Internal 10/12/2022 Dr Plaza [facility]    OFFICE NOTE from other specialist Internal 06/08/2018 Dr Casarez [facility]   05/02/2018 [facility] - Media tab   DISCHARGE SUMMARY from hospital N/A     DISCHARGE REPORT from the ER N/A     OPERATIVE REPORT N/A     MEDICATION LIST N/A     IMAGING  (FOR ALL VISITS)       EMG N/A     EEG N/A     LUMBAR PUNCTURE N/A     GAYLE SCAN N/A     ULTRASOUND (CAROTID BILAT) *VASCULAR* N/A     MRI (HEAD, NECK, SPINE) Internal 10/09/2019 brain    CT (HEAD, NECK, SPINE) N/A                      ______________________________________      Patient was asked about 14 Review of systems including changes in vision (dry eyes, double vision), hearing, heart, lungs, musculoskeletal, depression, anxiety, snoring, RBD, insomnia, urinary frequency, urinary urgency, constipation, swallowing problems, hematological, ID, allergies, skin problems: seborrhea, endocrinological: thyroid, diabetes, cholesterol; balance, weight changes, and other neurological problems and these were not significant at this time except for   Patient Active Problem List   Diagnosis     Mohs defect of cheek     Chronic bilateral thoracic back pain     Closed compression fracture of thoracic vertebra (H)     Closed compression fracture of first lumbar vertebra (H)     Osteoporosis     Invasive ductal carcinoma of breast, female (H)- RIGHT breast, Upper outer quadrant- 2015     Benign essential tremor     Hyperlipidemia LDL goal <100     Mixed urge and stress incontinence     Vertigo     Lateral pain of left hip          Allergies   Allergen Reactions     Compazine [Prochlorperazine] Other (See Comments)     Jittery and hyper and foggy     Codeine Sulfate Nausea     Past Surgical History:   Procedure Laterality Date     BIOPSY BREAST       BREAST  SURGERY Right 01/2016    breast cancer     LUMPECTOMY BREAST       Past Medical History:   Diagnosis Date     Basal cell cancer      Breast cancer (H)      Dyslipidemia      History of blood transfusion      Hypertension      Mohs defect of nose 06/2012     Social History     Socioeconomic History     Marital status:      Spouse name: Not on file     Number of children: Not on file     Years of education: Not on file     Highest education level: Not on file   Occupational History     Not on file   Tobacco Use     Smoking status: Never     Smokeless tobacco: Never   Vaping Use     Vaping Use: Never used   Substance and Sexual Activity     Alcohol use: Yes     Comment: socially     Drug use: No     Sexual activity: Yes     Partners: Male   Other Topics Concern     Parent/sibling w/ CABG, MI or angioplasty before 65F 55M? No   Social History Narrative     Not on file     Social Determinants of Health     Financial Resource Strain: Not on file   Food Insecurity: Not on file   Transportation Needs: Not on file   Physical Activity: Not on file   Stress: Not on file   Social Connections: Not on file   Intimate Partner Violence: Not on file   Housing Stability: Not on file       Drug and lactation database from the [place] [employer] of Medicine:  http://toxnet.nlm.nih.gov/cgi-bin/sis/htmlgen?LACT      B/P: Data Unavailable, T: Data Unavailable, P: Data Unavailable, R: Data Unavailable 0 lbs 0 oz  not currently breastfeeding., There is no height or weight on file to calculate BMI.  Medications and Vitals not listed above were documented in the cart and reviewed by me.     Current Outpatient Medications   Medication Sig Dispense Refill     acetaminophen (TYLENOL) 500 MG tablet Take 500-1,000 mg by mouth once 1000mg once a day.       Calcium Carb-Cholecalciferol (CALCIUM 1000 + D) 1000-800 MG-UNIT TABS        Cholecalciferol (VITAMIN D3 PO) Take 3,000 Units by mouth       cyanocobalamin (VITAMIN B-12) 1000  MCG tablet Take by mouth daily       famotidine (PEPCID) 20 MG tablet Take 1 tablet (20 mg) by mouth 2 times daily 180 tablet 0     propranolol ER (INDERAL LA) 60 MG 24 hr capsule Take 1 capsule (60 mg) by mouth daily 180 capsule 1     simvastatin (ZOCOR) 20 MG tablet Take 1 tablet (20 mg) by mouth At Bedtime 180 tablet 1     tamoxifen (NOLVADEX) 20 MG tablet TAKE 1 TABLET ORALLY ONCE A DAY 90 tablet 1         Olive White MD     3:56 PM  Note          RECORDS RECEIVED FROM: Internal   REASON FOR VISIT: Benign essential tremor [G25.0]   Date of Appt: 04/17/2023   NOTES (FOR ALL VISITS) STATUS DETAILS   OFFICE NOTE from referring provider Internal 10/12/2022 Dr Plaza [facility]    OFFICE NOTE from other specialist Internal 06/08/2018 Dr Casarez [facility]   05/02/2018 [facility] Media tab   DISCHARGE SUMMARY from hospital N/A     DISCHARGE REPORT from the ER N/A     OPERATIVE REPORT N/A     MEDICATION LIST N/A     IMAGING  (FOR ALL VISITS)       EMG N/A     EEG N/A     LUMBAR PUNCTURE N/A     GAYLE SCAN N/A     ULTRASOUND (CAROTID BILAT) *VASCULAR* N/A     MRI (HEAD, NECK, SPINE) Internal 10/09/2019 brain    CT (HEAD, NECK, SPINE) N/A

## 2023-04-17 ENCOUNTER — PRE VISIT (OUTPATIENT)
Dept: NEUROLOGY | Facility: CLINIC | Age: 68
End: 2023-04-17

## 2023-04-17 ENCOUNTER — OFFICE VISIT (OUTPATIENT)
Dept: NEUROLOGY | Facility: CLINIC | Age: 68
End: 2023-04-17
Attending: PHYSICIAN ASSISTANT
Payer: COMMERCIAL

## 2023-04-17 VITALS
HEART RATE: 64 BPM | BODY MASS INDEX: 21.46 KG/M2 | WEIGHT: 126 LBS | SYSTOLIC BLOOD PRESSURE: 110 MMHG | DIASTOLIC BLOOD PRESSURE: 68 MMHG

## 2023-04-17 DIAGNOSIS — G24.1 DYSTONIA, TORSION, FRAGMENTS OF: ICD-10-CM

## 2023-04-17 DIAGNOSIS — R39.198 URINARY DYSFUNCTION: ICD-10-CM

## 2023-04-17 DIAGNOSIS — F09 COGNITIVE DISORDER: ICD-10-CM

## 2023-04-17 DIAGNOSIS — G25.0 BENIGN ESSENTIAL TREMOR: Primary | ICD-10-CM

## 2023-04-17 PROCEDURE — 99205 OFFICE O/P NEW HI 60 MIN: CPT | Performed by: PSYCHIATRY & NEUROLOGY

## 2023-04-17 RX ORDER — ACETAMINOPHEN 160 MG
2000 TABLET,DISINTEGRATING ORAL DAILY
COMMUNITY
Start: 2023-04-17

## 2023-04-17 RX ORDER — ACETAMINOPHEN 500 MG
1000 TABLET ORAL EVERY MORNING
Qty: 2 TABLET | Refills: 0 | COMMUNITY
Start: 2023-04-17

## 2023-04-17 RX ORDER — LANOLIN ALCOHOL/MO/W.PET/CERES
1000 CREAM (GRAM) TOPICAL EVERY OTHER DAY
COMMUNITY
Start: 2023-04-17

## 2023-04-17 NOTE — PATIENT INSTRUCTIONS
Assessment:  (G25.0) Benign essential tremor  (primary encounter diagnosis)  Mgm with tremor  Mother hand tremor  50s onset   Head tremor  Hand tremor   Right handed  Bilateral hand tremor  Right may be worse than her left h and  No clear if alcohol helps her tremor    Reasons [facility] is Required   Family History of tremor  yes  Uncontrolled shaking  yes  Tremors on action or intention  yes  Difficulty holding items yes - problems with cup and has to use two hands  Frequently spills/drops items  - yes problems pouring  Difficulty eating normally  - has a fancy adaptable device  Difficulty with dressing/daily hygiene needs  - yes buttoning  Difficulty writing  Yes problems  Difficulty using cell phone/computers  Double tapping    Tried and failed therapies   Has been on medications for tremor   Propranolol inderal LA 60mg - 10 years   Rx by a neurologist     Handedness   Right handeded  Which hand is more severe  right  Treatment: RIght, Left or both hands   - bilateral   Where is the tremor worse - close to the mouth or when arms are extended - extended is worse and left is worse than right      Confirm that you do not have any of these contraindications:   An implanted electrical medical device, such as a pacemaker,  defibrillator, heart monitor, insulin pump, bladder stimulator, or  deep brain stimulator or an Insulin pump   NO     Suspected or diagnosed epilepsy or other seizure disorder  no     Pregnancy  - no    Confirm that your tremor is not caused by any of the followings no   Thyroid issues (e.g., hyperthyroidism)   Metabolic disorders (e.g., B-12 deficiency)    Size of watch band as measured around your wrist  16 cm - SMALL   Small (13.6-16.4 cm), Medium (16.5-18.4 cm) or large (18.5-20.4 cm)       Review of diagnosis    Essential tremor     Avoidance of dopamine blockers   Not taking    Motor complication review   N/a    Review of Impulse control disorders   N/a    Review of surgical or  medication options   reviewed    Gait/Balance/Falls   falls due to not seeing a step - tend to tuck and roll    Exercise/Therapy performed/offered   Exercising     Cognitive/Driving   Retired -  and worked in a green house     [place] background   Studied [place] [employer] started in 3rd grade  [facility] in [place]  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   Nocturia  Wakes up 3 times per night  Eventual gets back to sleep  Wakes up at 7am and feels tired   May nap     Bladder/Renal/Prostate/Gyn/Other  Mixed urinary dysfunction  Has a pad  Nocturia 3/noc  Has urgency and frequency  Has not seen a urologist    GI/Constipation/GERD   Changed her diet and no longer  Has issues with GERD  asymptomatic    ENDO/Lipid/DM/Bone density/Thyroid  Osteoporosis  Calcium with milk in motor, afternoon and evening  Vitamin D3  Lipid disorder  On statin - borderline    Cardio/heart/Hyper or Hypotensive   Has low blood pressure at times - strenuous and bent over  Has not fainted.     Vision/Dry Eyes/Cataracts/Glaucoma/Macular   Wears   Early cataracts    Heme/Anticoagulation/Antiplatelet/Anemia/Other  Vitamin B12 every other day  Aspirin when travels 81mg day     ENT/Resp  Vertigo - associated - no issues lately  Hearing loss - going to get hearing aides    Skin/Cancer/Seborrhea/other  Skin cancer - mohs defect  Squamous cell carcinoma    Musculoskeletal/Pain/Headache  Closed compression fracture L1  Closed compression fracture thoracic spine  Left hip pain    Lumbar spine MRI 4/25/2022  Findings: Exaggerated lumbar lordosis. Presumed 5  lumbar-type  vertebra. Chronic wedge-shaped deformity and vertebral body height  loss at T12 without any bone marrow abnormality. Stepwise trace  retrolisthesis is at L1-2 and L2-3 better seen on prior radiographs.  Multilevel disc height loss and disc degeneration.   The tip of the  conus medullaris is at  L1.  No pars defect identified. Bone marrow  signal intensity is within normal limits and no abnormal signal is  visible.      On a level by level basis:     T12-L1: Disc bulge without spinal canal or foraminal stenosis.     L1-2: No significant spinal canal or foraminal narrowing.     L2-3: Mild facet hypertrophy and associated mild neural foraminal  narrowing without significant spinal canal narrowing.     L3-4: Disc bulge and facet hypertrophy. Bilateral mild neural  foraminal stenosis without spinal canal narrowing.     L4-5: No significant spinal canal or foraminal narrowing.     L5-S1: No significant spinal canal or foraminal narrowing.     Partially visualized presumed benign cyst in the liver segment 6  measuring 1 cm in diameter.                                                                    Impression: Degenerative changes as described above. No high-grade  spinal canal or neural foraminal stenosis at any level.    Other:  Invasive ductal carcinoma right breast 2016       Medications           Acetaminophen tylenol 2      Aspirin 81mg prn      Calc Carb-cholecalciferol 1000-800  1 1    Cholecalciferol Vitamin D3 50mcg 2000 units 1      Cyanocobalamin Vit B12 1000mcg qod      Famotidine pepcid 20mg off      Propranolol inderal LA 60mg 24hr    1   Simvastatin zocor 20mg     1   Tamoxifen nolvadex 20mg  1               Plan:    Treating insomnia can be difficult because the medications we use can be harmful, especially in older adults. In addition, sleep medications do not tend to be very effective and should only be used short-term. Cognitive behavioral therapy (CBT) is the most effective treatment  "for long-term insomnia. The goal of CBT for insomnia is to address the underlying causes of the sleep problems, including your habits and how you think about sleep. You can do CBT with a trained psychologist, or from the comfort of your home by following an online program or workbook. Here are some great resources for at-home CBT:    1) 6-week online CBT course through [facility] for $40: Need/sleep  2) \"Say Noel to Insomnia\" by Kimo Noel, PhD at [employer]: 6-week program  3) \"Overcoming Insomnia: A Cognitive-Behavioral Therapy Approach Workbook\" by Margarito Hurtado and Yohana Roberts  4) \"Quiet Your Mind and Get to Sleep: Solutions to Insomnia for Those with Depression, Anxiety or Chronic Pain ([place] Self-Help Workbook) by Yohana Roberts and Ester Leon    Urinary issues - would recommend a consultation    Cognitive changes  Neuropsychological evaluation was ordered --- this     Still on tamoxifen - 7 years out but was told may need to take it 10 years based on the type of cancer.     Has hearing loss and will get a hearing aides    Has visual changes and may have early cataracts.     [employer] DBS Education Video  Deep brain stimulation (DBS)     If the above link does not work, cut and paste this address into your browser    [email or web address]    Pharmacy (MT[facility]) consultation and medication management  Please call the scheduling number I@ [phone] to set up an appointment with pharmacists Evelina Saldivar or Summer Bustos.     Continue on the beta blocker for now -has some light headedness; bp and heart rate are 110/69 and heart rate 64    Discussed risks of primidone/mysoline which can affect memory and balance    Discussed topiramate (topamax) which can help tremor but carry risk of renal stones    Discussed cervical dystonia/dystonic head tremor - has a bit of tilt and rotation of " the head  Consider botox/botulinum toxin injections  Dr Garcia Standal    mychart ksenia to be installed on her phone.    Return 6-12 months

## 2023-04-17 NOTE — LETTER
2023         RE: Seema Johnson  [address]  [facility] [address]        Dear Colleague,    Thank you for referring your patient, Seema Johnson, to the [facility] [place]. Please see a copy of my visit note below.        Diagnosis/Summary/Recommendations:    PATIENT: Seema Johnson  67 year old female     : 1955    TRI: 2023    MRN: [id number]    [address]   [facility] [address]     [phone] (M)   [phone] (H)   NONE (W)     [email or web address]    Tyrell Johnson  [phone]    Retired     Seen by Leida 2018  Brain mri 2019 for vertigo      Assessment:  (G25.0) Benign essential tremor  (primary encounter diagnosis)  Mgm with tremor  Mother hand tremor  50s onset   Head tremor  Hand tremor   Right handed  Bilateral hand tremor  Right may be worse than her left h and  No clear if alcohol helps her tremor    Reasons [facility] Therapy is Required   Family History of tremor  yes  Uncontrolled shaking  yes  Tremors on action or intention  yes  Difficulty holding items yes - problems with cup and has to use two hands  Frequently spills/drops items  - yes problems pouring  Difficulty eating normally  - has a fancy adaptable device  Difficulty with dressing/daily hygiene needs  - yes buttoning  Difficulty writing  Yes problems  Difficulty using cell phone/computers  Double tapping    Tried and failed therapies   Has been on medications for tremor   Propranolol inderal LA 60mg - 10 years   Rx by a neurologist     Handedness   Right handeded  Which hand is more severe  right  Treatment: RIght, Left or both hands   - bilateral   Where is the tremor worse - close to the mouth or when arms are extended - extended is worse and left is worse than right      Confirm that you do not have any of these contraindications:    An implanted electrical medical device, such as a pacemaker,  defibrillator, heart monitor, insulin  pump, bladder stimulator, or  deep brain stimulator or an Insulin pump   NO      Suspected or diagnosed epilepsy or other seizure disorder  no      Pregnancy  - no    Confirm that your tremor is not caused by any of the followings no    Thyroid issues (e.g., hyperthyroidism)    Metabolic disorders (e.g., B-12 deficiency)    Size of watch band as measured around your wrist  16 cm - SMALL   Small (13.6-16.4 cm), Medium (16.5-18.4 cm) or large (18.5-20.4 cm)       Review of diagnosis    Essential tremor     Avoidance of dopamine blockers   Not taking    Motor complication review   N/a    Review of Impulse control disorders   N/a    Review of surgical or medication options   reviewed    Gait/Balance/Falls   falls due to not seeing a step - tend to tuck and roll    Exercise/Therapy performed/offered   Exercising     Cognitive/Driving   Retired -  and worked in a green house     [place] background   Studied [place] started in 3rd grade  [employer] in [place]  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 for winter     Hallucinations/delusions   denies    Sleep   Goes to bed around 10pm   Has some problems falling asleep - deep breathing.   Nocturia  Wakes up 3 times per night  Eventual gets back to sleep  Wakes up at 7am and feels tired   May nap     Bladder/Renal/Prostate/Gyn/Other  Mixed urinary dysfunction  Has a pad  Nocturia 3/noc  Has urgency and frequency  Has not seen a urologist    GI/Constipation/GERD   Changed her diet and no longer  Has issues with GERD  asymptomatic    ENDO/Lipid/DM/Bone density/Thyroid  Osteoporosis  Calcium with milk in motor, afternoon and evening  Vitamin D3  Lipid disorder  On statin - borderline    Cardio/heart/Hyper or Hypotensive    Has low blood pressure at times - strenuous and bent over  Has not fainted.     Vision/Dry Eyes/Cataracts/Glaucoma/Macular   Wears   Early cataracts    Heme/Anticoagulation/Antiplatelet/Anemia/Other  Vitamin B12 every other day  Aspirin when travels 81mg day     ENT/Resp  Vertigo - associated - no issues lately  Hearing loss - going to get hearing aides    Skin/Cancer/Seborrhea/other  Skin cancer - mohs defect  Squamous cell carcinoma    Musculoskeletal/Pain/Headache  Closed compression fracture L1  Closed compression fracture thoracic spine  Left hip pain    Lumbar spine MRI 4/25/2022  Findings: Exaggerated lumbar lordosis. Presumed 5 lumbar-type  vertebra. Chronic wedge-shaped deformity and vertebral body height  loss at T12 without any bone marrow abnormality. Stepwise trace  retrolisthesis is at L1-2 and L2-3 better seen on prior radiographs.  Multilevel disc height loss and disc degeneration.   The tip of the  conus medullaris is at  L1.  No pars defect identified. Bone marrow  signal intensity is within normal limits and no abnormal signal is  visible.      On a level by level basis:     T12-L1: Disc bulge without spinal canal or foraminal stenosis.     L1-2: No significant spinal canal or foraminal narrowing.     L2-3: Mild facet hypertrophy and associated mild neural foraminal  narrowing without significant spinal canal narrowing.     L3-4: Disc bulge and facet hypertrophy. Bilateral mild neural  foraminal stenosis without spinal canal narrowing.     L4-5: No significant spinal canal or foraminal narrowing.     L5-S1: No significant spinal canal or foraminal narrowing.     Partially visualized presumed benign cyst in the liver segment 6  measuring 1 cm in diameter.                                                                    Impression: Degenerative changes as described above. No high-grade  spinal canal or neural foraminal stenosis at any level.    Other:  Invasive ductal carcinoma right breast 2016  "      Medications           Acetaminophen tylenol 2      Aspirin 81mg prn      Calc Carb-cholecalciferol 1000-800  1 1    Cholecalciferol Vitamin D3 50mcg 2000 units 1      Cyanocobalamin Vit B12 1000mcg qod      Famotidine pepcid 20mg off      Propranolol inderal LA 60mg 24hr    1   Simvastatin zocor 20mg     1   Tamoxifen nolvadex 20mg  1               Plan:    Treating insomnia can be difficult because the medications we use can be harmful, especially in older adults. In addition, sleep medications do not tend to be very effective and should only be used short-term. Cognitive behavioral therapy (CBT) is the most effective treatment for long-term insomnia. The goal of CBT for insomnia is to address the underlying causes of the sleep problems, including your habits and how you think about sleep. You can do CBT with a trained psychologist, or from the comfort of your home by following an online program or workbook. Here are some great resources for at-home CBT:    1) 6-week online CBT course through [facility] for $40: [employer]/sleep  2) \"Say Noel to Insomnia\" by Kimo Noel, PhD at [employer]: 6-week program  3) \"Overcoming Insomnia: A Cognitive-Behavioral Therapy Approach Workbook\" by Margarito Hurtado and Yohana Roberts  4) \"Quiet Your Mind and Get to Sleep: Solutions to Insomnia for Those with Depression, Anxiety or Chronic Pain ([place] Self-Help Workbook) by Yohana Roberts and Ester Leon    Urinary issues - would recommend a consultation    Cognitive changes  Neuropsychological evaluation was ordered --- this     Still on tamoxifen - 7 years out but was told may need to take it 10 years based on the type of cancer.     Has hearing loss and will get a hearing aides    Has visual changes and may have early cataracts.     [facility] DBS Education Video  Deep brain stimulation (DBS)     If the above link does not work, cut and paste this address into " your browser    [email or web address]    Pharmacy ([facility]) consultation and medication management  Please call the scheduling number I@ [phone] to set up an appointment with pharmacists Evelina Saldivar or Summer Bustos.     Continue on the beta blocker for now -has some light headedness; bp and heart rate are 110/69 and heart rate 64    Discussed risks of primidone/mysoline which can affect memory and balance    Discussed topiramate (topamax) which can help tremor but carry risk of renal stones    Discussed cervical dystonia/dystonic head tremor - has a bit of tilt and rotation of the head  Consider botox/botulinum toxin injections  Dr Radha Hubbard    [email or web address] ksenia to be installed on her phone.    Return 6-12 months    Coding statement:   Medical Decision Making:  #  Chronic progressive medical conditions addressed  - see above --   Review and/or interpretation of unique test or documentation from a provider outside of neurology yes   Independent historian provided additional details  no I  Prescription drug management and review of potential side effects and/or monitoring for side effects  -- see above ---  Health impacted by social determinants of health  no    I have reviewed the note as documented above.  This accurately captures the substance of my conversation with the patient and total time spent preparing for visit, executing visit and completing visit on the day of the visit:  60 minutes.  The portion of this total time included face to face time 55 minutes     Pawel Salinas MD     ______________________________________    Last visit date and details:     6/8/2018    Impression:  1.  Essential tremor     Recommendations:  1.  Continue propranolol 20 mg twice daily.  If she continues to have tremor disability on this we will start her on propranolol long-acting 60 mg when she returns from [place].  If she continues to have disability we can gradually  increase the dose of propranolol but in the past this was limited by side effect.  2.  If propranolol is not completely effective or not tolerated we would try primidone.  3.  Again if primidone was not completely effective the patient would be a candidate for deep brain stimulation.  I told her that she should never be disabled or troubled by her essential tremor.    .     The patient's maternal grandmother has tremor.  Her mother developed tremor but only at the age of 92.     The patient began having tremor 15 years ago.  It affected both hands equally.  The tremor has gradually worsened.  She was tried in the past on propranolol at unknown dosage but had to stop it because it caused grogginess.  She says her tremor now affects her more.  It affects her head but not her voice.  Rarely it will affect her legs.  Her tremor is much worse when she is anxious.  She does not notice an alcohol effect.     The tremors notes causing significant disability.  She could barely eat.  She could not use a spoon knife or fork.  She began eating with a big spoon.  Her handwriting is significantly affected.  She could not do fine work with her hands.  She could not put a key in the lock.  It affected everything she tried to do with her hands.  She saw Dr. Plaza and was started on propranolol 20 mg twice a day.  At present she is tolerating this.  This has had great effect on her tremor.  She is able to eat now.  Her handwriting is improved.  In terms of her head tremor this is not bothersome.  She is never noticed that her head is significantly deviated.     Asking questions about parkinsonism she is quite normal.  She has a good sense of smell.  She does not have REM behavior disorder.  She has no micrographia.  Her gait is normal.  She can turn in bed and stand from a chair normally.     She has had some minor numbness of her hands and fingers for years.  She has never had an EMG.  There is no numbness of the  feet.     The patient is taking no medications which would cause tremor.  Her TSH on 1/5/2017 was 1.22    Brain MRI scan 10/9/2019  1. No MRI explanation for vertigo.  2. Mild leukoaraiosis.             Olive Garzon     SHAHID    1/17/23  3:56 PM  Note          RECORDS RECEIVED FROM: Internal   REASON FOR VISIT: Benign essential tremor [G25.0]   Date of Appt: 04/17/2023   NOTES (FOR ALL VISITS) STATUS DETAILS   OFFICE NOTE from referring provider Internal 10/12/2022 Dr Plaza [facility]    OFFICE NOTE from other specialist Internal 06/08/2018 Dr Casarez [facility]   05/02/2018 [facility] - Media tab   DISCHARGE SUMMARY from hospital N/A     DISCHARGE REPORT from the ER N/A     OPERATIVE REPORT N/A     MEDICATION LIST N/A     IMAGING  (FOR ALL VISITS)       EMG N/A     EEG N/A     LUMBAR PUNCTURE N/A     GAYLE SCAN N/A     ULTRASOUND (CAROTID BILAT) *VASCULAR* N/A     MRI (HEAD, NECK, SPINE) Internal 10/09/2019 brain    CT (HEAD, NECK, SPINE) N/A                      ______________________________________      Patient was asked about 14 Review of systems including changes in vision (dry eyes, double vision), hearing, heart, lungs, musculoskeletal, depression, anxiety, snoring, RBD, insomnia, urinary frequency, urinary urgency, constipation, swallowing problems, hematological, ID, allergies, skin problems: seborrhea, endocrinological: thyroid, diabetes, cholesterol; balance, weight changes, and other neurological problems and these were not significant at this time except for   Patient Active Problem List   Diagnosis     Mohs defect of cheek     Chronic bilateral thoracic back pain     Closed compression fracture of thoracic vertebra (H)     Closed compression fracture of first lumbar vertebra (H)     Osteoporosis     Invasive ductal carcinoma of breast, female (H)- RIGHT breast, Upper outer quadrant- 2015     Benign essential tremor     Hyperlipidemia LDL goal <100     Mixed urge and stress incontinence     Vertigo     Lateral  pain of left hip          Allergies   Allergen Reactions     Compazine [Prochlorperazine] Other (See Comments)     Jittery and hyper and foggy     Codeine Sulfate Nausea     Past Surgical History:   Procedure Laterality Date     BIOPSY BREAST       BREAST SURGERY Right 01/2016    breast cancer     LUMPECTOMY BREAST       Past Medical History:   Diagnosis Date     Basal cell cancer      Breast cancer (H)      Dyslipidemia      History of blood transfusion      Hypertension      Mohs defect of nose 06/2012     Social History     Socioeconomic History     Marital status:      Spouse name: Not on file     Number of children: Not on file     Years of education: Not on file     Highest education level: Not on file   Occupational History     Not on file   Tobacco Use     Smoking status: Never     Smokeless tobacco: Never   Vaping Use     Vaping Use: Never used   Substance and Sexual Activity     Alcohol use: Yes     Comment: socially     Drug use: No     Sexual activity: Yes     Partners: Male   Other Topics Concern     Parent/sibling w/ CABG, MI or angioplasty before 65F 55M? No   Social History Narrative     Not on file     Social Determinants of Health     Financial Resource Strain: Not on file   Food Insecurity: Not on file   Transportation Needs: Not on file   Physical Activity: Not on file   Stress: Not on file   Social Connections: Not on file   Intimate Partner Violence: Not on file   Housing Stability: Not on file       Drug and lactation database from the [place] [employer] of Medicine:  http://toxnet.nlm.nih.gov/cgi-bin/sis/htmlgen?LACT      B/P: Data Unavailable, T: Data Unavailable, P: Data Unavailable, R: Data Unavailable 0 lbs 0 oz  not currently breastfeeding., There is no height or weight on file to calculate BMI.  Medications and Vitals not listed above were documented in the cart and reviewed by me.     Current Outpatient Medications   Medication Sig Dispense Refill     acetaminophen  (TYLENOL) 500 MG tablet Take 500-1,000 mg by mouth once 1000mg once a day.       Calcium Carb-Cholecalciferol (CALCIUM 1000 + D) 1000-800 MG-UNIT TABS        Cholecalciferol (VITAMIN D3 PO) Take 3,000 Units by mouth       cyanocobalamin (VITAMIN B-12) 1000 MCG tablet Take by mouth daily       famotidine (PEPCID) 20 MG tablet Take 1 tablet (20 mg) by mouth 2 times daily 180 tablet 0     propranolol ER (INDERAL LA) 60 MG 24 hr capsule Take 1 capsule (60 mg) by mouth daily 180 capsule 1     simvastatin (ZOCOR) 20 MG tablet Take 1 tablet (20 mg) by mouth At Bedtime 180 tablet 1     tamoxifen (NOLVADEX) 20 MG tablet TAKE 1 TABLET ORALLY ONCE A DAY 90 tablet 1         Olive White MD          3:56 PM  Note          RECORDS RECEIVED FROM: Internal   REASON FOR VISIT: Benign essential tremor [G25.0]   Date of Appt: 04/17/2023   NOTES (FOR ALL VISITS) STATUS DETAILS   OFFICE NOTE from referring provider Internal 10/12/2022 Dr Plaza [facility]    OFFICE NOTE from other specialist Internal 06/08/2018 Dr Casarez [facility]   05/02/2018 [facility] - Media tab   DISCHARGE SUMMARY from hospital N/A     DISCHARGE REPORT from the ER N/A     OPERATIVE REPORT N/A     MEDICATION LIST N/A     IMAGING  (FOR ALL VISITS)       EMG N/A     EEG N/A     LUMBAR PUNCTURE N/A     GAYLE SCAN N/A     ULTRASOUND (CAROTID BILAT) *VASCULAR* N/A     MRI (HEAD, NECK, SPINE) Internal 10/09/2019 brain    CT (HEAD, NECK, SPINE) N/A                  Again, thank you for allowing me to participate in the care of your patient.        Sincerely,        Pawel Salinas MD

## 2023-04-19 ENCOUNTER — OFFICE VISIT (OUTPATIENT)
Dept: FAMILY MEDICINE | Facility: CLINIC | Age: 68
End: 2023-04-19
Payer: COMMERCIAL

## 2023-04-19 VITALS
BODY MASS INDEX: 22.23 KG/M2 | HEIGHT: 64 IN | SYSTOLIC BLOOD PRESSURE: 119 MMHG | DIASTOLIC BLOOD PRESSURE: 72 MMHG | HEART RATE: 54 BPM | TEMPERATURE: 97.9 F | WEIGHT: 130.2 LBS | OXYGEN SATURATION: 99 %

## 2023-04-19 DIAGNOSIS — M79.621 AXILLARY PAIN, RIGHT: ICD-10-CM

## 2023-04-19 DIAGNOSIS — L60.0 INGROWN TOENAIL: Primary | ICD-10-CM

## 2023-04-19 DIAGNOSIS — N39.46 MIXED INCONTINENCE URGE AND STRESS (MALE)(FEMALE): ICD-10-CM

## 2023-04-19 DIAGNOSIS — C50.911 INFILTRATING DUCTAL CARCINOMA OF RIGHT FEMALE BREAST (H): Chronic | ICD-10-CM

## 2023-04-19 DIAGNOSIS — H53.9 VISION CHANGES: ICD-10-CM

## 2023-04-19 DIAGNOSIS — G25.0 BENIGN ESSENTIAL TREMOR: ICD-10-CM

## 2023-04-19 DIAGNOSIS — M79.89 OTHER SPECIFIED SOFT TISSUE DISORDERS: ICD-10-CM

## 2023-04-19 DIAGNOSIS — H91.93 DECREASED HEARING OF BOTH EARS: ICD-10-CM

## 2023-04-19 DIAGNOSIS — E78.5 HYPERLIPIDEMIA LDL GOAL <100: ICD-10-CM

## 2023-04-19 PROCEDURE — 99214 OFFICE O/P EST MOD 30 MIN: CPT | Performed by: PHYSICIAN ASSISTANT

## 2023-04-19 RX ORDER — CEPHALEXIN 500 MG/1
500 CAPSULE ORAL 3 TIMES DAILY
Qty: 21 CAPSULE | Refills: 0 | Status: SHIPPED | OUTPATIENT
Start: 2023-04-19 | End: 2023-04-26

## 2023-04-19 RX ORDER — PROPRANOLOL HCL 60 MG
60 CAPSULE, EXTENDED RELEASE 24HR ORAL DAILY
Qty: 180 CAPSULE | Refills: 1 | Status: CANCELLED | OUTPATIENT
Start: 2023-04-19

## 2023-04-19 RX ORDER — PROPRANOLOL HCL 60 MG
60 CAPSULE, EXTENDED RELEASE 24HR ORAL DAILY
Qty: 180 CAPSULE | Refills: 1 | Status: SHIPPED | OUTPATIENT
Start: 2023-04-19 | End: 2023-10-02

## 2023-04-19 RX ORDER — TAMOXIFEN CITRATE 20 MG/1
TABLET ORAL
Qty: 90 TABLET | Refills: 1 | Status: CANCELLED | OUTPATIENT
Start: 2023-04-19

## 2023-04-19 RX ORDER — SIMVASTATIN 20 MG
20 TABLET ORAL AT BEDTIME
Qty: 180 TABLET | Refills: 1 | Status: SHIPPED | OUTPATIENT
Start: 2023-04-19 | End: 2024-04-24

## 2023-04-19 ASSESSMENT — PAIN SCALES - GENERAL: PAINLEVEL: NO PAIN (0)

## 2023-04-19 NOTE — PROGRESS NOTES
Assessment & Plan     Ingrown toenail  Soak foot, if able to lift corner of nail with gauze will try  Start abx  Ref to podiatry   - Orthopedic  Referral; Future  - cephALEXin (KEFLEX) 500 MG capsule; Take 1 capsule (500 mg) by mouth 3 times daily    Hyperlipidemia LDL goal <100  Stable, sx well controlled, tolerates medication(s) without side effects. Refilled x 1yr  - simvastatin (ZOCOR) 20 MG tablet; Take 1 tablet (20 mg) by mouth At Bedtime    Axillary pain, right  Past few weeks pain along scar from lymph node dissection.   Plan for US and right diagnostic mammo.  Last mammo 10-  - US Axillary Right; Future    Other specified soft tissue disorders  Infiltrating ductal carcinoma of right female breast (H)  As above. Continue with oncology   - MA Diagnostic Digital Right; Future    Benign essential tremor  Continuing with neurology.   Refilled consult notes.  She is considering her options and wants to continue on propranolol for now. Refilling   - propranolol ER (INDERAL LA) 60 MG 24 hr capsule; Take 1 capsule (60 mg) by mouth daily    Mixed incontinence urge and stress (male)(female)  Discussed sx- both stress and urge. Urge sx are greater.   Discussed medications like vesicare, oxybutynin- with potential side effects. She wants to hold off for now. Is scheduled with urology.     Decreased hearing of both ears  Ref for evaluation for hearing aids  - Adult Audiology  Referral; Future    Vision changes  - Adult Eye  Referral; Future     Follow Up: The patient was instructed to contact clinic for worsening symptoms, non-improvement in time frame as expected/discussed, and for questions regarding medications or treatment plan. For virtual visits, the patient was advised to be seen for in person evaluation if symptoms or condition are worsening or non-improvement as expected.       Shantel Plaza PA-C  [facility] EVANS Stephenson is a 67 year old,  "presenting for the following health issues:  Recheck Medication        4/19/2023     9:10 AM   Additional Questions   Roomed by Jocelyne CALVIN   Accompanied by       History of Present Illness       Reason for visit:  Medication check-up    She eats 4 or more servings of fruits and vegetables daily.She consumes 0 sweetened beverage(s) daily.She exercises with enough effort to increase her heart rate 60 or more minutes per day.  She exercises with enough effort to increase her heart rate 6 days per week.   She is taking medications regularly.     \"Yearly refills\"     No injuries, illnesses while traveling. Did have a fall that was due to tripping on speed bump she didn't see.     Medication Followup of All meds    Taking Medication as prescribed: yes    Side Effects:  Lightheaded- prednisone     Medication Helping Symptoms:  Propanolol is not working as well as hoped      Saw neurologist-  for essential tremor  Continue propranolol for now.   Planning for neuropsych testing - she is worried about cognitive decline. I have a thought and its gone. Forgetful. Inability to keep on train of thought.  Worse since our last visit. Not anxious but maybe more down with mood, not feeling as hopeful about the world. Moments/times where not wanting to get out of bed- 2x per month. Is enjoying life the way she normally does. Does not think needs a medication.   Nutrition - pretty good. Cravings for potato chips.   Walking daily 3-6 miles while in [place] . Wintered for 6 months. No palpitations, CP or trouble with exercise or exertion.     With neurologist talked about frequent urination at night and urge incontinence.   Wearing pad. Sneezing releasing urine. Sudden urges. Urge incontinence is worse than stress incontinence. Seeing urology but not until fall 2023. No UTI sx.     Traveling frequently.     Axillary pain along scar where lymph nodes excised with her breast cancer.       Concern - Ingrown toenail - fell out " "with chemotherapy years ago.   Onset: 2 weeks   Description: ingrowth toenail - big toes on the RIGHT foot. Not draining . Not red/hot.   Intensity: moderate  Progression of Symptoms:  worsening  Accompanying Signs & Symptoms: red, pain 4-5/10  Previous history of similar problem: yes  Precipitating factors:        Worsened by: none  Alleviating factors:        Improved by: none  Therapies tried and outcome: None        1/5/2017     1:31 PM 3/22/2018     2:51 PM 10/3/2019     7:44 AM   PHQ   PHQ-9 Total Score 3 0 3   Q9: Thoughts of better off dead/self-harm past 2 weeks Not at all Not at all Not at all           Review of Systems   Constitutional, HEENT, cardiovascular, pulmonary, gi and gu systems are negative, except as otherwise noted.      Objective    /72 (BP Location: Left arm, Patient Position: Chair, Cuff Size: Adult Regular)   Pulse 54   Temp 97.9  F (36.6  C) (Temporal)   Ht 1.62 m (5' 3.78\")   Wt 59.1 kg (130 lb 3.2 oz)   LMP  (Exact Date)   SpO2 99%   Breastfeeding No   BMI 22.50 kg/m    Body mass index is 22.5 kg/m .  Physical Exam   GENERAL: healthy, alert and no distress  EYES: Eyes grossly normal to inspection, PERRL and conjunctivae and sclerae normal  HENT: ear canals and TM's normal, nose and mouth without ulcers or lesions  NECK: no adenopathy, no asymmetry, masses, or scars and thyroid normal to palpation  RESP: lungs clear to auscultation - no rales, rhonchi or wheezes  CV: regular rate and rhythm, normal S1 S2, no S3 or S4, no murmur, click or rub, no peripheral edema and peripheral pulses strong  Breast: right axillary- tender w/palpation along scar, no mass is palpable, no redness or rash. Limited breast exam done- no masses or tenderness.   ABDOMEN: soft, nontender, no hepatosplenomegaly, no masses and bowel sounds normal  MS: no gross musculoskeletal defects noted, no edema  PSYCH: mentation appears normal, affect normal/bright  LYMPH: normal ant/post cervical, " supraclavicular nodes

## 2023-04-20 NOTE — PROGRESS NOTES
Seema Johnson is a 61 year old female who presents with concerns of plugged ears.    NURSING ASSESSMENT:  Description:  Left ear feels plugged, present for a couple weeks now. Patient thought she had wax buildup. Upon examination, fluid in ear, no wax. Patient denies pain, drainage, cough, sore throat, fever, headache, sinus pain/pressure, sob, chest pain.   Onset/duration:  2 weeks  Precip. factors:  n/a  Associated symptoms:  See above  Improves/worsens symptoms:  n/a  Pain scale (0-10)   0/10  LMP/preg/breast feeding:  n/a  Last exam/Treatment:  1/2017  Allergies:   Allergies   Allergen Reactions     Compazine [Prochlorperazine]      Codeine Sulfate Nausea       MEDICATIONS:   Taking medication(s) as prescribed? N/A  Taking over the counter medication(s?) N/A  Any medication side effects? Not Applicable    Any barriers to taking medication(s) as prescribed?  N/A  Medication(s) improving/managing symptoms?  N/A  Medication reconciliation completed: N/A      NURSING PLAN: Nursing advice to patient home cares per protocol-decongestant recommended. f/u in clinic if no improvement by next week or if new/worsening symptoms occur.    RECOMMENDED DISPOSITION:  Home care advice - see nursing plan.   Will comply with recommendation: Yes  If further questions/concerns or if symptoms do not improve, worsen or new symptoms develop, call your PCP or [facility] as soon as possible.      Guideline used:  Telephone Triage Protocols for Nurses, Fourth Edition, Ebonie Ramos RN    
[Consultation] : a consultation visit

## 2023-04-25 ENCOUNTER — OFFICE VISIT (OUTPATIENT)
Dept: PODIATRY | Facility: CLINIC | Age: 68
End: 2023-04-25
Attending: PHYSICIAN ASSISTANT
Payer: COMMERCIAL

## 2023-04-25 VITALS — BODY MASS INDEX: 21.78 KG/M2 | DIASTOLIC BLOOD PRESSURE: 70 MMHG | WEIGHT: 126 LBS | SYSTOLIC BLOOD PRESSURE: 118 MMHG

## 2023-04-25 DIAGNOSIS — L60.0 INGROWN TOENAIL: ICD-10-CM

## 2023-04-25 DIAGNOSIS — M79.671 RIGHT FOOT PAIN: Primary | ICD-10-CM

## 2023-04-25 PROCEDURE — 11730 AVULSION NAIL PLATE SIMPLE 1: CPT | Mod: T5 | Performed by: PODIATRIST

## 2023-04-25 PROCEDURE — 99203 OFFICE O/P NEW LOW 30 MIN: CPT | Mod: 25 | Performed by: PODIATRIST

## 2023-04-25 NOTE — LETTER
4/25/2023         RE: Seema Johnson  [address]  [facility] [address]        Dear Colleague,    Thank you for referring your patient, Seema Johnson, to the [facility]. Please see a copy of my visit note below.    PATIENT HISTORY:  Shantel Plaza PA-C requested I see this patient for their foot issue.  Seema Johnson is a 67 year old female who presents to clinic for ingrown nail.  Patient notes that she is trying to cut the side of her nail for years.  It started after her chemotherapy and her nail grew back that it will cut into the side of the toe.  She will get a shooting pain with pressure.  Can be 4 out of 10 at its worst.  She has tremors now it is hard for her to cut it back like she used to.  Denies fever, nausea, vomiting.  Wondering about removal of the side of the nail.    Review of Systems:  Patient denies fever, chills, rash, wound, stiffness, limping, numbness, weakness, heart burn, blood in stool, chest pain with activity, calf pain when walking, shortness of breath with activity, chronic cough, easy bleeding/bruising, swelling of ankles, excessive thirst, fatigue, depression, anxiety.      PAST MEDICAL HISTORY:   Past Medical History:   Diagnosis Date     Basal cell cancer      Breast cancer (H)      Dyslipidemia      History of blood transfusion      Hypertension      Mohs defect of nose 06/2012        PAST SURGICAL HISTORY:   Past Surgical History:   Procedure Laterality Date     BIOPSY BREAST       BREAST SURGERY Right 01/2016    breast cancer     LUMPECTOMY BREAST          MEDICATIONS:   Current Outpatient Medications:      acetaminophen (TYLENOL) 500 MG tablet, 1000mg once a day in morning, Disp: 2 tablet, Rfl: 0     aspirin (ASA) 81 MG EC tablet, Take 1 tablet (81 mg) by mouth daily as needed for moderate pain, Disp: , Rfl:      Calcium Carb-Cholecalciferol (CALCIUM 1000 + D) 1000-20 MG-MCG TABS, Afternoon and evening, Disp: , Rfl:       cephALEXin (KEFLEX) 500 MG capsule, Take 1 capsule (500 mg) by mouth 3 times daily, Disp: 21 capsule, Rfl: 0     Cholecalciferol (VITAMIN D3) 50 MCG (2000 UT) CAPS, Take 2,000 Units by mouth daily, Disp: , Rfl:      cyanocobalamin (VITAMIN B-12) 1000 MCG tablet, Take 1 tablet (1,000 mcg) by mouth every other day, Disp: , Rfl:      propranolol ER (INDERAL LA) 60 MG 24 hr capsule, Take 1 capsule (60 mg) by mouth daily, Disp: 180 capsule, Rfl: 1     simvastatin (ZOCOR) 20 MG tablet, Take 1 tablet (20 mg) by mouth At Bedtime, Disp: 180 tablet, Rfl: 1     tamoxifen (NOLVADEX) 20 MG tablet, TAKE 1 TABLET ORALLY ONCE A DAY, Disp: 90 tablet, Rfl: 1     ALLERGIES:    Allergies   Allergen Reactions     Compazine [Prochlorperazine] Other (See Comments)     Jittery and hyper and foggy     Codeine Sulfate Nausea        SOCIAL HISTORY:   Social History     Socioeconomic History     Marital status:      Spouse name: Not on file     Number of children: Not on file     Years of education: Not on file     Highest education level: Not on file   Occupational History     Not on file   Tobacco Use     Smoking status: Never     Smokeless tobacco: Never   Vaping Use     Vaping status: Never Used     Passive vaping exposure: Yes   Substance and Sexual Activity     Alcohol use: Yes     Comment: socially     Drug use: No     Sexual activity: Yes     Partners: Male   Other Topics Concern     Parent/sibling w/ CABG, MI or angioplasty before 65F 55M? No   Social History Narrative     Not on file     Social Determinants of Health     Financial Resource Strain: Not on file   Food Insecurity: Not on file   Transportation Needs: Not on file   Physical Activity: Not on file   Stress: Not on file   Social Connections: Not on file   Intimate Partner Violence: Not on file   Housing Stability: Not on file        FAMILY HISTORY:   Family History   Problem Relation Age of Onset     Cerebrovascular Disease Mother      Hypertension Mother      Thyroid  Disease Mother      Other - See Comments Mother         ?tremor     Tremor Mother         hand tremor     Cerebrovascular Disease Father      Hypothyroidism Father      Breast Cancer Sister      Other Cancer Sister      Anesthesia Reaction Sister      Diabetes Sister      Other - See Comments Brother      Other - See Comments Brother      Other - See Comments Brother      Other - See Comments Brother      Diabetes Maternal Grandmother      Coronary Artery Disease Maternal Grandmother      Tremor Maternal Grandmother      Colon Cancer Paternal Grandmother      Diabetes Son      Other - See Comments Son         lives in [place]     Coronary Artery Disease No family hx of      Hyperlipidemia No family hx of      Colon Cancer No family hx of      Prostate Cancer No family hx of      Depression No family hx of      Substance Abuse No family hx of      Obesity No family hx of      Osteoporosis No family hx of      Anesthesia Reaction No family hx of      Asthma No family hx of      Mental Illness No family hx of      Anxiety Disorder No family hx of      Other Cancer No family hx of         EXAM:Vitals: /70   Wt 57.2 kg (126 lb)   BMI 21.78 kg/m      General appearance: Patient is alert and fully cooperative with history & exam.  No sign of distress is noted during the visit.     Psychiatric: Affect is pleasant & appropriate.  Patient appears motivated to improve health.     Respiratory: Breathing is regular & unlabored while sitting.     HEENT: Hearing is intact to spoken word.  Speech is clear.  No gross evidence of visual impairment that would impact ambulation.     Dermatologic: Medial border of the right great toenail is incurvated.  Pain on palpation.  No redness or signs acute infection or pain     Vascular: DP & PT pulses are intact & regular bilaterally.  No significant edema or varicosities noted.  CFT and skin temperature is normal to both lower extremities.     Neurologic: Lower extremity sensation  is intact to light touch.  No evidence of weakness or contracture in the lower extremities.  No evidence of neuropathy.     Musculoskeletal: Patient is ambulatory without assistive device or brace.  No gross ankle deformity noted.  No foot or ankle joint effusion is noted.    ASSESSMENT:    Ingrown toenail  Right foot pain     Medical Decision Making/Plan:  Reviewed patient's chart in [facility]. The potential causes and nature of an ingrown toenail were discussed with the patient.  We reviewed the natural history/prognosis of the condition and potential risks if no treatment is provided.      Treatment options discussed included conservative management (oral antibiotics, soaking of foot, adequate width shoes)  as well as surgical management (partial or total nail removal).  The pros and cons of both forms of treatment were reviewed.      After thorough discussion and answering all questions, the patient elected to have the border removed.  She was supposed to have a 2 weeks and apply antibiotic cream and bandage.  All questions were answered to patient's she will call further questions or concerns..     Procedure: After verbal consent, the right big toe was anesthetized with 5cc's of 1% lidocaine plain. A tourniquet was applied to the toe. The medial border was then raised from the nail bed and then cut the length of the nail.  The offending nail border was then removed.    Bacitracin was applied to the nail bed.  The tourniquet was removed.  Bandage was applied to the toe.  The patient tolerated the procedure and anesthesia well.    Patient risk factor: Patient is at low risk for infection.        Bisi Duong DPM, Podiatry/Foot and Ankle Surgery        Again, thank you for allowing me to participate in the care of your patient.        Sincerely,        Bisi Duong DPM, Podiatry/Foot and Ankle Surgery

## 2023-04-25 NOTE — PATIENT INSTRUCTIONS
Thank you for choosing [facility] Podiat[facility] / Foot & Ankle Surgery!    DR POSEY'S CLINIC:  [facility]   [address]   [place] [address]      TRIAGE LINE: [phone]  APPOINTMENTS: [phone]  RADIOLOGY: [phone]  SET UP SURGERY: [phone]  PHYSICAL THERAPY: [phone]   FAX NUMBER: [phone]  BILLING QUESTIONS: [phone]       Follow up: As needed      INGROWN TOENAILS  When a toenail is ingrown, it is curved and grows into the skin, usually at the nail borders (the sides of the nail). This  digging in  of the nail irritates the skin, often creating pain, redness, swelling, and warmth in the toe.  If an ingrown nail causes a break in the skin, bacteria may enter and cause an infection in the area, which is often marked by drainage and a foul odor. However, even if the toe isn t painful, red, swollen, or warm, a nail that curves downward into the skin can progress to an infection.  CAUSES:  Heredity: In many people, the tendency for ingrown toenails is inherited.   Trauma: Sometimes an ingrown toenail is the result of trauma, such as stubbing your toe, having an object fall on your toe, or engaging in activities that involve repeated pressure on the toes, such as kicking or running.   Improper Trimming:  The most common cause of ingrown toenails is cutting your nails too short. This encourages the skin next to the nail to fold over the nail.   Improperly Sized Footwear: Ingrown toenails can result from wearing socks and shoes that are tight or short.   Nail Conditions: Ingrown toenails can be caused by nail problems, such as fungal infections or losing a nail due to trauma.   TREATMENT: Sometimes initial treatment for ingrown toenails can be safely performed at home. However, home treatment is strongly discouraged if an infection is suspected, or for those who have medical conditions that put feet at high risk, such as diabetes, nerve damage in the foot, or poor  circulation.  Home care: If you don t have an infection or any of the above medical conditions, you can soak your foot in room-temperature water (adding Epsom s salt may be recommended by your doctor), and gently massage the side of the nail fold to help reduce the inflammation.  Avoid attempting  bathroom surgery.  Repeated cutting of the nail can cause the condition to worsen over time. If your symptoms fail to improve, it s time to see a foot and ankle surgeon.  Physician care: After examining the toe, the foot and ankle surgeon will select the treatment best suited for you. If an infection is present, an oral antibiotic may be prescribed.  Sometimes a minor surgical procedure, often performed in the office, will ease the pain and remove the offending nail. After applying a local anesthetic, the doctor removes part of the nail s side border. Some nails may become ingrown again, requiring removal of the nail root.  Following the nail procedure, a light bandage will be applied. Most people experience very little pain after surgery and may resume normal activity the next day. If your surgeon has prescribed an oral antibiotic, be sure to take all the medication, even if your symptoms have improved.  PREVENTION:  Proper Trimming: Cut toenails in a fairly straight line, and don t cut them too short. You should be able to get your fingernail under the sides and end of the nail.   Well-fitting Footwear: Don t wear shoes that are short or tight in the toe area. Avoid shoes that are loose, because they too cause pressure on the toes, especially when running or walking briskly.     INGROWN TOENAIL REMOVAL AFTERCARE   Go directly home and elevate the affected foot on one or two pillows for the remainder of the day/evening if possible. Your toe may stay numb anywhere from 2-8 hours.   Take Tylenol, ibuprofen or another anti-inflammatory as needed for pain.   Take antibiotic if that has been prescribed. Finish the entire  prescribed antibiotic even if your symptoms have improved.   The evening of the procedure, soak/wash the affected area in warm water (you may add Epsom salt) for 5 to 10 minutes. Do this twice a day for 2-4 weeks (6-8 weeks if you had phenol) (you may count showering/bathing as one soak).  After soaks, pat the area dry and then allow to airdry for a few minutes. Apply antibiotic ointment to the area and cover with 2 X 2 gauze and paper tape or band-aid.  You may pursue everyday activities as tolerated with either an open toe shoe or cut-out shoe as needed or you may wear regular shoes if no pain is noted.  Watch for any signs and symptoms of infection such as: redness, red streaks going up the foot/leg, swelling, pus or foul odor. Those that have had the phenol procedure, the toe will drain longer and will look like it is infected because it is a chemical burn.   Please call with questions.

## 2023-04-25 NOTE — PROGRESS NOTES
PATIENT HISTORY:  Shantel Plaza PA-C requested I see this patient for their foot issue.  Seema Johnson is a 67 year old female who presents to clinic for ingrown nail.  Patient notes that she is trying to cut the side of her nail for years.  It started after her chemotherapy and her nail grew back that it will cut into the side of the toe.  She will get a shooting pain with pressure.  Can be 4 out of 10 at its worst.  She has tremors now it is hard for her to cut it back like she used to.  Denies fever, nausea, vomiting.  Wondering about removal of the side of the nail.    Review of Systems:  Patient denies fever, chills, rash, wound, stiffness, limping, numbness, weakness, heart burn, blood in stool, chest pain with activity, calf pain when walking, shortness of breath with activity, chronic cough, easy bleeding/bruising, swelling of ankles, excessive thirst, fatigue, depression, anxiety.      PAST MEDICAL HISTORY:   Past Medical History:   Diagnosis Date     Basal cell cancer      Breast cancer (H)      Dyslipidemia      History of blood transfusion      Hypertension      Mohs defect of nose 06/2012        PAST SURGICAL HISTORY:   Past Surgical History:   Procedure Laterality Date     BIOPSY BREAST       BREAST SURGERY Right 01/2016    breast cancer     LUMPECTOMY BREAST          MEDICATIONS:   Current Outpatient Medications:      acetaminophen (TYLENOL) 500 MG tablet, 1000mg once a day in morning, Disp: 2 tablet, Rfl: 0     aspirin (ASA) 81 MG EC tablet, Take 1 tablet (81 mg) by mouth daily as needed for moderate pain, Disp: , Rfl:      Calcium Carb-Cholecalciferol (CALCIUM 1000 + D) 1000-20 MG-MCG TABS, Afternoon and evening, Disp: , Rfl:      cephALEXin (KEFLEX) 500 MG capsule, Take 1 capsule (500 mg) by mouth 3 times daily, Disp: 21 capsule, Rfl: 0     Cholecalciferol (VITAMIN D3) 50 MCG (2000 UT) CAPS, Take 2,000 Units by mouth daily, Disp: , Rfl:      cyanocobalamin (VITAMIN B-12) 1000 MCG tablet, Take  1 tablet (1,000 mcg) by mouth every other day, Disp: , Rfl:      propranolol ER (INDERAL LA) 60 MG 24 hr capsule, Take 1 capsule (60 mg) by mouth daily, Disp: 180 capsule, Rfl: 1     simvastatin (ZOCOR) 20 MG tablet, Take 1 tablet (20 mg) by mouth At Bedtime, Disp: 180 tablet, Rfl: 1     tamoxifen (NOLVADEX) 20 MG tablet, TAKE 1 TABLET ORALLY ONCE A DAY, Disp: 90 tablet, Rfl: 1     ALLERGIES:    Allergies   Allergen Reactions     Compazine [Prochlorperazine] Other (See Comments)     Jittery and hyper and foggy     Codeine Sulfate Nausea        SOCIAL HISTORY:   Social History     Socioeconomic History     Marital status:      Spouse name: Not on file     Number of children: Not on file     Years of education: Not on file     Highest education level: Not on file   Occupational History     Not on file   Tobacco Use     Smoking status: Never     Smokeless tobacco: Never   Vaping Use     Vaping status: Never Used     Passive vaping exposure: Yes   Substance and Sexual Activity     Alcohol use: Yes     Comment: socially     Drug use: No     Sexual activity: Yes     Partners: Male   Other Topics Concern     Parent/sibling w/ CABG, MI or angioplasty before 65F 55M? No   Social History Narrative     Not on file     Social Determinants of Health     Financial Resource Strain: Not on file   Food Insecurity: Not on file   Transportation Needs: Not on file   Physical Activity: Not on file   Stress: Not on file   Social Connections: Not on file   Intimate Partner Violence: Not on file   Housing Stability: Not on file        FAMILY HISTORY:   Family History   Problem Relation Age of Onset     Cerebrovascular Disease Mother      Hypertension Mother      Thyroid Disease Mother      Other - See Comments Mother         ?tremor     Tremor Mother         hand tremor     Cerebrovascular Disease Father      Hypothyroidism Father      Breast Cancer Sister      Other Cancer Sister      Anesthesia Reaction Sister      Diabetes Sister       Other - See Comments Brother      Other - See Comments Brother      Other - See Comments Brother      Other - See Comments Brother      Diabetes Maternal Grandmother      Coronary Artery Disease Maternal Grandmother      Tremor Maternal Grandmother      Colon Cancer Paternal Grandmother      Diabetes Son      Other - See Comments Son         lives in [place]     Coronary Artery Disease No family hx of      Hyperlipidemia No family hx of      Colon Cancer No family hx of      Prostate Cancer No family hx of      Depression No family hx of      Substance Abuse No family hx of      Obesity No family hx of      Osteoporosis No family hx of      Anesthesia Reaction No family hx of      Asthma No family hx of      Mental Illness No family hx of      Anxiety Disorder No family hx of      Other Cancer No family hx of         EXAM:Vitals: /70   Wt 57.2 kg (126 lb)   BMI 21.78 kg/m      General appearance: Patient is alert and fully cooperative with history & exam.  No sign of distress is noted during the visit.     Psychiatric: Affect is pleasant & appropriate.  Patient appears motivated to improve health.     Respiratory: Breathing is regular & unlabored while sitting.     HEENT: Hearing is intact to spoken word.  Speech is clear.  No gross evidence of visual impairment that would impact ambulation.     Dermatologic: Medial border of the right great toenail is incurvated.  Pain on palpation.  No redness or signs acute infection or pain     Vascular: DP & PT pulses are intact & regular bilaterally.  No significant edema or varicosities noted.  CFT and skin temperature is normal to both lower extremities.     Neurologic: Lower extremity sensation is intact to light touch.  No evidence of weakness or contracture in the lower extremities.  No evidence of neuropathy.     Musculoskeletal: Patient is ambulatory without assistive device or brace.  No gross ankle deformity noted.  No foot or ankle joint effusion is  noted.    ASSESSMENT:    Ingrown toenail  Right foot pain     Medical Decision Making/Plan:  Reviewed patient's chart in [facility]. The potential causes and nature of an ingrown toenail were discussed with the patient.  We reviewed the natural history/prognosis of the condition and potential risks if no treatment is provided.      Treatment options discussed included conservative management (oral antibiotics, soaking of foot, adequate width shoes)  as well as surgical management (partial or total nail removal).  The pros and cons of both forms of treatment were reviewed.      After thorough discussion and answering all questions, the patient elected to have the border removed.  She was supposed to have a 2 weeks and apply antibiotic cream and bandage.  All questions were answered to patient's she will call further questions or concerns..     Procedure: After verbal consent, the right big toe was anesthetized with 5cc's of 1% lidocaine plain. A tourniquet was applied to the toe. The medial border was then raised from the nail bed and then cut the length of the nail.  The offending nail border was then removed.    Bacitracin was applied to the nail bed.  The tourniquet was removed.  Bandage was applied to the toe.  The patient tolerated the procedure and anesthesia well.    Patient risk factor: Patient is at low risk for infection.        Bisi Duong DPM, Podiatry/Foot and Ankle Surgery

## 2023-04-26 ENCOUNTER — VIRTUAL VISIT (OUTPATIENT)
Dept: PHARMACY | Facility: CLINIC | Age: 68
End: 2023-04-26
Payer: COMMERCIAL

## 2023-04-26 ENCOUNTER — TELEPHONE (OUTPATIENT)
Dept: NEUROLOGY | Facility: CLINIC | Age: 68
End: 2023-04-26
Payer: COMMERCIAL

## 2023-04-26 DIAGNOSIS — K21.9 GASTROESOPHAGEAL REFLUX DISEASE WITHOUT ESOPHAGITIS: ICD-10-CM

## 2023-04-26 DIAGNOSIS — G25.0 BENIGN ESSENTIAL TREMOR: Primary | ICD-10-CM

## 2023-04-26 DIAGNOSIS — E78.5 HYPERLIPIDEMIA LDL GOAL <100: ICD-10-CM

## 2023-04-26 DIAGNOSIS — R52 PAIN: ICD-10-CM

## 2023-04-26 DIAGNOSIS — K59.00 CONSTIPATION, UNSPECIFIED CONSTIPATION TYPE: ICD-10-CM

## 2023-04-26 DIAGNOSIS — C50.911 INFILTRATING DUCTAL CARCINOMA OF RIGHT FEMALE BREAST (H): Chronic | ICD-10-CM

## 2023-04-26 DIAGNOSIS — Z78.9 TAKES DIETARY SUPPLEMENTS: ICD-10-CM

## 2023-04-26 PROCEDURE — 99605 MTMS BY PHARM NP 15 MIN: CPT | Mod: VID | Performed by: PHARMACIST

## 2023-04-26 PROCEDURE — 99607 MTMS BY PHARM ADDL 15 MIN: CPT | Mod: VID | Performed by: PHARMACIST

## 2023-04-26 RX ORDER — CALCIUM/D3/MAG OX/COP/MANG/ZN 300 MG-20
1 TABLET ORAL 2 TIMES DAILY
COMMUNITY

## 2023-04-26 RX ORDER — PRIMIDONE 50 MG/1
TABLET ORAL
Qty: 30 TABLET | Refills: 11 | Status: SHIPPED | OUTPATIENT
Start: 2023-04-26 | End: 2023-05-01 | Stop reason: SINTOL

## 2023-04-26 RX ORDER — POLYETHYLENE GLYCOL 3350 17 G/17G
1 POWDER, FOR SOLUTION ORAL EVERY OTHER DAY
COMMUNITY

## 2023-04-26 RX ORDER — FAMOTIDINE 20 MG/1
20 TABLET, FILM COATED ORAL
COMMUNITY
End: 2023-10-02

## 2023-04-26 NOTE — PROGRESS NOTES
Medication Therapy Management (MTM) Encounter    ASSESSMENT:                            Medication Adherence/Access: No issues identified    Essential tremor: Patient has not been responding well to propranolol and may benefit from trying a different medication for tremor. She will likely not tolerate a higher dose of propranolol due to low blood pressure and low heart rate. Another first line therapy is primidone and we discussed risks/benefits and a slow titration. Alternatively, could consider topiramate or gabapentin if needed. In the future, may consider weaning off propranolol.     Breast cancer:  Stable     Hyperlipidemia: stable     Constipation: stable     GERD: stable       Pain: stable     Vitamins/Supplements: stable     PLAN:                            1. Start primidone for your tremor. We have ordered 50 mg tablets and I'd like you start with a half-tablet (25 mg) at bedtime for the first 1-2 weeks. If this is going well without side effects, you may increase to a full tablet (50 mg) before our next appointment.    2. Side effects to monitor for include: drowsiness, dizziness, cognitive fogginess, balance issues, and mood changes. We discussed that you may have these side effects the first day but they may improve the second day so try at least 2 days on the medication.     3. Continue the propranolol 60 mg every evening for now. We may try going off of this after you return from [place].     Follow-up: 5/16/23 at 9 am video visit    SUBJECTIVE/OBJECTIVE:                          Seema Johnson is a 67 year old female contacted via secure video for an initial visit. She was referred to me from Dr. Salinas.      Reason for visit: medication review; discuss treatments for tremor    Allergies/ADRs: Reviewed in chart  Past Medical History: Reviewed in chart  Tobacco: She reports that she has never smoked. She has never used smokeless tobacco.  Alcohol: trying to cut back; was drinking a glass of wine most  evenings but drinking alcohol less than daily now  Social: trip to [place] planned 5/7-5/14    Medication Adherence/Access: no issues reported    Essential tremor: Taking propranolol ER 60 mg (at night). States she has been on it for several years (at least 5 years). Started at a lower dose and increased over time. She states it initially seemed to help but her tremor has worsened with time. She has a lot more tremor now. She is especially bothered by the tremor when she is trying to text, drink coffee, pour liquids. Tremor is exacerbated by stress. She reports her resting pulse rounds around 62 per her smart watch. She does feel light-headed at times. Had one bout of depression in the past. Last winter she felt like she was getting more depressed, despite living in [place] for the winter. She feels that she can manage these mood changes.     BP Readings from Last 1 Encounters:   04/25/23 118/70     Pulse Readings from Last 1 Encounters:   04/19/23 54     BP Readings from Last 3 Encounters:   04/25/23 118/70   04/19/23 119/72   04/17/23 110/68     Breast cancer:  Taking tamoxifen 20 mg once daily. States her oncologist would like her to stay on the medication for a while longer given results from her biopsy. Patient states she is sensitive to the sun while on tamoxifen. She gets sun spots easily despite wearing sunscreen and hats all the time. She and her  spend time in [place] in the winter and do a lot of walking.    Hyperlipidemia: Current therapy includes simvastatin 20 mg daily.  Patient reports no significant myalgias or other side effects. She takes aspirin 81 mg when flying only.   The 10-year ASCVD risk score ([place] DK, et al., 2019) is: 6.1%    Values used to calculate the score:      Age: 67 years      Sex: Female      Is Non- : No      Diabetic: No      Tobacco smoker: No      Systolic Blood Pressure: 118 mmHg      Is BP treated: No      HDL Cholesterol: 82 mg/dL       Total Cholesterol: 156 mg/dL  Recent Labs   Lab Test 10/10/22  0930 10/05/21  1057   CHOL 156 150   HDL 82 69   LDL 63 67   TRIG 57 69     Constipation: Reports she is taking Miralax about every other day which has been helping     GERD: Taking famotidine periodically as needed; states it works very well, within 10 minutes. She is trying to avoid eating late at night and this helps avoid GERD symptoms.      Pain: Taking Tylenol 1000mg daily; no concerns reported today    Vitamins/Supplements:     Calcium 600 mg daily- has recently switched from chewables to Caltrate minis. States she drinks milk every morning and has yogurt or cottage cheese every day. Her oncologist suggested she take calcium    Vitamin D3 50 mcg daily     Vitamin B12 1000 mcg - every other day (she is not eating meat)    Today's Vitals: There were no vitals taken for this visit.  ----------------    I spent 45 minutes with this patient today. All changes were made via verbal approval with Dr. Salinas. A copy of the visit note was provided to the patient's provider(s).    A summary of these recommendations was sent via [employer].    Evelina Saldivar, Pharm.D.  Medication Therapy Management Pharmacist  [facility]    Telemedicine Visit Details  Type of service:  Video Conference via [employer]  Start Time: 12:29 PM  End Time: 1:14 PM     Medication Therapy Recommendations  Benign essential tremor    Current Medication: propranolol ER (INDERAL LA) 60 MG 24 hr capsule   Rationale: Synergistic therapy - Needs additional medication therapy - Indication   Recommendation: Start Medication - primidone 25 mg half-tab   Status: Accepted per Provider

## 2023-04-26 NOTE — LETTER
_  Medication List        Prepared on: Apr 26, 2023     Bring your Medication List when you go to the doctor, hospital, or   emergency room. And, share it with your family or caregivers.     Note any changes to how you take your medications.  Cross out medications when you no longer use them.    Medication How I take it Why I use it Prescriber   acetaminophen (TYLENOL) 500 MG tablet Take 1,000 mg by mouth every morning Pain  Patient Reported    aspirin (ASA) 81 MG EC tablet Take 81 mg by mouth daily as needed (prevent clots when flying) Prevent clots  Patient Reported    Calcium Carbonate-Vit D-Min (CALTRATE MINIS PLUS MINERALS) 300-800 MG-UNIT TABS Take 2 tablets by mouth daily Bone health  Patient Reported   Cholecalciferol (VITAMIN D3) 50 MCG (2000 UT) CAPS Take 2,000 Units by mouth daily General Health  Patient Reported    cyanocobalamin (VITAMIN B-12) 1000 MCG tablet Take 1 tablet (1,000 mcg) by mouth every other day General Health  Patient reported    famotidine (PEPCID) 20 MG tablet Take 20 mg by mouth nightly as needed Heartburn  Patient Reported   polyethylene glycol (MIRALAX) 17 GM/Dose powder Take 1 capful. by mouth every other day Constipation  Patient Reported   primidone (MYSOLINE) 50 MG tablet Take one-half tablet (25 mg) by mouth at bedtime for 1 week then may increase to 1 tablet (50 mg) at bedtime Tremor Pawel Salinas MD   propranolol ER (INDERAL LA) 60 MG 24 hr capsule Take 1 capsule (60 mg) by mouth daily Tremor Shantel Plaza PA-C   simvastatin (ZOCOR) 20 MG tablet Take 1 tablet (20 mg) by mouth At Bedtime High cholesterol  Shantel Plaza PA-C   tamoxifen (NOLVADEX) 20 MG tablet TAKE 1 TABLET ORALLY ONCE A DAY Breast cancer Aide Laguerre, APRN CNP         Add new medications, over-the-counter drugs, herbals, vitamins, or  minerals in the blank rows below.    Medication How I take it Why I use it Prescriber                                      Allergies:      compazine  [prochlorperazine]; codeine sulfate        Side effects I have had:               Other Information:              My notes and questions:

## 2023-04-26 NOTE — LETTER
"Recommended To-Do List      Prepared on: Apr 26, 2023       You can get the best results from your medications by completing the items on this \"To-Do List.\"      Bring your To-Do List when you go to your doctor. And, share it with your family or caregivers.    My To-Do List:  What we talked about: What I should do:   A new medication that may be of benefit to you    Start taking primidone (MYSOLINE)          What we talked about: What I should do:                     "

## 2023-04-26 NOTE — TELEPHONE ENCOUNTER
Per MTM visit today, we will start primidone for tremor. Order is pended and will be sent to Dr. Salinas to sign    Evelina Saldivar, Pharm.D.  Medication Therapy Management Pharmacist  [facility]

## 2023-04-26 NOTE — LETTER
April 26, 2023  Seema Johnson  [address]  [facility] [address]    Dear Ms. Johnson, [facility]     Thank you for talking with me on Apr 26, 2023 about your health and medications. As a follow-up to our conversation, I have included two documents:      1. Your Recommended To-Do List has steps you should take to get the best results from your medications.  2. Your Medication List will help you keep track of your medications and how to take them.    If you want to talk about these documents, please call Evelina Saldivar RPH at phone: [phone], Monday-Friday 8-4:30pm.    I look forward to working with you and your doctors to make sure your medications work well for you.    Sincerely,  Evelina Saldivar RPH  [facility] Pharmacist, [facility]

## 2023-04-27 NOTE — PATIENT INSTRUCTIONS
"Recommendations from today's MTM visit:                                                    Today we reviewed what your medicines are for, how to know if they are working, that your medicines are safe and how to make your medicine regimen as easy as possible.      1. Start primidone for your tremor. We have ordered 50 mg tablets and I'd like you start with a half-tablet (25 mg) at bedtime for the first 1-2 weeks. If this is going well without side effects, you may increase to a full tablet (50 mg) before our next appointment.    2. Side effects to monitor for include: drowsiness, dizziness, cognitive fogginess, balance issues, and mood changes. We discussed that you may have these side effects the first day but they may improve the second day so try at least 2 days on the medication.     3. Continue the propranolol 60 mg every evening for now. We may try going off of this after you return from [place].     Follow-up: 5/16/23 at 9 am video visit    It was great speaking with you today.  I value your experience and would be very thankful for your time in providing feedback in our clinic survey. In the next few days, you may receive an email or text message from [email or web address] with a link to a survey related to your  clinical pharmacist.\"     To schedule another MTM appointment, please call the clinic directly or you may call the MTM scheduling line at [phone] or toll-free at [phone].     My Clinical Pharmacist's contact information:                                                      Please feel free to contact me with any questions or concerns you have.      Evelina Saldivar, Pharm.D.  Medication Therapy Management Pharmacist  [facility]     "

## 2023-05-01 ENCOUNTER — TELEPHONE (OUTPATIENT)
Dept: PHARMACY | Facility: CLINIC | Age: 68
End: 2023-05-01
Payer: COMMERCIAL

## 2023-05-01 NOTE — TELEPHONE ENCOUNTER
Evelina,    Patient stated she hoped to speak to you (okay with a mychart message) about an issue with her medication, potential reaction. Please reach out to patient at your earliest convenience, patient decline to schedule appointment at this time.    DESTINI Eaton

## 2023-05-01 NOTE — TELEPHONE ENCOUNTER
Sent patient a JETME message to gather more information    Evelina Saldivar, Pharm.D.  Medication Therapy Management Pharmacist  [facility]

## 2023-05-05 ENCOUNTER — ANCILLARY PROCEDURE (OUTPATIENT)
Dept: MAMMOGRAPHY | Facility: CLINIC | Age: 68
End: 2023-05-05
Attending: PHYSICIAN ASSISTANT
Payer: COMMERCIAL

## 2023-05-05 ENCOUNTER — ANCILLARY PROCEDURE (OUTPATIENT)
Dept: ULTRASOUND IMAGING | Facility: CLINIC | Age: 68
End: 2023-05-05
Attending: PHYSICIAN ASSISTANT
Payer: COMMERCIAL

## 2023-05-05 DIAGNOSIS — M79.621 AXILLARY PAIN, RIGHT: ICD-10-CM

## 2023-05-05 DIAGNOSIS — C50.911 INFILTRATING DUCTAL CARCINOMA OF RIGHT FEMALE BREAST (H): Chronic | ICD-10-CM

## 2023-05-05 DIAGNOSIS — M79.89 OTHER SPECIFIED SOFT TISSUE DISORDERS: ICD-10-CM

## 2023-05-05 PROCEDURE — 76882 US LMTD JT/FCL EVL NVASC XTR: CPT | Mod: RT

## 2023-05-05 PROCEDURE — G0279 TOMOSYNTHESIS, MAMMO: HCPCS

## 2023-05-05 PROCEDURE — 77065 DX MAMMO INCL CAD UNI: CPT | Mod: RT

## 2023-05-16 ENCOUNTER — VIRTUAL VISIT (OUTPATIENT)
Dept: PHARMACY | Facility: CLINIC | Age: 68
End: 2023-05-16
Payer: COMMERCIAL

## 2023-05-16 DIAGNOSIS — G25.0 BENIGN ESSENTIAL TREMOR: Primary | ICD-10-CM

## 2023-05-16 PROCEDURE — 99606 MTMS BY PHARM EST 15 MIN: CPT | Performed by: PHARMACIST

## 2023-05-16 NOTE — PROGRESS NOTES
Medication Therapy Management (MTM) Encounter    ASSESSMENT:                            Medication Adherence/Access: No issues identified    Essential tremor: Patient will likely not be able to tolerate a higher dose of propranolol due to hypotension and bradycardia. We discussed at length the other options such as topiramate and gabapentin and will consider the possible risks and benefits of these before she makes a decision and will let us know which she chooses. In her case, things to note regarding topiramate would be that she may have early neuropathy (topiramate could make this worse) and she is not very good about drinking water (which could increase her risk of developing kidney stones). As for the gabapentin, this could be helpful for neuropathy but she would likely have to take it 2-3 times daily which may be difficult for her.     PLAN:                            1. We discussed that alternative treatment options for your tremor could include topiramate or gabapentin. Please let me know which treatment you'd like to try first.   2. Topiramate is typically dosed once daily at bedtime to start, but we may add a dose during the day if needed. Side effects can include drowsiness, dizziness, cognitive fogginess, tingling in extremities, kidney stones, mood changes. It is important to drink a lot of water, ideally 6-8 glasses of 8 ounces each per day, but for you would start with at least 4 glasses per day.   3. Gabapentin is typically dosed 2-3 times daily. Side effects can include drowsiness, dizziness, cognitive fogginess, swelling in legs/ankles, mood changes.     Follow-up: 1-2 months    SUBJECTIVE/OBJECTIVE:                          Seema Johnson is a 67 year old female called for a follow-up visit.  Today's visit is a follow-up MTM visit from 4/26/23     Reason for visit: follow up on medications    Allergies/ADRs: Reviewed in chart  Past Medical History: Reviewed in chart  Tobacco: She reports that she  "has never smoked. She has never used smokeless tobacco.  Alcohol: trying to cut back; was drinking a glass of wine most evenings but drinking alcohol less than daily now    Medication Adherence/Access: no issues reported    Essential tremor: Taking propranolol ER 60 mg (at night) and has been taking it for several years. She recently tried primidone 25 mg nightly x 2 nights and had significant side effects- nausea, headache, nasal drainage, vomiting, chills, hot flashes, sensitivity to light and sound. She states that she would be interested in trying a different medication for tremor as the tremor is affecting her quality of life. She typically drinks 2 x 8 ounce glasses of water per day. Has some tingling in her fingers at times and has been told this may be \"early stage neuropathy.\" She has some cognitive concerns already. No issues with her weight- states she is able to keep a consistent weight. [place] of [place].     BP Readings from Last 3 Encounters:   04/25/23 118/70   04/19/23 119/72   04/17/23 110/68     Pulse Readings from Last 3 Encounters:   04/19/23 54   04/17/23 64   10/12/22 64     Today's Vitals: There were no vitals taken for this visit.  ----------------    I spent 23 minutes with this patient today. All changes were made via collaborative practice agreement with Dr. Salinas . A copy of the visit note was provided to the patient's provider(s).    A summary of these recommendations was sent via [email or web address].    Evelina Saldivar, Pharm.D.  Medication Therapy Management Pharmacist  [facility]    Telemedicine Visit Details  Type of service:  Telephone visit  Start Time: 9:02 AM  End Time: 9:25 AM     Medication Therapy Recommendations  Benign essential tremor    Current Medication: propranolol ER (INDERAL LA) 60 MG 24 hr capsule   Rationale: Synergistic therapy - Needs additional medication therapy - Indication   Recommendation: Start Medication - topiramate 25 MG " tablet   Status: Accepted per Provider

## 2023-05-16 NOTE — Clinical Note
She is considering whether to start topiramate or gabapentin for tremor and will let us know what she decides

## 2023-05-17 NOTE — PATIENT INSTRUCTIONS
"Recommendations from today's MTM visit:                                                      1. We discussed that alternative treatment options for your tremor could include topiramate or gabapentin. Please let me know which treatment you'd like to try first.   2. Topiramate is typically dosed once daily at bedtime to start, but we may add a dose during the day if needed. Side effects can include drowsiness, dizziness, cognitive fogginess, tingling in extremities, kidney stones, mood changes. It is important to drink a lot of water, ideally 6-8 glasses of 8 ounces each per day, but for you would start with at least 4 glasses per day.   3. Gabapentin is typically dosed 2-3 times daily. Side effects can include drowsiness, dizziness, cognitive fogginess, swelling in legs/ankles, mood changes.     Follow-up: 1-2 months    It was great speaking with you today.  I value your experience and would be very thankful for your time in providing feedback in our clinic survey. In the next few days, you may receive an email or text message from [employer] with a link to a survey related to your  clinical pharmacist.\"     To schedule another MTM appointment, please call the clinic directly or you may call the MTM scheduling line at [phone] or toll-free at [phone].     My Clinical Pharmacist's contact information:                                                      Please feel free to contact me with any questions or concerns you have.      Evelina Saldivar, Pharm.D.  Medication Therapy Management Pharmacist  [facility]     "

## 2023-05-23 ENCOUNTER — TELEPHONE (OUTPATIENT)
Dept: NEUROLOGY | Facility: CLINIC | Age: 68
End: 2023-05-23
Payer: COMMERCIAL

## 2023-05-23 NOTE — TELEPHONE ENCOUNTER
RN called the pharmacy to address their questions. They wanted to know what the plan is after she reaches the 2 tabs at bedtime. I clarified that we may continue to increase the dose but would send in a new RX. They were grateful for the call back and had no further questions.     Myrna Albarran RN Care Coordinator   Neurology/Neurosurgery/PM&R/ Pain Management

## 2023-05-23 NOTE — TELEPHONE ENCOUNTER
[facility] Call Center    Phone Message    May a detailed message be left on voicemail: yes     Reason for Call: Medication Question or concern regarding medication   Prescription Clarification  Name of Medication: topiramate  Prescribing Provider: Rita   Pharmacy: [facility]   What on the order needs clarification? Pharmacy need specific directions.  Pharmacy said instructions are confusing.  Please call pharmacy          Action Taken: [facility]  Travel Screening: Not Applicable

## 2023-05-25 ENCOUNTER — TELEPHONE (OUTPATIENT)
Dept: FAMILY MEDICINE | Facility: CLINIC | Age: 68
End: 2023-05-25
Payer: COMMERCIAL

## 2023-05-25 NOTE — TELEPHONE ENCOUNTER
Reason for Call:  Appointment Request    Patient requesting this type of appt:  EAR CLEANING AND SHOTS    Requested provider: Shantel Plaza    Reason patient unable to be scheduled: Not within requested timeframe    When does patient want to be seen/preferred time: 3-7 days    Comments: Patient needs to have ears cleaned before her ENT VISIT on June 13th. She is also requesting shots    Could we send this information to you in [facility] or would you prefer to receive a phone call?:   Patient would prefer a phone call   Okay to leave a detailed message?: Yes at Home number on file [phone] (home)    Call taken on 5/25/2023 at 8:40 AM by Toñito Lanza

## 2023-05-26 NOTE — TELEPHONE ENCOUNTER
Patient called back and she is requesting to be seen with her  on 6/1/23 at 9:10/9:30. No spots available so she wanted to send request to provider to double book at that time.

## 2023-06-01 ENCOUNTER — OFFICE VISIT (OUTPATIENT)
Dept: FAMILY MEDICINE | Facility: CLINIC | Age: 68
End: 2023-06-01
Payer: COMMERCIAL

## 2023-06-01 VITALS
TEMPERATURE: 97.8 F | RESPIRATION RATE: 16 BRPM | DIASTOLIC BLOOD PRESSURE: 62 MMHG | HEART RATE: 61 BPM | BODY MASS INDEX: 21.91 KG/M2 | WEIGHT: 128.3 LBS | HEIGHT: 64 IN | OXYGEN SATURATION: 100 % | SYSTOLIC BLOOD PRESSURE: 104 MMHG

## 2023-06-01 DIAGNOSIS — H61.23 BILATERAL IMPACTED CERUMEN: Primary | ICD-10-CM

## 2023-06-01 DIAGNOSIS — Z23 NEED FOR DIPHTHERIA-TETANUS-PERTUSSIS (TDAP) VACCINE: ICD-10-CM

## 2023-06-01 PROCEDURE — 90715 TDAP VACCINE 7 YRS/> IM: CPT | Performed by: PHYSICIAN ASSISTANT

## 2023-06-01 PROCEDURE — 90471 IMMUNIZATION ADMIN: CPT | Performed by: PHYSICIAN ASSISTANT

## 2023-06-01 PROCEDURE — 69209 REMOVE IMPACTED EAR WAX UNI: CPT | Mod: 50 | Performed by: PHYSICIAN ASSISTANT

## 2023-06-01 NOTE — NURSING NOTE
Patient identified using two patient identifiers.  Ear exam showing wax occlusion completed by provider.  Solution: warm water was placed in the bilateral ear(s) via irrigation tool: elephant ear.  Patient tolerated well.  Annemarie Sauer MA

## 2023-06-01 NOTE — PROGRESS NOTES
"  Assessment & Plan     Bilateral impacted cerumen  Ear lavage by MA staff.     Need for diphtheria-tetanus-pertussis (Tdap) vaccine  Given   - TDAP VACCINE (Adacel, Boostrix)  [[id number]]     Follow Up: The patient was instructed to contact clinic for worsening symptoms, non-improvement in time frame as expected/discussed, and for questions regarding medications or treatment plan. For virtual visits, the patient was advised to be seen for in person evaluation if symptoms or condition are worsening or non-improvement as expected.       Shantel Plaza PA-C  [facility] EVANS Stephenson is a 67 year old, presenting for the following health issues:  No chief complaint on file.        4/19/2023     9:10 AM   Additional Questions   Roomed by Jocelyne CALVIN   Accompanied by      HPI     Hearing test in a week and was told needed to be sure ears were clear.   She tends to have a lot of wax.     Tremor- started topamax - seems to be helping so far. Less tremor.         Review of Systems   No additional concerns         Objective    /62 (BP Location: Left arm, Patient Position: Sitting, Cuff Size: Adult Regular)   Pulse 61   Temp 97.8  F (36.6  C) (Temporal)   Resp 16   Ht 1.62 m (5' 3.78\")   Wt 58.2 kg (128 lb 4.8 oz)   SpO2 100%   BMI 22.17 kg/m    There is no height or weight on file to calculate BMI.  Physical Exam   GENERAL: healthy, alert and no distress  EYES: Eyes grossly normal to inspection, PERRL and conjunctivae and sclerae normal  HENT: normal cephalic/atraumatic, right ear: occluded with wax, left ear: wax in inferior portion of canal, nose and mouth without ulcers or lesions, oropharynx clear and oral mucous membranes moist                Answers for HPI/ROS submitted by the patient on 6/1/2023  What is the reason for your visit today? : ear check for hearing exam  How many servings of fruits and vegetables do you eat daily?: 4 or more  On average, how many sweetened " beverages do you drink each day (Examples: soda, juice, sweet tea, etc.  Do NOT count diet or artificially sweetened beverages)?: 0  How many minutes a day do you exercise enough to make your heart beat faster?: 60 or more  How many days a week do you exercise enough to make your heart beat faster?: 6  How many days per week do you miss taking your medication?: 0

## 2023-06-13 ENCOUNTER — OFFICE VISIT (OUTPATIENT)
Dept: AUDIOLOGY | Facility: CLINIC | Age: 68
End: 2023-06-13
Payer: COMMERCIAL

## 2023-06-13 DIAGNOSIS — H90.3 SENSORINEURAL HEARING LOSS, BILATERAL: ICD-10-CM

## 2023-06-13 PROCEDURE — 92557 COMPREHENSIVE HEARING TEST: CPT | Performed by: AUDIOLOGIST

## 2023-06-13 PROCEDURE — 92550 TYMPANOMETRY & REFLEX THRESH: CPT | Performed by: AUDIOLOGIST

## 2023-06-13 NOTE — PROGRESS NOTES
AUDIOLOGY REPORT    SUBJECTIVE:  Seema Johnson is a 67 year old female who was seen in the Audiology Clinic at the [facility] for audiologic evaluation, referred by Shantel Plaza PA-C . The patient reports a perceived decline in hearing. The patient denies  bilateral tinnitus, bilateral otalgia, bilateral drainage, bilateral aural fullness, family history of hearing loss and history of noise exposure.  The patient notes difficulty with communication in a variety of listening situations.  They were unaccompanied today.    OBJECTIVE:  Abuse Screening:  Do you feel unsafe at home or work/school? No  Do you feel threatened by someone? No  Does anyone try to keep you from having contact with others, or doing things outside of your home? No  Physical signs of abuse present? No     Fall Risk Screen:  1. Have you fallen two or more times in the past year? No  2. Have you fallen and had an injury in the past year? No    Fall Risk Assessment Completed by Audiology    Otoscopic exam indicates ears are clear of cerumen bilaterally     Pure Tone Thresholds assessed using conventional audiometry with good  reliability from 250-8000 Hz bilaterally using insert earphones and circumaural headphones     RIGHT:  normal and borderline-normal from 250-4000 Hz sloping to mild-moderate sensorineural hearing loss    LEFT:     normal and borderline-normal from 250-3000 Hz sloping to mild-moderate sensorineural hearing loss    Tympanogram:    RIGHT: normal eardrum mobility    LEFT:   normal eardrum mobility    Reflexes (reported by stimulus ear):  RIGHT: Ipsilateral is absent at frequencies tested  RIGHT: Contralateral is absent at frequencies tested  LEFT:   Ipsilateral is absent at frequencies tested  LEFT:   Contralateral is elevated at frequencies tested      Speech Reception Threshold:    RIGHT: 15 dB HL    LEFT:   15 dB HL    Speech Reception Thresholds are in good agreement with pure tone  thresholds.    Word Recognition Score:     RIGHT: 100% at 55 dB HL using NU-6 recorded word list.    LEFT:   100% at 55 dB HL using NU-6 recorded word list.      ASSESSMENT:     ICD-10-CM    1. Sensorineural hearing loss, bilateral  H90.3 Adult Audiology  Referral          Today s results were discussed with the patient in detail.     PLAN:  Patient was counseled regarding hearing loss and impact on communication. I gave her a communication tip sheet and recommended that the patient have a hearing recheck in 3-4 years, or sooner should symptoms increase.  Please call this clinic with questions regarding these results or recommendations.          Denzel López MA, CCC-A  [place] Licensed Audiologist #[id number]  6/13/2023

## 2023-06-19 ENCOUNTER — TELEPHONE (OUTPATIENT)
Dept: OPHTHALMOLOGY | Facility: CLINIC | Age: 68
End: 2023-06-19

## 2023-06-19 ENCOUNTER — OFFICE VISIT (OUTPATIENT)
Dept: OPHTHALMOLOGY | Facility: CLINIC | Age: 68
End: 2023-06-19
Attending: PHYSICIAN ASSISTANT
Payer: COMMERCIAL

## 2023-06-19 DIAGNOSIS — H52.203 MYOPIA OF BOTH EYES WITH ASTIGMATISM AND PRESBYOPIA: ICD-10-CM

## 2023-06-19 DIAGNOSIS — H53.9 VISION CHANGES: ICD-10-CM

## 2023-06-19 DIAGNOSIS — H35.30 ARMD (AGE-RELATED MACULAR DEGENERATION), BILATERAL: Primary | ICD-10-CM

## 2023-06-19 DIAGNOSIS — H52.13 MYOPIA OF BOTH EYES WITH ASTIGMATISM AND PRESBYOPIA: ICD-10-CM

## 2023-06-19 DIAGNOSIS — H25.813 COMBINED FORMS OF AGE-RELATED CATARACT OF BOTH EYES: ICD-10-CM

## 2023-06-19 DIAGNOSIS — H52.4 MYOPIA OF BOTH EYES WITH ASTIGMATISM AND PRESBYOPIA: ICD-10-CM

## 2023-06-19 DIAGNOSIS — H40.003 GLAUCOMA SUSPECT OF BOTH EYES: ICD-10-CM

## 2023-06-19 PROCEDURE — 92004 COMPRE OPH EXAM NEW PT 1/>: CPT | Performed by: OPHTHALMOLOGY

## 2023-06-19 PROCEDURE — 99207 OCT OPTIC NERVE RNFL SPECTRALIS OU (BOTH EYES): CPT | Performed by: OPHTHALMOLOGY

## 2023-06-19 PROCEDURE — 92134 CPTRZ OPH DX IMG PST SGM RTA: CPT | Performed by: OPHTHALMOLOGY

## 2023-06-19 PROCEDURE — 92015 DETERMINE REFRACTIVE STATE: CPT | Performed by: OPHTHALMOLOGY

## 2023-06-19 ASSESSMENT — REFRACTION_MANIFEST
OD_ADD: +2.50
OS_ADD: +2.50
OD_AXIS: 040
OS_SPHERE: -1.75
OS_AXIS: 151
OD_CYLINDER: +0.75
OS_CYLINDER: +1.00
OD_SPHERE: -1.25

## 2023-06-19 ASSESSMENT — CONF VISUAL FIELD
OS_NORMAL: 1
OD_NORMAL: 1
OD_INFERIOR_TEMPORAL_RESTRICTION: 0
METHOD: COUNTING FINGERS
OS_SUPERIOR_TEMPORAL_RESTRICTION: 0
OD_INFERIOR_NASAL_RESTRICTION: 0
OS_SUPERIOR_NASAL_RESTRICTION: 0
OS_INFERIOR_TEMPORAL_RESTRICTION: 0
OS_INFERIOR_NASAL_RESTRICTION: 0
OD_SUPERIOR_NASAL_RESTRICTION: 0
OD_SUPERIOR_TEMPORAL_RESTRICTION: 0

## 2023-06-19 ASSESSMENT — TONOMETRY
IOP_METHOD: ICARE
OD_IOP_MMHG: 08
OS_IOP_MMHG: 08

## 2023-06-19 ASSESSMENT — REFRACTION_WEARINGRX
OD_AXIS: 040
OD_SPHERE: -1.25
OD_CYLINDER: +0.75
OS_ADD: +2.50
OD_ADD: +2.50
SPECS_TYPE: PAL
OS_AXIS: 151
OS_CYLINDER: +1.00
OS_SPHERE: -1.75

## 2023-06-19 ASSESSMENT — VISUAL ACUITY
METHOD: SNELLEN - LINEAR
OD_CC: 20/25
OS_CC: 20/20
CORRECTION_TYPE: GLASSES

## 2023-06-19 ASSESSMENT — CUP TO DISC RATIO
OS_RATIO: 0.5
OD_RATIO: 0.5

## 2023-06-19 ASSESSMENT — SLIT LAMP EXAM - LIDS
COMMENTS: 2+ DERMATOCHALASIS
COMMENTS: 2+ DERMATOCHALASIS

## 2023-06-19 NOTE — NURSING NOTE
"Chief Complaints and History of Present Illnesses   Patient presents with     Annual Eye Exam       Chief Complaint(s) and History of Present Illness(es)     Annual Eye Exam            Laterality: both eyes          Comments    Patient here for annual eye exam.   Patient states that at her last exam (2022) her eye doctor at [facility] told her she has the start of cataracts.   Glasses (PAL) worn for driving and reading, not always worn around the house.   Patient states that over this past winter her vision seemed blurry when she was outside in very bright sunlight.   Night time driving is difficult, states she isn't to the point of avoiding it yet, but it takes more concentration.   No pain, no floaters. States \"lightening bolt\" streaks of light very occasionally over the past year.   Started taking Topamax 1 month ago and was told to watch for changes in vision while on the medication.                    Sebastian Naylor, Ophthalmic Assistant   "

## 2023-06-19 NOTE — TELEPHONE ENCOUNTER
Left Voicemail (1st Attempt) for the patient to call back and schedule the following:    Appointment type: return  Provider: dr. Nevarez   Return date: 6/19/2024   Specialty phone number: [phone]   Additonal Notes: Return in about 1 year (around 6/19/2024) for Annual Visit, OCT Macula, OCT RNFL    Ira stevenson Procedure   Orthopedics, Podiatry, Sports Medicine, Ent ,Eye , Audiology, Adult Endocrine & Diabetes, Nutrition & Medication Therapy Management Specialties   [facility]

## 2023-06-19 NOTE — PROGRESS NOTES
"HPI     Annual Eye Exam    In both eyes.           Comments    Patient here for annual eye exam.   Patient states that at her last exam (2022) her eye doctor at [facility] told her she has the start of cataracts.   Glasses (PAL) worn for driving and reading, not always worn around the house.   Patient states that over this past winter her vision seemed blurry when she was outside in very bright sunlight.   Night time driving is difficult, states she isn't to the point of avoiding it yet, but it takes more concentration.   No pain, no floaters. States \"lightening bolt\" streaks of light very occasionally over the past year.   Started taking Topamax 1 month ago and was told to watch for changes in vision while on the medication.     Mom with ARMD            Last edited by Juan Nevarez MD on 6/19/2023 10:26 AM.         Review of systems for the eyes was negative other than the pertinent positives/negatives listed in the HPI.      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 Notes difficulty with nighttime driving and occasional lightning bolt flash.       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, essential tremor on topiramate  Current Medications: acetaminophen (TYLENOL) 500 MG tablet, Take 1,000 mg by mouth every morning  aspirin (ASA) 81 MG EC tablet, Take 81 mg by mouth daily as needed (prevent clots when flying)  Calcium Carbonate-Vit D-Min (CALTRATE MINIS PLUS MINERALS) 300-800 MG-UNIT TABS, Take 2 tablets by mouth daily  Cholecalciferol (VITAMIN D3) 50 MCG (2000 UT) CAPS, Take 2,000 Units by mouth daily  cyanocobalamin (VITAMIN B-12) 1000 MCG tablet, Take 1 tablet (1,000 mcg) by mouth every other day  famotidine (PEPCID) 20 MG tablet, Take 20 mg by mouth nightly as needed  polyethylene glycol (MIRALAX) 17 " GM/Dose powder, Take 1 capful. by mouth every other day  propranolol ER (INDERAL LA) 60 MG 24 hr capsule, Take 1 capsule (60 mg) by mouth daily  simvastatin (ZOCOR) 20 MG tablet, Take 1 tablet (20 mg) by mouth At Bedtime  tamoxifen (NOLVADEX) 20 MG tablet, TAKE 1 TABLET ORALLY ONCE A DAY  topiramate (TOPAMAX) 50 MG tablet, Take 1 tablet (50 mg) by mouth At Bedtime    No current facility-administered medications on file prior to visit.    FHx: Age related macular degeneration-mom  PSHx: denies history of ocular surgeries       Current Eye Medications:      Assessment & Plan:  (H35.30) ARMD (age-related macular degeneration), bilateral  (primary encounter diagnosis)  Discussed diagnosis in detail  Patient is nonsmoker  Advised Mediterranean diet-high consumption of vegetables and olive oil and moderate consumption of protein  Have spinach (cooked or raw), colorful fruits, walnuts, hazelnuts, almonds in your diet.  Amsler grid provided with instructions for use    (H40.003) Glaucoma suspect of both eyes  Based on c.d ratio  IOP within normal limits and OCT retinal nerve fiber layer normal  Low suspicion for Primary open angle glaucoma (POAG) at this time  Recommend annual oct retinal nerve fiber layer    (H25.813) Combined forms of age-related cataract of both eyes  Mild cataracts are present and may account for some of the patient's visual complaints. No treatment currently recommended. The patient will monitor for vision changes and contact us with any decrease in vision. Recheck in one year.     (H52.13,  H52.203,  H52.4) Myopia of both eyes with astigmatism and presbyopia  Patient deferred MRx today    Return in about 1 year (around 6/19/2024) for Annual Visit, OCT Macula, OCT RNFL.        Juan Nevarez MD     Attending Physician Attestation:  Complete documentation of historical and exam elements from today's encounter can be found in the full encounter summary report (not reduplicated in this progress  note).  I personally obtained the chief complaint(s) and history of present illness.  I confirmed and edited as necessary the review of systems, past medical/surgical history, family history, social history, and examination findings as documented by others; and I examined the patient myself.  I personally reviewed the relevant tests, images, and reports as documented above.  I formulated and edited as necessary the assessment and plan and discussed the findings and management plan with the patient and family. - Juan Nevarez MD

## 2023-06-21 ENCOUNTER — TELEPHONE (OUTPATIENT)
Dept: NEUROLOGY | Facility: CLINIC | Age: 68
End: 2023-06-21
Payer: COMMERCIAL

## 2023-06-21 NOTE — CONFIDENTIAL NOTE
Prior Authorization Retail Medication Request    Medication/Dose: Topiramate 50MG Tabs  ICD code: G25  Previously Tried and Failed:    Rationale:      Insurance Name: [facility]  Insurance ID: [id number]       Pharmacy Information   Name: [id number] - [place] - [address]  Phone: [phone]

## 2023-06-28 NOTE — TELEPHONE ENCOUNTER
Central Prior Authorization Team   Phone: [phone]    PA Initiation    Medication: Topiramate 50MG Tabs  Insurance Company: [employer] - Phone [phone] Fax [phone]  Pharmacy Filling the Rx: [facility] # [id number] - [place], [place] - [address] [address]  Filling Pharmacy Phone: [phone]  Filling Pharmacy Fax:    Start Date: 6/28/2023

## 2023-06-28 NOTE — TELEPHONE ENCOUNTER
Prior Authorization Approval    Authorization Effective Date: 5/29/2023  Authorization Expiration Date: 1/27/2024  Medication: Topiramate 50MG Tabs  Approved Dose/Quantity:   Reference #:     Insurance Company: [employer] - Phone [phone] Fax [phone]  Expected CoPay:       CoPay Card Available:      Foundation Assistance Needed:    Which Pharmacy is filling the prescription (Not needed for infusion/clinic administered): [facility] # [id number] - [place], [place] - [address] [address]  Pharmacy Notified: Yes  Patient Notified: Yes

## 2023-08-09 ENCOUNTER — TELEPHONE (OUTPATIENT)
Dept: NEUROPSYCHOLOGY | Facility: CLINIC | Age: 68
End: 2023-08-09
Payer: COMMERCIAL

## 2023-08-09 NOTE — TELEPHONE ENCOUNTER
Per clinical team Dr. Demarco will not be in [facility] on 10/3. Will need to complete eval at [facility].     Spoke with patient, she is aware of appt change and will [place] [facility] [place] for eval.     Jaiden Curtis on 8/9/2023 at 5:46 PM

## 2023-09-07 DIAGNOSIS — R92.30 DENSE BREASTS: ICD-10-CM

## 2023-09-07 DIAGNOSIS — Z12.31 VISIT FOR SCREENING MAMMOGRAM: ICD-10-CM

## 2023-09-07 DIAGNOSIS — C50.911 INFILTRATING DUCTAL CARCINOMA OF RIGHT FEMALE BREAST (H): Chronic | ICD-10-CM

## 2023-09-08 RX ORDER — TAMOXIFEN CITRATE 20 MG/1
20 TABLET ORAL DAILY
Qty: 90 TABLET | Refills: 0 | Status: SHIPPED | OUTPATIENT
Start: 2023-09-08 | End: 2023-11-30

## 2023-09-15 ENCOUNTER — TELEPHONE (OUTPATIENT)
Dept: UROLOGY | Facility: CLINIC | Age: 68
End: 2023-09-15
Payer: COMMERCIAL

## 2023-09-15 DIAGNOSIS — R35.0 URINARY FREQUENCY: Primary | ICD-10-CM

## 2023-09-15 RX ORDER — ZOLEDRONIC ACID 5 MG/100ML
INJECTION, SOLUTION INTRAVENOUS
COMMUNITY
Start: 2023-05-15

## 2023-09-15 NOTE — PROGRESS NOTES
Diagnosis/Summary/Recommendations:    PATIENT: Seema Johnson  67 year old female     : 1955    TRI: 2023    MRN: [id number]  [address] [facility] [address]   [facility] [address]     [phone] (M)   [phone] (H)   NONE (W)      [email or web address]     Tyrell Johnson  [phone]     Retired      Seen by Leida 2018  Brain mri 2019 for vertigo        Assessment:  (G25.0) Benign essential tremor  (primary encounter diagnosis)  Mgm with tremor  Mother hand tremor  50s onset   Head tremor  Hand tremor   Right handed  Bilateral hand tremor  Right may be worse than her left h and  No clear if alcohol helps her tremor     Reasons [facility] is Required   Family History of tremor  yes  Uncontrolled shaking  yes  Tremors on action or intention  yes  Difficulty holding items yes - problems with cup and has to use two hands  Frequently spills/drops items  - yes problems pouring  Difficulty eating normally  - has a fancy adaptable device  Difficulty with dressing/daily hygiene needs  - yes buttoning  Difficulty writing  Yes problems  Difficulty using cell phone/computers  Double tapping     Tried and failed therapies   Has been on medications for tremor   Propranolol inderal LA 60mg - 10 years   Rx by a neurologist      Handedness   Right handeded  Which hand is more severe  right  Treatment: RIght, Left or both hands   - bilateral   Where is the tremor worse - close to the mouth or when arms are extended - extended is worse and left is worse than right       Confirm that you do not have any of these contraindications:   An implanted electrical medical device, such as a pacemaker,  defibrillator, heart monitor, insulin pump, bladder stimulator, or  deep brain stimulator or an Insulin pump   NO      Suspected or diagnosed epilepsy or other seizure disorder  no      Pregnancy  - no     Confirm that your tremor is not caused by any of the followings no   Thyroid issues (e.g.,  hyperthyroidism)   Metabolic disorders (e.g., B-12 deficiency)     Size of watch band as measured around your wrist  16 cm - SMALL   Small (13.6-16.4 cm), Medium (16.5-18.4 cm) or large (18.5-20.4 cm)         Review of diagnosis    Essential tremor      Avoidance of dopamine blockers   Not taking     Motor complication review   N/a     Review of Impulse control disorders   N/a     Review of surgical or medication options   reviewed     Gait/Balance/Falls   falls due to not seeing a step - tend to tuck and roll     Exercise/Therapy performed/offered   Exercising      Cognitive/Driving   Retired -  and worked in a green house      [place] background   Studied [place] [employer] started in 3rd grade  [employer] in [place]  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 for winter      Hallucinations/delusions   denies     Sleep   Goes to bed around 10pm   Has some problems falling asleep - deep breathing.   Nocturia  Wakes up 3 times per night  Eventual gets back to sleep  Wakes up at 7am and feels tired   May nap      Bladder/Renal/Prostate/Gyn/Other  Mixed urinary dysfunction  Has a pad  Nocturia 3/noc  Has urgency and frequency  Has not seen a urologist     GI/Constipation/GERD   Changed her diet and no longer  Has issues with GERD  asymptomatic     ENDO/Lipid/DM/Bone density/Thyroid  Osteoporosis  Calcium with milk in motor, afternoon and evening  Vitamin D3  Lipid disorder  On statin - borderline     Cardio/heart/Hyper or Hypotensive   Has low blood pressure at times - strenuous and bent over  Has not fainted.      Vision/Dry Eyes/Cataracts/Glaucoma/Macular   Wears   Early cataracts     Heme/Anticoagulation/Antiplatelet/Anemia/Other  Vitamin B12 every other day  Aspirin  when travels 81mg day      ENT/Resp  Vertigo - associated - no issues lately  Hearing loss - going to get hearing aides     Skin/Cancer/Seborrhea/other  Skin cancer - mohs defect  Squamous cell carcinoma     Musculoskeletal/Pain/Headache  Closed compression fracture L1  Closed compression fracture thoracic spine  Left hip pain     Lumbar spine MRI 4/25/2022  Findings: Exaggerated lumbar lordosis. Presumed 5 lumbar-type  vertebra. Chronic wedge-shaped deformity and vertebral body height  loss at T12 without any bone marrow abnormality. Stepwise trace  retrolisthesis is at L1-2 and L2-3 better seen on prior radiographs.  Multilevel disc height loss and disc degeneration.   The tip of the  conus medullaris is at  L1.  No pars defect identified. Bone marrow  signal intensity is within normal limits and no abnormal signal is  visible.      On a level by level basis:     T12-L1: Disc bulge without spinal canal or foraminal stenosis.     L1-2: No significant spinal canal or foraminal narrowing.     L2-3: Mild facet hypertrophy and associated mild neural foraminal  narrowing without significant spinal canal narrowing.     L3-4: Disc bulge and facet hypertrophy. Bilateral mild neural  foraminal stenosis without spinal canal narrowing.     L4-5: No significant spinal canal or foraminal narrowing.     L5-S1: No significant spinal canal or foraminal narrowing.     Partially visualized presumed benign cyst in the liver segment 6  measuring 1 cm in diameter.                                                                    Impression: Degenerative changes as described above. No high-grade  spinal canal or neural foraminal stenosis at any level.     Other:  Invasive ductal carcinoma right breast 2016     Treating insomnia can be difficult because the medications we use can be harmful, especially in older adults. In addition, sleep medications do not tend to be very effective and should only be used short-term. Cognitive  "behavioral therapy (CBT) is the most effective treatment for long-term insomnia. The goal of CBT for insomnia is to address the underlying causes of the sleep problems, including your habits and how you think about sleep. You can do CBT with a trained psychologist, or from the comfort of your home by following an online program or workbook. Here are some great resources for at-home CBT:     1) 6-week online CBT course through [facility] for $40: [employer]/sleep  2) \"Say Noel to Insomnia\" by Kimo Noel, PhD at [employer] [facility] [employer]: 6-week program  3) \"Overcoming Insomnia: A Cognitive-Behavioral Therapy Approach Workbook\" by Margarito Hurtado and Yohana Roberts  4) \"Quiet Your Mind and Get to Sleep: Solutions to Insomnia for Those with Depression, Anxiety or Chronic Pain ([place] Self-Help Workbook) by Yohana Roberts and Ester Leon     Urinary issues - would recommend a consultation     Cognitive changes  Neuropsychological evaluation was ordered --- this      Still on tamoxifen - 7 years out but was told may need to take it 10 years based on the type of cancer.      Has hearing loss and will get a hearing aides     Has visual changes and may have early cataracts.      iPayment DBS Education Video  Deep brain stimulation (DBS)      If the above link does not work, cut and paste this address into your browser     [email or web address]     Pharmacy (MTM) consultation and medication management  Please call the scheduling number I@ [phone] to set up an appointment with pharmacists Evelina Saldivar or Summer Bustos.      Continue on the beta blocker for now -has some light headedness; bp and heart rate are 110/69 and heart rate 64     Discussed risks of primidone/mysoline which can affect memory and balance     Discussed topiramate (topamax) which can help tremor but carry risk of renal stones     Discussed " cervical dystonia/dystonic head tremor - has a bit of tilt and rotation of the head  Consider botox/botulinum toxin injections  Dr aRdha Hubbard     Dr. Zt ksenia to be installed on her phone.     Return 6-12 months      Medications               Acetaminophen tylenol 2         Aspirin 81mg prn         Calc Carb-cholecalciferol 1000-800  2       Cholecalciferol Vitamin D3 50mcg 2000 units 1         Cyanocobalamin Vit B12 1000mcg qod         Polyethylene glycol miralax qod      Famotidine pepcid 20mg off         Propranolol inderal LA 60mg 24hr       1   Simvastatin zocor 20mg        1   Tamoxifen nolvadex 20mg  1         Topiramate topamax 50mg       1   Zoledronic acid reclast year                  Plan:    95/61 and pulse is 65  Taking propranolol inderal LA 60mg 24hr    Taking fluid 32 oz daily  On topiramate topamax 50mg    10/3/2023 Dr. Demarco evaluation  Encouraged her to review test results with her after testing    Stress level is up     Encouraged her to spread her calcium to twice daily    Will be going to [place] and will be in [facility] for the winter and return mid April 2024    Rx sent to [facility] for her propranolol and topiramate  Watched the video and learned about mood     Will be ending her reclast treatments at some point     Discussed DBS and botox - she will live with her symptoms as is with her present medication regimen which was refilled.     Next summer Oleg Bello and Radha     Discussed antiamyloid treatments.     Coding statement:   Medical Decision Making:  #  Chronic progressive medical conditions addressed  - see above --   Review and/or interpretation of unique test or documentation from a provider outside of neurology no   Independent historian provided additional details  no I  Prescription drug management and review of potential side effects and/or monitoring for side effects  -- see above ---  Health impacted by social determinants of health  no    I have reviewed the note as  documented above.  This accurately captures the substance of my conversation with the patient and total time spent preparing for visit, executing visit and completing visit on the day of the visit:  30 minutes.  The portion of this total time included face to face time 23 minutes.     Pawel Salinas MD     ______________________________________    Last visit date and details:             ______________________________________      Patient was asked about 14 Review of systems including changes in vision (dry eyes, double vision), hearing, heart, lungs, musculoskeletal, depression, anxiety, snoring, RBD, insomnia, urinary frequency, urinary urgency, constipation, swallowing problems, hematological, ID, allergies, skin problems: seborrhea, endocrinological: thyroid, diabetes, cholesterol; balance, weight changes, and other neurological problems and these were not significant at this time except for   Patient Active Problem List   Diagnosis    Mohs defect of cheek    Chronic bilateral thoracic back pain    Closed compression fracture of thoracic vertebra (H)    Closed compression fracture of first lumbar vertebra (H)    Osteoporosis    Invasive ductal carcinoma of breast, female (H)- RIGHT breast, Upper outer quadrant- 2015    Benign essential tremor    Hyperlipidemia LDL goal <100    Mixed urge and stress incontinence    Vertigo    Lateral pain of left hip          Allergies   Allergen Reactions    Compazine [Prochlorperazine] Other (See Comments)     Jittery and hyper and foggy    Primidone      vomiting, chills & hot flashes, headache, sensitivity to light & noise    Codeine Sulfate Nausea     Past Surgical History:   Procedure Laterality Date    BIOPSY BREAST      BREAST SURGERY Right 01/2016    breast cancer    LUMPECTOMY BREAST       Past Medical History:   Diagnosis Date    Basal cell cancer     Breast cancer (H)     Dyslipidemia     History of blood transfusion     Hypertension     Mohs defect of nose 06/2012      Social History     Socioeconomic History    Marital status:      Spouse name: Not on file    Number of children: Not on file    Years of education: Not on file    Highest education level: Not on file   Occupational History    Not on file   Tobacco Use    Smoking status: Never    Smokeless tobacco: Never   Vaping Use    Vaping Use: Never used   Substance and Sexual Activity    Alcohol use: Yes     Comment: socially    Drug use: No    Sexual activity: Yes     Partners: Male   Other Topics Concern    Parent/sibling w/ CABG, MI or angioplasty before 65F 55M? No   Social History Narrative    Not on file     Social Determinants of Health     Financial Resource Strain: Not on file   Food Insecurity: Not on file   Transportation Needs: Not on file   Physical Activity: Not on file   Stress: Not on file   Social Connections: Not on file   Intimate Partner Violence: Not on file   Housing Stability: Not on file       Drug and lactation database from the [place] National Library of Medicine:  http://toxnet.nlm.nih.gov/cgi-bin/sis/htmlgen?LACT      B/P: Data Unavailable, T: Data Unavailable, P: Data Unavailable, R: Data Unavailable 0 lbs 0 oz  not currently breastfeeding., There is no height or weight on file to calculate BMI.  Medications and Vitals not listed above were documented in the cart and reviewed by me.     Current Outpatient Medications   Medication Sig Dispense Refill    zoledronic Acid (RECLAST) 5 MG/100ML SOLN infusion 5 mg/100 mL once a year      acetaminophen (TYLENOL) 500 MG tablet Take 1,000 mg by mouth every morning 2 tablet 0    aspirin (ASA) 81 MG EC tablet Take 81 mg by mouth daily as needed (prevent clots when flying)      Calcium Carbonate-Vit D-Min (CALTRATE MINIS PLUS MINERALS) 300-800 MG-UNIT TABS Take 2 tablets by mouth daily      Cholecalciferol (VITAMIN D3) 50 MCG (2000 UT) CAPS Take 2,000 Units by mouth daily      cyanocobalamin (VITAMIN B-12) 1000 MCG tablet Take 1 tablet (1,000  mcg) by mouth every other day      famotidine (PEPCID) 20 MG tablet Take 20 mg by mouth nightly as needed      polyethylene glycol (MIRALAX) 17 GM/Dose powder Take 1 capful. by mouth every other day      propranolol ER (INDERAL LA) 60 MG 24 hr capsule Take 1 capsule (60 mg) by mouth daily 180 capsule 1    simvastatin (ZOCOR) 20 MG tablet Take 1 tablet (20 mg) by mouth At Bedtime 180 tablet 1    tamoxifen (NOLVADEX) 20 MG tablet TAKE ONE TABLET BY MOUTH ONE TIME DAILY 90 tablet 0    topiramate (TOPAMAX) 50 MG tablet Take 1 tablet (50 mg) by mouth At Bedtime 90 tablet 3         Pawel Salinas MD

## 2023-09-15 NOTE — TELEPHONE ENCOUNTER
Please place order for UA/UC for upcoming appointment with Frances Cleveland.    Thank you,    Britt[facility] Visit Facilitator- Surgical Specialties

## 2023-09-18 ENCOUNTER — LAB (OUTPATIENT)
Dept: LAB | Facility: CLINIC | Age: 68
End: 2023-09-18
Payer: COMMERCIAL

## 2023-09-18 ENCOUNTER — OFFICE VISIT (OUTPATIENT)
Dept: UROLOGY | Facility: CLINIC | Age: 68
End: 2023-09-18
Payer: COMMERCIAL

## 2023-09-18 DIAGNOSIS — R35.0 URINARY FREQUENCY: ICD-10-CM

## 2023-09-18 DIAGNOSIS — K59.00 CONSTIPATION, UNSPECIFIED CONSTIPATION TYPE: ICD-10-CM

## 2023-09-18 DIAGNOSIS — N81.11 CYSTOCELE, MIDLINE: ICD-10-CM

## 2023-09-18 DIAGNOSIS — N95.2 VAGINAL ATROPHY: ICD-10-CM

## 2023-09-18 DIAGNOSIS — R39.15 URINARY URGENCY: ICD-10-CM

## 2023-09-18 DIAGNOSIS — R35.0 URINARY FREQUENCY: Primary | ICD-10-CM

## 2023-09-18 DIAGNOSIS — M62.89 PELVIC FLOOR DYSFUNCTION: ICD-10-CM

## 2023-09-18 DIAGNOSIS — N81.4 UTEROVAGINAL PROLAPSE: ICD-10-CM

## 2023-09-18 DIAGNOSIS — N39.46 MIXED INCONTINENCE: ICD-10-CM

## 2023-09-18 LAB
ALBUMIN UR-MCNC: NEGATIVE MG/DL
APPEARANCE UR: ABNORMAL
BACTERIA #/AREA URNS HPF: ABNORMAL /HPF
BILIRUB UR QL STRIP: NEGATIVE
COLOR UR AUTO: ABNORMAL
GLUCOSE UR STRIP-MCNC: NEGATIVE MG/DL
HGB UR QL STRIP: NEGATIVE
KETONES UR STRIP-MCNC: NEGATIVE MG/DL
LEUKOCYTE ESTERASE UR QL STRIP: ABNORMAL
NITRATE UR QL: NEGATIVE
PH UR STRIP: 7.5 [PH] (ref 5–7)
RBC #/AREA URNS AUTO: ABNORMAL /HPF
SKIP: ABNORMAL
SP GR UR STRIP: 1.01 (ref 1–1.03)
SQUAMOUS #/AREA URNS AUTO: ABNORMAL /LPF
UROBILINOGEN UR STRIP-MCNC: NORMAL MG/DL
WBC #/AREA URNS AUTO: ABNORMAL /HPF
YEAST #/AREA URNS HPF: ABNORMAL /HPF

## 2023-09-18 PROCEDURE — 99205 OFFICE O/P NEW HI 60 MIN: CPT | Mod: 25 | Performed by: PHYSICIAN ASSISTANT

## 2023-09-18 PROCEDURE — 81001 URINALYSIS AUTO W/SCOPE: CPT

## 2023-09-18 PROCEDURE — 51798 US URINE CAPACITY MEASURE: CPT | Performed by: PHYSICIAN ASSISTANT

## 2023-09-18 NOTE — PROGRESS NOTES
Urology Clinic    Name: Seema Johnson    MRN: [id number]   YOB: 1955  Accompanied at today's visit by:self              Assessment and Plan:   67 year old female with NKECHI, urinary urgency/frequency, nocturia, vaginal atrophy, cystocele, uterovaginal prolapse, constipation. Hx is complicated by hx of breast cancer and currently on tamoxifen.     - PVR WNL.   - UA shows WBC. Micro shows yeast, however patient asymptomatic and no signs of yeast infection in exam. UC pending. Advised to call clinic if develops s/s of UTI or vaginal infection.   - Referral to PFPT.  - Avoid bladder irritants.   - Discuss possible use of estrogen cream with oncologist.   - Do NOT recommend Beta 3 agonists such as myrbetriq as currently on tamoxifen. If patient interested in OAB medication in th future, would need to clear antimuscarinic with eye doctor given hx of suspected glaucoma from ophthalmology appointment in 6/2023. Consider third line options if unable to have antimuscarinics or if fails antimuscarinics.   - Consider pessary for prolapse and DAVID symptoms if fails PFPT.   - Discussed correlation between nocturia and sleep apnea. Patient going to winter in [place] and would like to hold off until she returns.   - Fiber supplement for bowels. Continue MiraLAX every other day.  - Follow-up when back from [place].     Orders Placed This Encounter   Procedures    MEASURE POST-VOID RESIDUAL URINE/BLADDER CAPACITY, US NON-IMAGING    Physical Therapy Referral     After discussing the assessment and plan with patient, patient verbalized understanding and agreed to the above plan. All questions answered.     67 minutes were spent today on the date of the encounter in reviewing the EMR, direct patient care, coordination of care and documentation in addition to exam, ordering PFPT.     Frances Cleveland PA-C  September 18, 2023    Patient Care Team:  Shantel Plaza PA-C as PCP - General (Physician Assistant -  Medical)  Yo Cortes MD as Shantel Rose PA-C as Assigned PCP  Ruth Ann Davis, RN as Specialty Care Coordinator (Oncology)  Aide Laguerre APRN CNP as Assigned Cancer Care Provider  Geno Gutierrez MD as Assigned Heart and Vascular Provider  Juan Nevarez MD as Physician (Ophthalmology)  Shantel Plaza PA-C as Referring Physician (Family Medicine)  Denzel López AuD as Audiologist (Audiology)  Clementina Salinas MD as Assigned Neuroscience Provider  Evelina Saldivar [facility] as Pharmacist (Pharmacist)  Clementina Salinas MD as MD (Neurology)  Bisi Duong DPM, Podiatry/Foot and Ankle Surgery as Assigned Musculoskeletal Provider  Evelina Saldivar RP as Assigned MTM Pharmacist  Juan Nevarez MD as Assigned Surgical Provider  CLEMENTINA SALINAS          Chief Complaint:   Urinary incontinence          History of Present Illness:   September 18, 2023    HISTORY: Seema Johnson is a 67 year old female as a new consultation for concerns of urinary incontinence for several years. Her hx is complicated by hx of breast cancer diagnosed in 2015 and currently on tamoxifen; did undergo chemo and radiation. Patient reports voiding 2 hours during the day but an be more frequently if anxious and 3-4x/night. Snores at night. Does not wake well rested. Does not take naps.  Describes NKECHI; UUI more bothersome than DAVID. Going through 1-2 pads/day and 0 pads/night. Feels like they empty completely after voiding. Drinks mineral water, 2 cps of coffee per day. Denies gross hematuria or history of kidney stones. Denies history of recurrent UTIs. Feels a vaginal bulge rarely. Sexually active. Denies pain with intercourse.  Denies urinary incontinence with intercourse. Describes constipation. Takes miralax every other day and high fiber diet. PMH is significant for vertigo, bilateral chronic thoracic back pain, essential tremor, HLD. PSH includes breast biopsy, Mohs to  face, lumpectomy. Denies history of smoking.. Patient voices no other concerns at this time.           Past Medical History:     Past Medical History:   Diagnosis Date    Basal cell cancer     Breast cancer (H)     Dyslipidemia     History of blood transfusion     Hypertension     Mohs defect of nose 06/2012            Past Surgical History:     Past Surgical History:   Procedure Laterality Date    BIOPSY BREAST      BREAST SURGERY Right 01/2016    breast cancer    LUMPECTOMY BREAST              Social History:     Social History     Tobacco Use    Smoking status: Never    Smokeless tobacco: Never   Substance Use Topics    Alcohol use: Yes     Comment: socially            Family History:     Family History   Problem Relation Age of Onset    Macular Degeneration Mother     Cerebrovascular Disease Mother     Hypertension Mother     Thyroid Disease Mother     Other - See Comments Mother         ?tremor    Tremor Mother         hand tremor    Cerebrovascular Disease Father     Hypothyroidism Father     Diabetes Maternal Grandmother     Coronary Artery Disease Maternal Grandmother     Tremor Maternal Grandmother     Colon Cancer Paternal Grandmother     Other - See Comments Brother     Other - See Comments Brother     Other - See Comments Brother     Other - See Comments Brother     Breast Cancer Sister     Other Cancer Sister     Anesthesia Reaction Sister     Diabetes Sister     Anesthesia Reaction Sister     Diabetes Son     Other - See Comments Son         lives in [place]    Coronary Artery Disease No family hx of     Hyperlipidemia No family hx of     Colon Cancer No family hx of     Prostate Cancer No family hx of     Depression No family hx of     Substance Abuse No family hx of     Obesity No family hx of     Osteoporosis No family hx of     Asthma No family hx of     Mental Illness No family hx of     Anxiety Disorder No family hx of     Other Cancer No family hx of           Allergies:     Allergies    Allergen Reactions    Compazine [Prochlorperazine] Other (See Comments)     Jittery and hyper and foggy    Primidone      vomiting, chills & hot flashes, headache, sensitivity to light & noise    Codeine Sulfate Nausea            Medications:     Current Outpatient Medications   Medication Sig    acetaminophen (TYLENOL) 500 MG tablet Take 1,000 mg by mouth every morning    aspirin (ASA) 81 MG EC tablet Take 81 mg by mouth daily as needed (prevent clots when flying)    Calcium Carbonate-Vit D-Min (CALTRATE MINIS PLUS MINERALS) 300-800 MG-UNIT TABS Take 2 tablets by mouth daily    Cholecalciferol (VITAMIN D3) 50 MCG (2000 UT) CAPS Take 2,000 Units by mouth daily    cyanocobalamin (VITAMIN B-12) 1000 MCG tablet Take 1 tablet (1,000 mcg) by mouth every other day    famotidine (PEPCID) 20 MG tablet Take 20 mg by mouth nightly as needed    polyethylene glycol (MIRALAX) 17 GM/Dose powder Take 1 capful. by mouth every other day    propranolol ER (INDERAL LA) 60 MG 24 hr capsule Take 1 capsule (60 mg) by mouth daily    simvastatin (ZOCOR) 20 MG tablet Take 1 tablet (20 mg) by mouth At Bedtime    tamoxifen (NOLVADEX) 20 MG tablet TAKE ONE TABLET BY MOUTH ONE TIME DAILY    topiramate (TOPAMAX) 50 MG tablet Take 1 tablet (50 mg) by mouth At Bedtime    zoledronic Acid (RECLAST) 5 MG/100ML SOLN infusion 5 mg/100 mL once a year    tamoxifen (NOLVADEX) 20 MG tablet TAKE ONE TABLET BY MOUTH ONE TIME DAILY    topiramate (TOPAMAX) 50 MG tablet Take 1 tablet (50 mg) by mouth At Bedtime    zoledronic Acid (RECLAST) 5 MG/100ML SOLN infusion 5 mg/100 mL once a year     No current facility-administered medications for this visit.             Review of Systems:    ROS: 14 point ROS neg other than the symptoms noted above in the HPI.          Physical Exam:   not currently breastfeeding.  Data Unavailable, There is no height or weight on file to calculate BMI., 0 lbs 0 oz  Gen appearance: Age-appropriate appearing female in NAD.   HEENT:   EOMI, conjunctiva clear/white. Normal ROM of neck for age.   Psych:  alert , In no acute distress.  Neuro:  A&Ox3  Skin:  Clear of obvious rashes, ecchymoses.  Resp:  Normal respiratory effort; not in acute respiratory distress.   Vasc:  Regular rate.  lymph:  No obvious LE edema bilaterally.     exam:  Vulva is normal in appearance. Urethra normal.. Negative for DAVID with reduction of speculum when instructed to cough though does have some mobility to urethra. Vaginal mucosa atrophic. No pain to palpation on internal exam or external exam. Cervix unremarkable, Cystocele grade 3. Uterovaginal prolapse grade 2.. Strength 1/5. No obvious masses, lesions, ulcers, bleeding noted on internal or external exam.          Data:    PVR  21mL    Labs:  UA RESULTS:  Recent Labs   Lab Test 09/18/23  1244 10/03/19  0934   COLOR Light Yellow Yellow   APPEARANCE Slightly Cloudy* Clear   URINEGLC Negative Negative   URINEBILI Negative Negative   URINEKETONE Negative Negative   SG 1.007 1.015   UBLD Negative Negative   URINEPH 7.5* 7.5*   PROTEIN Negative Negative   UROBILINOGEN  --  1.0   NITRITE Negative Negative   LEUKEST Trace* Negative   RBCU 0-2  --    WBCU 0-5  --      Creatinine   Date Value Ref Range Status   10/10/2022 0.69 0.52 - 1.04 mg/dL Final   09/24/2020 0.690 0.500 - 1.300 mg/dL Final     Lab on 10/10/2022   Component Date Value Ref Range Status    Sodium 10/10/2022 142  133 - 144 mmol/L Final    Potassium 10/10/2022 4.1  3.4 - 5.3 mmol/L Final    Chloride 10/10/2022 111 (H)  94 - 109 mmol/L Final    Carbon Dioxide (CO2) 10/10/2022 28  20 - 32 mmol/L Final    Anion Gap 10/10/2022 3  3 - 14 mmol/L Final    Urea Nitrogen 10/10/2022 15  7 - 30 mg/dL Final    Creatinine 10/10/2022 0.69  0.52 - 1.04 mg/dL Final    Calcium 10/10/2022 9.3  8.5 - 10.1 mg/dL Final    Glucose 10/10/2022 96  70 - 99 mg/dL Final    Alkaline Phosphatase 10/10/2022 31 (L)  40 - 150 U/L Final    AST 10/10/2022 16  0 - 45 U/L Final    ALT  10/10/2022 16  0 - 50 U/L Final    Protein Total 10/10/2022 6.8  6.8 - 8.8 g/dL Final    Albumin 10/10/2022 4.0  3.4 - 5.0 g/dL Final    Bilirubin Total 10/10/2022 0.7  0.2 - 1.3 mg/dL Final    GFR Estimate 10/10/2022 >90  >60 mL/min/1.73m2 Final    Effective December 21, 2021 eGFRcr in adults is calculated using the 2021 CKD-EPI creatinine equation which includes age and gender (Carmen et al., [place], DOI: 10.1056/AUHUdv2237707)    WBC Count 10/10/2022 4.8  4.0 - 11.0 10e3/uL Final    RBC Count 10/10/2022 3.92  3.80 - 5.20 10e6/uL Final    Hemoglobin 10/10/2022 12.0  11.7 - 15.7 g/dL Final    Hematocrit 10/10/2022 36.1  35.0 - 47.0 % Final    MCV 10/10/2022 92  78 - 100 fL Final    MCH 10/10/2022 30.6  26.5 - 33.0 pg Final    MCHC 10/10/2022 33.2  31.5 - 36.5 g/dL Final    RDW 10/10/2022 13.1  10.0 - 15.0 % Final    Platelet Count 10/10/2022 217  150 - 450 10e3/uL Final    % Neutrophils 10/10/2022 56  % Final    % Lymphocytes 10/10/2022 31  % Final    % Monocytes 10/10/2022 10  % Final    % Eosinophils 10/10/2022 2  % Final    % Basophils 10/10/2022 1  % Final    % Immature Granulocytes 10/10/2022 0  % Final    NRBCs per 100 WBC 10/10/2022 0  <1 /100 Final    Absolute Neutrophils 10/10/2022 2.7  1.6 - 8.3 10e3/uL Final    Absolute Lymphocytes 10/10/2022 1.5  0.8 - 5.3 10e3/uL Final    Absolute Monocytes 10/10/2022 0.5  0.0 - 1.3 10e3/uL Final    Absolute Eosinophils 10/10/2022 0.1  0.0 - 0.7 10e3/uL Final    Absolute Basophils 10/10/2022 0.0  0.0 - 0.2 10e3/uL Final    Absolute Immature Granulocytes 10/10/2022 0.0  <=0.4 10e3/uL Final    Absolute NRBCs 10/10/2022 0.0  10e3/uL Final    Cholesterol 10/10/2022 156  <200 mg/dL Final    Triglycerides 10/10/2022 57  <150 mg/dL Final    Direct Measure HDL 10/10/2022 82  >=50 mg/dL Final    LDL Cholesterol Calculated 10/10/2022 63  <=100 mg/dL Final    Non HDL Cholesterol 10/10/2022 74  <130 mg/dL Final

## 2023-09-18 NOTE — NURSING NOTE
Seema Johnson's goals for this visit include:   Chief Complaint   Patient presents with    New Patient     Urinary dysfunction       She requests these members of her care team be copied on today's visit information:       PCP: Shantel Plaza    Referring Provider:  Pawel Salinas MD  [address] [place] [id number]    post void residual:21 ml    Do you need any medication refills at today's visit?     Carol Licona LPN on 9/18/2023 at 1:02 PM

## 2023-09-18 NOTE — PATIENT INSTRUCTIONS
Follow-up when back from [place]     Ask your oncologist about estrogen cream 2x/week vaginally at night.    Call clinic if develop symptoms of UTI. If develop symptoms of vaginal infection, recommend following up with your primary care doctor.     Try adding fiber supplement to bowel regimen. Start with half the dose.     Referred you to pelvic floor physical therapy  You have been referred to Pelvic Floor Physical Therapy. They will call you to schedule this appointment  Locations for Pelvic Floor Physical Therapy:  [facility]:   [facility] - [facility] - [facility]    _________________________________________________   Below is a list of things that can irritate the bladder and should be eliminated:  Caffeinated soft drinks.  Coffee.  Tea.  Chocolate.  Tomato-based foods.  Acidic juices and fruits. (includes cranberry juice)  Alcohol.  Nicotine  Carbonated drinks.  Aspartame/Nutrasweet.    ________________________________

## 2023-09-19 ENCOUNTER — ALLIED HEALTH/NURSE VISIT (OUTPATIENT)
Dept: FAMILY MEDICINE | Facility: CLINIC | Age: 68
End: 2023-09-19
Payer: COMMERCIAL

## 2023-09-19 DIAGNOSIS — Z23 NEED FOR PROPHYLACTIC VACCINATION AND INOCULATION AGAINST INFLUENZA: Primary | ICD-10-CM

## 2023-09-19 PROCEDURE — 90662 IIV NO PRSV INCREASED AG IM: CPT

## 2023-09-19 PROCEDURE — 99207 PR NO CHARGE NURSE ONLY: CPT

## 2023-09-19 PROCEDURE — G0008 ADMIN INFLUENZA VIRUS VAC: HCPCS

## 2023-10-02 ENCOUNTER — OFFICE VISIT (OUTPATIENT)
Dept: NEUROLOGY | Facility: CLINIC | Age: 68
End: 2023-10-02
Payer: COMMERCIAL

## 2023-10-02 VITALS
HEART RATE: 65 BPM | DIASTOLIC BLOOD PRESSURE: 61 MMHG | BODY MASS INDEX: 19.97 KG/M2 | SYSTOLIC BLOOD PRESSURE: 95 MMHG | WEIGHT: 117 LBS | HEIGHT: 64 IN

## 2023-10-02 DIAGNOSIS — R25.1 TREMOR: Primary | ICD-10-CM

## 2023-10-02 DIAGNOSIS — G25.0 BENIGN ESSENTIAL TREMOR: ICD-10-CM

## 2023-10-02 PROCEDURE — 99214 OFFICE O/P EST MOD 30 MIN: CPT | Performed by: PSYCHIATRY & NEUROLOGY

## 2023-10-02 RX ORDER — PROPRANOLOL HCL 60 MG
60 CAPSULE, EXTENDED RELEASE 24HR ORAL AT BEDTIME
Qty: 180 CAPSULE | Refills: 3 | Status: SHIPPED | OUTPATIENT
Start: 2023-10-02

## 2023-10-02 RX ORDER — TOPIRAMATE 50 MG/1
50 TABLET, FILM COATED ORAL AT BEDTIME
Qty: 90 TABLET | Refills: 3 | Status: SHIPPED | OUTPATIENT
Start: 2023-10-02 | End: 2024-01-26

## 2023-10-02 NOTE — LETTER
10/2/2023         RE: Seema Johnson  [address]  [facility] [address]        Dear Colleague,    Thank you for referring your patient, Seema Johnson, to the [facility] [place]. Please see a copy of my visit note below.        Diagnosis/Summary/Recommendations:    PATIENT: Seema Johnson  67 year old female     : 1955    TRI: 2023    MRN: [id number]  [address]   [facility] [address]     [phone] (M)   [phone] (H)   NONE (W)      [email or web address]     Tyrell Johnson  [phone]     Retired      Seen by Leida 2018  Brain mri 2019 for vertigo        Assessment:  (G25.0) Benign essential tremor  (primary encounter diagnosis)  Mgm with tremor  Mother hand tremor  50s onset   Head tremor  Hand tremor   Right handed  Bilateral hand tremor  Right may be worse than her left h and  No clear if alcohol helps her tremor     Reasons [facility] is Required   Family History of tremor  yes  Uncontrolled shaking  yes  Tremors on action or intention  yes  Difficulty holding items yes - problems with cup and has to use two hands  Frequently spills/drops items  - yes problems pouring  Difficulty eating normally  - has a fancy adaptable device  Difficulty with dressing/daily hygiene needs  - yes buttoning  Difficulty writing  Yes problems  Difficulty using cell phone/computers  Double tapping     Tried and failed therapies   Has been on medications for tremor   Propranolol inderal LA 60mg - 10 years   Rx by a neurologist      Handedness   Right handeded  Which hand is more severe  right  Treatment: RIght, Left or both hands   - bilateral   Where is the tremor worse - close to the mouth or when arms are extended - extended is worse and left is worse than right       Confirm that you do not have any of these contraindications:    An implanted electrical medical device, such as a pacemaker,  defibrillator, heart monitor, insulin  pump, bladder stimulator, or  deep brain stimulator or an Insulin pump   NO       Suspected or diagnosed epilepsy or other seizure disorder  no       Pregnancy  - no     Confirm that your tremor is not caused by any of the followings no    Thyroid issues (e.g., hyperthyroidism)    Metabolic disorders (e.g., B-12 deficiency)     Size of watch band as measured around your wrist  16 cm - SMALL   Small (13.6-16.4 cm), Medium (16.5-18.4 cm) or large (18.5-20.4 cm)         Review of diagnosis    Essential tremor      Avoidance of dopamine blockers   Not taking     Motor complication review   N/a     Review of Impulse control disorders   N/a     Review of surgical or medication options   reviewed     Gait/Balance/Falls   falls due to not seeing a step - tend to tuck and roll     Exercise/Therapy performed/offered   Exercising      Cognitive/Driving   Retired -  and worked in a green house      [place] background   Studied [place] [place] [employer] started in 3rd grade  [employer] in [employer]  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   Nocturia  Wakes up 3 times per night  Eventual gets back to sleep  Wakes up at 7am and feels tired   May nap      Bladder/Renal/Prostate/Gyn/Other  Mixed urinary dysfunction  Has a pad  Nocturia 3/noc  Has urgency and frequency  Has not seen a urologist     GI/Constipation/GERD   Changed her diet and no longer  Has issues with GERD  asymptomatic     ENDO/Lipid/DM/Bone density/Thyroid  Osteoporosis  Calcium with milk in motor, afternoon and evening  Vitamin D3  Lipid disorder  On statin - borderline      Cardio/heart/Hyper or Hypotensive   Has low blood pressure at times - strenuous and bent over  Has not fainted.      Vision/Dry Eyes/Cataracts/Glaucoma/Macular   Wears   Early cataracts     Heme/Anticoagulation/Antiplatelet/Anemia/Other  Vitamin B12 every other day  Aspirin when travels 81mg day      ENT/Resp  Vertigo - associated - no issues lately  Hearing loss - going to get hearing aides     Skin/Cancer/Seborrhea/other  Skin cancer - mohs defect  Squamous cell carcinoma     Musculoskeletal/Pain/Headache  Closed compression fracture L1  Closed compression fracture thoracic spine  Left hip pain     Lumbar spine MRI 4/25/2022  Findings: Exaggerated lumbar lordosis. Presumed 5 lumbar-type  vertebra. Chronic wedge-shaped deformity and vertebral body height  loss at T12 without any bone marrow abnormality. Stepwise trace  retrolisthesis is at L1-2 and L2-3 better seen on prior radiographs.  Multilevel disc height loss and disc degeneration.   The tip of the  conus medullaris is at  L1.  No pars defect identified. Bone marrow  signal intensity is within normal limits and no abnormal signal is  visible.      On a level by level basis:     T12-L1: Disc bulge without spinal canal or foraminal stenosis.     L1-2: No significant spinal canal or foraminal narrowing.     L2-3: Mild facet hypertrophy and associated mild neural foraminal  narrowing without significant spinal canal narrowing.     L3-4: Disc bulge and facet hypertrophy. Bilateral mild neural  foraminal stenosis without spinal canal narrowing.     L4-5: No significant spinal canal or foraminal narrowing.     L5-S1: No significant spinal canal or foraminal narrowing.     Partially visualized presumed benign cyst in the liver segment 6  measuring 1 cm in diameter.                                                                    Impression: Degenerative changes as described above. No high-grade  spinal canal or neural foraminal stenosis at any level.    "  Other:  Invasive ductal carcinoma right breast 2016     Treating insomnia can be difficult because the medications we use can be harmful, especially in older adults. In addition, sleep medications do not tend to be very effective and should only be used short-term. Cognitive behavioral therapy (CBT) is the most effective treatment for long-term insomnia. The goal of CBT for insomnia is to address the underlying causes of the sleep problems, including your habits and how you think about sleep. You can do CBT with a trained psychologist, or from the comfort of your home by following an online program or workbook. Here are some great resources for at-home CBT:     1) 6-week online CBT course through [facility] for $40: [employer]/sleep  2) \"Say Noel to Insomnia\" by Kimo Noel, PhD at [employer]: 6-week program  3) \"Overcoming Insomnia: A Cognitive-Behavioral Therapy Approach Workbook\" by Margarito Hurtado and Yohana Roberts  4) \"Quiet Your Mind and Get to Sleep: Solutions to Insomnia for Those with Depression, Anxiety or Chronic Pain ([employer] Self-Help Workbook) by Yohana Roberts and Ester Leon     Urinary issues - would recommend a consultation     Cognitive changes  Neuropsychological evaluation was ordered --- this      Still on tamoxifen - 7 years out but was told may need to take it 10 years based on the type of cancer.      Has hearing loss and will get a hearing aides     Has visual changes and may have early cataracts.       [facility] [place] DBS Education Video  Deep brain stimulation (DBS)      If the above link does not work, cut and paste this address into your browser     [email or web address]     Pharmacy ([place]) consultation and medication management  Please call the scheduling number I@ [phone] to set up an appointment with pharmacists Evelina Saldivar or Summer Bustos.      Continue on the " beta blocker for now -has some light headedness; bp and heart rate are 110/69 and heart rate 64     Discussed risks of primidone/mysoline which can affect memory and balance     Discussed topiramate (topamax) which can help tremor but carry risk of renal stones     Discussed cervical dystonia/dystonic head tremor - has a bit of tilt and rotation of the head  Consider botox/botulinum toxin injections  Dr Radha Hubbard     [employer] ksenia to be installed on her phone.     Return 6-12 months      Medications               Acetaminophen tylenol 2         Aspirin 81mg prn         Calc Carb-cholecalciferol 1000-800  2       Cholecalciferol Vitamin D3 50mcg 2000 units 1         Cyanocobalamin Vit B12 1000mcg qod         Polyethylene glycol miralax qod      Famotidine pepcid 20mg off         Propranolol inderal LA 60mg 24hr       1   Simvastatin zocor 20mg        1   Tamoxifen nolvadex 20mg  1         Topiramate topamax 50mg       1   Zoledronic acid reclast year                  Plan:    95/61 and pulse is 65  Taking propranolol inderal LA 60mg 24hr    Taking fluid 32 oz daily  On topiramate topamax 50mg    10/3/2023 Dr. Demarco evaluation  Encouraged her to review test results with her after testing    Stress level is up     Encouraged her to spread her calcium to twice daily    Will be going to [place] and will be in [facility] for the winter and return mid April 2024    Rx sent to [facility] for her propranolol and topiramate  Watched the video and learned about mood     Will be ending her reclast treatments at some point     Discussed DBS and botox - she will live with her symptoms as is with her present medication regimen which was refilled.     Next summer Ace, Oleg and Radha     Discussed antiamyloid treatments.     Coding statement:   Medical Decision Making:  #  Chronic progressive medical conditions addressed  - see above --   Review and/or interpretation of unique test or documentation from a provider outside of  neurology no   Independent historian provided additional details  no I  Prescription drug management and review of potential side effects and/or monitoring for side effects  -- see above ---  Health impacted by social determinants of health  no    I have reviewed the note as documented above.  This accurately captures the substance of my conversation with the patient and total time spent preparing for visit, executing visit and completing visit on the day of the visit:  30 minutes.  The portion of this total time included face to face time 23 minutes.     Pawel Salinas MD     ______________________________________    Last visit date and details:             ______________________________________      Patient was asked about 14 Review of systems including changes in vision (dry eyes, double vision), hearing, heart, lungs, musculoskeletal, depression, anxiety, snoring, RBD, insomnia, urinary frequency, urinary urgency, constipation, swallowing problems, hematological, ID, allergies, skin problems: seborrhea, endocrinological: thyroid, diabetes, cholesterol; balance, weight changes, and other neurological problems and these were not significant at this time except for   Patient Active Problem List   Diagnosis     Mohs defect of cheek     Chronic bilateral thoracic back pain     Closed compression fracture of thoracic vertebra (H)     Closed compression fracture of first lumbar vertebra (H)     Osteoporosis     Invasive ductal carcinoma of breast, female (H)- RIGHT breast, Upper outer quadrant- 2015     Benign essential tremor     Hyperlipidemia LDL goal <100     Mixed urge and stress incontinence     Vertigo     Lateral pain of left hip          Allergies   Allergen Reactions     Compazine [Prochlorperazine] Other (See Comments)     Jittery and hyper and foggy     Primidone      vomiting, chills & hot flashes, headache, sensitivity to light & noise     Codeine Sulfate Nausea     Past Surgical History:   Procedure  Laterality Date     BIOPSY BREAST       BREAST SURGERY Right 01/2016    breast cancer     LUMPECTOMY BREAST       Past Medical History:   Diagnosis Date     Basal cell cancer      Breast cancer (H)      Dyslipidemia      History of blood transfusion      Hypertension      Mohs defect of nose 06/2012     Social History     Socioeconomic History     Marital status:      Spouse name: Not on file     Number of children: Not on file     Years of education: Not on file     Highest education level: Not on file   Occupational History     Not on file   Tobacco Use     Smoking status: Never     Smokeless tobacco: Never   Vaping Use     Vaping Use: Never used   Substance and Sexual Activity     Alcohol use: Yes     Comment: socially     Drug use: No     Sexual activity: Yes     Partners: Male   Other Topics Concern     Parent/sibling w/ CABG, MI or angioplasty before 65F 55M? No   Social History Narrative     Not on file     Social Determinants of Health     Financial Resource Strain: Not on file   Food Insecurity: Not on file   Transportation Needs: Not on file   Physical Activity: Not on file   Stress: Not on file   Social Connections: Not on file   Intimate Partner Violence: Not on file   Housing Stability: Not on file       Drug and lactation database from the [place] National Library of Medicine:  http://toxnet.nlm.nih.gov/cgi-bin/sis/htmlgen?LACT      B/P: Data Unavailable, T: Data Unavailable, P: Data Unavailable, R: Data Unavailable 0 lbs 0 oz  not currently breastfeeding., There is no height or weight on file to calculate BMI.  Medications and Vitals not listed above were documented in the cart and reviewed by me.     Current Outpatient Medications   Medication Sig Dispense Refill     zoledronic Acid (RECLAST) 5 MG/100ML SOLN infusion 5 mg/100 mL once a year       acetaminophen (TYLENOL) 500 MG tablet Take 1,000 mg by mouth every morning 2 tablet 0     aspirin (ASA) 81 MG EC tablet Take 81 mg by mouth  daily as needed (prevent clots when flying)       Calcium Carbonate-Vit D-Min (CALTRATE MINIS PLUS MINERALS) 300-800 MG-UNIT TABS Take 2 tablets by mouth daily       Cholecalciferol (VITAMIN D3) 50 MCG (2000 UT) CAPS Take 2,000 Units by mouth daily       cyanocobalamin (VITAMIN B-12) 1000 MCG tablet Take 1 tablet (1,000 mcg) by mouth every other day       famotidine (PEPCID) 20 MG tablet Take 20 mg by mouth nightly as needed       polyethylene glycol (MIRALAX) 17 GM/Dose powder Take 1 capful. by mouth every other day       propranolol ER (INDERAL LA) 60 MG 24 hr capsule Take 1 capsule (60 mg) by mouth daily 180 capsule 1     simvastatin (ZOCOR) 20 MG tablet Take 1 tablet (20 mg) by mouth At Bedtime 180 tablet 1     tamoxifen (NOLVADEX) 20 MG tablet TAKE ONE TABLET BY MOUTH ONE TIME DAILY 90 tablet 0     topiramate (TOPAMAX) 50 MG tablet Take 1 tablet (50 mg) by mouth At Bedtime 90 tablet 3         Pawel Salinas MD      Again, thank you for allowing me to participate in the care of your patient.        Sincerely,        Pawel Salinas MD

## 2023-10-02 NOTE — PATIENT INSTRUCTIONS
Medications               Acetaminophen tylenol 2         Aspirin 81mg prn         Calc Carb-cholecalciferol 1000-800  2       Cholecalciferol Vitamin D3 50mcg 2000 units 1         Cyanocobalamin Vit B12 1000mcg qod         Polyethylene glycol miralax qod      Famotidine pepcid 20mg off         Propranolol inderal LA 60mg 24hr       1   Simvastatin zocor 20mg        1   Tamoxifen nolvadex 20mg  1         Topiramate topamax 50mg       1   Zoledronic acid reclast year                  Plan:    95/61 and pulse is 65  Taking propranolol inderal LA 60mg 24hr    Taking fluid 32 oz daily  On topiramate topamax 50mg    10/3/2023 Dr. Demarco evaluation  Encouraged her to review test results with her after testing    Stress level is up     Encouraged her to spread her calcium to twice daily    Will be going to [place] and will be in [facility] for the winter and return mid April 2024    Rx sent to [facility] for her propranolol and topiramate  Watched the video and learned about mood     Will be ending her reclast treatments at some point     Discussed DBS and botox - she will live with her symptoms as is with her present medication regimen which was refilled.     Next summer Oleg Bello and Radha

## 2023-10-02 NOTE — NURSING NOTE
"Seema Johnson's goals for this visit include:   Chief Complaint   Patient presents with    RECHECK     Follow up 6 months tremor       She requests these members of her care team be copied on today's visit information: yes    PCP: [facility]    Referring Provider:  No referring provider defined for this encounter.    BP 95/61   Pulse 65   Ht 1.626 m (5' 4\")   Wt 53.1 kg (117 lb)   BMI 20.08 kg/m      Do you need any medication refills at today's visit? No  MIGDALIA Mccray, EVA ([facility])      "

## 2023-10-03 ENCOUNTER — OFFICE VISIT (OUTPATIENT)
Dept: NEUROPSYCHOLOGY | Facility: CLINIC | Age: 68
End: 2023-10-03
Attending: PSYCHIATRY & NEUROLOGY
Payer: COMMERCIAL

## 2023-10-03 DIAGNOSIS — R41.3 COMPLAINTS OF MEMORY DISTURBANCE: Primary | ICD-10-CM

## 2023-10-03 PROCEDURE — 96132 NRPSYC TST EVAL PHYS/QHP 1ST: CPT

## 2023-10-03 PROCEDURE — 96133 NRPSYC TST EVAL PHYS/QHP EA: CPT

## 2023-10-03 PROCEDURE — 90791 PSYCH DIAGNOSTIC EVALUATION: CPT

## 2023-10-03 PROCEDURE — 96138 PSYCL/NRPSYC TECH 1ST: CPT

## 2023-10-03 PROCEDURE — 96139 PSYCL/NRPSYC TST TECH EA: CPT
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
## 2023-10-04 NOTE — PROGRESS NOTES
The patient was seen for neuropsychological evaluation at the request of Pawel Salinas MD for the purposes of diagnostic clarification and treatment planning. 217 minutes of test administration and scoring were provided by this writer. Please see Dr. Morena Demarco's report for a full interpretation of the findings.    Tonja Cabrera  Psychometrist

## 2023-10-06 NOTE — PROGRESS NOTES
Provider: CE      Tech: KS      Patient Name: Seema Johnson      : [birth year]      MRN: [id number]      SPENCE: 10/3/23      Age: 67      Education: 17      Ethnicity: C      Handedness: Right      Station: OP             NEUROPSYCHOLOGICAL TESTS             PREMORBID ABILITY RAW SCORE STANDARDIZED SCORE* DESCRIPTIVE RANGE**   WAIS-IV ACS Test of Premorbid Functioning (TOPF) 55 SS= 112 High Average     Estimated FSIQ = 111 High Average          ATTENTION AND EXECUTIVE FUNCTIONS RAW SCORE STANDARDIZED SCORE* DESCRIPTIVE RANGE**   Wechsler Adult Intelligence Scale - 4th Edition (WAIS-IV)     Digit Span Forward 7 ss= 7 Low Average   Digit Span Backward 10 ss= 12 High Average   Digit Span Sequencing 6 ss= 8 Average   Digit Span Total 23 ss= 9 Average   Coding 73 ss= 14 Above Average   Similarities 33 ss= 15 Above Average   Matrix Reasoning 24 ss= 17 Exceptionally High   Trail Making Test (Time/errors)       Part A s,r,e 41/0 TS= 41 Low Average   Part B s,r,e 69/0 TS= 51 Average   Stroop       Word e 100 TS= 45 Average   Color e 78 TS= 52 Average   Color/Word e 49 TS= 60 High Average   Interference e 6 TS= 56 Average          LANGUAGE RAW SCORE STANDARDIZED SCORE* DESCRIPTIVE RANGE**   Gates Naming Test s,r,e 58 TS= 60 High Average   Letter-Cue Verbal Fluency s,r,e 13-19-10 (42) TS= 50 Average   Animal Fluency s,r,e 15 TS=  40 Low Average          VISUOSPATIAL PROCESSING RAW SCORE STANDARDIZED SCORE* DESCRIPTIVE RANGE**   Rigoberto Complex Figure Test (RCFT) Copy 33 %ile= >16 WNL   Repeatable Battery for the Assessment of Neuropsychological Status (RBANS; Form A)   Line Orientation 18 %ile= 51-75 Average   WMS-IV Visual Reproduction Copy 43 %= >75 High Average          LEARNING & MEMORY RAW SCORE STANDARDIZED SCORE* DESCRIPTIVE RANGE**   Wechsler Memory Scale-4th Edition (WMS-IV)      Logical Memory I 37 ss= 12 High Average   Logical Memory II 18 ss= 10 Average   Logical Memory Recognition 16 %= 10-16 Low Average   Visual  Reproduction I 37 ss= 12 High Average   Visual Reproduction II 33 ss= 14 Above Average   Visual Reproduction Recognition 7 %= >75 High Average   Arthur Verbal Learning Test - Revised (HVLT-R; Form 1)    Total Trials 1-3 6-8-9 (23) TS= 48 Average   Delayed Recall 7 TS= 44 Average   Retention (%) 78% TS= 45 Average   Recognition Hits 12 --     Recognition False Alarms 2 --     Recognition Discriminability 10 TS= 47 Average          PSYCHOLOGICAL & BEHAVIORAL SYMPTOMS RAW SCORE STANDARDIZED SCORE* DESCRIPTIVE RANGE**   Geriatric Depression Scale (GDS) 5 --  Minimal   Geriatric Anxiey Scale (GAS)       Somatic 6 --  Mild   Cognitive 4 --  Mild   Affective 3 --  Minimal   Total 13 --  Mild          PERFORMANCE VALIDITY RAW SCORE STANDARDIZED SCORE* DESCRIPTIVE RANGE**   RDS 9   Valid Range          * All standardized scores are adjusted for age. Superscripts denote additional adjustment for (s)ex, (r)ace/ethnicity, (l)anguage, and/or (e)ducation.   ** Descriptive ranges are based on American Academy of Clinical Neuropsychology (2020) consensus guidelines, or test manuals where appropriate.    WNL = within normal limits. DC = discontinued due to patient s inability to complete the test.    Standardized scores: TS = T-score; mean = 50, standard deviation =10; z = z-score; mean = 0, standard deviation = 1; ss = scaled score; mean = 10, standard deviation = 3; MAS = [place] Older Adult Normative Study age adjusted scaled score; mean = 10, standard deviation = 3; SS = standard score; mean = 100, standard deviation = 15.
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
## 2023-11-12 ENCOUNTER — HEALTH MAINTENANCE LETTER (OUTPATIENT)
Age: 68
End: 2023-11-12

## 2023-11-28 DIAGNOSIS — Z12.31 VISIT FOR SCREENING MAMMOGRAM: ICD-10-CM

## 2023-11-28 DIAGNOSIS — R92.30 DENSE BREASTS: ICD-10-CM

## 2023-11-28 DIAGNOSIS — C50.911 INFILTRATING DUCTAL CARCINOMA OF RIGHT FEMALE BREAST (H): Chronic | ICD-10-CM

## 2023-11-30 RX ORDER — TAMOXIFEN CITRATE 20 MG/1
20 TABLET ORAL DAILY
Qty: 30 TABLET | Refills: 0 | Status: SHIPPED | OUTPATIENT
Start: 2023-11-30 | End: 2023-12-12

## 2023-12-12 DIAGNOSIS — Z12.31 VISIT FOR SCREENING MAMMOGRAM: ICD-10-CM

## 2023-12-12 DIAGNOSIS — R92.30 DENSE BREASTS: ICD-10-CM

## 2023-12-12 DIAGNOSIS — C50.911 INFILTRATING DUCTAL CARCINOMA OF RIGHT FEMALE BREAST (H): Chronic | ICD-10-CM

## 2023-12-12 RX ORDER — TAMOXIFEN CITRATE 20 MG/1
20 TABLET ORAL DAILY
Qty: 90 TABLET | Refills: 1 | Status: SHIPPED | OUTPATIENT
Start: 2023-12-12 | End: 2024-01-26

## 2023-12-19 ENCOUNTER — TELEPHONE (OUTPATIENT)
Dept: OPHTHALMOLOGY | Facility: CLINIC | Age: 68
End: 2023-12-19
Payer: COMMERCIAL

## 2023-12-19 NOTE — TELEPHONE ENCOUNTER
Left Voicemail (2nd Attempt) for the patient to call back and schedule the following:    Appointment type: return  Provider: dr. Nevarez   Return date: 6/19/2024  Specialty phone number: [phone]   Additonal Notes: Return in about 1 year (around 6/19/2024) for Annual Visit, OCT Macula, OCT RNFL.     [facility]   Orthopedics, Podiatry, Sports Medicine, Ent ,Eye , Audiology, Adult Endocrine & Diabetes, Nutrition & Medication Therapy Management Specialties   [facility]

## 2024-01-02 NOTE — TELEPHONE ENCOUNTER
Ruth Ann,     Does this patient need an appointment? Assuming she's going to request another refill.     ThanksDedrick

## 2024-01-26 DIAGNOSIS — C50.911 INFILTRATING DUCTAL CARCINOMA OF RIGHT FEMALE BREAST (H): Chronic | ICD-10-CM

## 2024-01-26 DIAGNOSIS — Z12.31 VISIT FOR SCREENING MAMMOGRAM: ICD-10-CM

## 2024-01-26 DIAGNOSIS — G25.0 BENIGN ESSENTIAL TREMOR: ICD-10-CM

## 2024-01-26 DIAGNOSIS — R92.30 DENSE BREASTS: ICD-10-CM

## 2024-01-26 RX ORDER — TOPIRAMATE 50 MG/1
50 TABLET, FILM COATED ORAL AT BEDTIME
Qty: 14 TABLET | Refills: 0 | Status: SHIPPED | OUTPATIENT
Start: 2024-01-26 | End: 2024-04-22

## 2024-01-26 RX ORDER — TOPIRAMATE 50 MG/1
50 TABLET, FILM COATED ORAL AT BEDTIME
Qty: 90 TABLET | Refills: 0 | Status: SHIPPED | OUTPATIENT
Start: 2024-01-26 | End: 2024-01-26

## 2024-01-26 RX ORDER — TAMOXIFEN CITRATE 20 MG/1
20 TABLET ORAL DAILY
Qty: 90 TABLET | Refills: 0 | Status: SHIPPED | OUTPATIENT
Start: 2024-01-26 | End: 2024-01-26

## 2024-01-26 RX ORDER — TAMOXIFEN CITRATE 20 MG/1
20 TABLET ORAL DAILY
Qty: 14 TABLET | Refills: 0 | Status: SHIPPED | OUTPATIENT
Start: 2024-01-26 | End: 2024-04-19

## 2024-01-26 NOTE — TELEPHONE ENCOUNTER
Patient calling stating she is back in [place] from [place] and needs a 2 week supply of these medications. States her  has tried to mail her these from [place] but have been delayed. Patient states she is in [place] with her mother who was put in hospice rather suddenly.     Asking if medications can be sent to [place] [facility]. Medications and pharmacy pended.

## 2024-03-23 NOTE — PROGRESS NOTES
VIDEO VISIT    Date of Visit: April 22, 2024  Name: Seema Johnson  Date of Birth 1955  MRN: [id number]  [address]   [facility] [address]     [phone] (M)   [phone] (H)   NONE (W)      [email or web address]     Tyrell Johnson  [phone]     Retired      Seen by Leida 2018  Brain mri 2019 for vertigo        Assessment:  (G25.0) Benign essential tremor  (primary encounter diagnosis)  Mgm with tremor  Mother hand tremor  50s onset   Head tremor  Hand tremor   Right handed  Bilateral hand tremor  Right may be worse than her left h and  No clear if alcohol helps her tremor     Reasons [facility] Therapy is Required   Family History of tremor  yes  Uncontrolled shaking  yes  Tremors on action or intention  yes  Difficulty holding items yes - problems with cup and has to use two hands  Frequently spills/drops items  - yes problems pouring  Difficulty eating normally  - has a fancy adaptable device  Difficulty with dressing/daily hygiene needs  - yes buttoning  Difficulty writing  Yes problems  Difficulty using cell phone/computers  Double tapping     Tried and failed therapies   Has been on medications for tremor   Propranolol inderal LA 60mg - 10 years   Rx by a neurologist      Handedness   Right handeded  Which hand is more severe  right  Treatment: RIght, Left or both hands   - bilateral   Where is the tremor worse - close to the mouth or when arms are extended - extended is worse and left is worse than right       Confirm that you do not have any of these contraindications:   An implanted electrical medical device, such as a pacemaker,  defibrillator, heart monitor, insulin pump, bladder stimulator, or  deep brain stimulator or an Insulin pump   NO      Suspected or diagnosed epilepsy or other seizure disorder  no      Pregnancy  - no     Confirm that your tremor is not caused by any of the followings no   Thyroid issues (e.g., hyperthyroidism)   Metabolic disorders  (e.g., B-12 deficiency)     Size of watch band as measured around your wrist  16 cm - SMALL   Small (13.6-16.4 cm), Medium (16.5-18.4 cm) or large (18.5-20.4 cm)         Review of diagnosis    Essential tremor      Avoidance of dopamine blockers   Not taking     Motor complication review   N/a     Review of Impulse control disorders   N/a     Review of surgical or medication options   reviewed     Gait/Balance/Falls   falls due to not seeing a step - tend to tuck and roll     Exercise/Therapy performed/offered   Exercising      Cognitive/Driving   Retired -  and worked in a green house      Arabic background   Studied Arabic [employer] started in 3rd grade  [employer] in Arabic  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   Nocturia  Wakes up 3 times per night  Eventual gets back to sleep  Wakes up at 7am and feels tired   May nap      Bladder/Renal/Prostate/Gyn/Other  Mixed urinary dysfunction  Has a pad  Nocturia 3/noc  Has urgency and frequency  Has not seen a urologist     GI/Constipation/GERD   Changed her diet and no longer  Has issues with GERD  asymptomatic     ENDO/Lipid/DM/Bone density/Thyroid  Osteoporosis  Calcium with milk in motor, afternoon and evening  Vitamin D3  Lipid disorder  On statin - borderline     Cardio/heart/Hyper or Hypotensive   Has low blood pressure at times - strenuous and bent over  Has not fainted.      Vision/Dry Eyes/Cataracts/Glaucoma/Macular   Wears   Early cataracts     Heme/Anticoagulation/Antiplatelet/Anemia/Other  Vitamin B12 every other day  Aspirin when travels 81mg day       ENT/Resp  Vertigo - associated - no issues lately  Hearing loss - going to get hearing aides     Skin/Cancer/Seborrhea/other  Skin cancer - mohs defect  Squamous cell carcinoma     Musculoskeletal/Pain/Headache  Closed compression fracture L1  Closed compression fracture thoracic spine  Left hip pain     Lumbar spine MRI 4/25/2022  Findings: Exaggerated lumbar lordosis. Presumed 5 lumbar-type  vertebra. Chronic wedge-shaped deformity and vertebral body height  loss at T12 without any bone marrow abnormality. Stepwise trace  retrolisthesis is at L1-2 and L2-3 better seen on prior radiographs.  Multilevel disc height loss and disc degeneration.   The tip of the  conus medullaris is at  L1.  No pars defect identified. Bone marrow  signal intensity is within normal limits and no abnormal signal is  visible.      On a level by level basis:     T12-L1: Disc bulge without spinal canal or foraminal stenosis.     L1-2: No significant spinal canal or foraminal narrowing.     L2-3: Mild facet hypertrophy and associated mild neural foraminal  narrowing without significant spinal canal narrowing.     L3-4: Disc bulge and facet hypertrophy. Bilateral mild neural  foraminal stenosis without spinal canal narrowing.     L4-5: No significant spinal canal or foraminal narrowing.     L5-S1: No significant spinal canal or foraminal narrowing.     Partially visualized presumed benign cyst in the liver segment 6  measuring 1 cm in diameter.                                                                    Impression: Degenerative changes as described above. No high-grade  spinal canal or neural foraminal stenosis at any level.     Other:  Invasive ductal carcinoma right breast 2016      Treating insomnia can be difficult because the medications we use can be harmful, especially in older adults. In addition, sleep medications do not tend to be very effective and should only be used short-term. Cognitive behavioral therapy (CBT) is the  "most effective treatment for long-term insomnia. The goal of CBT for insomnia is to address the underlying causes of the sleep problems, including your habits and how you think about sleep. You can do CBT with a trained psychologist, or from the comfort of your home by following an online program or workbook. Here are some great resources for at-home CBT:     1) 6-week online CBT course through [facility] for $40: [employer]/sleep  2) \"Say Noel to Insomnia\" by Kimo Noel, PhD at [employer]: 6-week program  3) \"Overcoming Insomnia: A Cognitive-Behavioral Therapy Approach Workbook\" by Margarito Hurtado and Yohana Roberts  4) \"Quiet Your Mind and Get to Sleep: Solutions to Insomnia for Those with Depression, Anxiety or Chronic Pain ([employer] Self-Help Workbook) by Yohana Roberts and Ester Leon     Urinary issues - would recommend a consultation     Cognitive changes  Neuropsychological evaluation was ordered --- this      Still on tamoxifen - 7 years out but was told may need to take it 10 years based on the type of cancer.      Has hearing loss and will get a hearing aides     Has visual changes and may have early cataracts.      [employer] DBS Education Video  Deep brain stimulation (DBS)      If the above link does not work, cut and paste this address into your browser     [email or web address]     Pharmacy ([facility]) consultation and medication management     Continue on the beta blocker for now -has some light headedness; bp and heart rate are 110/69 and heart rate 64     Discussed risks of primidone/mysoline which can affect memory and balance     Discussed topiramate (topamax) which can help tremor but carry risk of renal stones     Discussed cervical dystonia/dystonic head tremor - has a bit of tilt and rotation of the head  Consider botox/botulinum toxin injections  Dr Radha Hubbard     mychart ksenia to " be installed on her phone.     Return 6-12 months    95/61 and pulse is 65  Taking propranolol inderal LA 60mg 24hr     Taking fluid 32 oz daily  On topiramate topamax 50mg     10/3/2023 Dr. Demarco evaluation  Encouraged her to review test results with her after testing    In the context of estimated high average premorbid abilities, Ms. Johnson s current neurocognitive profile revealed performance within expectation across all cognitive domains assessed. Subtle weaknesses (i.e., low average scores) were noted on tests of immediate auditory attention and semantic verbal fluency. Cognitive strengths were noted on tests of abstract reasoning and visual memory. Performance was within expectation across other cognitive tests including working memory, speeded processing, naming, letter-cue verbal fluency, visuospatial processing, verbal learning and memory, cognitive inhibition, planning/organization, and mental flexibility. Assessment of current psychological and emotional status revealed mild symptoms of anxiety and the absence of clinically significant depressive mood symptoms.     Overall, Ms. Johnson is functioning quite well from a neuropsychological standpoint and does not evidence any objective cognitive deficits. The very subtle weakness in attention and her subjective cognitive complaints are likely attributable to cognitive lapses in the context of anxiety, stress, and fatigue.         Stress level is up      Encouraged her to spread her calcium to twice daily     Will be going to [place] and will be in [facility] for the winter and return mid April 2024     Rx sent to [facility] for her propranolol and topiramate  Watched the video and learned about mood      Will be ending her reclast treatments at some point      Discussed DBS and botox - she will live with her symptoms as is with her present medication regimen which was refilled.      Next summer Oleg Bello and Radha      Discussed antiamyloid treatments.      Susu blankenship  [place] and had an intense headache and flashing in her eyes and headache lasted like a migraine. Lasted 3 hours - started getting headaches and gradually resolved.   Level 2 pain every other day or third day  Last Monday eyes were fine at doctors office.    Will get a MRI       Medications               Acetaminophen tylenol 2         Aspirin 81mg prn         Calc Carb-cholecalciferol 1000-800  2         Cholecalciferol Vitamin D3 50mcg 2000 units 1         Cyanocobalamin Vit B12 1000mcg qod         Polyethylene glycol miralax qod         Famotidine pepcid 20mg off         Propranolol inderal LA 60mg 24hr       1   Simvastatin zocor 20mg        1   Tamoxifen nolvadex 20mg  1         Topiramate topamax 50mg       1   Zoledronic acid reclast year                        Plan:    Her lorazepam was stopped as she was not taking it but actually needs it for her MRI scan so a Rx was sent.   [facility] Rx sent in [place].     Shantel Fenton is her PCP and an MRI will be ordered and scheduled for the symptoms.     Has incontinence - getting up every 2 hours  Had a urinalysis and not sure if there is a infection or contaminant.   Urology appointment may 2024  May get therapy   Frances Cleveland PA-C     Consider getting a sed rate or/and CRP with the headache history.   No eye issues  Wearing mouth guard  Family history of pituitary gland tumor  Pending mri to be scheduled  Neurology headache - referral if needed  Maria Rdz or Aashish     Saw oncologist on Friday and is taking tamoxifen and will remain on it for 18 months.     She is on reclast and will have a decision from rheumatology about if she will get another dose.   Her bowel  habits are better with the change in her calcium    Remains on simvastatin    Tremors are about the same and tolerable.   Christiana and Kat are studying a couple of medications for tremor.  "    https://[email or web address]_stories/evaluating-[email or web address]-new-[email or web address]-treatment-[email or web address]-essential-tremor/  https://www.[employer]/    Cognitive testing was good. 10/3/2023    Last visit was 10/2/2023    Return in 6 months     Return back in 6-12 months.     Medical Decision Making:  #  Chronic progressive medical conditions addressed  headache, etc.   Review and/or interpretation of unique test or documentation from a provider outside of neurology yes   Independent historian provided additional details  no   Prescription drug management and review of potential side effects and/or monitoring for side effects  yes   Health impacted by social determinants of health  no    I have reviewed the note as documented above.  This accurately captures the substance of my conversation with the patient and total time spent preparing for visit, executing visit and completing visit on the day of the visit:  30 minutes.     The longitudinal plan of care for Seema Johnson was addressed during this visit. Due to the added complexity in care, I will continue to support Seema Johnson in the subsequent management of this condition(s) and with the ongoing continuity of care of this condition(s).    Pawel Salinas MD      ------------------------------------------------------------------------------------------------------------------------------------------------------------------------    Video-Visit Details    The patient has been notified of following:     \"After a review of the patient s situation, this visit was changed from an in-person visit to a video visit to reduce the risk of COVID-19 exposure.   The patient is being evaluated via a billable video visit.\"    \"This video visit will be conducted via a call between you and your physician/provider. We have found that certain health care needs can be provided without the need for an in-person physical exam.  This service lets us provide the care you need with a " "video conversation.  If a prescription is necessary we can send it directly to your pharmacy.  If lab work is needed we can place an order for that and you can then stop by our lab to have the test done at a later time.    If during the course of the call the physician/provider feels a video visit is not appropriate, you will not be charged for this service.\"     Patient has given verbal consent for Video visit? Yes    Patient would like the video invitation sent by:     Type of service:  Video Visit    Video Start Time:     Video End Time (time video stopped):     Duration:  minutes - see above    Originating Location (pt. Location):     Distant Location (provider location):  [facility] [place]     Mode of Communication:  Video Conference via FreeDrive (and if not possible then doximity)      Pawel Salinas MD      --------------------------------------------------------------------------------------------------------------    Seema Johnson is a 68 year old female who is being evaluated via a billable video visit.      Charts reviewed  Consult from  Images reviewed        I have reviewed and updated the patient's Past Medical History, Social History, Family History and Medication List.    ALLERGIES  Compazine [prochlorperazine], Primidone, and Codeine sulfate    Lasts visit details if there was a last visit:       14 Review of systems  are negative except for   Patient Active Problem List   Diagnosis    Mohs defect of cheek    Chronic bilateral thoracic back pain    Closed compression fracture of thoracic vertebra (H)    Closed compression fracture of first lumbar vertebra (H)    Osteoporosis    Invasive ductal carcinoma of breast, female (H)- RIGHT breast, Upper outer quadrant- 2015    Benign essential tremor    Hyperlipidemia LDL goal <100    Mixed urge and stress incontinence    Vertigo    Lateral pain of left hip        Allergies   Allergen Reactions    Compazine " [Prochlorperazine] Other (See Comments)     Jittery and hyper and foggy    Primidone      vomiting, chills & hot flashes, headache, sensitivity to light & noise    Codeine Sulfate Nausea     Past Surgical History:   Procedure Laterality Date    BIOPSY BREAST      BREAST SURGERY Right 01/2016    breast cancer    LUMPECTOMY BREAST       Past Medical History:   Diagnosis Date    Basal cell cancer     Breast cancer (H)     Dyslipidemia     History of blood transfusion     Hypertension     Mohs defect of nose 06/2012     Social History     Socioeconomic History    Marital status:      Spouse name: Not on file    Number of children: Not on file    Years of education: Not on file    Highest education level: Not on file   Occupational History    Not on file   Tobacco Use    Smoking status: Never    Smokeless tobacco: Never   Vaping Use    Vaping Use: Never used   Substance and Sexual Activity    Alcohol use: Yes     Comment: socially    Drug use: No    Sexual activity: Yes     Partners: Male   Other Topics Concern    Parent/sibling w/ CABG, MI or angioplasty before 65F 55M? No   Social History Narrative    Not on file     Social Determinants of Health     Financial Resource Strain: Not on file   Food Insecurity: Not on file   Transportation Needs: Not on file   Physical Activity: Not on file   Stress: Not on file   Social Connections: Not on file   Interpersonal Safety: Not on file   Housing Stability: Not on file     Family History   Problem Relation Age of Onset    Macular Degeneration Mother     Cerebrovascular Disease Mother     Hypertension Mother     Thyroid Disease Mother     Other - See Comments Mother         ?tremor    Tremor Mother         hand tremor    Cerebrovascular Disease Father     Hypothyroidism Father     Diabetes Maternal Grandmother     Coronary Artery Disease Maternal Grandmother     Tremor Maternal Grandmother     Colon Cancer Paternal Grandmother     Other - See Comments Brother     Other -  See Comments Brother     Other - See Comments Brother     Other - See Comments Brother     Breast Cancer Sister     Other Cancer Sister     Anesthesia Reaction Sister     Diabetes Sister     Anesthesia Reaction Sister     Diabetes Son     Other - See Comments Son         lives in [place]    Coronary Artery Disease No family hx of     Hyperlipidemia No family hx of     Colon Cancer No family hx of     Prostate Cancer No family hx of     Depression No family hx of     Substance Abuse No family hx of     Obesity No family hx of     Osteoporosis No family hx of     Asthma No family hx of     Mental Illness No family hx of     Anxiety Disorder No family hx of     Other Cancer No family hx of      Current Outpatient Medications   Medication Sig Dispense Refill    acetaminophen (TYLENOL) 500 MG tablet Take 1,000 mg by mouth every morning 2 tablet 0    aspirin (ASA) 81 MG EC tablet Take 81 mg by mouth daily as needed (prevent clots when flying)      Calcium Carbonate-Vit D-Min (CALTRATE MINIS PLUS MINERALS) 300-800 MG-UNIT TABS Take 2 tablets by mouth daily      Cholecalciferol (VITAMIN D3) 50 MCG (2000 UT) CAPS Take 2,000 Units by mouth daily      cyanocobalamin (VITAMIN B-12) 1000 MCG tablet Take 1 tablet (1,000 mcg) by mouth every other day      polyethylene glycol (MIRALAX) 17 GM/Dose powder Take 1 capful. by mouth every other day      propranolol ER (INDERAL LA) 60 MG 24 hr capsule Take 1 capsule (60 mg) by mouth At Bedtime 180 capsule 3    simvastatin (ZOCOR) 20 MG tablet Take 1 tablet (20 mg) by mouth At Bedtime 180 tablet 1    tamoxifen (NOLVADEX) 20 MG tablet Take 1 tablet (20 mg) by mouth daily 14 tablet 0    topiramate (TOPAMAX) 50 MG tablet Take 1 tablet (50 mg) by mouth at bedtime 14 tablet 0    zoledronic Acid (RECLAST) 5 MG/100ML SOLN infusion 5 mg/100 mL once a year

## 2024-04-02 ENCOUNTER — TELEPHONE (OUTPATIENT)
Dept: NEUROLOGY | Facility: CLINIC | Age: 69
End: 2024-04-02
Payer: COMMERCIAL

## 2024-04-02 DIAGNOSIS — R25.1 TREMOR: Primary | ICD-10-CM

## 2024-04-02 NOTE — TELEPHONE ENCOUNTER
[facility] Call Center    Phone Message    May a detailed message be left on voicemail: yes     Reason for Call: Symptoms or Concerns     If patient has red-flag symptoms, warm transfer to triage line    Current symptom or concern: Vision disturbances     Symptoms have been present for:  3 week(s)    Has patient previously been seen for this? Yes    By Pawel Salinas    Are there any new or worsening symptoms? Yes: Pt is requesting a call back to discuss her vision changes since taking Topiramate.     Please call pt back to advise at # [phone].    Action Taken: [facility]    Travel Screening: Not Applicable

## 2024-04-03 NOTE — TELEPHONE ENCOUNTER
"RN returned patients call regarding her vision changes since starting topiramate ~3 weeks ago. She explained that when she first started taking the medication she was getting headaches. Not anything debilitating, but definitely bothersome. She said the headaches have slowly started to get better with time.     Regarding her visual changes, she explained that her middle-field vision is \"fuzzy\" and her peripheral vision is still sharp. She denied any gradual worsening of her vision. Symptoms have stayed the same since they started. She moved up her optometrist appointment for 4/15. She wonders if this can wait to be addressed until then. She is currently in [place] and is not driving.     Will route encounter to Dr. Salinas's covering provider Adina Love, DNP, APRN to review & advise.    ZACHARY MarianoN RN Care Coordinator  Neurology/Neurosurgery/PM&R/Pain Management     "

## 2024-04-03 NOTE — TELEPHONE ENCOUNTER
Per chart review, pt has been on Topiramate since at least June 2023. When she saw Dr. Salinas in Oct 2023, she was taking Topiramate 50 mg at bedtime & he refilled her Rx.    I'm not sure what she meant by 3 weeks ago when she started Topiramate?   Did she stop and restarted it?

## 2024-04-04 NOTE — TELEPHONE ENCOUNTER
RN attempted to reach patient to clarify if she had stopped and restarted the Topiramate 50mg, or if she been taking the medication since June and developed the visual changes only 3 weeks ago. Left a VM requesting a call back with clarification.     DARIO Mariano RN Care Coordinator  Neurology/Neurosurgery/PM&R/Pain Management

## 2024-04-04 NOTE — TELEPHONE ENCOUNTER
Unable to reach patient. Left VM that writer will try her mobile number again around 4:00pm.    DARIO Mariano RN Care Coordinator  Neurology/Neurosurgery/PM&R/Pain Management     Addendum 4:13pm:     Writer attempted to reach patient again via her mobile phone number. No answer, left a VM with request for a call back to the clinic.    DARIO Mariano RN Care Coordinator  Neurology/Neurosurgery/PM&R/Pain Management

## 2024-04-04 NOTE — TELEPHONE ENCOUNTER
[facility] Call Center    Phone Message    May a detailed message be left on voicemail: yes     Reason for Call: Other: Pt returning missed call from care team.      Per pt- No interruptions in Topiramate 50mg  since starting the medication in 2023.     Please reach out to further advise.  Pt states that she can also communicate via [employer].       Action Taken: Other: Neurology    Travel Screening: Not Applicable

## 2024-04-04 NOTE — TELEPHONE ENCOUNTER
[facility] Call Center    Phone Message    May a detailed message be left on voicemail: yes     Reason for Call: Other: Pt is returning a call to the care  team in thread below. Please call back to advise at # [phone]. Pt is in [place] and is 5 hrs behind our time.     Action Taken: Message routed to:  [facility] Neurology     Travel Screening: Not Applicable

## 2024-04-05 ENCOUNTER — PATIENT OUTREACH (OUTPATIENT)
Dept: CARE COORDINATION | Facility: CLINIC | Age: 69
End: 2024-04-05
Payer: COMMERCIAL

## 2024-04-05 NOTE — TELEPHONE ENCOUNTER
Thank you for the update and clarification.    If the vision changes are too bothersome, she can reduce the dose to 25 mg.  If not, it is only 10 days before she is seen by her eye doctor and can wait.    Either way, she can send Dr. Salinas an update after her eye exam.

## 2024-04-05 NOTE — TELEPHONE ENCOUNTER
RN called patient to relay the message below. She is comfortable with this plan and will call back if she has any further changes to her vision before her optometrist appointment on 4/15.     While on the phone, she remembered that she did forget to take her Topiramate for a few days while she was in [place]. This was about a month ago, so she wonders if missing those few days could have caused the start of her vision changes. Will route encounter back to Poppy HANNAH CNP to review.    DARIO Mariano RN Care Coordinator  Neurology/Neurosurgery/PM&R/Pain Management

## 2024-04-05 NOTE — TELEPHONE ENCOUNTER
Patient reports no interruptions in Topiramate 50mg since starting the medication in 2023. She had googled her eye symptoms and read that vision changes could be a serious side effect of the topiramate.    Update sent to Nuno HANNAH CNP to ask if waiting until 4/15 to see her optomitrist would be appropriate, given that she's been on the the topiramate for awhile and vision changes just started recently.    ZACHARY MarianoN RN Care Coordinator  Neurology/Neurosurgery/PM&R/Pain Management

## 2024-04-06 DIAGNOSIS — R25.1 TREMOR: Primary | ICD-10-CM

## 2024-04-09 NOTE — TELEPHONE ENCOUNTER
RN called patient to relay Dr. Salinas's message about setting up a MTM appointment to discuss her vision changes in relation to her use of topiramate. Patient is agreeable to this plan. Referral order pended within encounter and routed to Dr. Salinas for review and signature.    ZACHARY MarianoN RN Care Coordinator  Neurology/Neurosurgery/PM&R/Pain Management

## 2024-04-12 ENCOUNTER — HOSPITAL ENCOUNTER (OUTPATIENT)
Dept: MAMMOGRAPHY | Facility: CLINIC | Age: 69
Discharge: HOME | End: 2024-04-12
Attending: PHYSICIAN ASSISTANT | Admitting: PHYSICIAN ASSISTANT
Payer: COMMERCIAL

## 2024-04-12 DIAGNOSIS — Z12.31 VISIT FOR SCREENING MAMMOGRAM: ICD-10-CM

## 2024-04-12 PROCEDURE — 77063 BREAST TOMOSYNTHESIS BI: CPT

## 2024-04-16 SDOH — HEALTH STABILITY: PHYSICAL HEALTH: ON AVERAGE, HOW MANY MINUTES DO YOU ENGAGE IN EXERCISE AT THIS LEVEL?: 60 MIN

## 2024-04-16 SDOH — HEALTH STABILITY: PHYSICAL HEALTH: ON AVERAGE, HOW MANY DAYS PER WEEK DO YOU ENGAGE IN MODERATE TO STRENUOUS EXERCISE (LIKE A BRISK WALK)?: 5 DAYS

## 2024-04-16 ASSESSMENT — SOCIAL DETERMINANTS OF HEALTH (SDOH): HOW OFTEN DO YOU GET TOGETHER WITH FRIENDS OR RELATIVES?: TWICE A WEEK

## 2024-04-17 ENCOUNTER — VIRTUAL VISIT (OUTPATIENT)
Dept: NEUROLOGY | Facility: CLINIC | Age: 69
End: 2024-04-17
Attending: PSYCHIATRY & NEUROLOGY
Payer: COMMERCIAL

## 2024-04-17 DIAGNOSIS — R25.1 TREMOR: Primary | ICD-10-CM

## 2024-04-17 PROCEDURE — 99207 PR NO BILLABLE SERVICE THIS VISIT: CPT | Mod: 93 | Performed by: PHARMACIST

## 2024-04-17 NOTE — PROGRESS NOTES
"Medication Therapy Management (MTM) Encounter    ASSESSMENT:                            Medication Adherence/Access: No issues identified    Tremor:   Patient had reached out due to concern that topiramate was causing vision changes; however, she has since met with her ophthalmologist and was told this is likely not related to topiramate. Additionally, her headaches have improved. The likelihood of edema being caused by topiramate and/or propranolol is very low so I advised that she discuss with her PCP about the edema at her annual wellness visit next week.     PLAN:                            We discussed that it is unlikely that topiramate caused your headaches or vision changes and I'm glad the headaches are improving!   We discussed that it is unlikely that topiramate and/or propranolol would be causing leg swelling; please discuss this with your primary care provider next week - it may be beneficial to have some routine blood work done to assess for other causes of swelling     Follow-up: 6 months or sooner if needed     SUBJECTIVE/OBJECTIVE:                          Seema Johnson is a 68 year old female called for a follow-up visit.       Reason for visit: follow up on topiramate.    Allergies/ADRs: Reviewed in chart  Past Medical History: Reviewed in chart  Tobacco: She reports that she has never smoked. She has never used smokeless tobacco.  Alcohol: 1 glass of wine in the evenings, not every evening    Medication Adherence/Access: no issues reported    Tremor:   - Propranolol LA 60 mg daily  - Topiramate 50 mg nightly   When she was in [place] she started having headaches accompanied by blurry vision. The first headache occurred in February 2024. She never had headaches previously. She was concerned that topiramate was causing the visual issues but she saw her ophthalmologist this week and was told she has the \"minor beginning\" of cataracts but otherwise her eyes are okay. The headaches have also gradually " "improved and she is having very few now. She is feeling reassured that she can stay on topiramate.  Patient states topiramate has been working well for her tremor and it keeps her tremor at a tolerable level.   She also notes that her calves/ankles have been more swollen lately (left >right) - especially when flying on a plane. She otherwise does not sit for long distances as she likes to stay busy. She is wondering if this is a side effect of topiramate or propranolol.   She was running low on topiramate and missed a few doses (took it every other day for 5 days) and reports increased leg swelling during that time.  She reports some decrease in appetite while on topiramate and has lost about 10 pounds but states her  watches this closely and he makes sure she eats regularly. Her diet is described as \"healthy.\"   Reports she has been drinking a lot of water.     She will be travelling to [place] and [place] for 6 weeks each this summer.     Today's Vitals: There were no vitals taken for this visit.  ----------------    I spent 20 minutes with this patient today. All changes were made via collaborative practice agreement with Dr. Salinas. A copy of the visit note was provided to the patient's provider(s).    A summary of these recommendations was sent via [employer].    Evelina Saldivar, Pharm.D.  Medication Therapy Management Pharmacist  [facility]    Telemedicine Visit Details  Type of service:  Telephone visit  Start Time:  9:40 AM  End Time: 10:00 AM     Medication Therapy Recommendations  No medication therapy recommendations to display   "

## 2024-04-17 NOTE — Clinical Note
"4/17/2024       RE: Seema Johnson  [address]  [facility] [address]     Dear Colleague,    Thank you for referring your patient, Seema Johnson, to the [facility] [place] [facility]. Please see a copy of my visit note below.    Medication Therapy Management (MTM) Encounter    ASSESSMENT:                            Medication Adherence/Access: {adherencechoices:884780}    ***:   ***    PLAN:                            Have blood work done.       Follow-up: {followuptest2:634079}    SUBJECTIVE/OBJECTIVE:                          Seema Johnson is a 68 year old female called for a follow-up visit.       Reason for visit: follow up on topiramate.    Allergies/ADRs: Reviewed in chart  Past Medical History: Reviewed in chart  Tobacco: She reports that she has never smoked. She has never used smokeless tobacco.  Alcohol: 1 glass of wine in the evenings, not every evening    Medication Adherence/Access: no issues reported    Tremor:   - Propranolol LA 60 mg daily  - Topiramate 50 mg nightly   When patient was in [place] she started having headaches accompanied by blurry vision. The first headache occurred in February 2024. She never had headaches previously. She was concerned that topiramate was causing the visual issues but she saw her ophthalmologist this week and was told she has the \"minor beginning\" of cataracts but otherwise her eyes are okay. The headaches have also gradually improved and she is having very few now. She is feeling reassured that she can stay on topiramate.  Patient states topiramate has been working well for her tremor and it keeps her tremor at a tolerable level.   She also notes that her calves/ankles have been more swollen lately (left >right) - especially when flying on a plane. She otherwise does not sit for long distances as she likes to stay busy. She is wondering if this is a side effect of topiramate " or propranolol.   She ran out of     rizo/ankles swelling- especially left one, especially flying.   Socks leave a mikael   Not a lot of sitting  Will start charting when they are more swollen and when they are not  Had to skip some medication and leg swelled up more. Skipped 2 doses, 5 days of every other day.    [place] in [place]- flew to [place]     With topiramate she has lost her appetite- has to make sure to eat   Still eating healthy   Has lost a few pounds   10 pounds    keeping track of her     Drinking a lot of water. Get up at night and drinks a glass of water. She forgets to drink water during the day.     [place] and [place] for 6 weeks each.     Wt Readings from Last 10 Encounters:   10/02/23 53.1 kg (117 lb)   06/01/23 58.2 kg (128 lb 4.8 oz)   04/25/23 57.2 kg (126 lb)   04/19/23 59.1 kg (130 lb 3.2 oz)   04/17/23 57.2 kg (126 lb)   10/12/22 56.5 kg (124 lb 9.6 oz)   10/10/22 57.1 kg (125 lb 12.8 oz)   05/11/22 59 kg (130 lb)   04/19/22 59.2 kg (130 lb 8 oz)   04/14/22 57.6 kg (127 lb)       Tylenol every morning   Calcium twice daily   Vitamin D   Vitamin B12 every other day     Tamoxifen - will see oncologist on Friday - wants to come off of it.   Reclast infusions- will call rheumatologist. Near the end of when she is allowed to get them.     Today's Vitals: There were no vitals taken for this visit.  ----------------    I spent 20 minutes with this patient today. { :[id number]}. A copy of the visit note was provided to the patient's provider(s).    A summary of these recommendations {GIVEN/NOT GIVEN:742173}.    Evelina Saldivar, Pharm.D.  Medication Therapy Management Pharmacist  [facility]    Telemedicine Visit Details  Type of service:  Telephone visit  Start Time:  9:40 AM  End Time: 10:00 AM     Medication Therapy Recommendations  No medication therapy recommendations to display     Medication Therapy Management (MTM) Encounter    ASSESSMENT:                            Medication  "Adherence/Access: No issues identified    Tremor:   Patient had reached out due to concern that topiramate was causing vision changes; however, she has since met with her ophthalmologist and was told this is likely not related to topiramate. Additionally, her headaches have improved. The likelihood of edema being caused by topiramate and/or propranolol is very low so I advised that she discuss with her PCP about the edema at her annual wellness visit next week.     PLAN:                            We discussed that it is unlikely that topiramate caused your headaches or vision changes and I'm glad the headaches are improving!   We discussed that it is unlikely that topiramate and/or propranolol would be causing leg swelling; please discuss this with your primary care provider next week - it may be beneficial to have some routine blood work done to assess for other causes of swelling     Follow-up: 6 months or sooner if needed     SUBJECTIVE/OBJECTIVE:                          Seema Johnson is a 68 year old female called for a follow-up visit.       Reason for visit: follow up on topiramate.    Allergies/ADRs: Reviewed in chart  Past Medical History: Reviewed in chart  Tobacco: She reports that she has never smoked. She has never used smokeless tobacco.  Alcohol: 1 glass of wine in the evenings, not every evening    Medication Adherence/Access: no issues reported    Tremor:   - Propranolol LA 60 mg daily  - Topiramate 50 mg nightly   When she was in [place] she started having headaches accompanied by blurry vision. The first headache occurred in February 2024. She never had headaches previously. She was concerned that topiramate was causing the visual issues but she saw her ophthalmologist this week and was told she has the \"minor beginning\" of cataracts but otherwise her eyes are okay. The headaches have also gradually improved and she is having very few now. She is feeling reassured that she can stay on " "topiramate.  Patient states topiramate has been working well for her tremor and it keeps her tremor at a tolerable level.   She also notes that her calves/ankles have been more swollen lately (left >right) - especially when flying on a plane. She otherwise does not sit for long distances as she likes to stay busy. She is wondering if this is a side effect of topiramate or propranolol.   She was running low on topiramate and missed a few doses (took it every other day for 5 days) and reports increased leg swelling during that time.  She reports some decrease in appetite while on topiramate and has lost about 10 pounds but states her  watches this closely and he makes sure she eats regularly. Her diet is described as \"healthy.\"   Reports she has been drinking a lot of water.     She will be travelling to [place] and [place] for 6 weeks each this summer.     Today's Vitals: There were no vitals taken for this visit.  ----------------    I spent 20 minutes with this patient today. All changes were made via collaborative practice agreement with Dr. Salinas. A copy of the visit note was provided to the patient's provider(s).    A summary of these recommendations was sent via [employer].    Evelina Saldivar, Pharm.D.  Medication Therapy Management Pharmacist  [facility]    Telemedicine Visit Details  Type of service:  Telephone visit  Start Time:  9:40 AM  End Time: 10:00 AM     Medication Therapy Recommendations  No medication therapy recommendations to display       Again, thank you for allowing me to participate in the care of your patient.      Sincerely,    Evelina Saldivar [facility]    "

## 2024-04-17 NOTE — Clinical Note
Apparently her headaches resolved. She saw ophthalmology this week and the blurry vision is likely early cataracts. We concluded the topamax is okay to continue

## 2024-04-18 NOTE — PATIENT INSTRUCTIONS
"Recommendations from today's MTM visit:                                                      We discussed that it is unlikely that topiramate caused your headaches or vision changes and I'm glad the headaches are improving!   We discussed that it is unlikely that topiramate and/or propranolol would be causing leg swelling; please discuss this with your primary care provider next week - it may be beneficial to have some routine blood work done to assess for other causes of swelling     Follow-up: 6 months or sooner if needed     It was great speaking with you today.  I value your experience and would be very thankful for your time in providing feedback in our clinic survey. In the next few days, you may receive an email or text message from [employer] with a link to a survey related to your  clinical pharmacist.\"     To schedule another MTM appointment, please call the clinic directly or you may call the MTM scheduling line at [phone].    My Clinical Pharmacist's contact information:                                                      Please feel free to contact me with any questions or concerns you have.      Evelina Saldivar, Pharm.D.  Medication Therapy Management Pharmacist  [facility]     "

## 2024-04-19 ENCOUNTER — ONCOLOGY VISIT (OUTPATIENT)
Dept: ONCOLOGY | Facility: CLINIC | Age: 69
End: 2024-04-19
Attending: NURSE PRACTITIONER
Payer: COMMERCIAL

## 2024-04-19 VITALS
HEART RATE: 59 BPM | SYSTOLIC BLOOD PRESSURE: 95 MMHG | OXYGEN SATURATION: 100 % | WEIGHT: 122.2 LBS | BODY MASS INDEX: 20.86 KG/M2 | DIASTOLIC BLOOD PRESSURE: 66 MMHG | HEIGHT: 64 IN | RESPIRATION RATE: 16 BRPM

## 2024-04-19 DIAGNOSIS — Z12.31 ENCOUNTER FOR SCREENING MAMMOGRAM FOR MALIGNANT NEOPLASM OF BREAST: ICD-10-CM

## 2024-04-19 DIAGNOSIS — Z12.31 VISIT FOR SCREENING MAMMOGRAM: ICD-10-CM

## 2024-04-19 DIAGNOSIS — C50.911 INFILTRATING DUCTAL CARCINOMA OF RIGHT FEMALE BREAST (H): Chronic | ICD-10-CM

## 2024-04-19 DIAGNOSIS — R92.30 DENSE BREASTS: ICD-10-CM

## 2024-04-19 PROCEDURE — 99213 OFFICE O/P EST LOW 20 MIN: CPT | Performed by: INTERNAL MEDICINE

## 2024-04-19 PROCEDURE — G0463 HOSPITAL OUTPT CLINIC VISIT: HCPCS | Performed by: INTERNAL MEDICINE

## 2024-04-19 RX ORDER — TAMOXIFEN CITRATE 20 MG/1
20 TABLET ORAL DAILY
Qty: 90 TABLET | Refills: 3 | Status: SHIPPED | OUTPATIENT
Start: 2024-04-19

## 2024-04-19 ASSESSMENT — PAIN SCALES - GENERAL: PAINLEVEL: NO PAIN (0)

## 2024-04-19 NOTE — NURSING NOTE
"Oncology Rooming Note    April 19, 2024 10:07 AM   Seema Johnson is a 68 year old female who presents for:    Chief Complaint   Patient presents with    Oncology Clinic Visit     Follow up     Initial Vitals: BP 95/66 (BP Location: Left arm)   Pulse 59   Resp 16   Ht 1.626 m (5' 4.02\")   Wt 55.4 kg (122 lb 3.2 oz)   SpO2 100%   BMI 20.97 kg/m   Estimated body mass index is 20.97 kg/m  as calculated from the following:    Height as of this encounter: 1.626 m (5' 4.02\").    Weight as of this encounter: 55.4 kg (122 lb 3.2 oz). Body surface area is 1.58 meters squared.  No Pain (0) Comment: Data Unavailable   No LMP recorded. Patient is postmenopausal.  Allergies reviewed: Yes  Medications reviewed: Yes    Medications: Medication refills not needed today.  Pharmacy name entered into [employer] ([employer]):    [facility] #[id number] - [place], [place] - [address]/PHARMACY #[id number] - [facility] [address]  [facility] [id number] - [facility] [address] [address]  [facility] [id number] - [place], [place] - [address] [facility] [id number] - [place] [address]  [id number] - [facility] [address]    Frailty Screening:   Is the patient here for a new oncology consult visit in cancer care? 2. No      Clinical concerns:No new concerns         Mayte Irby LPN              "

## 2024-04-19 NOTE — LETTER
4/19/2024         RE: Seema Johnson  [address]  [place] [address]        Dear Colleague,    Thank you for referring your patient, Seema Johnson, to the [facility]. Please see a copy of my visit note below.    Oncology Follow Up Visit:   04/19/24    Diagnosis:  Stage II Right Breast Cancer  Seema Johnson is a 69 yo female who was noted to have a 7 mm breast cancer with an intramammary lymph node positivity and a sentinel lymph node positivity. One of two sentinel lymph nodes was positive with the tumor size being 0.3 cm and an intramammary lymph node was positive as well. All margins completely clear with invasive margin being 0.3 cm. There was some background DCIS and that margin was also clear, but less than 0.1 cm. The tumor was grade 1, strongly ER positive in 95% of the cells, NV negative ( stained in less than 1% of the cells) and HER2/lex negative. (T1B N1 M0)  BREAST NEXT genetic testing panel was negative.    Treatment:   1. She underwent a lumpectomy with sentinel lymph node evaluation on January 21, 2016.   2. Adjuvant chemotherapy:  Taxol IV q 2 weeks x 4 cycles, March 18th- April 28, 2016.    Followed by DD-AC x 4 cycles, May 12- June 23, 2016.   3. Adjuvant radiation therapy 7/18/16- 8/26/16.  4. Genetic counseling: BREAST NEXT panel negative  5. Initiated Tamoxifen mid Oct 2016      Interval History:   Ms. Johnson is here today for follow up . She continues on her tamoxifen. She would like to be done with it. Her only side effect is that she is getting more brown spots on her face from it. She has no vaginal bleeding. No n/v/d/c. No new pains. No new medical complaints. She undergoes reclast once a year at her rheumatologists office        Rest of comprehensive and complete ROS is reviewed and is negative.   Past Medical History:   Diagnosis Date     Basal cell cancer      Breast cancer (H)      Dyslipidemia      History of blood transfusion       Hypertension      Mohs defect of nose 06/2012     Current Outpatient Medications   Medication Sig Dispense Refill     acetaminophen (TYLENOL) 500 MG tablet Take 1,000 mg by mouth every morning 2 tablet 0     aspirin (ASA) 81 MG EC tablet Take 81 mg by mouth daily as needed (prevent clots when flying)       Calcium Carbonate-Vit D-Min (CALTRATE MINIS PLUS MINERALS) 300-800 MG-UNIT TABS Take 1 tablet by mouth 2 times daily       Cholecalciferol (VITAMIN D3) 50 MCG (2000 UT) CAPS Take 2,000 Units by mouth daily       cyanocobalamin (VITAMIN B-12) 1000 MCG tablet Take 1 tablet (1,000 mcg) by mouth every other day       polyethylene glycol (MIRALAX) 17 GM/Dose powder Take 1 capful. by mouth every other day       propranolol ER (INDERAL LA) 60 MG 24 hr capsule Take 1 capsule (60 mg) by mouth At Bedtime 180 capsule 3     simvastatin (ZOCOR) 20 MG tablet Take 1 tablet (20 mg) by mouth At Bedtime 180 tablet 1     tamoxifen (NOLVADEX) 20 MG tablet Take 1 tablet (20 mg) by mouth daily 14 tablet 0     topiramate (TOPAMAX) 50 MG tablet Take 1 tablet (50 mg) by mouth at bedtime 14 tablet 0     zoledronic Acid (RECLAST) 5 MG/100ML SOLN infusion 5 mg/100 mL once a year       No current facility-administered medications for this visit.     Allergies   Allergen Reactions     Compazine [Prochlorperazine] Other (See Comments)     Jittery and hyper and foggy     Primidone      vomiting, chills & hot flashes, headache, sensitivity to light & noise     Codeine Sulfate Nausea   There is a FHx of ovarian cancer in one of her sisters and of breast cancer in another sister.    Physical Exam  Vitals reviewed.   Constitutional:       Appearance: Normal appearance. She is normal weight.   HENT:      Head: Normocephalic and atraumatic.   Eyes:      Extraocular Movements: Extraocular movements intact.      Conjunctiva/sclera: Conjunctivae normal.      Pupils: Pupils are equal, round, and reactive to light.   Cardiovascular:      Rate and Rhythm:  "Normal rate and regular rhythm.   Pulmonary:      Effort: Pulmonary effort is normal.      Breath sounds: Normal breath sounds.   Abdominal:      General: Abdomen is flat.      Palpations: Abdomen is soft.   Musculoskeletal:         General: Normal range of motion.      Cervical back: Normal range of motion and neck supple.   Skin:     General: Skin is warm.   Neurological:      General: No focal deficit present.      Mental Status: She is alert and oriented to person, place, and time. Mental status is at baseline.   Psychiatric:         Mood and Affect: Mood normal.         Behavior: Behavior normal.         Thought Content: Thought content normal.         Judgment: Judgment normal.     Right breast no masses. No nipple discharge and axilla benign. Left breast no masses no nipple discharge and axilla benign    Laboratory/Imaging Results:   Recent Labs   Lab Test 10/10/22  0930 04/14/22  0908 10/05/21  1057 09/24/20  0000 10/08/19  1343 10/03/19  0934 04/11/19  1529    139 139  --   --  140 141   POTASSIUM 4.1 3.6 4.4  --   --  4.1 3.9   CHLORIDE 111* 107 107  --   --  108 107   CO2 28 26 29  --   --  27 27   ANIONGAP 3 6 3  --   --  5 7   BUN 15 16 19  --   --  15 17   CR 0.69 0.88 0.83 0.690 0.78 0.74 0.82   GLC 96 95 89  --   --  86 75   ROSEANNE 9.3 9.6 9.6  --   --  9.0 9.5     No results for input(s): \"MAG\", \"PHOS\" in the last 54436 hours.  Recent Labs   Lab Test 10/10/22  0930 04/14/22  0908 10/05/21  1057 01/30/20  1347 10/08/19  1343 10/03/19  0934 10/02/18  1028   WBC 4.8 4.9 4.0 4.9 5.2 4.9 5.3   HGB 12.0 12.6 11.6* 11.8 11.1* 12.1 12.4    238 219 232 212 233 242   MCV 92 95 91 91 95 93 94   NEUTROPHIL 56  --  56  --  58.8 60.5 67.0     Recent Labs   Lab Test 10/10/22  0930 04/14/22  0908 10/05/21  1057   BILITOTAL 0.7 0.7 0.6   ALKPHOS 31* 33* 33*   ALT 16 19 21   AST 16 16 15   ALBUMIN 4.0 4.1 3.9     TSH   Date Value Ref Range Status   04/14/2022 1.51 0.40 - 4.00 mU/L Final   10/05/2021 1.69 " "0.40 - 4.00 mU/L Final   10/03/2019 1.62 0.40 - 4.00 mU/L Final   01/05/2017 1.22 0.40 - 4.00 mU/L Final     No results for input(s): \"CEA\" in the last 14321 hours.  Results for orders placed or performed during the hospital encounter of 04/12/24   MA Screen Bilateral w/Hakeem    Narrative    BILATERAL FULL FIELD DIGITAL SCREENING MAMMOGRAM WITH TOMOSYNTHESIS    Performed on: 4/12/24    Compared to: 10/10/2022, 10/05/2021, 08/24/2020, and 10/08/2019    Technique:  This study was evaluated with the assistance of Computer-Aided   Detection.  Breast Tomosynthesis was used in interpretation.    Findings: The breasts are extremely dense, which lowers the sensitivity of   mammography.  There are post-surgical changes in the right breast.  There is no radiographic evidence of malignancy.     Impression    IMPRESSION: ACR BI-RADS Category 2: Benign    BREAST CANCER SCREENING RECOMMENDATION: Routine yearly mammography   beginning at age 40 or as discussed with your provider.    The results and recommendations of this examination will be communicated   to the patient.        Javier Oglesby MD           Assessment and Plan:   Prognostic Stage I Right Breast Cancer  2. Osteoporosis with spine fractures both spontaneous and traumatic      Plan    Continue reclast annually with rheumatology  Continue calcium and vitamin D 3  We discussed her tamoxifen and she has been on it 8.5 years and I would recommend she continue for the complete 10 years for bone density benefit. She concurs and an Rx was given.   Mammogram was unremarkable and due in 1 year  Colonoscopy last in 2020 and noted 10 years to repeat vs being completed due to age.   Joan Contreras MD on 4/19/2024 at 10:30 AM        Again, thank you for allowing me to participate in the care of your patient.        Sincerely,        Joan Contreras MD  "

## 2024-04-19 NOTE — PROGRESS NOTES
Oncology Follow Up Visit:   04/19/24    Diagnosis:  Stage II Right Breast Cancer  Seema Johnson is a 67 yo female who was noted to have a 7 mm breast cancer with an intramammary lymph node positivity and a sentinel lymph node positivity. One of two sentinel lymph nodes was positive with the tumor size being 0.3 cm and an intramammary lymph node was positive as well. All margins completely clear with invasive margin being 0.3 cm. There was some background DCIS and that margin was also clear, but less than 0.1 cm. The tumor was grade 1, strongly ER positive in 95% of the cells, [place] negative ( stained in less than 1% of the cells) and HER2/lex negative. (T1B N1 M0)  BREAST NEXT genetic testing panel was negative.    Treatment:   1. She underwent a lumpectomy with sentinel lymph node evaluation on January 21, 2016.   2. Adjuvant chemotherapy:  Taxol IV q 2 weeks x 4 cycles, March 18th- April 28, 2016.    Followed by DD-AC x 4 cycles, May 12- June 23, 2016.   3. Adjuvant radiation therapy 7/18/16- 8/26/16.  4. Genetic counseling: BREAST NEXT panel negative  5. Initiated Tamoxifen mid Oct 2016      Interval History:   Ms. Johnson is here today for follow up . She continues on her tamoxifen. She would like to be done with it. Her only side effect is that she is getting more brown spots on her face from it. She has no vaginal bleeding. No n/v/d/c. No new pains. No new medical complaints. She undergoes reclast once a year at her rheumatologists office        Rest of comprehensive and complete ROS is reviewed and is negative.   Past Medical History:   Diagnosis Date    Basal cell cancer     Breast cancer (H)     Dyslipidemia     History of blood transfusion     Hypertension     Mohs defect of nose 06/2012     Current Outpatient Medications   Medication Sig Dispense Refill    acetaminophen (TYLENOL) 500 MG tablet Take 1,000 mg by mouth every morning 2 tablet 0    aspirin (ASA) 81 MG EC tablet Take 81 mg by mouth daily as  needed (prevent clots when flying)      Calcium Carbonate-Vit D-Min (CALTRATE MINIS PLUS MINERALS) 300-800 MG-UNIT TABS Take 1 tablet by mouth 2 times daily      Cholecalciferol (VITAMIN D3) 50 MCG (2000 UT) CAPS Take 2,000 Units by mouth daily      cyanocobalamin (VITAMIN B-12) 1000 MCG tablet Take 1 tablet (1,000 mcg) by mouth every other day      polyethylene glycol (MIRALAX) 17 GM/Dose powder Take 1 capful. by mouth every other day      propranolol ER (INDERAL LA) 60 MG 24 hr capsule Take 1 capsule (60 mg) by mouth At Bedtime 180 capsule 3    simvastatin (ZOCOR) 20 MG tablet Take 1 tablet (20 mg) by mouth At Bedtime 180 tablet 1    tamoxifen (NOLVADEX) 20 MG tablet Take 1 tablet (20 mg) by mouth daily 14 tablet 0    topiramate (TOPAMAX) 50 MG tablet Take 1 tablet (50 mg) by mouth at bedtime 14 tablet 0    zoledronic Acid (RECLAST) 5 MG/100ML SOLN infusion 5 mg/100 mL once a year       No current facility-administered medications for this visit.     Allergies   Allergen Reactions    Compazine [Prochlorperazine] Other (See Comments)     Jittery and hyper and foggy    Primidone      vomiting, chills & hot flashes, headache, sensitivity to light & noise    Codeine Sulfate Nausea   There is a FHx of ovarian cancer in one of her sisters and of breast cancer in another sister.    Physical Exam  Vitals reviewed.   Constitutional:       Appearance: Normal appearance. She is normal weight.   HENT:      Head: Normocephalic and atraumatic.   Eyes:      Extraocular Movements: Extraocular movements intact.      Conjunctiva/sclera: Conjunctivae normal.      Pupils: Pupils are equal, round, and reactive to light.   Cardiovascular:      Rate and Rhythm: Normal rate and regular rhythm.   Pulmonary:      Effort: Pulmonary effort is normal.      Breath sounds: Normal breath sounds.   Abdominal:      General: Abdomen is flat.      Palpations: Abdomen is soft.   Musculoskeletal:         General: Normal range of motion.      Cervical  "back: Normal range of motion and neck supple.   Skin:     General: Skin is warm.   Neurological:      General: No focal deficit present.      Mental Status: She is alert and oriented to person, place, and time. Mental status is at baseline.   Psychiatric:         Mood and Affect: Mood normal.         Behavior: Behavior normal.         Thought Content: Thought content normal.         Judgment: Judgment normal.     Right breast no masses. No nipple discharge and axilla benign. Left breast no masses no nipple discharge and axilla benign    Laboratory/Imaging Results:   Recent Labs   Lab Test 10/10/22  0930 04/14/22  0908 10/05/21  1057 09/24/20  0000 10/08/19  1343 10/03/19  0934 04/11/19  1529    139 139  --   --  140 141   POTASSIUM 4.1 3.6 4.4  --   --  4.1 3.9   CHLORIDE 111* 107 107  --   --  108 107   CO2 28 26 29  --   --  27 27   ANIONGAP 3 6 3  --   --  5 7   BUN 15 16 19  --   --  15 17   CR 0.69 0.88 0.83 0.690 0.78 0.74 0.82   GLC 96 95 89  --   --  86 75   ROSEANNE 9.3 9.6 9.6  --   --  9.0 9.5     No results for input(s): \"MAG\", \"PHOS\" in the last 09127 hours.  Recent Labs   Lab Test 10/10/22  0930 04/14/22  0908 10/05/21  1057 01/30/20  1347 10/08/19  1343 10/03/19  0934 10/02/18  1028   WBC 4.8 4.9 4.0 4.9 5.2 4.9 5.3   HGB 12.0 12.6 11.6* 11.8 11.1* 12.1 12.4    238 219 232 212 233 242   MCV 92 95 91 91 95 93 94   NEUTROPHIL 56  --  56  --  58.8 60.5 67.0     Recent Labs   Lab Test 10/10/22  0930 04/14/22  0908 10/05/21  1057   BILITOTAL 0.7 0.7 0.6   ALKPHOS 31* 33* 33*   ALT 16 19 21   AST 16 16 15   ALBUMIN 4.0 4.1 3.9     TSH   Date Value Ref Range Status   04/14/2022 1.51 0.40 - 4.00 mU/L Final   10/05/2021 1.69 0.40 - 4.00 mU/L Final   10/03/2019 1.62 0.40 - 4.00 mU/L Final   01/05/2017 1.22 0.40 - 4.00 mU/L Final     No results for input(s): \"CEA\" in the last 65017 hours.  Results for orders placed or performed during the hospital encounter of 04/12/24   MA Screen Bilateral w/Hakeem    " Narrative    BILATERAL FULL FIELD DIGITAL SCREENING MAMMOGRAM WITH TOMOSYNTHESIS    Performed on: 4/12/24    Compared to: 10/10/2022, 10/05/2021, 08/24/2020, and 10/08/2019    Technique:  This study was evaluated with the assistance of Computer-Aided   Detection.  Breast Tomosynthesis was used in interpretation.    Findings: The breasts are extremely dense, which lowers the sensitivity of   mammography.  There are post-surgical changes in the right breast.  There is no radiographic evidence of malignancy.     Impression    IMPRESSION: ACR BI-RADS Category 2: Benign    BREAST CANCER SCREENING RECOMMENDATION: Routine yearly mammography   beginning at age 40 or as discussed with your provider.    The results and recommendations of this examination will be communicated   to the patient.        Javier Oglesby MD           Assessment and Plan:   Prognostic Stage I Right Breast Cancer  2. Osteoporosis with spine fractures both spontaneous and traumatic      Plan    Continue reclast annually with rheumatology  Continue calcium and vitamin D 3  We discussed her tamoxifen and she has been on it 8.5 years and I would recommend she continue for the complete 10 years for bone density benefit. She concurs and an Rx was given.   Mammogram was unremarkable and due in 1 year  Colonoscopy last in 2020 and noted 10 years to repeat vs being completed due to age.   Joan Contreras MD on 4/19/2024 at 10:30 AM

## 2024-04-22 ENCOUNTER — VIRTUAL VISIT (OUTPATIENT)
Dept: NEUROLOGY | Facility: CLINIC | Age: 69
End: 2024-04-22
Payer: COMMERCIAL

## 2024-04-22 ENCOUNTER — OFFICE VISIT (OUTPATIENT)
Dept: FAMILY MEDICINE | Facility: CLINIC | Age: 69
End: 2024-04-22
Payer: COMMERCIAL

## 2024-04-22 VITALS
WEIGHT: 121.1 LBS | DIASTOLIC BLOOD PRESSURE: 64 MMHG | OXYGEN SATURATION: 97 % | TEMPERATURE: 97.5 F | HEART RATE: 57 BPM | SYSTOLIC BLOOD PRESSURE: 100 MMHG | HEIGHT: 64 IN | BODY MASS INDEX: 20.67 KG/M2

## 2024-04-22 DIAGNOSIS — M81.0 OSTEOPOROSIS, UNSPECIFIED OSTEOPOROSIS TYPE, UNSPECIFIED PATHOLOGICAL FRACTURE PRESENCE: ICD-10-CM

## 2024-04-22 DIAGNOSIS — G25.0 BENIGN ESSENTIAL TREMOR: ICD-10-CM

## 2024-04-22 DIAGNOSIS — E78.5 HYPERLIPIDEMIA LDL GOAL <100: ICD-10-CM

## 2024-04-22 DIAGNOSIS — C50.911 INFILTRATING DUCTAL CARCINOMA OF RIGHT FEMALE BREAST (H): Chronic | ICD-10-CM

## 2024-04-22 DIAGNOSIS — R51.9 HEADACHE AROUND THE EYES: ICD-10-CM

## 2024-04-22 DIAGNOSIS — D48.5 NEOPLASM OF UNCERTAIN BEHAVIOR OF SKIN: ICD-10-CM

## 2024-04-22 DIAGNOSIS — H53.9 VISUAL DISTURBANCE: ICD-10-CM

## 2024-04-22 DIAGNOSIS — R60.0 PEDAL EDEMA: ICD-10-CM

## 2024-04-22 DIAGNOSIS — G24.1 DYSTONIA, TORSION, FRAGMENTS OF: ICD-10-CM

## 2024-04-22 DIAGNOSIS — R25.1 TREMOR: Primary | ICD-10-CM

## 2024-04-22 DIAGNOSIS — N39.46 MIXED URGE AND STRESS INCONTINENCE: ICD-10-CM

## 2024-04-22 DIAGNOSIS — R94.4 DECREASED GFR: ICD-10-CM

## 2024-04-22 DIAGNOSIS — Z00.00 ENCOUNTER FOR MEDICARE ANNUAL WELLNESS EXAM: Primary | ICD-10-CM

## 2024-04-22 PROBLEM — Z17.0 ESTROGEN RECEPTOR POSITIVE NEOPLASM: Status: ACTIVE | Noted: 2024-04-22

## 2024-04-22 PROBLEM — Z80.3 FAMILY HISTORY OF BREAST CANCER: Status: ACTIVE | Noted: 2024-04-22

## 2024-04-22 PROBLEM — D49.9 ESTROGEN RECEPTOR POSITIVE NEOPLASM: Status: ACTIVE | Noted: 2024-04-22

## 2024-04-22 PROBLEM — C50.419 MALIGNANT NEOPLASM OF UPPER-OUTER QUADRANT OF FEMALE BREAST (H): Status: ACTIVE | Noted: 2024-04-22

## 2024-04-22 LAB
ALBUMIN SERPL BCG-MCNC: 4.5 G/DL (ref 3.5–5.2)
ALBUMIN UR-MCNC: NEGATIVE MG/DL
ALP SERPL-CCNC: 27 U/L (ref 40–150)
ALT SERPL W P-5'-P-CCNC: 9 U/L (ref 0–50)
AMORPH CRY #/AREA URNS HPF: ABNORMAL /HPF
ANION GAP SERPL CALCULATED.3IONS-SCNC: 10 MMOL/L (ref 7–15)
APPEARANCE UR: CLEAR
AST SERPL W P-5'-P-CCNC: 19 U/L (ref 0–45)
BACTERIA #/AREA URNS HPF: ABNORMAL /HPF
BILIRUB SERPL-MCNC: 0.5 MG/DL
BILIRUB UR QL STRIP: NEGATIVE
BUN SERPL-MCNC: 15.6 MG/DL (ref 8–23)
CALCIUM SERPL-MCNC: 9.7 MG/DL (ref 8.8–10.2)
CHLORIDE SERPL-SCNC: 107 MMOL/L (ref 98–107)
CHOLEST SERPL-MCNC: 153 MG/DL
COLOR UR AUTO: YELLOW
CREAT SERPL-MCNC: 1.01 MG/DL (ref 0.51–0.95)
DEPRECATED HCO3 PLAS-SCNC: 22 MMOL/L (ref 22–29)
EGFRCR SERPLBLD CKD-EPI 2021: 60 ML/MIN/1.73M2
ERYTHROCYTE [DISTWIDTH] IN BLOOD BY AUTOMATED COUNT: 12.2 % (ref 10–15)
FASTING STATUS PATIENT QL REPORTED: YES
GLUCOSE SERPL-MCNC: 89 MG/DL (ref 70–99)
GLUCOSE UR STRIP-MCNC: NEGATIVE MG/DL
HCT VFR BLD AUTO: 36.4 % (ref 35–47)
HDLC SERPL-MCNC: 76 MG/DL
HGB BLD-MCNC: 11.9 G/DL (ref 11.7–15.7)
HGB UR QL STRIP: NEGATIVE
KETONES UR STRIP-MCNC: NEGATIVE MG/DL
LDLC SERPL CALC-MCNC: 65 MG/DL
LEUKOCYTE ESTERASE UR QL STRIP: ABNORMAL
MCH RBC QN AUTO: 30.4 PG (ref 26.5–33)
MCHC RBC AUTO-ENTMCNC: 32.7 G/DL (ref 31.5–36.5)
MCV RBC AUTO: 93 FL (ref 78–100)
NITRATE UR QL: NEGATIVE
NONHDLC SERPL-MCNC: 77 MG/DL
PH UR STRIP: 7.5 [PH] (ref 5–7)
PLATELET # BLD AUTO: 217 10E3/UL (ref 150–450)
POTASSIUM SERPL-SCNC: 4 MMOL/L (ref 3.4–5.3)
PROT SERPL-MCNC: 6.6 G/DL (ref 6.4–8.3)
RBC # BLD AUTO: 3.92 10E6/UL (ref 3.8–5.2)
RBC #/AREA URNS AUTO: ABNORMAL /HPF
SODIUM SERPL-SCNC: 139 MMOL/L (ref 135–145)
SP GR UR STRIP: 1.01 (ref 1–1.03)
SQUAMOUS #/AREA URNS AUTO: ABNORMAL /LPF
TRIGL SERPL-MCNC: 62 MG/DL
TSH SERPL DL<=0.005 MIU/L-ACNC: 2.38 UIU/ML (ref 0.3–4.2)
UROBILINOGEN UR STRIP-ACNC: 0.2 E.U./DL
WBC # BLD AUTO: 5 10E3/UL (ref 4–11)
WBC #/AREA URNS AUTO: ABNORMAL /HPF
WBC CLUMPS #/AREA URNS HPF: PRESENT /HPF

## 2024-04-22 PROCEDURE — 36415 COLL VENOUS BLD VENIPUNCTURE: CPT | Performed by: PHYSICIAN ASSISTANT

## 2024-04-22 PROCEDURE — 80061 LIPID PANEL: CPT | Performed by: PHYSICIAN ASSISTANT

## 2024-04-22 PROCEDURE — 80053 COMPREHEN METABOLIC PANEL: CPT | Performed by: PHYSICIAN ASSISTANT

## 2024-04-22 PROCEDURE — 87086 URINE CULTURE/COLONY COUNT: CPT | Performed by: PHYSICIAN ASSISTANT

## 2024-04-22 PROCEDURE — 85027 COMPLETE CBC AUTOMATED: CPT | Performed by: PHYSICIAN ASSISTANT

## 2024-04-22 PROCEDURE — 84443 ASSAY THYROID STIM HORMONE: CPT | Performed by: PHYSICIAN ASSISTANT

## 2024-04-22 PROCEDURE — 99214 OFFICE O/P EST MOD 30 MIN: CPT | Mod: 25 | Performed by: PHYSICIAN ASSISTANT

## 2024-04-22 PROCEDURE — 99214 OFFICE O/P EST MOD 30 MIN: CPT | Mod: 95 | Performed by: PSYCHIATRY & NEUROLOGY

## 2024-04-22 PROCEDURE — G0439 PPPS, SUBSEQ VISIT: HCPCS | Performed by: PHYSICIAN ASSISTANT

## 2024-04-22 PROCEDURE — 81001 URINALYSIS AUTO W/SCOPE: CPT | Performed by: PHYSICIAN ASSISTANT

## 2024-04-22 RX ORDER — LORAZEPAM 0.5 MG/1
.5-1 TABLET ORAL ONCE
Qty: 2 TABLET | Refills: 0 | Status: SHIPPED | OUTPATIENT
Start: 2024-04-22 | End: 2024-04-22

## 2024-04-22 RX ORDER — TOPIRAMATE 50 MG/1
50 TABLET, FILM COATED ORAL AT BEDTIME
Qty: 90 TABLET | Refills: 3 | Status: SHIPPED | OUTPATIENT
Start: 2024-04-22

## 2024-04-22 ASSESSMENT — PAIN SCALES - GENERAL: PAINLEVEL: MILD PAIN (2)

## 2024-04-22 NOTE — LETTER
4/22/2024         RE: Seema Johnson  [address]  [facility] [address]        Dear Colleague,    Thank you for referring your patient, Seema Johnson, to the [facility] [place]. Please see a copy of my visit note below.        VIDEO VISIT    Date of Visit: April 22, 2024  Name: Seema Johnson  Date of Birth 1955  MRN: [id number]  [address]   [facility] [address]     [phone] (M)   [phone] (H)   NONE (W)      [email or web address]     Tyrell Johnson  [phone]     Retired      Seen by Leida 2018  Brain mri 2019 for vertigo        Assessment:  (G25.0) Benign essential tremor  (primary encounter diagnosis)  Mgm with tremor  Mother hand tremor  50s onset   Head tremor  Hand tremor   Right handed  Bilateral hand tremor  Right may be worse than her left h and  No clear if alcohol helps her tremor     Reasons [facility] Therapy is Required   Family History of tremor  yes  Uncontrolled shaking  yes  Tremors on action or intention  yes  Difficulty holding items yes - problems with cup and has to use two hands  Frequently spills/drops items  - yes problems pouring  Difficulty eating normally  - has a fancy adaptable device  Difficulty with dressing/daily hygiene needs  - yes buttoning  Difficulty writing  Yes problems  Difficulty using cell phone/computers  Double tapping     Tried and failed therapies   Has been on medications for tremor   Propranolol inderal LA 60mg - 10 years   Rx by a neurologist      Handedness   Right handeded  Which hand is more severe  right  Treatment: RIght, Left or both hands   - bilateral   Where is the tremor worse - close to the mouth or when arms are extended - extended is worse and left is worse than right       Confirm that you do not have any of these contraindications:    An implanted electrical medical device, such as a pacemaker,  defibrillator, heart monitor, insulin pump, bladder stimulator, or  deep  brain stimulator or an Insulin pump   NO       Suspected or diagnosed epilepsy or other seizure disorder  no       Pregnancy  - no     Confirm that your tremor is not caused by any of the followings no    Thyroid issues (e.g., hyperthyroidism)    Metabolic disorders (e.g., B-12 deficiency)     Size of watch band as measured around your wrist  16 cm - SMALL   Small (13.6-16.4 cm), Medium (16.5-18.4 cm) or large (18.5-20.4 cm)         Review of diagnosis    Essential tremor      Avoidance of dopamine blockers   Not taking     Motor complication review   N/a     Review of Impulse control disorders   N/a     Review of surgical or medication options   reviewed     Gait/Balance/Falls   falls due to not seeing a step - tend to tuck and roll     Exercise/Therapy performed/offered   Exercising      Cognitive/Driving   Retired -  and worked in a green house      [place] background   Studied [place] [employer] started in 3rd grade  [employer] in [place]  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 for winter      Hallucinations/delusions   denies     Sleep   Goes to bed around 10pm   Has some problems falling asleep - deep breathing.   Nocturia  Wakes up 3 times per night  Eventual gets back to sleep  Wakes up at 7am and feels tired   May nap      Bladder/Renal/Prostate/Gyn/Other  Mixed urinary dysfunction  Has a pad  Nocturia 3/noc  Has urgency and frequency  Has not seen a urologist     GI/Constipation/GERD   Changed her diet and no longer  Has issues with GERD  asymptomatic     ENDO/Lipid/DM/Bone density/Thyroid  Osteoporosis  Calcium with milk in motor, afternoon and evening  Vitamin D3  Lipid disorder  On statin - borderline     Cardio/heart/Hyper or Hypotensive   Has low  blood pressure at times - strenuous and bent over  Has not fainted.      Vision/Dry Eyes/Cataracts/Glaucoma/Macular   Wears   Early cataracts     Heme/Anticoagulation/Antiplatelet/Anemia/Other  Vitamin B12 every other day  Aspirin when travels 81mg day      ENT/Resp  Vertigo - associated - no issues lately  Hearing loss - going to get hearing aides     Skin/Cancer/Seborrhea/other  Skin cancer - mohs defect  Squamous cell carcinoma     Musculoskeletal/Pain/Headache  Closed compression fracture L1  Closed compression fracture thoracic spine  Left hip pain     Lumbar spine MRI 4/25/2022  Findings: Exaggerated lumbar lordosis. Presumed 5 lumbar-type  vertebra. Chronic wedge-shaped deformity and vertebral body height  loss at T12 without any bone marrow abnormality. Stepwise trace  retrolisthesis is at L1-2 and L2-3 better seen on prior radiographs.  Multilevel disc height loss and disc degeneration.   The tip of the  conus medullaris is at  L1.  No pars defect identified. Bone marrow  signal intensity is within normal limits and no abnormal signal is  visible.      On a level by level basis:     T12-L1: Disc bulge without spinal canal or foraminal stenosis.     L1-2: No significant spinal canal or foraminal narrowing.     L2-3: Mild facet hypertrophy and associated mild neural foraminal  narrowing without significant spinal canal narrowing.     L3-4: Disc bulge and facet hypertrophy. Bilateral mild neural  foraminal stenosis without spinal canal narrowing.     L4-5: No significant spinal canal or foraminal narrowing.     L5-S1: No significant spinal canal or foraminal narrowing.     Partially visualized presumed benign cyst in the liver segment 6  measuring 1 cm in diameter.                                                                    Impression: Degenerative changes as described above. No high-grade  spinal canal or neural foraminal stenosis at any level.     Other:  Invasive ductal carcinoma right breast 2016     "  Treating insomnia can be difficult because the medications we use can be harmful, especially in older adults. In addition, sleep medications do not tend to be very effective and should only be used short-term. Cognitive behavioral therapy (CBT) is the most effective treatment for long-term insomnia. The goal of CBT for insomnia is to address the underlying causes of the sleep problems, including your habits and how you think about sleep. You can do CBT with a trained psychologist, or from the comfort of your home by following an online program or workbook. Here are some great resources for at-home CBT:     1) 6-week online CBT course through [facility] for $40: [employer]/sleep  2) \"Say Noel to Insomnia\" by Kimo Noel, PhD at [employer]: 6-week program  3) \"Overcoming Insomnia: A Cognitive-Behavioral Therapy Approach Workbook\" by Margarito Hurtado and Yohana Roberts  4) \"Quiet Your Mind and Get to Sleep: Solutions to Insomnia for Those with Depression, Anxiety or Chronic Pain ([facility] Self-Help Workbook) by Yohana Roberts and Ester Leon     Urinary issues - would recommend a consultation     Cognitive changes  Neuropsychological evaluation was ordered --- this      Still on tamoxifen - 7 years out but was told may need to take it 10 years based on the type of cancer.      Has hearing loss and will get a hearing aides     Has visual changes and may have early cataracts.       ZALP Garrison DBS Education Video  Deep brain stimulation (DBS)      If the above link does not work, cut and paste this address into your browser     [email or web address]     Pharmacy (MTM) consultation and medication management     Continue on the beta blocker for now -has some light headedness; bp and heart rate are 110/69 and heart rate 64     Discussed risks of primidone/mysoline which can affect memory and balance     Discussed " topiramate (topamax) which can help tremor but carry risk of renal stones     Discussed cervical dystonia/dystonic head tremor - has a bit of tilt and rotation of the head  Consider botox/botulinum toxin injections  Dr Radha Hubbard     mychart ksenia to be installed on her phone.     Return 6-12 months    95/61 and pulse is 65  Taking propranolol inderal LA 60mg 24hr     Taking fluid 32 oz daily  On topiramate topamax 50mg     10/3/2023 Dr. Demarco evaluation  Encouraged her to review test results with her after testing    In the context of estimated high average premorbid abilities, Ms. Johnson s current neurocognitive profile revealed performance within expectation across all cognitive domains assessed. Subtle weaknesses (i.e., low average scores) were noted on tests of immediate auditory attention and semantic verbal fluency. Cognitive strengths were noted on tests of abstract reasoning and visual memory. Performance was within expectation across other cognitive tests including working memory, speeded processing, naming, letter-cue verbal fluency, visuospatial processing, verbal learning and memory, cognitive inhibition, planning/organization, and mental flexibility. Assessment of current psychological and emotional status revealed mild symptoms of anxiety and the absence of clinically significant depressive mood symptoms.     Overall, Ms. Johnson is functioning quite well from a neuropsychological standpoint and does not evidence any objective cognitive deficits. The very subtle weakness in attention and her subjective cognitive complaints are likely attributable to cognitive lapses in the context of anxiety, stress, and fatigue.         Stress level is up      Encouraged her to spread her calcium to twice daily     Will be going to [place] and will be in [facility] for the winter and return mid April 2024     Rx sent to [facility] for her propranolol and topiramate  Watched the video and learned about mood      Will  be ending her reclast treatments at some point      Discussed DBS and botox - she will live with her symptoms as is with her present medication regimen which was refilled.      Next summer Ace, Oleg and Radha      Discussed antiamyloid treatments.     Susu blankenship  [place] and had an intense headache and flashing in her eyes and headache lasted like a migraine. Lasted 3 hours - started getting headaches and gradually resolved.   Level 2 pain every other day or third day  Last Monday eyes were fine at doctors office.    Will get a MRI       Medications               Acetaminophen tylenol 2         Aspirin 81mg prn         Calc Carb-cholecalciferol 1000-800  2         Cholecalciferol Vitamin D3 50mcg 2000 units 1         Cyanocobalamin Vit B12 1000mcg qod         Polyethylene glycol miralax qod         Famotidine pepcid 20mg off         Propranolol inderal LA 60mg 24hr       1   Simvastatin zocor 20mg        1   Tamoxifen nolvadex 20mg  1         Topiramate topamax 50mg       1   Zoledronic acid reclast year                        Plan:    Her lorazepam was stopped as she was not taking it but actually needs it for her MRI scan so a Rx was sent.   [facility] Rx sent in [place].     Shantel Fenton is her PCP and an MRI will be ordered and scheduled for the symptoms.     Has incontinence - getting up every 2 hours  Had a urinalysis and not sure if there is a infection or contaminant.   Urology appointment may 2024  May get therapy   Frances Cleveland PA-C     Consider getting a sed rate or/and CRP with the headache history.   No eye issues  Wearing mouth guard  Family history of pituitary gland tumor  Pending mri to be scheduled  Neurology headache - referral if needed  Maria Rdz or Aashish     Saw oncologist on Friday and is taking tamoxifen and will remain on it for 18 months.     She is on reclast and will have a decision from rheumatology about if she will get another dose.   Her bowel   "habits are better with the change in her calcium    Remains on simvastatin    Tremors are about the same and tolerable.   Christiana and Kat are studying a couple of medications for tremor.     [email or web address]-treatment-[email or web address]-[email or web address]  [email or web address]://[email or web address]/    Cognitive testing was good. 10/3/2023    Last visit was 10/2/2023    Return in 6 months     Return back in 6-12 months.     Medical Decision Making:  #  Chronic progressive medical conditions addressed  headache, etc.   Review and/or interpretation of unique test or documentation from a provider outside of neurology yes   Independent historian provided additional details  no   Prescription drug management and review of potential side effects and/or monitoring for side effects  yes   Health impacted by social determinants of health  no    I have reviewed the note as documented above.  This accurately captures the substance of my conversation with the patient and total time spent preparing for visit, executing visit and completing visit on the day of the visit:  30 minutes.     The longitudinal plan of care for Seema Radha Abelvik was addressed during this visit. Due to the added complexity in care, I will continue to support Seema Johnson in the subsequent management of this condition(s) and with the ongoing continuity of care of this condition(s).    Pawel Salinas MD      ------------------------------------------------------------------------------------------------------------------------------------------------------------------------    Video-Visit Details    The patient has been notified of following:     \"After a review of the patient s situation, this visit was changed from an in-person visit to a video visit to reduce the risk of COVID-19 exposure.   The patient is being evaluated via a billable video visit.\"    \"This video visit will be conducted via a call between you and your " "physician/provider. We have found that certain health care needs can be provided without the need for an in-person physical exam.  This service lets us provide the care you need with a video conversation.  If a prescription is necessary we can send it directly to your pharmacy.  If lab work is needed we can place an order for that and you can then stop by our lab to have the test done at a later time.    If during the course of the call the physician/provider feels a video visit is not appropriate, you will not be charged for this service.\"     Patient has given verbal consent for Video visit? Yes    Patient would like the video invitation sent by:     Type of service:  Video Visit    Video Start Time:     Video End Time (time video stopped):     Duration:  minutes - see above    Originating Location (pt. Location):     Distant Location (provider location):  [facility]UROLOG[facility]     Mode of Communication:  Video Conference via [employer] (and if not possible then doximity)      Pawel Salinas MD      --------------------------------------------------------------------------------------------------------------    Seema Johnson is a 68 year old female who is being evaluated via a billable video visit.      Charts reviewed  Consult from  Images reviewed        I have reviewed and updated the patient's Past Medical History, Social History, Family History and Medication List.    ALLERGIES  Compazine [prochlorperazine], Primidone, and Codeine sulfate    Lasts visit details if there was a last visit:       14 Review of systems  are negative except for   Patient Active Problem List   Diagnosis     Mohs defect of cheek     Chronic bilateral thoracic back pain     Closed compression fracture of thoracic vertebra (H)     Closed compression fracture of first lumbar vertebra (H)     Osteoporosis     Invasive ductal carcinoma of breast, female (H)- RIGHT breast, Upper outer quadrant- 2015     Benign " essential tremor     Hyperlipidemia LDL goal <100     Mixed urge and stress incontinence     Vertigo     Lateral pain of left hip        Allergies   Allergen Reactions     Compazine [Prochlorperazine] Other (See Comments)     Jittery and hyper and foggy     Primidone      vomiting, chills & hot flashes, headache, sensitivity to light & noise     Codeine Sulfate Nausea     Past Surgical History:   Procedure Laterality Date     BIOPSY BREAST       BREAST SURGERY Right 01/2016    breast cancer     LUMPECTOMY BREAST       Past Medical History:   Diagnosis Date     Basal cell cancer      Breast cancer (H)      Dyslipidemia      History of blood transfusion      Hypertension      Mohs defect of nose 06/2012     Social History     Socioeconomic History     Marital status:      Spouse name: Not on file     Number of children: Not on file     Years of education: Not on file     Highest education level: Not on file   Occupational History     Not on file   Tobacco Use     Smoking status: Never     Smokeless tobacco: Never   Vaping Use     Vaping Use: Never used   Substance and Sexual Activity     Alcohol use: Yes     Comment: socially     Drug use: No     Sexual activity: Yes     Partners: Male   Other Topics Concern     Parent/sibling w/ CABG, MI or angioplasty before 65F 55M? No   Social History Narrative     Not on file     Social Determinants of Health     Financial Resource Strain: Not on file   Food Insecurity: Not on file   Transportation Needs: Not on file   Physical Activity: Not on file   Stress: Not on file   Social Connections: Not on file   Interpersonal Safety: Not on file   Housing Stability: Not on file     Family History   Problem Relation Age of Onset     Macular Degeneration Mother      Cerebrovascular Disease Mother      Hypertension Mother      Thyroid Disease Mother      Other - See Comments Mother         ?tremor     Tremor Mother         hand tremor     Cerebrovascular Disease Father       Hypothyroidism Father      Diabetes Maternal Grandmother      Coronary Artery Disease Maternal Grandmother      Tremor Maternal Grandmother      Colon Cancer Paternal Grandmother      Other - See Comments Brother      Other - See Comments Brother      Other - See Comments Brother      Other - See Comments Brother      Breast Cancer Sister      Other Cancer Sister      Anesthesia Reaction Sister      Diabetes Sister      Anesthesia Reaction Sister      Diabetes Son      Other - See Comments Son         lives in [place]     Coronary Artery Disease No family hx of      Hyperlipidemia No family hx of      Colon Cancer No family hx of      Prostate Cancer No family hx of      Depression No family hx of      Substance Abuse No family hx of      Obesity No family hx of      Osteoporosis No family hx of      Asthma No family hx of      Mental Illness No family hx of      Anxiety Disorder No family hx of      Other Cancer No family hx of      Current Outpatient Medications   Medication Sig Dispense Refill     acetaminophen (TYLENOL) 500 MG tablet Take 1,000 mg by mouth every morning 2 tablet 0     aspirin (ASA) 81 MG EC tablet Take 81 mg by mouth daily as needed (prevent clots when flying)       Calcium Carbonate-Vit D-Min (CALTRATE MINIS PLUS MINERALS) 300-800 MG-UNIT TABS Take 2 tablets by mouth daily       Cholecalciferol (VITAMIN D3) 50 MCG (2000 UT) CAPS Take 2,000 Units by mouth daily       cyanocobalamin (VITAMIN B-12) 1000 MCG tablet Take 1 tablet (1,000 mcg) by mouth every other day       polyethylene glycol (MIRALAX) 17 GM/Dose powder Take 1 capful. by mouth every other day       propranolol ER (INDERAL LA) 60 MG 24 hr capsule Take 1 capsule (60 mg) by mouth At Bedtime 180 capsule 3     simvastatin (ZOCOR) 20 MG tablet Take 1 tablet (20 mg) by mouth At Bedtime 180 tablet 1     tamoxifen (NOLVADEX) 20 MG tablet Take 1 tablet (20 mg) by mouth daily 14 tablet 0     topiramate (TOPAMAX) 50 MG tablet Take 1 tablet  (50 mg) by mouth at bedtime 14 tablet 0     zoledronic Acid (RECLAST) 5 MG/100ML SOLN infusion 5 mg/100 mL once a year           Seema is a 68 year old who is being evaluated via a billable video visit.    How would you like to obtain your AVS? MyChart  If the video visit is dropped, the invitation should be resent by: Send to e-mail at: [email or web address]  Will anyone else be joining your video visit? No    Video-Visit Details    Type of service:  Video Visit   Video End Time:  Originating Location (pt. Location):     Distant Location (provider location):    Platform used for Video Visit:   MIGDALIA Mccray, EVA ([facility])        Again, thank you for allowing me to participate in the care of your patient.        Sincerely,        Pawel Salinas MD

## 2024-04-22 NOTE — PATIENT INSTRUCTIONS
Referred By  Referred To   Frances Cleveland PA-C   [address] [facility] [address]   Phone: [phone]   Fax: [phone]    Diagnoses: Urinary frequency   Mixed incontinence   Pelvic floor dysfunction   Urinary urgency   Cystocele, midline   Order: Physical Therapy Referral       Comment: Please be aware that coverage of these services is subject to the terms and limitations of your health insurance plan.  Call member services at your health plan with any benefit or coverage questions.   If you have not heard from the scheduling office within 2 business days, please call [phone] for [employer], [phone] for [employer] and [phone] for [place].     ---  MRI brain    Venous ultrasound of the legs            Preventive Care Advice   This is general advice given by our system to help you stay healthy. However, your care team may have specific advice just for you. Please talk to your care team about your preventive care needs.  Nutrition  Eat 5 or more servings of fruits and vegetables each day.  Try wheat bread, brown rice and whole grain pasta (instead of white bread, rice, and pasta).  Get enough calcium and vitamin D. Check the label on foods and aim for 100% of the RDA (recommended daily allowance).  Lifestyle  Exercise at least 150 minutes each week   (30 minutes a day, 5 days a week).  Do muscle strengthening activities 2 days a week. These help control your weight and prevent disease.  No smoking.  Wear sunscreen to prevent skin cancer.  Have a dental exam and cleaning every 6 months.  Yearly exams  See your health care team every year to talk about:  Any changes in your health.  Any medicines your care team has prescribed.  Preventive care, family planning, and ways to prevent chronic diseases.  Shots (vaccines)   HPV shots (up to age 26), if you've never had them before.  Hepatitis B shots (up to age 59), if you've never had them before.  COVID-19 shot: Get this shot when  it's due.  Flu shot: Get a flu shot every year.  Tetanus shot: Get a tetanus shot every 10 years.  Pneumococcal, hepatitis A, and RSV shots: Ask your care team if you need these based on your risk.  Shingles shot (for age 50 and up).  General health tests  Diabetes screening:  Starting at age 35, Get screened for diabetes at least every 3 years.  If you are younger than age 35, ask your care team if you should be screened for diabetes.  Cholesterol test: At age 39, start having a cholesterol test every 5 years, or more often if advised.  Bone density scan (DEXA): At age 50, ask your care team if you should have this scan for osteoporosis (brittle bones).  Hepatitis C: Get tested at least once in your life.  STIs (sexually transmitted infections)  Before age 24: Ask your care team if you should be screened for STIs.  After age 24: Get screened for STIs if you're at risk. You are at risk for STIs (including HIV) if:  You are sexually active with more than one person.  You don't use condoms every time.  You or a partner was diagnosed with a sexually transmitted infection.  If you are at risk for HIV, ask about PrEP medicine to prevent HIV.  Get tested for HIV at least once in your life, whether you are at risk for HIV or not.  Cancer screening tests  Cervical cancer screening: If you have a cervix, begin getting regular cervical cancer screening tests at age 21. Most people who have regular screenings with normal results can stop after age 65. Talk about this with your provider.  Breast cancer scan (mammogram): If you've ever had breasts, begin having regular mammograms starting at age 40. This is a scan to check for breast cancer.  Colon cancer screening: It is important to start screening for colon cancer at age 45.  Have a colonoscopy test every 10 years (or more often if you're at risk) Or, ask your provider about stool tests like a FIT test every year or Cologuard test every 3 years.  To learn more about your  testing options, visit: https://[email or web address].pdf.  For help making a decision, visit: https://bit.ly/cs11967.  Prostate cancer screening test: If you have a prostate and are age 55 to 69, ask your provider if you would benefit from a yearly prostate cancer screening test.  Lung cancer screening: If you are a current or former smoker age 50 to 80, ask your care team if ongoing lung cancer screenings are right for you.  For informational purposes only. Not to replace the advice of your health care provider. Copyright   2023 [employer]. All rights reserved. Clinically reviewed by the  [employer] [place] [facility]. Cell[employer]Point 237224 - REV 01/24.    Hearing Loss: Care Instructions  Overview     Hearing loss is a sudden or slow decrease in how well you hear. It can range from slight to profound. Permanent hearing loss can occur with aging. It also can happen when you are exposed long-term to loud noise. Examples include listening to loud music, riding motorcycles, or being around other loud machines.  Hearing loss can affect your work and home life. It can make you feel lonely or depressed. You may feel that you have lost your independence. But hearing aids and other devices can help you hear better and feel connected to others.  Follow-up care is a key part of your treatment and safety. Be sure to make and go to all appointments, and call your doctor if you are having problems. It's also a good idea to know your test results and keep a list of the medicines you take.  How can you care for yourself at home?  Avoid loud noises whenever possible. This helps keep your hearing from getting worse.  Always wear hearing protection around loud noises.  Wear a hearing aid as directed.  A professional can help you pick a hearing aid that will work best for you.  You can also get hearing aids over the counter for mild to moderate hearing loss.  Have hearing tests as your doctor suggests. They can show  "whether your hearing has changed. Your hearing aid may need to be adjusted.  Use other devices as needed. These may include:  Telephone amplifiers and hearing aids that can connect to a television, stereo, radio, or microphone.  Devices that use lights or vibrations. These alert you to the doorbell, a ringing telephone, or a baby monitor.  Television closed-captioning. This shows the words at the bottom of the screen. Most new TVs can do this.  TTY (text telephone). This lets you type messages back and forth on the telephone instead of talking or listening. These devices are also called TDD. When messages are typed on the keyboard, they are sent over the phone line to a receiving TTY. The message is shown on a monitor.  Use text messaging, social media, and email if it is hard for you to communicate by telephone.  Try to learn a listening technique called speechreading. It is not lipreading. You pay attention to people's gestures, expressions, posture, and tone of voice. These clues can help you understand what a person is saying. Face the person you are talking to, and have them face you. Make sure the lighting is good. You need to see the other person's face clearly.  Think about counseling if you need help to adjust to your hearing loss.  When should you call for help?  Watch closely for changes in your health, and be sure to contact your doctor if:    You think your hearing is getting worse.     You have new symptoms, such as dizziness or nausea.   Where can you learn more?  Go to https://www.[employer].net/patiented  Enter R798 in the search box to learn more about \"Hearing Loss: Care Instructions.\"  Current as of: September 27, 2023               Content Version: 14.0    9449-9552 [employer].   Care instructions adapted under license by your healthcare professional. If you have questions about a medical condition or this instruction, always ask your healthcare professional. [email or web address].[email or web address], " [facility] disclaims any warranty or liability for your use of this information.      Learning About Stress  What is stress?     Stress is your body's response to a hard situation. Your body can have a physical, emotional, or mental response. Stress is a fact of life for most people, and it affects everyone differently. What causes stress for you may not be stressful for someone else.  A lot of things can cause stress. You may feel stress when you go on a job interview, take a test, or run a race. This kind of short-term stress is normal and even useful. It can help you if you need to work hard or react quickly. For example, stress can help you finish an important job on time.  Long-term stress is caused by ongoing stressful situations or events. Examples of long-term stress include long-term health problems, ongoing problems at work, or conflicts in your family. Long-term stress can harm your health.  How does stress affect your health?  When you are stressed, your body responds as though you are in danger. It makes hormones that speed up your heart, make you breathe faster, and give you a burst of energy. This is called the fight-or-flight stress response. If the stress is over quickly, your body goes back to normal and no harm is done.  But if stress happens too often or lasts too long, it can have bad effects. Long-term stress can make you more likely to get sick, and it can make symptoms of some diseases worse. If you tense up when you are stressed, you may develop neck, shoulder, or low back pain. Stress is linked to high blood pressure and heart disease.  Stress also harms your emotional health. It can make you montoya, tense, or depressed. Your relationships may suffer, and you may not do well at work or school.  What can you do to manage stress?  You can try these things to help manage stress:   Do something active. Exercise or activity can help reduce stress. Walking is a great way to get started. Even  everyday activities such as housecleaning or yard work can help.  Try yoga or chon chi. These techniques combine exercise and meditation. You may need some training at first to learn them.  Do something you enjoy. For example, listen to music or go to a movie. Practice your hobby or do volunteer work.  Meditate. This can help you relax, because you are not worrying about what happened before or what may happen in the future.  Do guided imagery. Imagine yourself in any setting that helps you feel calm. You can use online videos, books, or a teacher to guide you.  Do breathing exercises. For example:  From a standing position, bend forward from the waist with your knees slightly bent. Let your arms dangle close to the floor.  Breathe in slowly and deeply as you return to a standing position. Roll up slowly and lift your head last.  Hold your breath for just a few seconds in the standing position.  Breathe out slowly and bend forward from the waist.  Let your feelings out. Talk, laugh, cry, and express anger when you need to. Talking with supportive friends or family, a counselor, or a lacey leader about your feelings is a healthy way to relieve stress. Avoid discussing your feelings with people who make you feel worse.  Write. It may help to write about things that are bothering you. This helps you find out how much stress you feel and what is causing it. When you know this, you can find better ways to cope.  What can you do to prevent stress?  You might try some of these things to help prevent stress:  Manage your time. This helps you find time to do the things you want and need to do.  Get enough sleep. Your body recovers from the stresses of the day while you are sleeping.  Get support. Your family, friends, and community can make a difference in how you experience stress.  Limit your news feed. Avoid or limit time on social media or news that may make you feel stressed.  Do something active. Exercise or activity  "can help reduce stress. Walking is a great way to get started.  Where can you learn more?  Go to https://www.[employer].net/patiented  Enter N032 in the search box to learn more about \"Learning About Stress.\"  Current as of: October 24, 2023               Content Version: 14.0    7848-3650 [employer].   Care instructions adapted under license by your healthcare professional. If you have questions about a medical condition or this instruction, always ask your healthcare professional. [employer] disclaims any warranty or liability for your use of this information.      Bladder Training: Care Instructions  Your Care Instructions     Bladder training is used to treat urge incontinence and stress incontinence. Urge incontinence means that the need to urinate comes on so fast that you can't get to a toilet in time. Stress incontinence means that you leak urine because of pressure on your bladder. For example, it may happen when you laugh, cough, or lift something heavy.  Bladder training can increase how long you can wait before you have to urinate. It can also help your bladder hold more urine. And it can give you better control over the urge to urinate.  It is important to remember that bladder training takes a few weeks to a few months to make a difference. You may not see results right away, but don't give up.  Follow-up care is a key part of your treatment and safety. Be sure to make and go to all appointments, and call your doctor if you are having problems. It's also a good idea to know your test results and keep a list of the medicines you take.  How can you care for yourself at home?  Work with your doctor to come up with a bladder training program that is right for you. You may use one or more of the following methods.  Delayed urination  In the beginning, try to keep from urinating for 5 minutes after you first feel the need to go.  While you wait, take deep, slow breaths to relax. " "Kegel exercises can also help you delay the need to go to the bathroom.  After some practice, when you can easily wait 5 minutes to urinate, try to wait 10 minutes before you urinate.  Slowly increase the waiting period until you are able to control when you have to urinate.  Scheduled urination  Empty your bladder when you first wake up in the morning.  Schedule times throughout the day when you will urinate.  Start by going to the bathroom every hour, even if you don't need to go.  Slowly increase the time between trips to the bathroom.  When you have found a schedule that works well for you, keep doing it.  If you wake up during the night and have to urinate, do it. Apply your schedule to waking hours only.  Kegel exercises  These tighten and strengthen pelvic muscles, which can help you control the flow of urine. (If doing these exercises causes pain, stop doing them and talk with your doctor.) To do Kegel exercises:  Squeeze your muscles as if you were trying not to pass gas. Or squeeze your muscles as if you were stopping the flow of urine. Your belly, legs, and buttocks shouldn't move.  Hold the squeeze for 3 seconds, then relax for 5 to 10 seconds.  Start with 3 seconds, then add 1 second each week until you are able to squeeze for 10 seconds.  Repeat the exercise 10 times a session. Do 3 to 8 sessions a day.  When should you call for help?  Watch closely for changes in your health, and be sure to contact your doctor if:    Your incontinence is getting worse.     You do not get better as expected.   Where can you learn more?  Go to https://www.health[employer]tucesta.net/patiented  Enter V684 in the search box to learn more about \"Bladder Training: Care Instructions.\"  Current as of: November 15, 2023               Content Version: 14.0    7587-8542 [employer].   Care instructions adapted under license by your healthcare professional. If you have questions about a medical condition or this instruction, " always ask your healthcare professional. [employer] disclaims any warranty or liability for your use of this information.

## 2024-04-22 NOTE — PROGRESS NOTES
Preventive Care Visit  [facility] EVANS Plaza PA-C, Family Medicine  Apr 22, 2024      Assessment & Plan     Encounter for Medicare annual wellness exam  Reviewed VS, weight/BMI   Routine screenings see orders  Immunizations: see orders and documentation in HPI. Discussed vaccines coverage as pharmacy benefit.  Health and cancer screening recommendations delivered according to the USPSTF and other appropriate society guidelines  Nutrition and exercise counseling performed.    - CBC with platelets; Future  - CBC with platelets    Headache around the eyes  Visual disturbance  Headache with visual disturbance Feb 2024. Pattern almost migraine like, however no hx of migraines or headaches in her history. She is already on propranolol and topamax for tremor which would/should prevent migraines.   Had an eye exam and her eye doctor .   Plan for MRI. She needs ativan due to her tremor.   She is seeing her neurologist later today , advised she mention this headache.   - MR Brain w/o & w Contrast; Future  - LORazepam (ATIVAN) 0.5 MG tablet; Take 1-2 tablets (0.5-1 mg) by mouth once for 1 dose 30-60 min prior to MRI    Infiltrating ductal carcinoma of right female breast (H)  Established and following with oncology, . visit and imaging current     Hyperlipidemia LDL goal <100  On her simvastatin for years. Tolerates well. Labs today   - Comprehensive metabolic panel; Future  - Lipid panel reflex to direct LDL Fasting; Future  - Comprehensive metabolic panel  - Lipid panel reflex to direct LDL Fasting    Osteoporosis, unspecified osteoporosis type, unspecified pathological fracture presence  Reclast infusions from her rheumatologist. She has follow up scheduled. Cont on calcium and vit D BID. Wt bearing exercises.     Mixed urge and stress incontinence  UA to rule out infection.  Avoid bladder irritants- coffee and alcohol, etc.   Gave information for pelvic floor PT that was referred to  from urology in Sept 2023.  Seeing urology May 2024 for follow up. Defer medication management to that visit   - UA Macroscopic with reflex to Microscopic and Culture - Lab Collect; Future  - UA Macroscopic with reflex to Microscopic and Culture - Lab Collect  - UA Microscopic with Reflex to Culture    Neoplasm of uncertain behavior of skin  Ref to derm. Concern for skin cancer.   - Adult Dermatology  Referral; Future    Pedal edema  Pt has no edema on exam today at all.  In the morning does not have any edema, then onset during the day, worse in the evenings.  Labs below  US for venous insufficiency  Lower sodium in diet.   No CV sx . Had echo in 2022 as part of her heart stress test which was normal   Recheck with me in follow up if worsening or new associated sx.   - UA Macroscopic with reflex to Microscopic and Culture - Lab Collect; Future  - Comprehensive metabolic panel; Future  - TSH with free T4 reflex; Future  - US Venous Competency Bilateral; Future  - UA Macroscopic with reflex to Microscopic and Culture - Lab Collect  - Comprehensive metabolic panel  - TSH with free T4 reflex  - UA Microscopic with Reflex to Culture    Patient has been advised of split billing requirements and indicates understanding: Yes        Counseling  Appropriate preventive services were discussed with this patient, including applicable screening as appropriate for fall prevention, nutrition, physical activity, Tobacco-use cessation, weight loss and cognition.  Checklist reviewing preventive services available has been given to the patient.  Reviewed patient's diet, addressing concerns and/or questions.   She is at risk for psychosocial distress and has been provided with information to reduce risk.   The patient was provided with written information regarding signs of hearing loss.   Information on urinary incontinence and treatment options given to patient.       Follow Up: see above. Additionally patient was instructed  "to contact clinic for worsening symptoms, non-improvement in time frame discussed, and for questions regarding treatment plan.   For virtual visits, the patient was advised to be seen for in person evaluation if symptoms or condition are worsening or non-improvement as expected.   RESHMA Berg   Seema is a 68 year old, presenting for the following:  Physical        4/22/2024     8:50 AM   Additional Questions   Roomed by Jocelyne CALVIN   Accompanied by           4/22/2024     8:50 AM   Patient Reported Additional Medications   Patient reports taking the following new medications NA         Health Care Directive  Patient does not have a Health Care Directive or Living Will: Discussed advance care planning with patient; however, patient declined at this time.    HPI  Routine Health Maintenance:  Immunizations - will do RSV vaccine at [facility].   Mammogram: hx of right breast cancer. Saw oncology 04/19/24-  and had mammogram.   Colonoscopy: 09/2020. Repeat 10 yr  Fasting: yes     Osteoporosis - rheumatology - reclast infusions. Taking calcium and vit D.      Eye exam recently- has cataract developing, not ready for surgery.     Headache with visual changes -  Feb 2024- was in [place] was walking and abruptly developed  headache around eyes with significant light sensitivity. Had visual disturbance of \"lightening bolts\" in both eyes, couldn't really see, felt dizzy, woozy. No NV. No speech changes. No weakness in arms, legs. But felt off balance because her vision was affected. Walked home, laid down rested. Didn't take anything, had taken her daily tylenol already. Lasted about 3 hrs she thinks. But then continued to have headaches daily for a few weeks. They are better- but still has \"very mild\" headaches. She scheduled an eye visit , and told not related to anything with her eyes. Is on propranolol and topirimate for tremor from neurology. No history of migraines or headaches.  "     Low appetite on topirimate. Forgets to eat. Weight is down. I am aware. Tyrell makes me eat.     Hyperlipidemia - on simvastatin. Walking for exercise 5-6d per week. 4 miles.     Frequency/urgency, up at night. Saw urology in the fall but was not able to start the pelvic floor PT that was recommended due to travel. Is seeing urology back in May 2024.     Pedal edema- started in the last 6months or so. Left a little worse than the right. In the mornings legs look normal, then notices in the evening. No diet changes. Walking a few miles most days per week. No CP/SOB/SPENCE. No hx of varicose veins.         4/16/2024   General Health   How would you rate your overall physical health? Good   Feel stress (tense, anxious, or unable to sleep) Only a little   (!) STRESS CONCERN      4/16/2024   Nutrition   Diet: Regular (no restrictions)         4/16/2024   Exercise   Days per week of moderate/strenous exercise 5 days   Average minutes spent exercising at this level 60 min         4/16/2024   Social Factors   Frequency of gathering with friends or relatives Twice a week   Worry food won't last until get money to buy more No   Food not last or not have enough money for food? No   Do you have housing?  Yes   Are you worried about losing your housing? No   Lack of transportation? No   Unable to get utilities (heat,electricity)? No         4/21/2024   Fall Risk   Fallen 2 or more times in the past year? No   Trouble with walking or balance? No          4/16/2024   Activities of Daily Living- Home Safety   Needs help with the following daily activites None of the above   Safety concerns in the home None of the above         4/16/2024   Dental   Dentist two times every year? Yes         4/16/2024   Hearing Screening   Hearing concerns? (!) I FEEL THAT PEOPLE ARE MUMBLING OR NOT SPEAKING CLEARLY.    (!) I NEED TO ASK PEOPLE TO SPEAK UP OR REPEAT THEMSELVES.    (!) IT'S HARD TO FOLLOW A CONVERSATION IN A NOISY RESTAURANT OR CROWDED  ROOM.    (!) TROUBLE FOLLOWING DIALOGUE IN THE THEATHER.    (!) TROUBLE UNDERSTANDING SOFT OR WHISPERED SPEECH.         4/16/2024   Driving Risk Screening   Patient/family members have concerns about driving No         4/16/2024   General Alertness/Fatigue Screening   Have you been more tired than usual lately? No         4/16/2024   Urinary Incontinence Screening   Bothered by leaking urine in past 6 months Yes         4/16/2024   TB Screening   Were you born outside of the [place]? No         Today's PHQ-2 Score:       4/21/2024     3:27 PM   PHQ-2 ( 1999 Pfizer)   Q1: Little interest or pleasure in doing things 0   Q2: Feeling down, depressed or hopeless 0   PHQ-2 Score 0   Q1: Little interest or pleasure in doing things Not at all   Q2: Feeling down, depressed or hopeless Not at all   PHQ-2 Score 0           4/16/2024   Substance Use   Alcohol more than 3/day or more than 7/wk No   Do you have a current opioid prescription? No   How severe/bad is pain from 1 to 10? 4/10   Do you use any other substances recreationally? No     Social History     Tobacco Use    Smoking status: Never    Smokeless tobacco: Never   Vaping Use    Vaping status: Never Used   Substance Use Topics    Alcohol use: Yes     Comment: socially    Drug use: No           4/12/2024   LAST FHS-7 RESULTS   1st degree relative breast or ovarian cancer No   Any relative bilateral breast cancer No   Any male have breast cancer No   Any ONE woman have BOTH breast AND ovarian cancer No   Any woman with breast cancer before 50yrs No   2 or more relatives with breast AND/OR ovarian cancer No   2 or more relatives with breast AND/OR bowel cancer Yes        Mammogram Screening - Annual screen due to history of breast cancer, carcinoma in situ, or hyperplasia    ASCVD Risk   The 10-year ASCVD risk score ([place] GURROLA, et al., 2019) is: 5.1%    Values used to calculate the score:      Age: 68 years      Sex: Female      Is Non- :  No      Diabetic: No      Tobacco smoker: No      Systolic Blood Pressure: 100 mmHg      Is BP treated: No      HDL Cholesterol: 82 mg/dL      Total Cholesterol: 156 mg/dL            Reviewed and updated as needed this visit by Provider                    Past Medical History:   Diagnosis Date    Basal cell cancer     Breast cancer (H)     Dyslipidemia     History of blood transfusion     Hypertension     Mohs defect of nose 06/2012     Past Surgical History:   Procedure Laterality Date    BIOPSY BREAST      BREAST SURGERY Right 01/2016    breast cancer    LUMPECTOMY BREAST       Lab work is in process  Labs reviewed in EPIC  BP Readings from Last 3 Encounters:   04/22/24 100/64   04/19/24 95/66   10/02/23 95/61    Wt Readings from Last 3 Encounters:   04/22/24 54.9 kg (121 lb 1.6 oz)   04/19/24 55.4 kg (122 lb 3.2 oz)   10/02/23 53.1 kg (117 lb)                  Patient Active Problem List   Diagnosis    Mohs defect of cheek    Chronic bilateral thoracic back pain    Closed compression fracture of thoracic vertebra (H)    Closed compression fracture of first lumbar vertebra (H)    Osteoporosis    Invasive ductal carcinoma of breast, female (H)- RIGHT breast, Upper outer quadrant- 2015    Benign essential tremor    Hyperlipidemia LDL goal <100    Mixed urge and stress incontinence    Vertigo    Lateral pain of left hip     Past Surgical History:   Procedure Laterality Date    BIOPSY BREAST      BREAST SURGERY Right 01/2016    breast cancer    LUMPECTOMY BREAST         Social History     Tobacco Use    Smoking status: Never    Smokeless tobacco: Never   Substance Use Topics    Alcohol use: Yes     Comment: socially     Family History   Problem Relation Age of Onset    Macular Degeneration Mother     Cerebrovascular Disease Mother     Hypertension Mother     Thyroid Disease Mother     Other - See Comments Mother         ?tremor    Tremor Mother         hand tremor    Cerebrovascular Disease Father     Hypothyroidism  Father     Diabetes Maternal Grandmother     Coronary Artery Disease Maternal Grandmother     Tremor Maternal Grandmother     Colon Cancer Paternal Grandmother     Other - See Comments Brother     Other - See Comments Brother     Other - See Comments Brother     Other - See Comments Brother     Breast Cancer Sister     Other Cancer Sister     Anesthesia Reaction Sister     Diabetes Sister     Anesthesia Reaction Sister     Diabetes Son     Other - See Comments Son         lives in [place]    Diabetes Sister     Coronary Artery Disease No family hx of     Hyperlipidemia No family hx of     Colon Cancer No family hx of     Prostate Cancer No family hx of     Depression No family hx of     Substance Abuse No family hx of     Obesity No family hx of     Osteoporosis No family hx of     Asthma No family hx of     Mental Illness No family hx of     Anxiety Disorder No family hx of     Other Cancer No family hx of          Current Outpatient Medications   Medication Sig Dispense Refill    acetaminophen (TYLENOL) 500 MG tablet Take 1,000 mg by mouth every morning 2 tablet 0    aspirin (ASA) 81 MG EC tablet Take 81 mg by mouth daily as needed (prevent clots when flying)      Calcium Carbonate-Vit D-Min (CALTRATE MINIS PLUS MINERALS) 300-800 MG-UNIT TABS Take 1 tablet by mouth 2 times daily      Cholecalciferol (VITAMIN D3) 50 MCG (2000 UT) CAPS Take 2,000 Units by mouth daily      cyanocobalamin (VITAMIN B-12) 1000 MCG tablet Take 1 tablet (1,000 mcg) by mouth every other day      LORazepam (ATIVAN) 0.5 MG tablet Take 1-2 tablets (0.5-1 mg) by mouth once for 1 dose 30-60 min prior to MRI 2 tablet 0    polyethylene glycol (MIRALAX) 17 GM/Dose powder Take 1 capful. by mouth every other day      propranolol ER (INDERAL LA) 60 MG 24 hr capsule Take 1 capsule (60 mg) by mouth At Bedtime 180 capsule 3    simvastatin (ZOCOR) 20 MG tablet Take 1 tablet (20 mg) by mouth At Bedtime 180 tablet 1    tamoxifen (NOLVADEX) 20 MG tablet  Take 1 tablet (20 mg) by mouth daily 90 tablet 3    topiramate (TOPAMAX) 50 MG tablet Take 1 tablet (50 mg) by mouth at bedtime 14 tablet 0    zoledronic Acid (RECLAST) 5 MG/100ML SOLN infusion 5 mg/100 mL once a year       Allergies   Allergen Reactions    Codeine Sulfate Nausea    Compazine [Prochlorperazine] Other (See Comments)     Jittery and hyper and foggy    Primidone      vomiting, chills & hot flashes, headache, sensitivity to light & noise     Current providers sharing in care for this patient include:  Patient Care Team:  Shantel Plaza PA-C as PCP - General (Physician Assistant - Medical)  Yo Cortes MD as MD  Shantel Plaza PA-C as Assigned PCP  Ruth Ann Davis, RN as Specialty Care Coordinator (Oncology)  Aide Laguerre APRN CNP as Assigned Cancer Care Provider  Juan Nevarez MD as Physician (Ophthalmology)  Shantel Plaza PA-C as Referring Physician (Family Medicine)  Denzel López AuD as Audiologist (Audiology)  Rita, Pawel Grant MD as Assigned Neuroscience Provider  Evelina Saldivar RP as Pharmacist (Pharmacist)  Rita, Pawel Grant MD as MD (Neurology)  Bisi Duong DPM, Podiatry/Foot and Ankle Surgery as Assigned Musculoskeletal Provider  Evelina Saldivar RP as Assigned MTM Pharmacist  Morena Demarco, PhD as Assigned Behavioral Health Provider  Frances Cleveland PA-C as Assigned Surgical Provider    The following health maintenance items are reviewed in Epic and correct as of today:  Health Maintenance   Topic Date Due    HEPATITIS C SCREENING  Never done    RSV VACCINE (Pregnancy & 60+) (1 - 1-dose 60+ series) Never done    COVID-19 Vaccine (5 - 2023-24 season) 09/01/2023    LIPID  10/10/2023    MEDICARE ANNUAL WELLNESS VISIT  10/12/2023    ANNUAL REVIEW OF HM ORDERS  04/19/2024    MAMMO SCREENING  04/12/2025    FALL RISK ASSESSMENT  04/22/2025    COLORECTAL CANCER SCREENING  09/25/2025    GLUCOSE  10/10/2025    ADVANCE CARE  "PLANNING  10/12/2027    DEXA  03/15/2032    DTAP/TDAP/TD IMMUNIZATION (5 - Td or Tdap) 06/01/2033    PHQ-2 (once per calendar year)  Completed    INFLUENZA VACCINE  Completed    Pneumococcal Vaccine: 65+ Years  Completed    ZOSTER IMMUNIZATION  Completed    IPV IMMUNIZATION  Aged Out    HPV IMMUNIZATION  Aged Out    MENINGITIS IMMUNIZATION  Aged Out    RSV MONOCLONAL ANTIBODY  Aged Out         Review of Systems  Constitutional, HEENT, cardiovascular, pulmonary, gi and gu systems are negative, except as otherwise noted.     Objective    Exam  /64   Pulse 57   Temp 97.5  F (36.4  C) (Temporal)   Ht 1.627 m (5' 4.06\")   Wt 54.9 kg (121 lb 1.6 oz)   SpO2 97%   BMI 20.75 kg/m     Estimated body mass index is 20.75 kg/m  as calculated from the following:    Height as of this encounter: 1.627 m (5' 4.06\").    Weight as of this encounter: 54.9 kg (121 lb 1.6 oz).    Physical Exam  GENERAL: alert and no distress  EYES: Eyes grossly normal to inspection, PERRL and conjunctivae and sclerae normal  HENT: ear canals and TM's normal, nose and mouth without ulcers or lesions  NECK: no adenopathy, no asymmetry, masses, or scars  RESP: lungs clear to auscultation - no rales, rhonchi or wheezes  CV: regular rate and rhythm, normal S1 S2, no S3 or S4, no murmur, click or rub, no peripheral edema at all on exam.   ABDOMEN: soft, nontender, no hepatosplenomegaly, no masses and bowel sounds normal  MS: no gross musculoskeletal defects noted, no edema  SKIN: left shin: 1cm erythematous irregular raised lesion; right shin: 8mm purplish nodular lesion   NEURO: Normal strength and tone, mentation intact and speech normal  PSYCH: mentation appears normal, affect normal/bright         4/22/2024   Mini Cog   Clock Draw Score 2 Normal   3 Item Recall 2 objects recalled   Mini Cog Total Score 4              Signed Electronically by: Shantel Plaza PA-C    "

## 2024-04-22 NOTE — PATIENT INSTRUCTIONS
Medications               Acetaminophen tylenol 2         Aspirin 81mg prn         Calc Carb-cholecalciferol 1000-800  2         Cholecalciferol Vitamin D3 50mcg 2000 units 1         Cyanocobalamin Vit B12 1000mcg qod         Polyethylene glycol miralax qod         Famotidine pepcid 20mg off         Propranolol inderal LA 60mg 24hr       1   Simvastatin zocor 20mg        1   Tamoxifen nolvadex 20mg  1         Topiramate topamax 50mg       1   Zoledronic acid reclast year                        Plan:    Her lorazepam was stopped as she was not taking it but actually needs it for her MRI scan so a Rx was sent.   [facility] Rx sent in [place].     Shantel Fenton is her PCP and an MRI will be ordered and scheduled for the symptoms.     Has incontinence - getting up every 2 hours  Had a urinalysis and not sure if there is a infection or contaminant.   Urology appointment may 2024  May get therapy   Frances Cleveland PA-C     Consider getting a sed rate or/and CRP with the headache history.   No eye issues  Wearing mouth guard  Family history of pituitary gland tumor  Pending mri to be scheduled  Neurology headache - referral if needed  Maria Rdz or Aashish     Saw oncologist on Friday and is taking tamoxifen and will remain on it for 18 months.     She is on reclast and will have a decision from rheumatology about if she will get another dose.   Her bowel  habits are better with the change in her calcium    Remains on simvastatin    Tremors are about the same and tolerable.   Christiana and Kat are studying a couple of medications for tremor.     [email or web address]  [email or web address]    Cognitive testing was good. 10/3/2023    Last visit was 10/2/2023    Return in 6 months     Return back in 6-12 months.

## 2024-04-22 NOTE — PROGRESS NOTES
Seema is a 68 year old who is being evaluated via a billable video visit.    How would you like to obtain your AVS? MyChart  If the video visit is dropped, the invitation should be resent by: Send to e-mail at: [email or web address]  Will anyone else be joining your video visit? No    Video-Visit Details    Type of service:  Video Visit   Video End Time:  Originating Location (pt. Location):     Distant Location (provider location):    Platform used for Video Visit:   MIGDALIA Mccray, EVA ([facility])

## 2024-04-22 NOTE — LETTER
April 29, 2024      Seema Johnson  [address]  [facility] [address]        Dear ,    We are writing to inform you of your test results.    The urine culture was negative for infection.     Cholesterol is well controlled .     Thyroid is normal     Complete blood counts are normal- no anemia.     Liver labs are normal.     Electrolytes were normal.  Kidney function (creatinine and GFR): creatinine was just above normal and GFR was reduced or slower than normal.  GFR is how fast the kidneys filter (clean) the blood. Creatinine is a substance the kidneys filter and clear, so if the kidney function is down or slower than normal, the creatinine goes up.  A variety of factors affect kidney function including hydration status, age, other other chronic diseases, and certain medications.  This change from prior labs can be from a reversible isolated cause like dehydration, recent use/excessive use of NSAID medications (like ibuprofen, aleve, etc) however this can also indicate a pattern of declining kidney function (chronic kidney disease). I would like for you to hydrate well, and plan to repeat this test in 1-2 weeks. If you have been taking any NSAIDs recently let me know.    Resulted Orders   UA Macroscopic with reflex to Microscopic and Culture - Lab Collect   Result Value Ref Range    Color Urine Yellow Colorless, Straw, Light Yellow, Yellow    Appearance Urine Clear Clear    Glucose Urine Negative Negative mg/dL    Bilirubin Urine Negative Negative    Ketones Urine Negative Negative mg/dL    Specific Gravity Urine 1.010 1.003 - 1.035    Blood Urine Negative Negative    pH Urine 7.5 (H) 5.0 - 7.0    Protein Albumin Urine Negative Negative mg/dL    Urobilinogen Urine 0.2 0.2, 1.0 E.U./dL    Nitrite Urine Negative Negative    Leukocyte Esterase Urine Trace (A) Negative   CBC with platelets   Result Value Ref Range    WBC Count 5.0 4.0 - 11.0 10e3/uL    RBC Count 3.92 3.80 - 5.20 10e6/uL    Hemoglobin 11.9  11.7 - 15.7 g/dL    Hematocrit 36.4 35.0 - 47.0 %    MCV 93 78 - 100 fL    MCH 30.4 26.5 - 33.0 pg    MCHC 32.7 31.5 - 36.5 g/dL    RDW 12.2 10.0 - 15.0 %    Platelet Count 217 150 - 450 10e3/uL   Comprehensive metabolic panel   Result Value Ref Range    Sodium 139 135 - 145 mmol/L      Comment:      Reference intervals for this test were updated on 09/26/2023 to more accurately reflect our healthy population. There may be differences in the flagging of prior results with similar values performed with this method. Interpretation of those prior results can be made in the context of the updated reference intervals.     Potassium 4.0 3.4 - 5.3 mmol/L    Carbon Dioxide (CO2) 22 22 - 29 mmol/L    Anion Gap 10 7 - 15 mmol/L    Urea Nitrogen 15.6 8.0 - 23.0 mg/dL    Creatinine 1.01 (H) 0.51 - 0.95 mg/dL    GFR Estimate 60 (L) >60 mL/min/1.73m2    Calcium 9.7 8.8 - 10.2 mg/dL    Chloride 107 98 - 107 mmol/L    Glucose 89 70 - 99 mg/dL    Alkaline Phosphatase 27 (L) 40 - 150 U/L      Comment:      Reference intervals for this test were updated on 11/14/2023 to more accurately reflect our healthy population. There may be differences in the flagging of prior results with similar values performed with this method. Interpretation of those prior results can be made in the context of the updated reference intervals.    AST 19 0 - 45 U/L      Comment:      Reference intervals for this test were updated on 6/12/2023 to more accurately reflect our healthy population. There may be differences in the flagging of prior results with similar values performed with this method. Interpretation of those prior results can be made in the context of the updated reference intervals.    ALT 9 0 - 50 U/L      Comment:      Reference intervals for this test were updated on 6/12/2023 to more accurately reflect our healthy population. There may be differences in the flagging of prior results with similar values performed with this method.  Interpretation of those prior results can be made in the context of the updated reference intervals.      Protein Total 6.6 6.4 - 8.3 g/dL    Albumin 4.5 3.5 - 5.2 g/dL    Bilirubin Total 0.5 <=1.2 mg/dL   Lipid panel reflex to direct LDL Fasting   Result Value Ref Range    Cholesterol 153 <200 mg/dL    Triglycerides 62 <150 mg/dL    Direct Measure HDL 76 >=50 mg/dL    LDL Cholesterol Calculated 65 <=100 mg/dL    Non HDL Cholesterol 77 <130 mg/dL    Patient Fasting > 8hrs? Yes     Narrative    Cholesterol  Desirable:  <200 mg/dL    Triglycerides  Normal:  Less than 150 mg/dL  Borderline High:  150-199 mg/dL  High:  200-499 mg/dL  Very High:  Greater than or equal to 500 mg/dL    Direct Measure HDL  Female:  Greater than or equal to 50 mg/dL   Male:  Greater than or equal to 40 mg/dL    LDL Cholesterol  Desirable:  <100mg/dL  Above Desirable:  100-129 mg/dL   Borderline High:  130-159 mg/dL   High:  160-189 mg/dL   Very High:  >= 190 mg/dL    Non HDL Cholesterol  Desirable:  130 mg/dL  Above Desirable:  130-159 mg/dL  Borderline High:  160-189 mg/dL  High:  190-219 mg/dL  Very High:  Greater than or equal to 220 mg/dL   TSH with free T4 reflex   Result Value Ref Range    TSH 2.38 0.30 - 4.20 uIU/mL   UA Microscopic with Reflex to Culture   Result Value Ref Range    Bacteria Urine Few (A) None Seen /HPF    RBC Urine 0-2 0-2 /HPF /HPF    WBC Urine 5-10 (A) 0-5 /HPF /HPF    Squamous Epithelials Urine Few (A) None Seen /LPF    WBC Clumps Urine Present (A) None Seen /HPF    Amorphous Crystals Urine Few (A) None Seen /HPF    Narrative    Urine Culture not indicated   Urine Culture Aerobic Bacterial - lab collect   Result Value Ref Range    Culture 50,000-100,000 CFU/mL Mixture of urogenital jhonny        If you have any questions or concerns, please call the clinic at the number listed above.       Sincerely,      Shantel Plaza PA-C

## 2024-04-23 ENCOUNTER — TELEPHONE (OUTPATIENT)
Dept: NEUROLOGY | Facility: CLINIC | Age: 69
End: 2024-04-23
Payer: COMMERCIAL

## 2024-04-23 NOTE — TELEPHONE ENCOUNTER
Left Voicemail (1st Attempt) and Sent [employer] (1st Attempt) for the patient to call back and schedule the following:    Appointment type: Return Movement Disorder  Provider: Dr. Salinas  Return date: 4/22/2025  Specialty phone number: [phone]  Additional appointment(s) needed:   Additonal Notes:    Attempted to schedule a return follow up appointment with Dr. Salinas, April 2025.    Also sent a [employer] message.    Francine PANDYA/Complex Procedure    [facility]   Neurology, NeuroSurgery, NeuroPsychology, Pain Management and Cardiology Specialties  Medical/Surgical Adult Specialties      
done

## 2024-04-24 DIAGNOSIS — E78.5 HYPERLIPIDEMIA LDL GOAL <100: ICD-10-CM

## 2024-04-24 LAB — BACTERIA UR CULT: NORMAL

## 2024-04-24 RX ORDER — SIMVASTATIN 20 MG
20 TABLET ORAL
Qty: 90 TABLET | Refills: 3 | Status: SHIPPED | OUTPATIENT
Start: 2024-04-24

## 2024-04-25 ENCOUNTER — THERAPY VISIT (OUTPATIENT)
Dept: PHYSICAL THERAPY | Facility: CLINIC | Age: 69
End: 2024-04-25
Attending: PHYSICIAN ASSISTANT
Payer: COMMERCIAL

## 2024-04-25 DIAGNOSIS — R32 URINARY INCONTINENCE: Primary | ICD-10-CM

## 2024-04-25 DIAGNOSIS — M62.89 PELVIC FLOOR DYSFUNCTION: ICD-10-CM

## 2024-04-25 DIAGNOSIS — N81.11 CYSTOCELE, MIDLINE: ICD-10-CM

## 2024-04-25 DIAGNOSIS — R35.0 URINARY FREQUENCY: ICD-10-CM

## 2024-04-25 DIAGNOSIS — N39.46 MIXED INCONTINENCE: ICD-10-CM

## 2024-04-25 DIAGNOSIS — R39.15 URINARY URGENCY: ICD-10-CM

## 2024-04-25 PROCEDURE — 97112 NEUROMUSCULAR REEDUCATION: CPT | Mod: GP | Performed by: PHYSICAL THERAPIST

## 2024-04-25 PROCEDURE — 97161 PT EVAL LOW COMPLEX 20 MIN: CPT | Mod: GP | Performed by: PHYSICAL THERAPIST

## 2024-04-25 PROCEDURE — 97110 THERAPEUTIC EXERCISES: CPT | Mod: GP | Performed by: PHYSICAL THERAPIST

## 2024-04-25 NOTE — PROGRESS NOTES
PHYSICAL THERAPY EVALUATION  Type of Visit: Evaluation    See electronic medical record for Abuse and Falls Screening details.    Subjective       Presenting condition or subjective complaint: frequent urination  at night and incontinnence durning the day.  Date of onset: 09/18/24 (saw MD)    Relevant medical history: Cancer   Dates & types of surgery: Breast cancer surgery    Prior diagnostic imaging/testing results:       Prior therapy history for the same diagnosis, illness or injury: No      Prior Level of Function  Transfers: Independent  Ambulation: Independent  ADL: Independent  IADL:     Living Environment  Social support: With a significant other or spouse   Type of home: House   Stairs to enter the home: Yes 6 Is there a railing: Yes   Ramp: No   Stairs inside the home: Yes 24 Is there a railing: Yes   Help at home: None  Equipment owned:       Employment: No    Hobbies/Interests: traveling, gardening & walking    Patient goals for therapy: Have a better night's rest.    Pain assessment: Pain denied     Objective      PELVIC EVALUATION  ADDITIONAL HISTORY:  Sex assigned at birth: Female  Gender identity: Female    Pronouns: She/Her Hers      Bladder History:  Feels bladder filling: No  Triggers for feeling of inability to wait to go to the bathroom: Yes    How long can you wait to urinate:    Gets up at night to urinate: Yes 4  Can stop the flow of urine when urinating: Sometimes  Volume of urine usually released: Medium   Other issues: Dribbling after urinating  Number of bladder infections in last 12 months:    Fluid intake per day: 48+oz 6oz    Medications taken for bladder: No     Activities causing urine leak: Cough; Sneeze; Hurrying to the bathroom due to a strong urge to urinate (pee)    Amount of urine typically leaked: 1-2 oz  Pads used to help with leaking: Yes I use this many pads per day: 2 I use this type/brand: Always pure cotton    Bowel History:  Frequency of bowel movement: almost every  day  Consistency of stool: Soft-formed    Ignores the urge to defecate: No  Other bowel issues:    Length of time spent trying to have a bowel movement:      Sexual Function History:  Sexual orientation: Straight    Sexually active: Yes  Lubrication used: No No  Pelvic pain:      Pain or difficulty with orgasms/erection/ejaculation: No    State of menopause: Post-menopause (I am done with menopause)  Hormone medications: No      Are you currently pregnant: No, Number of previous pregnancies: two, Number of deliveries: one, Have you been diagnosed with pelvic prolapse or abdominal separation: No, Do you get regular exercise: Yes, I do this type of exercise: walking, Have you tried pelvic floor strengthening exercises for 4 weeks: No, Do you have any history of trauma that is relevant to your care that you d like to share: No    Discussed reason for referral regarding pelvic health needs and external/internal pelvic floor muscle examination with patient/guardian.  Opportunity provided to ask questions and verbal consent for assessment and intervention was given.    PAIN:   POSTURE:   LUMBAR SCREEN:   HIP SCREEN:  Strength:   Pain: - none + mild ++ moderate +++ severe  Strength Scale: 0-5/5 Left Right   Hip Flexion     Hip Extension 4- 4-   Hip Abduction 4- 4-   Hip Adduction     Hip Internal Rotation     Hip External Rotation     Knee Flexion     Knee Extension        Functional Strength Testing:     PELVIC/SI SCREEN:     PAIN PROVOCATION TEST:   PELVIS/SI SPECIAL TESTS:   BREATHING SYMMETRY:     PELVIC EXAM  External Visual Inspection:  At rest: Normal  With voluntary pelvic floor contraction: Uncertain  Relaxation of PFM: Yes    Integumentary:   Introitus: Introital gaping, Atrophy    External Digital Palpation per Perineum:   Ischiocavernosis: Unremarkable  Bulbo cavernosis: Unremarkable  Transverse perineal: Unremarkable  Levator ani: Unremarkable  Perineal body: Unremarkable    Scar:   Location/Type: \  Mobility:      Internal Digital Palpation:  Per Vagina:  Digital Muscle Performance: P (Power): 2/5 slight contraction with cues  E (Endurance): fatigue present only able to repeat 2-3 contraction  Compensations: Adductors, Gluts, Breath holding    Per Rectum:        Pelvic Organ Prolapse:       ABDOMINAL ASSESSMENT  Diastasis Rectus Abdominis (JEAN):      Abdominal Activation/Strength:     Scar:   Location/Type:   Mobility:     Fascial Tension/Restriction:     BIOFEEDBACK:  Position:   Surface Electrodes:     Abdominals:     Perianals:       DERMATOMES:   DTR S:     Assessment & Plan   CLINICAL IMPRESSIONS  Medical Diagnosis: Urinary incontinence, pelvic dysfunction    Treatment Diagnosis:     Impression/Assessment: Patient is a 68 year old female with urinary incontinence complaints.  The following significant findings have been identified: Decreased ROM/flexibility, Decreased strength, Impaired muscle performance, and Decreased activity tolerance. These impairments interfere with their ability to perform self care tasks and recreational activities as compared to previous level of function.     Clinical Decision Making (Complexity):  Clinical Presentation: Stable/Uncomplicated  Clinical Presentation Rationale: based on medical and personal factors listed in PT evaluation  Clinical Decision Making (Complexity): Low complexity    PLAN OF CARE  Treatment Interventions:  Interventions: Neuromuscular Re-education, Therapeutic Activity, Therapeutic Exercise    Long Term Goals     PT Goal 1  Goal Identifier: Night voiding  Goal Description: Decreased night voiding to 2x or less  Rationale: to maximize safety and independence with performance of ADLs and functional tasks  Goal Progress: 4x/night  Target Date: 07/18/24  PT Goal 2  Goal Identifier: Urinary Incontinence  Goal Description: Currently leaking on average 4x/day  Rationale: to maximize safety and independence with performance of ADLs and functional tasks  Goal Progress:  Decrease leaking to 1-2x/week  Target Date: 07/18/24      Frequency of Treatment: 2x month  Duration of Treatment: 3 months    Recommended Referrals to Other Professionals:   Education Assessment:        Risks and benefits of evaluation/treatment have been explained.   Patient/Family/caregiver agrees with Plan of Care.     Evaluation Time:     PT Eval, Low Complexity Minutes (01846): 40       Signing Clinician: STERLING [facility]                                                                                   OUTPATIENT PHYSICAL THERAPY      PLAN OF TREATMENT FOR OUTPATIENT REHABILITATION   Patient's Last Name, First Name, Seema Bingham YOB: 1955   Provider's Name   [facility]   Medical Record No.  [id number]     Onset Date: 09/18/24 (saw MD)  Start of Care Date: 04/25/24     Medical Diagnosis:  Urinary incontinence, pelvic dysfunction      PT Treatment Diagnosis:    Plan of Treatment  Frequency/Duration: 2x month/ 3 months    Certification date from 04/25/24 to 07/18/24         See note for plan of treatment details and functional goals     Myrna Carreon PT                         I CERTIFY THE NEED FOR THESE SERVICES FURNISHED UNDER        THIS PLAN OF TREATMENT AND WHILE UNDER MY CARE     (Physician attestation of this document indicates review and certification of the therapy plan).              Referring Provider:  Frances Cleveland    Initial Assessment  See Epic Evaluation- Start of Care Date: 04/25/24

## 2024-04-30 NOTE — NURSING NOTE
Prior to immunization administration, verified patients identity using patient s name and date of birth. Please see Immunization Activity for additional information.     Screening Questionnaire for Adult Immunization    Are you sick today?   No   Do you have allergies to medications, food, a vaccine component or latex?   No   Have you ever had a serious reaction after receiving a vaccination?   No   Do you have a long-term health problem with heart, lung, kidney, or metabolic disease (e.g., diabetes), asthma, a blood disorder, no spleen, complement component deficiency, a cochlear implant, or a spinal fluid leak?  Are you on long-term aspirin therapy?   No   Do you have cancer, leukemia, HIV/AIDS, or any other immune system problem?   No   Do you have a parent, brother, or sister with an immune system problem?   No   In the past 3 months, have you taken medications that affect  your immune system, such as prednisone, other steroids, or anticancer drugs; drugs for the treatment of rheumatoid arthritis, Crohn s disease, or psoriasis; or have you had radiation treatments?   No   Have you had a seizure, or a brain or other nervous system problem?   No   During the past year, have you received a transfusion of blood or blood    products, or been given immune (gamma) globulin or antiviral drug?   No   For women: Are you pregnant or is there a chance you could become       pregnant during the next month?   No   Have you received any vaccinations in the past 4 weeks?   No     Immunization questionnaire answers were all negative.        Per orders of Shantel Plaza, injection of Prevnar 13 given by Shahla Gonzalez MA. Patient instructed to remain in clinic for 15 minutes afterwards, and to report any adverse reaction to me immediately.       Screening performed by Shahla Gonzalez MA on 1/30/2020 at 1:45 PM.     Quality 130: Documentation Of Current Medications In The Medical Record: Current Medications Documented Quality 47: Advance Care Plan: Advance Care Planning discussed and documented; advance care plan or surrogate decision maker documented in the medical record. Name And Contact Information For Health Care Proxy: Spouse Detail Level: Detailed Quality 134: Screening For Clinical Depression And Follow-Up Plan: The patient was screened for depression and the screen was negative and no follow up required Quality 431: Preventive Care And Screening: Unhealthy Alcohol Use - Screening: Patient not identified as an unhealthy alcohol user when screened for unhealthy alcohol use using a systematic screening method Quality 226: Preventive Care And Screening: Tobacco Use: Screening And Cessation Intervention: Patient screened for tobacco use and is an ex/non-smoker

## 2024-05-06 ENCOUNTER — HOSPITAL ENCOUNTER (OUTPATIENT)
Dept: MRI IMAGING | Facility: CLINIC | Age: 69
Discharge: HOME | End: 2024-05-06
Attending: PHYSICIAN ASSISTANT | Admitting: PHYSICIAN ASSISTANT
Payer: COMMERCIAL

## 2024-05-06 DIAGNOSIS — R51.9 HEADACHE AROUND THE EYES: ICD-10-CM

## 2024-05-06 DIAGNOSIS — H53.9 VISUAL DISTURBANCE: ICD-10-CM

## 2024-05-06 PROCEDURE — 255N000002 HC RX 255 OP 636: Performed by: PHYSICIAN ASSISTANT

## 2024-05-06 PROCEDURE — 70553 MRI BRAIN STEM W/O & W/DYE: CPT

## 2024-05-06 PROCEDURE — A9585 GADOBUTROL INJECTION: HCPCS | Performed by: PHYSICIAN ASSISTANT

## 2024-05-06 RX ORDER — GADOBUTROL 604.72 MG/ML
5 INJECTION INTRAVENOUS ONCE
Status: COMPLETED | OUTPATIENT
Start: 2024-05-06 | End: 2024-05-06

## 2024-05-06 RX ADMIN — GADOBUTROL 5 ML: 604.72 INJECTION INTRAVENOUS at 20:59

## 2024-05-07 ENCOUNTER — THERAPY VISIT (OUTPATIENT)
Dept: PHYSICAL THERAPY | Facility: CLINIC | Age: 69
End: 2024-05-07
Payer: COMMERCIAL

## 2024-05-07 DIAGNOSIS — R32 URINARY INCONTINENCE: Primary | ICD-10-CM

## 2024-05-07 PROCEDURE — 97110 THERAPEUTIC EXERCISES: CPT | Mod: GP | Performed by: PHYSICAL THERAPIST

## 2024-05-07 PROCEDURE — 97530 THERAPEUTIC ACTIVITIES: CPT | Mod: GP | Performed by: PHYSICAL THERAPIST

## 2024-05-08 ENCOUNTER — LAB (OUTPATIENT)
Dept: LAB | Facility: CLINIC | Age: 69
End: 2024-05-08
Attending: PHYSICIAN ASSISTANT
Payer: COMMERCIAL

## 2024-05-08 DIAGNOSIS — R51.9 HEADACHE AROUND THE EYES: ICD-10-CM

## 2024-05-08 DIAGNOSIS — R94.4 DECREASED GFR: ICD-10-CM

## 2024-05-08 LAB
ANION GAP SERPL CALCULATED.3IONS-SCNC: 9 MMOL/L (ref 7–15)
BUN SERPL-MCNC: 13.6 MG/DL (ref 8–23)
CALCIUM SERPL-MCNC: 9 MG/DL (ref 8.8–10.2)
CHLORIDE SERPL-SCNC: 108 MMOL/L (ref 98–107)
CREAT SERPL-MCNC: 0.89 MG/DL (ref 0.51–0.95)
CRP SERPL-MCNC: <3 MG/L
DEPRECATED HCO3 PLAS-SCNC: 22 MMOL/L (ref 22–29)
EGFRCR SERPLBLD CKD-EPI 2021: 69 ML/MIN/1.73M2
ERYTHROCYTE [SEDIMENTATION RATE] IN BLOOD BY WESTERGREN METHOD: 7 MM/HR (ref 0–30)
GLUCOSE SERPL-MCNC: 91 MG/DL (ref 70–99)
POTASSIUM SERPL-SCNC: 4 MMOL/L (ref 3.4–5.3)
SODIUM SERPL-SCNC: 139 MMOL/L (ref 135–145)

## 2024-05-08 PROCEDURE — 85652 RBC SED RATE AUTOMATED: CPT

## 2024-05-08 PROCEDURE — 36415 COLL VENOUS BLD VENIPUNCTURE: CPT

## 2024-05-08 PROCEDURE — 80048 BASIC METABOLIC PNL TOTAL CA: CPT

## 2024-05-08 PROCEDURE — 86140 C-REACTIVE PROTEIN: CPT

## 2024-05-17 ENCOUNTER — ANCILLARY PROCEDURE (OUTPATIENT)
Dept: ULTRASOUND IMAGING | Facility: CLINIC | Age: 69
End: 2024-05-17
Attending: PHYSICIAN ASSISTANT
Payer: COMMERCIAL

## 2024-05-17 DIAGNOSIS — R60.0 PEDAL EDEMA: ICD-10-CM

## 2024-05-17 PROCEDURE — 93970 EXTREMITY STUDY: CPT | Mod: GC | Performed by: STUDENT IN AN ORGANIZED HEALTH CARE EDUCATION/TRAINING PROGRAM

## 2024-05-21 ENCOUNTER — THERAPY VISIT (OUTPATIENT)
Dept: PHYSICAL THERAPY | Facility: CLINIC | Age: 69
End: 2024-05-21
Payer: COMMERCIAL

## 2024-05-21 DIAGNOSIS — R32 URINARY INCONTINENCE: Primary | ICD-10-CM

## 2024-05-21 PROCEDURE — 97112 NEUROMUSCULAR REEDUCATION: CPT | Mod: GP | Performed by: PHYSICAL THERAPIST

## 2024-05-21 PROCEDURE — 97530 THERAPEUTIC ACTIVITIES: CPT | Mod: GP | Performed by: PHYSICAL THERAPIST

## 2024-06-05 ENCOUNTER — THERAPY VISIT (OUTPATIENT)
Dept: PHYSICAL THERAPY | Facility: CLINIC | Age: 69
End: 2024-06-05
Payer: COMMERCIAL

## 2024-06-05 DIAGNOSIS — R32 URINARY INCONTINENCE: Primary | ICD-10-CM

## 2024-06-05 PROCEDURE — 97110 THERAPEUTIC EXERCISES: CPT | Mod: GP | Performed by: PHYSICAL THERAPIST

## 2024-06-05 NOTE — PROGRESS NOTES
06/05/24 0500   Appointment Info   Signing clinician's name / credentials Myrna Carreon DPT   Total/Authorized Visits 6   Visits Used 4   Medical Diagnosis Urinary incontinence, pelvic dysfunction   Quick Adds Certification   Progress Note/Certification   Start of Care Date 04/25/24   Onset of illness/injury or Date of Surgery 09/18/24  (saw MD)   Therapy Frequency 2x month   Predicted Duration 3 months   Certification date from 04/25/24   Certification date to 07/18/24   GOALS   PT Goals 2   PT Goal 1   Goal Identifier Night voiding   Goal Description Decreased night voiding to 2x or less   Rationale to maximize safety and independence with performance of ADLs and functional tasks   Goal Progress 1-3x/night   Target Date 07/18/24   PT Goal 2   Goal Identifier Urinary Incontinence   Goal Description Currently leaking on average 1-2x/day   Rationale to maximize safety and independence with performance of ADLs and functional tasks   Goal Progress Decrease leaking to 1-2x/week   Target Date 07/18/24   Subjective Report   Subjective Report Patient reports she continues to improve with less leaking and frequency of voiding overnight.  At this time feels like she is ready to continue to improve independently.  She will followup with MD at the end of the summer if she continues to have leaking issues.   Objective Measures   Objective Measures Objective Measure 1;Objective Measure 2   Objective Measure 1   Objective Measure PFM   Details Improved awareness of PF contraction; does still need ques for full contraction and relaxation in sanding   Objective Measure 2   Objective Measure Bladder diary   Treatment Interventions (PT)   Interventions Therapeutic Procedure/Exercise;Therapeutic Activity;Neuromuscular Re-education   Therapeutic Procedure/Exercise   Therapeutic Procedures: strength, endurance, ROM, flexibility minutes (15484) 30   PTRx Ther Proc 1 Supine Pelvic Floor Muscle Strengthening   PTRx Ther Proc 1 -  Details No Notes   PTRx Ther Proc 2 Roll Ins Hooklying   PTRx Ther Proc 2 - Details No Notes   PTRx Ther Proc 3 Roll Ins   PTRx Ther Proc 3 - Details 5x   PTRx Ther Proc 4 Roll Outs   PTRx Ther Proc 4 - Details 10x RTB; can progress to GTB when ready   PTRx Ther Proc 5 Hip Abduction Straight Leg Raise   PTRx Ther Proc 5 - Details 10x   PTRx Ther Proc 6 Clamshell Feet together   PTRx Ther Proc 6 - Details 10x   Therapeutic Activity   Ther Act 1 Voiding patterns   Ther Act 1 - Details Discussed drinking majority of water by mid-afternoon and avoid drinking 2 hours before bed; discussed correct pushing with GRRR and SHH to assist with fully emptying   PTRx Ther Act 1 Proper Defecation Techniques   PTRx Ther Act 1 - Details No Notes   PTRx Ther Act 2 Bladder Diary   PTRx Ther Act 2 - Details No Notes   Neuromuscular Re-education   PTRx Neuro Re-ed 1 Bridging Pelvic Floor    PTRx Neuro Re-ed 1 - Details No Notes   PTRx Neuro Re-ed 2 Sit to Stand Pelvic Floor    PTRx Neuro Re-ed 2 - Details No Notes   Intervention (Other)   PTRx Other  1 Urge Incontinence Suppression Techniques   PTRx Other 1 - Details No Notes   PTRx Other  2 General Bladder Information   PTRx Other 2 - Details No Notes   Total Session Time   Timed Code Treatment Minutes 30   Total Treatment Time (sum of timed and untimed services) 30         DISCHARGE  Reason for Discharge: Patient chooses to discontinue therapy.    Equipment Issued:     Discharge Plan: Patient to continue home program.    Referring Provider:  Frances Cleveland

## 2024-09-13 NOTE — PROGRESS NOTES
[facility] Dermatology Note  Encounter Date: Sep 27, 2024  Office Visit     Reviewed patients past medical history and pertinent chart review prior to patients visit today.     Dermatology Problem List:    0. NUB left shin, shave biopsy 09/27/24 .   0. NUB Right anterior ankle, punch biopsy 09/27/24 .     History of NMSC   - BCC, Left nasal sidewall, s/p Mohs  06/2012.   2. Fibrous papule of nose   3. Actinic Keratosis   - left cheek , right cheek , cryotherapy 09/27/24     Personal Hx: history of NMSC   ____________________________________________    Assessment & Plan:     # Neoplasm of uncertain behavior:  left shin  DDx includes NMSC vs other. Shave biopsy today.    Procedure Note: Biopsy by shave technique  The risks and benefits of the procedure were described to the patient. These include but are not limited to bleeding, infection, scar, incomplete removal, and non-diagnostic biopsy. Verbal informed consent was obtained. The above site(s) was cleansed with an alcohol pad and injected with 1% lidocaine with epinephrine. Once anesthesia was obtained, a biopsy(ies) was performed with Gilette blade. The tissue(s) was placed in a labeled container(s) with formalin and sent to pathology. Hemostasis was achieved with aluminum chloride. Vaseline and a bandage were applied to the wound(s). The patient tolerated the procedure well and was given post biopsy care instructions.     # Neoplasm of uncertain behavior:  Right anterior ankle  DDx includes inflamed angioma vs MM. Punch biopsy today.    Punch biopsy:  After discussion of benefits and risks including but not limited to bleeding/bruising, pain/swelling, infection, scar, incomplete removal, nerve damage/numbness, recurrence, and non-diagnostic biopsy. verbal consent and photographs were obtained. Time-out was performed. The area was cleaned with isopropyl alcohol. 0.5mL of 1% lidocaine with epinephrine was injected to obtain adequate anesthesia  "of the lesion. A 8 mm punch biopsy was performed.  A Deep layer of 3-0 vicryl was used and a top layer of 4-0 prolene sutures were utilized to approximate the epidermal edges.  Sutures should be removed in 10-14 days.  A suture removal kit and education on suture removal provided.  White petroleum jelly/VaselineTM and a bandage was applied to the wound.  Patient to continue with Vaseline to biopsy site once daily until sutures dissolved.  Explicit verbal and written wound care instructions were provided.  The patient left the Dermatology Clinic in good condition. The patient was counseled that sutures should dissolve on their own.       # actinic keratoses  Actinic keratoses are pre-cancerous skin growths caused by sun exposure. Treatment is recommended and medically indicated. Treated with cryotherapy as outlined below.     Procedures performed:   - Cryotherapy procedure note, location(s): left cheek x 1, right cheek x 1 . After verbal consent and discussion of risks and benefits including, but not limited to, dyspigmentation/scar, blister, and pain, 2 lesion(s) were treated with 1-2 mm freeze border for 1-2 cycles with liquid nitrogen. Post cryotherapy instructions were provided.     # fibrous papule, nose.   Benign, no further treatment needed.   If lesion grows, changes, becomes symptomatic, bleeds without trauma, or becomes concerning to patient for any reason I would like to see them back for follow up to recheck for signs of malignancy. I discussed this with patient and patient agrees.         Follow up: April 2025 for [facility] when she is back from [place] for the winter    Graciela Girard PA-C  [facility]  Dermatology    _______________________________________    CC: Derm Problem (Spot check: left shin(less than a year, unknown timeline)  and right shin (has had it for about a year)- PCP noticed it. Every once in awhile there is a \"pinching feeling\" but nothing that she is too concerned about. Recheck " mohs site )    HPI:  Ms. Seema Johnson is a(n) 68 year old female who presents today as a new patient for evaluation of a lesion of concern on the bilateral lower legs.  The lesion on the right anterior ankle has been present for several months and is asymptomatic.  She like to make sure it is not of concern.  Additionally she has a lesion of concern on the left shin.  This has been out to her by her primary care provider.  Additionally, patient has lesion concern on her nose.  This is new over the past couple years.  It is asymptomatic.  She like to make sure it is not of concern..     Patient is otherwise feeling well, without additional skin concerns.      Physical Exam:  SKIN: Focused examination of face and right ankle and left shin was performed.  - NUB, right anterior ankle, blue hued firm, domed papule  - NUB,  left shin, erythematous plaque  -nasal dorsum, pink to flesh colored papule with reassuring features   -Left cheek, right cheek , pink macule(s) with overlying adherent scale consistent with an actinic keratosis            - No other lesions of concern on areas examined.     Medications:  Current Outpatient Medications   Medication Sig Dispense Refill    acetaminophen (TYLENOL) 500 MG tablet Take 1,000 mg by mouth every morning 2 tablet 0    aspirin (ASA) 81 MG EC tablet Take 81 mg by mouth daily as needed (prevent clots when flying)      Calcium Carbonate-Vit D-Min (CALTRATE MINIS PLUS MINERALS) 300-800 MG-UNIT TABS Take 1 tablet by mouth 2 times daily      Cholecalciferol (VITAMIN D3) 50 MCG (2000 UT) CAPS Take 2,000 Units by mouth daily      cyanocobalamin (VITAMIN B-12) 1000 MCG tablet Take 1 tablet (1,000 mcg) by mouth every other day      polyethylene glycol (MIRALAX) 17 GM/Dose powder Take 1 capful. by mouth every other day      propranolol ER (INDERAL LA) 60 MG 24 hr capsule Take 1 capsule (60 mg) by mouth At Bedtime 180 capsule 3    simvastatin (ZOCOR) 20 MG tablet TAKE ONE TABLET BY  MOUTH AT BEDTIME 90 tablet 3    tamoxifen (NOLVADEX) 20 MG tablet Take 1 tablet (20 mg) by mouth daily 90 tablet 3    topiramate (TOPAMAX) 50 MG tablet Take 1 tablet (50 mg) by mouth at bedtime 90 tablet 3    zoledronic Acid (RECLAST) 5 MG/100ML SOLN infusion 5 mg/100 mL once a year       No current facility-administered medications for this visit.      Past Medical History:   Patient Active Problem List   Diagnosis    Mohs defect of cheek    Chronic bilateral thoracic back pain    Closed compression fracture of thoracic vertebra (H)    Closed compression fracture of first lumbar vertebra (H)    Osteoporosis    Invasive ductal carcinoma of breast, female (H)- RIGHT breast, Upper outer quadrant- 2015    Benign essential tremor    Hyperlipidemia LDL goal <100    Vertigo    Lateral pain of left hip    Estrogen receptor positive neoplasm    Family history of breast cancer    Malignant neoplasm of upper-outer quadrant of female breast (H)     Past Medical History:   Diagnosis Date    Basal cell cancer     Breast cancer (H)     Dyslipidemia     History of blood transfusion     Hypertension     Mohs defect of nose 06/2012       CC Shantel Plaza PA-C  [address] [place] [address] on close of this encounter.

## 2024-09-13 NOTE — PATIENT INSTRUCTIONS
Wound Care After a Biopsy    What is a skin biopsy?  A skin biopsy allows the doctor to examine a very small piece of tissue under the microscope to determine the diagnosis and the best treatment for the skin condition. A local anesthetic (numbing medicine) is injected with a very small needle into the skin area to be tested. A small piece of skin is taken from the area. Sometimes a suture (stitch) is used.     What are the risks of a skin biopsy?  I will experience scar, bleeding, swelling, pain, crusting and redness. I may experience incomplete removal or recurrence. Risks of this procedure are excessive bleeding, bruising, infection, nerve damage, numbness, thick (hypertrophic or keloidal) scar and non-diagnostic biopsy.    How should I care for my wound for the first 24 hours?  Keep the wound dry and covered for 24 hours  If it bleeds, hold direct pressure on the area for 15 minutes. If bleeding does not stop, call us or go to the emergency room  Avoid strenuous exercise the first 1-2 days or as your doctor instructs you    How should I care for the wound after 24 hours?  After 24 hours, remove the bandage  You may bathe or shower as normal  If you had a scalp biopsy, you can shampoo as usual and can use shower water to clean the biopsy site daily  Clean the wound once a day with gentle soap and water  Do not scrub, be gentle  Apply white petroleum/Vaseline after cleaning the wound with a cotton swab or a clean finger, and keep the site covered with a Bandaid /bandage. Bandages are not necessary with a scalp biopsy  If you are unable to cover the site with a Bandaid /bandage, re-apply ointment 2-3 times a day to keep the site moist. Moisture will help with healing  Avoid strenuous activity for first 1-2 days  Avoid lakes, rivers, pools, and oceans until the stitches are removed or the site is healed    How do I clean my wound?  Wash hands thoroughly with soap or use hand  before all wound care  Clean  the wound with gentle soap and water  Apply white petroleum/Vaseline  to wound after it is clean  Replace the Bandaid /bandage to keep the wound covered for the first few days or as instructed by your doctor  If you had a scalp biopsy, warm shower water to the area on a daily basis should suffice    What should I use to clean my wound?   Cotton-tipped applicators (Qtips )  White petroleum jelly (Vaseline ). Use a clean new container and use Q-tips to apply.  Bandaids  as needed  Gentle soap     How should I care for my wound long term?  Do not get your wound dirty  Keep up with wound care for one week or until the area is healed.  If you have stitches, stitches need to be removed in 10-14 days. You may return to our clinic for this or you may have it done locally at your doctor s office.  A small scab will form and fall off by itself when the area is completely healed. The area will be red and will become pink in color as it heals. Sun protection is very important for how your scar will turn out. Sunscreen with an SPF 30 or greater is recommended once the area is healed.  You should have some soreness but it should be mild and slowly go away over several days. Talk to your doctor about using tylenol for pain,    When should I call my doctor?  If you have increased:   Pain or swelling  Pus or drainage (clear or slightly yellow drainage is ok)  Temperature over 100F  Spreading redness or warmth around wound    When will I hear about my results?  The biopsy results can take 2 weeks to come back.  Your results will automatically release to [employer] before your provider has even reviewed them.  The clinic will call you with the results, send you a [employer] message, or have you schedule a follow-up clinic or phone time to discuss the results.  Contact our clinics if you do not hear from us in 2 weeks.    Who should I call with questions?  [facility]: [phone]  [facility]  Critical access hospital: [phone]  For urgent needs outside of business hours call the [facility] at [phone] and ask for the dermatology resident on call   Cryotherapy    What is it?  Use of a very cold liquid, such as liquid nitrogen, to freeze and destroy abnormal skin cells that need to be removed    What should I expect?  Tenderness and redness  A small blister that might grow and fill with dark purple blood. There may be crusting.  More than one treatment may be needed if the lesions do not go away.    How do I care for the treated area?  Gently wash the area with your hands when bathing.  Use a thin layer of Vaseline to help with healing. You may use a Band-Aid.   The area should heal within 7-10 days and may leave behind a pink or lighter color.   Do not use an antibiotic or Neosporin ointment.   You may take acetaminophen (Tylenol) for pain.     Call your doctor if you have:  Severe pain  Signs of infection (warmth, redness, cloudy yellow drainage, and or a bad smell)  Questions or concerns    Who should I call with questions?      [facility]: [phone]      [facility]: [phone]      For urgent needs outside of business hours call the [facility] at [phone] and ask for the dermatology resident on call       The ABCDEs of Melanoma    Skin cancer can develop anywhere on the skin. Ask someone for help when checking your skin, especially in hard to see places. If you notice a mole different from others, or that changes, enlarges, itches, or bleeds (even if it is small), you should see a dermatologist.

## 2024-09-27 ENCOUNTER — OFFICE VISIT (OUTPATIENT)
Dept: DERMATOLOGY | Facility: CLINIC | Age: 69
End: 2024-09-27
Payer: COMMERCIAL

## 2024-09-27 DIAGNOSIS — L57.0 ACTINIC KERATOSES: Primary | ICD-10-CM

## 2024-09-27 DIAGNOSIS — D48.5 NEOPLASM OF UNCERTAIN BEHAVIOR OF SKIN: ICD-10-CM

## 2024-09-27 DIAGNOSIS — D22.39 FIBROUS PAPULE OF NOSE: ICD-10-CM

## 2024-09-27 PROCEDURE — 11104 PUNCH BX SKIN SINGLE LESION: CPT | Performed by: STUDENT IN AN ORGANIZED HEALTH CARE EDUCATION/TRAINING PROGRAM

## 2024-09-27 PROCEDURE — 17003 DESTRUCT PREMALG LES 2-14: CPT | Mod: XS | Performed by: STUDENT IN AN ORGANIZED HEALTH CARE EDUCATION/TRAINING PROGRAM

## 2024-09-27 PROCEDURE — 17000 DESTRUCT PREMALG LESION: CPT | Mod: XS | Performed by: STUDENT IN AN ORGANIZED HEALTH CARE EDUCATION/TRAINING PROGRAM

## 2024-09-27 PROCEDURE — 99202 OFFICE O/P NEW SF 15 MIN: CPT | Mod: 25 | Performed by: STUDENT IN AN ORGANIZED HEALTH CARE EDUCATION/TRAINING PROGRAM

## 2024-09-27 PROCEDURE — 11103 TANGNTL BX SKIN EA SEP/ADDL: CPT | Mod: XS | Performed by: STUDENT IN AN ORGANIZED HEALTH CARE EDUCATION/TRAINING PROGRAM

## 2024-09-27 ASSESSMENT — PAIN SCALES - GENERAL: PAINLEVEL: NO PAIN (0)

## 2024-09-27 NOTE — LETTER
9/27/2024      Seema Johnson  [address]  [place] [address]      Dear Colleague,    Thank you for referring your patient, Seema Johnson, to the [facility]. Please see a copy of my visit note below.    [facility] Dermatology Note  Encounter Date: Sep 27, 2024  Office Visit     Reviewed patients past medical history and pertinent chart review prior to patients visit today.     Dermatology Problem List:    0. NUB left shin, shave biopsy 09/27/24 .   0. NUB Right anterior ankle, punch biopsy 09/27/24 .     History of NMSC   - BCC, Left nasal sidewall, s/p Mohs  06/2012.   2. Fibrous papule of nose   3. Actinic Keratosis   - left cheek , right cheek , cryotherapy 09/27/24     Personal Hx: history of NMSC   ____________________________________________    Assessment & Plan:     # Neoplasm of uncertain behavior:  left shin  DDx includes NMSC vs other. Shave biopsy today.    Procedure Note: Biopsy by shave technique  The risks and benefits of the procedure were described to the patient. These include but are not limited to bleeding, infection, scar, incomplete removal, and non-diagnostic biopsy. Verbal informed consent was obtained. The above site(s) was cleansed with an alcohol pad and injected with 1% lidocaine with epinephrine. Once anesthesia was obtained, a biopsy(ies) was performed with Gilette blade. The tissue(s) was placed in a labeled container(s) with formalin and sent to pathology. Hemostasis was achieved with aluminum chloride. Vaseline and a bandage were applied to the wound(s). The patient tolerated the procedure well and was given post biopsy care instructions.     # Neoplasm of uncertain behavior:  Right anterior ankle  DDx includes inflamed angioma vs MM. Punch biopsy today.    Punch biopsy:  After discussion of benefits and risks including but not limited to bleeding/bruising, pain/swelling, infection, scar, incomplete removal, nerve damage/numbness,  recurrence, and non-diagnostic biopsy. verbal consent and photographs were obtained. Time-out was performed. The area was cleaned with isopropyl alcohol. 0.5mL of 1% lidocaine with epinephrine was injected to obtain adequate anesthesia of the lesion. A 8 mm punch biopsy was performed.  A Deep layer of 3-0 vicryl was used and a top layer of 4-0 prolene sutures were utilized to approximate the epidermal edges.  Sutures should be removed in 10-14 days.  A suture removal kit and education on suture removal provided.  White petroleum jelly/VaselineTM and a bandage was applied to the wound.  Patient to continue with Vaseline to biopsy site once daily until sutures dissolved.  Explicit verbal and written wound care instructions were provided.  The patient left the Dermatology Clinic in good condition. The patient was counseled that sutures should dissolve on their own.       # actinic keratoses  Actinic keratoses are pre-cancerous skin growths caused by sun exposure. Treatment is recommended and medically indicated. Treated with cryotherapy as outlined below.     Procedures performed:   - Cryotherapy procedure note, location(s): left cheek x 1, right cheek x 1 . After verbal consent and discussion of risks and benefits including, but not limited to, dyspigmentation/scar, blister, and pain, 2 lesion(s) were treated with 1-2 mm freeze border for 1-2 cycles with liquid nitrogen. Post cryotherapy instructions were provided.     # fibrous papule, nose.   Benign, no further treatment needed.   If lesion grows, changes, becomes symptomatic, bleeds without trauma, or becomes concerning to patient for any reason I would like to see them back for follow up to recheck for signs of malignancy. I discussed this with patient and patient agrees.         Follow up: 2-3 months for [facility] [place]     Graciela Girard PA-C  [facility]  Dermatology    _______________________________________    CC: Derm Problem (Spot check: left shin(less than a  "year, unknown timeline)  and right shin (has had it for about a year)- PCP noticed it. Every once in awhile there is a \"pinching feeling\" but nothing that she is too concerned about. Recheck mohs site )    HPI:  Ms. Seema Johnson is a(n) 68 year old female who presents today as a new patient for evaluation of a lesion of concern on the bilateral lower legs.  The lesion on the right anterior ankle has been present for several months and is asymptomatic.  She like to make sure it is not of concern.  Additionally she has a lesion of concern on the left shin.  This has been out to her by her primary care provider.  Additionally, patient has lesion concern on her nose.  This is new over the past couple years.  It is asymptomatic.  She like to make sure it is not of concern..     Patient is otherwise feeling well, without additional skin concerns.      Physical Exam:  SKIN: Focused examination of face and right ankle and left shin was performed.  - NUB, right anterior ankle, blue hued firm, domed papule  - NUB,  left shin, erythematous plaque  -nasal dorsum, pink to flesh colored papule with reassuring features   -Left cheek, right cheek , pink macule(s) with overlying adherent scale consistent with an actinic keratosis            - No other lesions of concern on areas examined.     Medications:  Current Outpatient Medications   Medication Sig Dispense Refill     acetaminophen (TYLENOL) 500 MG tablet Take 1,000 mg by mouth every morning 2 tablet 0     aspirin (ASA) 81 MG EC tablet Take 81 mg by mouth daily as needed (prevent clots when flying)       Calcium Carbonate-Vit D-Min (CALTRATE MINIS PLUS MINERALS) 300-800 MG-UNIT TABS Take 1 tablet by mouth 2 times daily       Cholecalciferol (VITAMIN D3) 50 MCG (2000 UT) CAPS Take 2,000 Units by mouth daily       cyanocobalamin (VITAMIN B-12) 1000 MCG tablet Take 1 tablet (1,000 mcg) by mouth every other day       polyethylene glycol (MIRALAX) 17 GM/Dose powder Take 1 " capful. by mouth every other day       propranolol ER (INDERAL LA) 60 MG 24 hr capsule Take 1 capsule (60 mg) by mouth At Bedtime 180 capsule 3     simvastatin (ZOCOR) 20 MG tablet TAKE ONE TABLET BY MOUTH AT BEDTIME 90 tablet 3     tamoxifen (NOLVADEX) 20 MG tablet Take 1 tablet (20 mg) by mouth daily 90 tablet 3     topiramate (TOPAMAX) 50 MG tablet Take 1 tablet (50 mg) by mouth at bedtime 90 tablet 3     zoledronic Acid (RECLAST) 5 MG/100ML SOLN infusion 5 mg/100 mL once a year       No current facility-administered medications for this visit.      Past Medical History:   Patient Active Problem List   Diagnosis     Mohs defect of cheek     Chronic bilateral thoracic back pain     Closed compression fracture of thoracic vertebra (H)     Closed compression fracture of first lumbar vertebra (H)     Osteoporosis     Invasive ductal carcinoma of breast, female (H)- RIGHT breast, Upper outer quadrant- 2015     Benign essential tremor     Hyperlipidemia LDL goal <100     Vertigo     Lateral pain of left hip     Estrogen receptor positive neoplasm     Family history of breast cancer     Malignant neoplasm of upper-outer quadrant of female breast (H)     Past Medical History:   Diagnosis Date     Basal cell cancer      Breast cancer (H)      Dyslipidemia      History of blood transfusion      Hypertension      Mohs defect of nose 06/2012       CC Shantel Plaza PA-C  [address] [place] [address] on close of this encounter.       Again, thank you for allowing me to participate in the care of your patient.        Sincerely,        Graciela Girard PA-C

## 2024-09-27 NOTE — NURSING NOTE
"Seema Johnson's chief complaint for this visit includes:  Chief Complaint   Patient presents with    Derm Problem     Spot check: left shin(less than a year, unknown timeline)  and right shin (has had it for about a year)- PCP noticed it. Every once in awhile there is a \"pinching feeling\" but nothing that she is too concerned about.      PCP: Shantel Plaza    Referring Provider:  Shantel Plaza, [facility]MA  [address] [place] [address]    There were no vitals taken for this visit.  No Pain (0)        Allergies   Allergen Reactions    Codeine Sulfate Nausea    Compazine [Prochlorperazine] Other (See Comments)     Jittery and hyper and foggy    Primidone      vomiting, chills & hot flashes, headache, sensitivity to light & noise         Do you need any medication refills at today's visit?  No.      Nina Shi RN on 9/27/2024 at 12:33 PM   "

## 2024-10-01 ENCOUNTER — OFFICE VISIT (OUTPATIENT)
Dept: FAMILY MEDICINE | Facility: CLINIC | Age: 69
End: 2024-10-01
Payer: COMMERCIAL

## 2024-10-01 ENCOUNTER — ANCILLARY PROCEDURE (OUTPATIENT)
Dept: GENERAL RADIOLOGY | Facility: CLINIC | Age: 69
End: 2024-10-01
Attending: FAMILY MEDICINE
Payer: COMMERCIAL

## 2024-10-01 VITALS
WEIGHT: 119.4 LBS | OXYGEN SATURATION: 98 % | BODY MASS INDEX: 20.38 KG/M2 | HEIGHT: 64 IN | DIASTOLIC BLOOD PRESSURE: 69 MMHG | SYSTOLIC BLOOD PRESSURE: 106 MMHG | RESPIRATION RATE: 16 BRPM | TEMPERATURE: 98 F | HEART RATE: 68 BPM

## 2024-10-01 DIAGNOSIS — R05.8 PRODUCTIVE COUGH: ICD-10-CM

## 2024-10-01 DIAGNOSIS — R05.8 PRODUCTIVE COUGH: Primary | ICD-10-CM

## 2024-10-01 LAB
PATH REPORT.COMMENTS IMP SPEC: NORMAL
PATH REPORT.COMMENTS IMP SPEC: NORMAL
PATH REPORT.FINAL DX SPEC: NORMAL
PATH REPORT.GROSS SPEC: NORMAL
PATH REPORT.MICROSCOPIC SPEC OTHER STN: NORMAL
PATH REPORT.RELEVANT HX SPEC: NORMAL

## 2024-10-01 PROCEDURE — 71046 X-RAY EXAM CHEST 2 VIEWS: CPT | Mod: TC | Performed by: RADIOLOGY

## 2024-10-01 PROCEDURE — 99213 OFFICE O/P EST LOW 20 MIN: CPT | Performed by: FAMILY MEDICINE

## 2024-10-01 RX ORDER — BENZONATATE 100 MG/1
100 CAPSULE ORAL 3 TIMES DAILY PRN
Qty: 30 CAPSULE | Refills: 1 | Status: SHIPPED | OUTPATIENT
Start: 2024-10-01

## 2024-10-01 ASSESSMENT — ENCOUNTER SYMPTOMS: COUGH: 1

## 2024-10-01 ASSESSMENT — PAIN SCALES - GENERAL: PAINLEVEL: NO PAIN (0)

## 2024-10-01 NOTE — PROGRESS NOTES
Assessment & Plan     Productive cough  -Seema recently returned from [place].  She has been noticing productive cough for the past 1 to 2 weeks.  -Denied fever/chills/fatigue  -Chest clear on auscultation.    PLAN:  -Chest x-ray to rule out pneumonia.  If x-ray is clear, proceed with symptomatic care with warm water, soups, cough suppressants as needed.    - XR Chest 2 Views; Future  - benzonatate (TESSALON) 100 MG capsule; Take 1 capsule (100 mg) by mouth 3 times daily as needed for cough.                    Subjective   Seema is a 68 year old, presenting for the following health issues:  Cough      10/1/2024    12:37 PM   Additional Questions   Roomed by Marion     Cough    History of Present Illness       Reason for visit:  Check for bronchitus  Symptom onset:  1-2 weeks ago  Symptoms include:  Sinus congestion, cough  Symptom intensity:  Moderate  Symptom progression:  Worsening  Had these symptoms before:  Yes  Has tried/received treatment for these symptoms:  No  What makes it worse:  Lack of sleep  What makes it better:  Staying hydrated   She is taking medications regularly.                 Review of Systems  Constitutional, HEENT, cardiovascular, pulmonary, gi and gu systems are negative, except as otherwise noted.      Objective    There were no vitals taken for this visit.  There is no height or weight on file to calculate BMI.  Physical Exam   GENERAL: alert and no distress  NECK: no adenopathy, no asymmetry, masses, or scars  RESP: lungs clear to auscultation - no rales, rhonchi or wheezes  CV: regular rate and rhythm, normal S1 S2, no S3 or S4, no murmur, click or rub, no peripheral edema  ABDOMEN: soft, nontender, no hepatosplenomegaly, no masses and bowel sounds normal  MS: no gross musculoskeletal defects noted, no edema            Signed Electronically by: Parker Antunez MD

## 2024-10-03 ENCOUNTER — TELEPHONE (OUTPATIENT)
Dept: DERMATOLOGY | Facility: CLINIC | Age: 69
End: 2024-10-03
Payer: COMMERCIAL

## 2024-10-03 NOTE — TELEPHONE ENCOUNTER
Excision/Mohs previsit information                                                    Diagnosis: SCCIS left shin, hemosiderotic dermatofibroma right anterior ankle    Is the surgical site below the waist?  YES  If yes, instruct the patient to purchase over the counter chlorhexidine surgical soap and wash all skin below the belly button twice before surgery    Allergies   Allergen Reactions    Codeine Sulfate Nausea    Compazine [Prochlorperazine] Other (See Comments)     Jittery and hyper and foggy    Primidone      vomiting, chills & hot flashes, headache, sensitivity to light & noise       Review and update allergy and medication list    Do you take the following medications:  Coumadin, Eliquis, Pradaxa, Xarelto:  NO.      Do you currently or have you previously had any of the following conditions:  Hepatitis:  NO  HIV/AIDS:  NO  Prolonged bleeding or bleeding disorder:  NO  Pacemaker: NO  Defibrillator:  NO  History of artificial or heart valve replacement:  NO  Endocarditis (inflammation of the inner lining of the heart's chambers and valves):  NO  Have you ever had a prosthetic joint infection:  NO  Pregnant or Breastfeeding:  NO  Mobility device (wheelchair, transfer difficulty): No    Important Reminders:                                                      -Ok to take all of their medications as prescribed  -Patients can eat, no need to be fasting  -If face is being treated, please come with a make-up free face  -If scalp is being treated, please come with clean hair free from product  -Patient will not be able to get the site wet for 48 hrs  -No submerging wound in standing water (lake, pool, bathtub, hot tub) for 2 weeks  -No physical activity for 48 hrs (further restrictions will be discussed by MD at time of visit)    Nina Chandler RN

## 2024-10-03 NOTE — TELEPHONE ENCOUNTER
Results and plan reviewed with Seema.  Procedure scheduled for 10/17/24.     I advised patient to plan on being in clinic for ~4hrs.  I advised patient that they don't need to be fasting and they can take medications as scheduled.  I reviewed that they will have a pressure bandage on for 48 hrs following procedure and that we don't want this to get wet.  I reviewed that following the 48 hrs they will begin daily wound care for 2 weeks, but should not submerge the wound in standing water such as a bathtub, pool, hot tub, etc.     I instructed her to wash with chlorhexidine from her waist to her toes the night before and the morning of the procedure.  She also wears compression socks.  I recommended she bring them the day of surgery.     Patient verbalized understanding and agreed with the plan.     HARINI Mosquera PA-C  10/2/2024  1:08 PM CDT       Please let patient know that biopsy(s) showed the following with the below treatment recommendations:     A. Right anterior ankle, hemosiderotic dermatofibroma, Mohs Micrographic Surgery needed. **Dermatofibroma's are benign scar tissue growths but the hemosiderotic subtype does have a higher risk of recurring/growing further in the future which is why we recommend Mohs micrographic surgery.  B. Left shin, SCCIS, Mohs Micrographic Surgery needed.     inal Diagnosis   A(1). Skin, right anterior ankle, punch:  - Hemosiderotic dermatofibroma - (see description)     B(2). Skin, left shin, shave:  - Squamous cell carcinoma in situ

## 2024-10-09 ENCOUNTER — OFFICE VISIT (OUTPATIENT)
Dept: FAMILY MEDICINE | Facility: CLINIC | Age: 69
End: 2024-10-09
Payer: COMMERCIAL

## 2024-10-09 ENCOUNTER — ANCILLARY PROCEDURE (OUTPATIENT)
Dept: GENERAL RADIOLOGY | Facility: CLINIC | Age: 69
End: 2024-10-09
Attending: PHYSICIAN ASSISTANT
Payer: COMMERCIAL

## 2024-10-09 VITALS
DIASTOLIC BLOOD PRESSURE: 62 MMHG | RESPIRATION RATE: 10 BRPM | WEIGHT: 117.4 LBS | BODY MASS INDEX: 20.04 KG/M2 | HEIGHT: 64 IN | OXYGEN SATURATION: 100 % | SYSTOLIC BLOOD PRESSURE: 92 MMHG | TEMPERATURE: 97.3 F | HEART RATE: 53 BPM

## 2024-10-09 DIAGNOSIS — G43.109 MIGRAINE WITH AURA AND WITHOUT STATUS MIGRAINOSUS, NOT INTRACTABLE: ICD-10-CM

## 2024-10-09 DIAGNOSIS — Z23 NEED FOR VACCINATION: ICD-10-CM

## 2024-10-09 DIAGNOSIS — K59.01 SLOW TRANSIT CONSTIPATION: ICD-10-CM

## 2024-10-09 DIAGNOSIS — H61.23 BILATERAL IMPACTED CERUMEN: Primary | ICD-10-CM

## 2024-10-09 PROCEDURE — 99214 OFFICE O/P EST MOD 30 MIN: CPT | Mod: 25 | Performed by: PHYSICIAN ASSISTANT

## 2024-10-09 PROCEDURE — 90662 IIV NO PRSV INCREASED AG IM: CPT | Performed by: PHYSICIAN ASSISTANT

## 2024-10-09 PROCEDURE — 74019 RADEX ABDOMEN 2 VIEWS: CPT | Mod: TC | Performed by: RADIOLOGY

## 2024-10-09 PROCEDURE — G0008 ADMIN INFLUENZA VIRUS VAC: HCPCS | Performed by: PHYSICIAN ASSISTANT

## 2024-10-09 ASSESSMENT — PAIN SCALES - GENERAL: PAINLEVEL: NO PAIN (0)

## 2024-10-09 NOTE — PATIENT INSTRUCTIONS
Instructions from Today's Visit:  Medicare provides best coverage for certain vaccines when given through your pharmacy-  RSV.          General Instructions After Your Visit  If you have been seen for a concern and are worse or not improving, please schedule a follow-up visit or reach out to a member of our care team or nurses if it is urgent.  Nurse advice is available 24/7 by calling [phone].  For emergencies, please call [phone].    Test results  You may see your lab or test results before we can make recommendations. This is common, as sometimes we are awaiting other labs to return or we are out of office on a particular day. Please be patient, and if you don't see a response from me or one of my colleagues within 2-3 business days, and you have a specific concern, please send us a message.    Refills  If you have run out of refills, please schedule follow-up visits. This is generally an indication that you are due for a follow-up visit. All prescriptions are only valid for 1 year and need a visit annually. Most mental health and chronic diseases we are treating (diabetes, high blood pressure, etc) require some type of visit every 6 months. We are now offering telephone or video visits! However, if a physical exams are needed or it is a complex concern, we may ask you to be seen in person.    Physicals & Preventative Visits   These appointment slots fill up fast. Please consider scheduling these 2-3 months in advance to allow for the appointment time that fits you best. If you have medications ordered or other issues addressed that are not preventive at these visits, please be aware there are extra costs associated with this.

## 2024-10-09 NOTE — PROGRESS NOTES
Assessment & Plan     Bilateral impacted cerumen  Ear lavage by staff    Migraine with aura and without status migrainosus, not intractable  Has had 2 migraines with preceding visual aura (lightening bolts in vision)  Saw her eye doctor with normal exam  Had brain MRI -May 2024 - no acute findings  Did discuss w/her neurologist who advised a neurologist w/specialty in HA if becoming more frequent.   She is already on topamax and propranol  This last migraine resolved quickly. She does not feel a consult is needed at this time.   Warning ssx for ER eval if worst HA of life, focal neuro deficits more typical of TIA or stroke.     Slow transit constipation  Her constipation is typically well managed with small dose of miralax every other day.   While traveling this summer got off schedule and has been more of an issue  Describing fecal leaking which is concerning for impaction or severe constipation  Xray obtained  Discussed bowel clean out with miralax.  Then resume her prior bowel regimen  If sx persist- needs ref for colonoscopy   - XR Abdomen 2 Views; Future    Need for vaccination  Given   - INFLUENZA HIGH DOSE, TRIVALENT, PF (FLUZONE)            Follow Up: see above. Additionally patient was instructed to contact clinic for worsening symptoms, non-improvement in time frame discussed, and for questions regarding treatment plan.   For virtual visits, the patient was advised to be seen for in person evaluation if symptoms or condition are worsening or non-improvement as expected.   RESHMA Berg is a 68 year old, presenting for the following health issues:  Recheck Medication, Lipids, and GI Problem  - Swinging between constipation and diarrhea, would like to discuss with provider instead.       10/9/2024     7:55 AM   Additional Questions   Roomed by Federico BERG   Accompanied by Caitlin         10/9/2024     7:55 AM   Patient Reported Additional Medications   Patient reports taking  "the following new medications None     GI Problem    History of Present Illness       Reason for visit:  Check for bronchitus  Symptom onset:  1-2 weeks ago  Symptoms include:  Sinus congestion, cough  Symptom intensity:  Moderate  Symptom progression:  Worsening  Had these symptoms before:  Yes  Has tried/received treatment for these symptoms:  No  What makes it worse:  Lack of sleep  What makes it better:  Staying hydrated   She is taking medications regularly.     Had migraine like headache with visual disturbance-  first occurrence Feb 2024. Did talk w/me and her neurologist about it in April 2024. MRI brain done May 2024 and was normal. Her neurologist advised ref to a headche specialized neurologist if became more frequent. Has only had 1 other migraine and that was last week. Visual disturbance of lightning bolts in eyes, dizzy feeling, then the headache started after. HA lasted 20-30min. Is already on propranolol and topamax for tremor.     Sees  of neurology for tremor. He is prescribing propranolol and topamax. She thinks it is maybe getting worse. \"We just aren't in the states long enough to do the insert\". Is scheduled to see him back in the spring.     Pedal edema -  Has been better. Elevating legs. Has compression stockings, just doesn't like them  US may 2024- showing areas of venous incompetency-  Right leg-  Isolated superficial venous incompetence of the great saphenous vein at the knee. .   Left leg- There was isolated venous incompetence of the left common femoral vein.    Swing between constipation and diarrhea while traveling in [place] over the summer-   Her normal has tended to be more constipated due to taking her calcium supplement. So takes small dose of miralax on schedule which typically helps.   In [place] with travel became constipated. Went 4-5 days w/o a BM.  Then took dulcolax and gave her diarrhea. Then mix of constipation/diarrhea.  Would have \"leaking\" of liquidy diarrhea on " "pad, then constipation.   More recently. Soft skinny stools (back to miralax every 3rd day)  Loose BMs - 3 per day  Her normal Colonoscopy 09/25/2020- normal. Repeat in 10 yr  No bleeding. No tarry stools.       Hyperlipidemia Follow-Up  Are you regularly taking any medication or supplement to lower your cholesterol?   Yes- Simvastatin   Are you having muscle aches or other side effects that you think could be caused by your cholesterol lowering medication?  No        Review of Systems  Constitutional, HEENT, cardiovascular, pulmonary, gi and gu systems are negative, except as otherwise noted.      Objective    BP 92/62 (BP Location: Left arm, Patient Position: Sitting, Cuff Size: Adult Regular)   Pulse 53   Temp 97.3  F (36.3  C) (Temporal)   Resp 10   Ht 1.625 m (5' 3.98\")   Wt 53.3 kg (117 lb 6.4 oz)   SpO2 100%   BMI 20.17 kg/m    Body mass index is 20.17 kg/m .  Physical Exam   GENERAL: alert and no distress  EYES: Eyes grossly normal to inspection, PERRL and conjunctivae and sclerae normal  HENT: ear canals and TM's normal, nose and mouth without ulcers or lesions  NECK: no adenopathy, no asymmetry, masses, or scars  RESP: lungs clear to auscultation - no rales, rhonchi or wheezes  CV: regular rate and rhythm, normal S1 S2, no S3 or S4, no murmur, click or rub, no peripheral edema  ABDOMEN: soft, nontender, no hepatosplenomegaly, no masses and bowel sounds normal  MS: no gross musculoskeletal defects noted, no edema  NEURO: fine head tremor, normal strength and tone, mentation intact and speech normal  PSYCH: mentation appears normal, affect normal/bright    Results for orders placed or performed in visit on 10/09/24   XR Abdomen 2 Views     Status: None    Narrative    XR ABDOMEN 2 VIEWS   10/9/2024 9:39 AM     HISTORY: constipation; Slow transit constipation    COMPARISON: None.      Impression    IMPRESSION: No free air. A nonobstructive bowel gas pattern. Large  amount of formed stool in the cecum " and ascending colon and small  amount of formed stool in the remainder of the colon. Few small  calcification around the L2 and L1 vertebral bodies are indeterminate,  may be related to osteophytic degenerative changes. Recommend a  follow-up lumbar spine CT for better characterization.    MARIAELENA PEREZ MD         SYSTEM ID:  [id number]           Signed Electronically by: Shantel Plaza PA-C

## 2024-10-17 ENCOUNTER — OFFICE VISIT (OUTPATIENT)
Dept: DERMATOLOGY | Facility: CLINIC | Age: 69
End: 2024-10-17
Payer: COMMERCIAL

## 2024-10-17 VITALS — HEART RATE: 60 BPM | OXYGEN SATURATION: 99 %

## 2024-10-17 DIAGNOSIS — D23.71: Primary | ICD-10-CM

## 2024-10-17 DIAGNOSIS — D04.72 SQUAMOUS CELL CARCINOMA IN SITU (SCCIS) OF SKIN OF LEFT LOWER LEG: ICD-10-CM

## 2024-10-17 PROCEDURE — 12032 INTMD RPR S/A/T/EXT 2.6-7.5: CPT | Mod: GC | Performed by: DERMATOLOGY

## 2024-10-17 PROCEDURE — 17313 MOHS 1 STAGE T/A/L: CPT | Mod: GC | Performed by: DERMATOLOGY

## 2024-10-17 PROCEDURE — 11403 EXC TR-EXT B9+MARG 2.1-3CM: CPT | Mod: XS | Performed by: DERMATOLOGY

## 2024-10-17 ASSESSMENT — PAIN SCALES - GENERAL: PAINLEVEL: NO PAIN (0)

## 2024-10-17 NOTE — PATIENT INSTRUCTIONS
Caring for your skin after surgery    After your surgery, a pressure bandage will be placed over the area. This will prevent bleeding. Please follow these instructions over the next 1 to 2 weeks. Following this regimen will help to prevent complications as your wound heals.     For the first 48 hours after your surgery:    Leave the pressure dressing on and keep it dry. If it should come loose, you may re-tape it, but do not take it off.  Relax and take it easy. Do not do any vigorous exercise, heavy lifting or bending forward. This could cause the wound to bleed.  Post-operative pain is usually mild. You may alternate between 1000 mg of Tylenol (acetaminophen) and 400 mg of Ibuprofen every 4 hours.  Do not take more than 4,000 mg of acetaminophen in a 24-hour period or 3200 mg of Ibuprofen in a 24-hr period.  Avoid alcohol and vitamin E as these may increase your tendency to bleed.  You may put an ice pack around the bandaged area for 20 minutes at a time as needed. This may help reduce swelling, bruising, and pain. Make sure the ice pack is waterproof so that the pressure bandage doesn't get wet.  You may see a small amount of drainage or blood on your pressure bandage. This is normal. However:  If drainage or bleeding continues or saturates the bandage, you will need to apply firm pressure over the bandage with a clean washcloth for 15 minutes.  Remove the saturated bandage. (If bleeding has stopped, apply Vaseline over the suture line and cover with bandage.)  If bleeding continues after applying pressure for 15 minutes, apply an ice pack with gentle pressure to the bandaged area for another 15 minutes.  If bleeding still continues, call our office or go to the nearest emergency room.    48 Hours After Surgery:    Remove outer white bandage down to clear plastic film (Tegaderm).  You may notice dark brown, dried blood under the Tegaderm.  This is normal.    Leave the clear plastic film (Tegaderm) on for up to 2  weeks, as long as it is intact.  If it falls off prior to 2 weeks follow daily wound care below.  If it stays intact for the full 2 weeks, then remove and treat as normal, healthy skin.    Daily Wound Care (if Tegaderm and Steri-Strips fall off prior to 2 weeks):    Wash wound with a mild soap and water.  Use caution when washing the wound, be gentle and do not let the forceful shower stream hit the wound directly. DO NOT WASH WITH HYDROGEN PEROXIDE AS THIS MIGHT CAUSE THE STITCHES TO DISSOLVE FASTER THAN WHAT WE WANT.    Pat dry.    Apply Vaseline (from a new container or tube) over the suture line with a Q-tip until it is completely healed. It is very important to keep the wound continuously moist, as wounds heal best in a moist environment.    Keep the site covered until it's healed.  You can cover it with a Telfa (non-stick) dressing and tape or a band-aid until healed with normal, healthy skin.      Call Us If:    You have bleeding that will not stop after applying pressure and ice.  You have pain that is not controlled with Tylenol and Ibuprofen.  You have signs or symptoms of an infection such as fever over 100 degrees Fahrenheit, redness, swelling, warmth spreading from the wound, increasing pain after the first 48-72 hours, or foul-smelling drainage from the wound    [facility]: [phone] - Ask for [facility]  [facility]: [phone]  For urgent needs outside of business hours call the [facility] at [phone] and ask to speak with/page the dermatology resident on call

## 2024-10-17 NOTE — LETTER
10/17/2024      Seema Johnson  [address]  [facility] [address]      Dear Colleague,    Thank you for referring your patient, Seema Johnson, to the [facility]. Please see a copy of my visit note below.    [facility] [place] Procedure Note    Dermatology Problem List:    History of NMSC   - SCCIS, L shin, s/p Mohs 10/17/24   - BCC, L nasal sidewall, s/p Mohs  06/2012.   2. Fibrous papule of nose   3. Actinic Keratosis   - left cheek , right cheek , cryotherapy 09/27/24   4. 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 10/17/24     Personal Hx: history of NMSC   [place] in [place]  ___________________________________________      Date of Service:  Oct 17, 2024  Surgery: Mohs micrographic surgery    Case 1  Repair Type: Intermediate Linear  Repair Size: 3.3cm  Suture Material: 4-0 monocryl, 5-0 Fast Absorbing Gut  Tumor Type: SCCIS squamous cell carcinoma in situ  Location: left shin  Derm-Path Accession #: [id number]  PreOp Size: 1.5cm x 1.0cm  PostOp Size: 1.5cm x1.4cm   Mohs Accession #: [id number]  Level of Defect: Fascia      Procedure:  We discussed the principles of treatment and most likely complications including scarring, bleeding, infection, swelling, pain, crusting, nerve damage, large wound,  incomplete excision, wound dehiscence,  nerve damage, recurrence, and a second procedure may be recommended to obtain the best cosmetic or functional result.    Informed consent was obtained and the patient underwent the procedure as follows:  The patient was placed supine on the operating table.  The cancer was identified, outlined with a marker, and verified by the patient.  The entire surgical field was prepped with Chlorhexidine.  The surgical site was anesthetized using 1% lidocaine with epinephrine.      The area of clinically apparent tumor was debulked. The layer of tissue was then surgically excised using  a #15 blade and was then transferred onto a specimen sheet maintaining the orientation of the specimen. Hemostasis was obtained using bipolar electrocoagulation. The wound site was then covered with a dressing while the tissue samples were processed for examination.    The excised tissue was transported to the [facility] histology laboratory maintaining the tissue orientation.  The tissue specimen was relaxed so that the entire surgical margin was in a a single horizontal plane for sectioning and inked for precise mapping.  A precise reference map was drawn to reflect the sectioning of the specimen, colored inking of the margins, and orientation on the patient.  The tissue was processed using horizontal sectioning of the base and continuous peripheral margins.  The histopathologic sections were reviewed in conjunction with the reference map.    Total blocks: 1    Total slides:  1    There were no cancer cells visualized on examination, therefore Mohs surgery was complete.     REPAIR: An intermediate linear layered closure was selected as the procedure which would maximally preserve both function and cosmesis.    After the excision of the tumor, the area was undermined. Hemostasis was obtained with bipolar electrocoagulation.  Closure was oriented so that the wound was in the patient's natural skin tension lines. The deeper layers of subcutaneous and superficial (nonmuscle) fascia (deep layer suturing) were then closed with 4-0 monocryl buried vertical mattress sutures. The epidermis was then carefully approximated along the length of the wound using (superficial layer suturing)  5-0 Fast Absorbing Gut simple running sutures.     Estimated blood loss was less than 10 ml for all surgical sites. A sterile pressure dressing was applied and wound care instructions, with a written handout, were given. The patient was discharged from the [facility] alert and ambulatory.    Follow-up in PRN          Date of  Service:  Oct 17, 2024  Surgery: Excision surgery with Frozen Specimen Pathology Review and Intermediate Linear Repair     Case 2  Repair Type: Intermediate Linear  Repair Size: 3.9cm  Suture Material: 4-0 Monocryl, 5-0 Fast Absorbing Gut  Tumor Type: Hemosiderotic dermatofibroma  Location: right ankle  Derm-Path Accession #: [id number]  PreOp Size: 2.0cm x 1.5cm  PostOp Size: 2.2cm x2.2cm  Surgery Accession #: [id number]  Level of Defect: Fascia      Procedure:  We discussed the principles of treatment and most likely complications including scarring, bleeding, infection, swelling, pain, crusting, nerve damage, large wound,  incomplete excision, wound dehiscence,  nerve damage, recurrence, and a second procedure may be recommended to obtain the best cosmetic or functional result.    Informed consent was obtained and the patient underwent the procedure as follows:  The patient was placed supine on the operating table.  The tumor was identified, outlined with a marker, and verified by the patient.  The entire surgical field was prepped with Chlorhexidine.  The surgical site was anesthetized using 1% lidocaine with epinephrine. The area of clinically apparent tumor was debulked. The layer of tissue was then surgically excised using a #15 blade and was then transferred onto a specimen sheet maintaining the orientation of the specimen. Hemostasis was obtained using bipolar electrocoagulation. The wound site was then covered with a dressing while the tissue samples were processed for examination.    The excised tissue was transported to the Mohs histology laboratory maintaining the tissue orientation.  The tissue specimen was relaxed so that the entire surgical margin was in a a single horizontal plane for sectioning and inked for precise mapping.  A precise reference map was drawn to reflect the sectioning of the specimen, colored inking of the margins, and orientation on the patient.  The tissue was processed using  horizontal sectioning of the base and continuous peripheral margins.  The histopathologic sections were reviewed in conjunction with the reference map.    Total blocks: 1    Total slides:  2    Residual tumor was identified and indicated in red on the reference map, identifying the location where further tissue excision was necessary. Therefore, an additional stage of excision surgery was deemed necessary.     Stage II   The patient was returned to the operating room, and the area prepped in the usual manner. The residual tumor was excised using the reference map as a guide. The specimen was transfered to a labeled specimen sheet maintaining the orientation of the specimen. Hemostasis was obtained and the wound site was covered with a dressing while the tissue was processed for examination.     The excised tissue was transported to the histology laboratory maintaining orientation. The specimen margins were inked for precise mapping and a reference map was prepared for the is additional stage to maintain precise orientation as described above. The tissue was processed using horizontal sectioning of the base and continuous peripheral margins. The histopathologic sections were reviewed in conjunction with the reference map.     Total blocks: 1    Total slides:  1    There were no tumor cells visualized on examination, therefore Mohs surgery was complete.     REPAIR: An intermediate linear layered closure was selected as the procedure which would maximally preserve both function and cosmesis.    After the excision of the tumor, the area was undermined. Hemostasis was obtained with bipolar electrocoagulation.  Closure was oriented so that the wound was in the patient's natural skin tension lines. The deeper layers of subcutaneous and superficial (nonmuscle) fascia (deep layer suturing) were then closed with 4-0 monocryl buried vertical mattress sutures. The epidermis was then carefully approximated along the length of  the wound using (superficial layer suturing)  5-0 Fast Absorbing Gut simple running sutures.     Estimated blood loss was less than 10 ml for all surgical sites. A sterile pressure dressing was applied and wound care instructions, with a written handout, were given. The patient was discharged from the [facility] Center alert and ambulatory.    Follow-up in PRN    Scribe Disclosure:   I, Herlinda Cummings, am serving as a scribe; to document services personally performed by Dr. Shyam Washington - -based on data collection and the provider's statements to me.     Staff Physician Comments:   I saw and evaluated the patient with the Mohs Surgery Fellow (Dr. Indio Hu) and I agree with the assessment and plan and the above description of the procedure as documented by the scribe. I was present for the key portions of the procedure and entire exam.    Shyam Washington DO    Department of Dermatology  [facility]: Phone: [phone], Fax:[phone]  [facility]: Phone: [phone], Fax: [phone]    Care and Laboratory Testing Performed at:  [facility]   Dermatology Clinic  [address] [place] [address]      Again, thank you for allowing me to participate in the care of your patient.        Sincerely,        Shyam Washington MD

## 2024-10-17 NOTE — NURSING NOTE
The following medication was given:     MEDICATION:  Lidocaine with epinephrine 1% 1:[id number]  ROUTE: SQ  SITE: see procedure note  DOSE: 13.25 mL  LOT #: [id number]  : [employer]  EXPIRATION DATE: 06/30/2026  NDC#: 90595-412-02  Was there drug waste? 1.75 mL  Multi-dose vial: Yes    Mastisol, Steri-Strips, Tegaderm and pressure dressing applied to Mohs site on right anterior ankle and left shin.  Wound care instructions reviewed with patient and AVS provided.  Patient verbalized understanding.  Patient will follow up for suture removal: N/A.  No further questions or concerns at this time.      Anita Lopez CMA  October 17, 2024

## 2024-10-17 NOTE — PROGRESS NOTES
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   2. Fibrous papule of nose   3. Actinic Keratosis   - left cheek , right cheek , cryotherapy 09/27/24   4. 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    Informed consent was obtained and the patient underwent the procedure as follows:  The patient was placed supine on the operating table.  The cancer was identified, outlined with a marker, and verified by the patient.  The entire surgical field was prepped with Chlorhexidine.  The surgical site was anesthetized using 1% lidocaine with epinephrine.      The area of clinically apparent tumor was debulked. The layer of tissue was then surgically excised using a #15 blade and was then transferred onto a specimen sheet maintaining the orientation of the specimen. Hemostasis was obtained using bipolar electrocoagulation. The wound site was then covered with a dressing while the tissue samples  were processed for examination.    The excised tissue was transported to the Mohs histology laboratory maintaining the tissue orientation.  The tissue specimen was relaxed so that the entire surgical margin was in a a single horizontal plane for sectioning and inked for precise mapping.  A precise reference map was drawn to reflect the sectioning of the specimen, colored inking of the margins, and orientation on the patient.  The tissue was processed using horizontal sectioning of the base and continuous peripheral margins.  The histopathologic sections were reviewed in conjunction with the reference map.    Total blocks: 1    Total slides:  1    There were no cancer cells visualized on examination, therefore Mohs surgery was complete.     REPAIR: An intermediate linear layered closure was selected as the procedure which would maximally preserve both function and cosmesis.    After the excision of the tumor, the area was undermined. Hemostasis was obtained with bipolar electrocoagulation.  Closure was oriented so that the wound was in the patient's natural skin tension lines. The deeper layers of subcutaneous and superficial (nonmuscle) fascia (deep layer suturing) were then closed with 4-0 monocryl buried vertical mattress sutures. The epidermis was then carefully approximated along the length of the wound using (superficial layer suturing)  5-0 Fast Absorbing Gut simple running sutures.     Estimated blood loss was less than 10 ml for all surgical sites. A sterile pressure dressing was applied and wound care instructions, with a written handout, were given. The patient was discharged from the [facility]atologic Surgery Center alert and ambulatory.    Follow-up in VAN          Date of Service:  Oct 17, 2024  Surgery: Excision surgery with Frozen Specimen Pathology Review and Intermediate Linear Repair     Case 2  Repair Type: Intermediate Linear  Repair Size: 3.9cm  Suture Material: 4-0 Monocryl, 5-0 Fast Absorbing  Gut  Tumor Type: Hemosiderotic dermatofibroma  Location: right ankle  Derm-Path Accession #: [id number]  PreOp Size: 2.0cm x 1.5cm  PostOp Size: 2.2cm x2.2cm  Surgery Accession #: [id number]  Level of Defect: Fascia      Procedure:  We discussed the principles of treatment and most likely complications including scarring, bleeding, infection, swelling, pain, crusting, nerve damage, large wound,  incomplete excision, wound dehiscence,  nerve damage, recurrence, and a second procedure may be recommended to obtain the best cosmetic or functional result.    Informed consent was obtained and the patient underwent the procedure as follows:  The patient was placed supine on the operating table.  The tumor was identified, outlined with a marker, and verified by the patient.  The entire surgical field was prepped with Chlorhexidine.  The surgical site was anesthetized using 1% lidocaine with epinephrine. The area of clinically apparent tumor was debulked. The layer of tissue was then surgically excised using a #15 blade and was then transferred onto a specimen sheet maintaining the orientation of the specimen. Hemostasis was obtained using bipolar electrocoagulation. The wound site was then covered with a dressing while the tissue samples were processed for examination.    The excised tissue was transported to the [facility]s histology laboratory maintaining the tissue orientation.  The tissue specimen was relaxed so that the entire surgical margin was in a a single horizontal plane for sectioning and inked for precise mapping.  A precise reference map was drawn to reflect the sectioning of the specimen, colored inking of the margins, and orientation on the patient.  The tissue was processed using horizontal sectioning of the base and continuous peripheral margins.  The histopathologic sections were reviewed in conjunction with the reference map.    Total blocks: 1    Total slides:  2    Residual tumor was identified and indicated in  red on the reference map, identifying the location where further tissue excision was necessary. Therefore, an additional stage of excision surgery was deemed necessary.     Stage II   The patient was returned to the operating room, and the area prepped in the usual manner. The residual tumor was excised using the reference map as a guide. The specimen was transfered to a labeled specimen sheet maintaining the orientation of the specimen. Hemostasis was obtained and the wound site was covered with a dressing while the tissue was processed for examination.     The excised tissue was transported to the histology laboratory maintaining orientation. The specimen margins were inked for precise mapping and a reference map was prepared for the is additional stage to maintain precise orientation as described above. The tissue was processed using horizontal sectioning of the base and continuous peripheral margins. The histopathologic sections were reviewed in conjunction with the reference map.     Total blocks: 1    Total slides:  1    There were no tumor cells visualized on examination, therefore Mohs surgery was complete.     REPAIR: An intermediate linear layered closure was selected as the procedure which would maximally preserve both function and cosmesis.    After the excision of the tumor, the area was undermined. Hemostasis was obtained with bipolar electrocoagulation.  Closure was oriented so that the wound was in the patient's natural skin tension lines. The deeper layers of subcutaneous and superficial (nonmuscle) fascia (deep layer suturing) were then closed with 4-0 monocryl buried vertical mattress sutures. The epidermis was then carefully approximated along the length of the wound using (superficial layer suturing)  5-0 Fast Absorbing Gut simple running sutures.     Estimated blood loss was less than 10 ml for all surgical sites. A sterile pressure dressing was applied and wound care instructions, with a  written handout, were given. The patient was discharged from the [facility]ermatologic Surgery Center alert and ambulatory.    Follow-up in PRN    Scribe Disclosure:   I, Herlinda Cummings, am serving as a scribe; to document services personally performed by Dr. Shyam Washington - -based on data collection and the provider's statements to me.     Staff Physician Comments:   I saw and evaluated the patient with the Mohs Surgery Fellow (Dr. Indio Hu) and I agree with the assessment and plan and the above description of the procedure as documented by the scribe. I was present for the key portions of the procedure and entire exam.    Shyam Washington DO    Department of Dermatology  [facility]: Phone: [phone], Fax:[phone]  [facility]: Phone: [phone], Fax: [phone]    Care and Laboratory Testing Performed at:  [facility]   Dermatology Clinic  [address] [place] [address]

## 2024-12-07 DIAGNOSIS — G25.0 BENIGN ESSENTIAL TREMOR: ICD-10-CM

## 2024-12-09 RX ORDER — PROPRANOLOL HYDROCHLORIDE 60 MG/1
60 CAPSULE, EXTENDED RELEASE ORAL AT BEDTIME
Qty: 180 CAPSULE | Refills: 0 | Status: SHIPPED | OUTPATIENT
Start: 2024-12-09

## 2024-12-09 NOTE — TELEPHONE ENCOUNTER
Pending Prescriptions:                       Disp   Refills    propranolol ER (INDERAL LA) 60 MG 24 hr c*180 ca*0            Sig: TAKE ONE CAPSULE BY MOUTH AT BEDTIME        Requested Pharmacy: [facility] in [place]    Pt's last office visit: 4/22/24  Next scheduled office visit: 4/28/25      Per the RN/LPN medication refill protocol, writer is unable to refill this request.

## 2025-04-04 NOTE — PROGRESS NOTES
[facility] Dermatology Note  Encounter Date: Apr 28, 2025  Office Visit     Reviewed patients past medical history and pertinent chart review prior to patients visit today.     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   2. Fibrous papule of nose   3. Actinic Keratosis   - left cheek , right cheek , cryotherapy 09/27/24   4. History of other biopsies  -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 10/17/24      Personal Hx: history of NMSC   [place] in [place], [place]    Family Hx: nothing significant to note  ____________________________________________    Assessment & Plan:     # Personal history of nonmelanoma skin cancer  - No signs of recurrence. Continued observation recommended.   - Sun protection: Counseled SPF 30+ sunscreen, UPF clothing, sun avoidance, tanning bed avoidance.    # Multiple nevi, trunk and extremities  # Solar lentigines  - Nevi demonstrate no concerning features on dermoscopy. We discussed the importance of self exams at home.   - ABCDEs: Counseled ABCDEs of melanoma: Asymmetry, Border (irregularity), Color (not uniform, changes in color), Diameter (greater than 6 mm which is about the size of a pencil eraser), and Evolving (any changes in preexisting moles).    # Cherry angiomas  # Seborrheic keratoses  - We discussed the benign nature of the skin lesions. No treatment required. Continued observation recommended. Follow up with any concerns.        Follow-up:  6 months for follow up full body skin exam, as needed for new or changing lesions or new concerns    All risks, benefits and alternatives were discussed with patient.  Patient is in agreement and understands the assessment and plan.  All questions were answered.  Graciela Girard PA-C  [facility] Dermatology    ____________________________________________    CC:  No chief complaint on file.    HPI:  Ms. Seema Johnson is a(n) 69 year old female who presents today as a return patient for a full body skin cancer screening. The patient has a history of nonmelanoma skin cancer. The patient was last seen in dermatology 10/17/24. Today, the patient reports no cutaneous concerns. Patient reports being diligent with photoprotection, especially so with taking Tamoxifen.      Physical Exam:  Vitals: There were no vitals taken for this visit.   SKIN: Total skin excluding the genitalia areas was performed. The exam included the scalp, face, neck, bilateral arms, chest, back, abdomen, bilateral legs, digits, mons pubis, buttocks, and nails.   - Ibarra II.  - Multiple tan/brown macules and papules scattered throughout exam, consistent with benign nevi. No concerning features on dermoscopy.   - Scattered tan, homogenous macules scattered on sun exposed skin, consistent with solar lentigines.   - Scattered waxy, stuck on appearing papules and patches, consistent with seborrheic keratoses.  - Several 1-2 mm red dome shaped symmetric papules, consistent with cherry angiomas.     - No other lesions of concern on areas examined.     Medications:  Current Outpatient Medications   Medication Sig Dispense Refill    acetaminophen (TYLENOL) 500 MG tablet Take 1,000 mg by mouth every morning 2 tablet 0    aspirin (ASA) 81 MG EC tablet Take 81 mg by mouth daily as needed (prevent clots when flying) (Patient not taking: Reported on 10/9/2024)      Calcium Carbonate-Vit D-Min (CALTRATE MINIS PLUS MINERALS) 300-800 MG-UNIT TABS Take 1 tablet by mouth 2 times daily      Cholecalciferol (VITAMIN D3) 50 MCG (2000 UT) CAPS Take 2,000 Units by mouth daily      cyanocobalamin (VITAMIN B-12) 1000 MCG tablet Take 1 tablet (1,000 mcg) by mouth every other day      polyethylene glycol (MIRALAX) 17 GM/Dose powder Take 1 capful. by mouth every other day      propranolol ER (INDERAL LA) 60 MG 24 hr capsule  TAKE ONE CAPSULE BY MOUTH AT BEDTIME 180 capsule 0    simvastatin (ZOCOR) 20 MG tablet TAKE ONE TABLET BY MOUTH AT BEDTIME 90 tablet 3    tamoxifen (NOLVADEX) 20 MG tablet Take 1 tablet (20 mg) by mouth daily 90 tablet 3    topiramate (TOPAMAX) 50 MG tablet Take 1 tablet (50 mg) by mouth at bedtime 90 tablet 3    zoledronic Acid (RECLAST) 5 MG/100ML SOLN infusion 5 mg/100 mL once a year       No current facility-administered medications for this visit.      Past Medical History:   Patient Active Problem List   Diagnosis    Mohs defect of cheek    Chronic bilateral thoracic back pain    Closed compression fracture of thoracic vertebra (H)    Closed compression fracture of first lumbar vertebra (H)    Osteoporosis    Invasive ductal carcinoma of breast, female (H)- RIGHT breast, Upper outer quadrant- 2015    Benign essential tremor    Hyperlipidemia LDL goal <100    Vertigo    Lateral pain of left hip    Estrogen receptor positive neoplasm    Family history of breast cancer    Malignant neoplasm of upper-outer quadrant of female breast (H)     Past Medical History:   Diagnosis Date    Basal cell cancer     Breast cancer (H)     Dyslipidemia     History of blood transfusion     Hypertension     Mohs defect of nose 06/2012       CC No referring provider defined for this encounter. on close of this encounter.

## 2025-04-15 DIAGNOSIS — E78.5 HYPERLIPIDEMIA LDL GOAL <100: ICD-10-CM

## 2025-04-15 RX ORDER — SIMVASTATIN 20 MG
20 TABLET ORAL
Qty: 90 TABLET | Refills: 0 | Status: SHIPPED | OUTPATIENT
Start: 2025-04-15

## 2025-04-16 NOTE — PROGRESS NOTES
VIDEO VISIT    Date of Visit: Apr 28, 2025   Name: Seema Johnson  Date of Birth 1955  MRN [id number]  [address] [facility] [address]   [facility] [address]     [phone] (M)   [phone] (H)   NONE (W)      [email or web address]     Tyrell Johnson  [phone]     Retired      Seen by Leida 2018  Brain mri 2019 for vertigo        Assessment:  (G25.0) Benign essential tremor  (primary encounter diagnosis)  Mgm with tremor  Mother hand tremor  50s onset   Head tremor  Hand tremor   Right handed  Bilateral hand tremor  Right may be worse than her left h and  No clear if alcohol helps her tremor     Reasons [facility] is Required   Family History of tremor  yes  Uncontrolled shaking  yes  Tremors on action or intention  yes  Difficulty holding items yes - problems with cup and has to use two hands  Frequently spills/drops items  - yes problems pouring  Difficulty eating normally  - has a fancy adaptable device  Difficulty with dressing/daily hygiene needs  - yes buttoning  Difficulty writing  Yes problems  Difficulty using cell phone/computers  Double tapping     Tried and failed therapies   Has been on medications for tremor   Propranolol inderal LA 60mg - 10 years   Rx by a neurologist      Handedness   Right handeded  Which hand is more severe  right  Treatment: RIght, Left or both hands   - bilateral   Where is the tremor worse - close to the mouth or when arms are extended - extended is worse and left is worse than right       Confirm that you do not have any of these contraindications:   An implanted electrical medical device, such as a pacemaker,  defibrillator, heart monitor, insulin pump, bladder stimulator, or  deep brain stimulator or an Insulin pump   NO      Suspected or diagnosed epilepsy or other seizure disorder  no      Pregnancy  - no     Confirm that your tremor is not caused by any of the followings no   Thyroid issues (e.g., hyperthyroidism)   Metabolic disorders  (e.g., B-12 deficiency)     Size of watch band as measured around your wrist  16 cm - SMALL   Small (13.6-16.4 cm), Medium (16.5-18.4 cm) or large (18.5-20.4 cm)         Review of diagnosis    Essential tremor      Avoidance of dopamine blockers   Not taking     Motor complication review   N/a     Review of Impulse control disorders   N/a     Review of surgical or medication options   reviewed     Gait/Balance/Falls   falls due to not seeing a step - tend to tuck and roll     Exercise/Therapy performed/offered   Exercising      Cognitive/Driving   Retired -  and worked in a green house      Croatian background   Studied [place] [employer] started in 3rd grade  [employer] in [place]  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 for winter      Hallucinations/delusions   denies     Sleep   Goes to bed around 10pm   Has some problems falling asleep - deep breathing.   Nocturia  Wakes up 3 times per night  Eventual gets back to sleep  Wakes up at 7am and feels tired   May nap      Bladder/Renal/Prostate/Gyn/Other  Mixed urinary dysfunction  Has a pad  Nocturia 3/noc  Has urgency and frequency  Has not seen a urologist     GI/Constipation/GERD   Changed her diet and no longer  Has issues with GERD  asymptomatic     ENDO/Lipid/DM/Bone density/Thyroid  Osteoporosis  Calcium with milk in motor, afternoon and evening  Vitamin D3  Lipid disorder  On statin - borderline     Cardio/heart/Hyper or Hypotensive   Has low blood pressure at times - strenuous and bent over  Has not fainted.      Vision/Dry Eyes/Cataracts/Glaucoma/Macular   Wears   Early cataracts     Heme/Anticoagulation/Antiplatelet/Anemia/Other  Vitamin B12 every other day  Aspirin when travels 81mg day       ENT/Resp  Vertigo - associated - no issues lately  Hearing loss - going to get hearing aides     Skin/Cancer/Seborrhea/other  Skin cancer - mohs defect  Squamous cell carcinoma     Musculoskeletal/Pain/Headache  Closed compression fracture L1  Closed compression fracture thoracic spine  Left hip pain     Lumbar spine MRI 4/25/2022  Findings: Exaggerated lumbar lordosis. Presumed 5 lumbar-type  vertebra. Chronic wedge-shaped deformity and vertebral body height  loss at T12 without any bone marrow abnormality. Stepwise trace  retrolisthesis is at L1-2 and L2-3 better seen on prior radiographs.  Multilevel disc height loss and disc degeneration.   The tip of the  conus medullaris is at  L1.  No pars defect identified. Bone marrow  signal intensity is within normal limits and no abnormal signal is  visible.      On a level by level basis:     T12-L1: Disc bulge without spinal canal or foraminal stenosis.     L1-2: No significant spinal canal or foraminal narrowing.     L2-3: Mild facet hypertrophy and associated mild neural foraminal  narrowing without significant spinal canal narrowing.     L3-4: Disc bulge and facet hypertrophy. Bilateral mild neural  foraminal stenosis without spinal canal narrowing.     L4-5: No significant spinal canal or foraminal narrowing.     L5-S1: No significant spinal canal or foraminal narrowing.     Partially visualized presumed benign cyst in the liver segment 6  measuring 1 cm in diameter.                                                                    Impression: Degenerative changes as described above. No high-grade  spinal canal or neural foraminal stenosis at any level.     Other:  Invasive ductal carcinoma right breast 2016      Treating insomnia can be difficult because the medications we use can be harmful, especially in older adults. In addition, sleep medications do not tend to be very effective and should only be used short-term. Cognitive behavioral therapy (CBT) is the  "most effective treatment for long-term insomnia. The goal of CBT for insomnia is to address the underlying causes of the sleep problems, including your habits and how you think about sleep. You can do CBT with a trained psychologist, or from the comfort of your home by following an online program or workbook. Here are some great resources for at-home CBT:     1) 6-week online CBT course through [facility] for $40: [employer]sleep  2) \"Say Noel to Insomnia\" by Kimo Noel, PhD at [employer]: 6-week program  3) \"Overcoming Insomnia: A Cognitive-Behavioral Therapy Approach Workbook\" by Margarito Hurtado and Yohana Roberts  4) \"Quiet Your Mind and Get to Sleep: Solutions to Insomnia for Those with Depression, Anxiety or Chronic Pain ([employer] Self-Help Workbook) by Yohana Roberts and Ester Leon     Urinary issues - would recommend a consultation     Cognitive changes  Neuropsychological evaluation was ordered --- this      Still on tamoxifen - 7 years out but was told may need to take it 10 years based on the type of cancer.      Has hearing loss and will get a hearing aides     Has visual changes and may have early cataracts.      [employer] DBS Education Video  Deep brain stimulation (DBS)      If the above link does not work, cut and paste this address into your browser     [email or web address]     Pharmacy (MTM) consultation and medication management     Continue on the beta blocker for now -has some light headedness; bp and heart rate are 110/69 and heart rate 64     Discussed risks of primidone/mysoline which can affect memory and balance     Discussed topiramate (topamax) which can help tremor but carry risk of renal stones     Discussed cervical dystonia/dystonic head tremor - has a bit of tilt and rotation of the head  Consider botox/botulinum toxin injections  Dr Radha Hubbard     mychart ksenia to " be installed on her phone.     Return 6-12 months     95/61 and pulse is 65  Taking propranolol inderal LA 60mg 24hr     Taking fluid 32 oz daily  On topiramate topamax 50mg     10/3/2023 Dr. Demarco evaluation  Encouraged her to review test results with her after testing     In the context of estimated high average premorbid abilities, Ms. Johnson s current neurocognitive profile revealed performance within expectation across all cognitive domains assessed. Subtle weaknesses (i.e., low average scores) were noted on tests of immediate auditory attention and semantic verbal fluency. Cognitive strengths were noted on tests of abstract reasoning and visual memory. Performance was within expectation across other cognitive tests including working memory, speeded processing, naming, letter-cue verbal fluency, visuospatial processing, verbal learning and memory, cognitive inhibition, planning/organization, and mental flexibility. Assessment of current psychological and emotional status revealed mild symptoms of anxiety and the absence of clinically significant depressive mood symptoms.     Overall, Ms. Johnson is functioning quite well from a neuropsychological standpoint and does not evidence any objective cognitive deficits. The very subtle weakness in attention and her subjective cognitive complaints are likely attributable to cognitive lapses in the context of anxiety, stress, and fatigue.       Stress level is up      Encouraged her to spread her calcium to twice daily     Will be going to [place] and will be in [facility] for the winter and return mid April 2024     Rx sent to [facility] for her propranolol and topiramate  Watched the video and learned about mood      Will be ending her reclast treatments at some point      Discussed DBS and botox - she will live with her symptoms as is with her present medication regimen which was refilled.      Next summer Oleg Bello and Radha      Discussed antiamyloid treatments.       Susu blankenship  [place] and had an intense headache and flashing in her eyes and headache lasted like a migraine. Lasted 3 hours - started getting headaches and gradually resolved.   Level 2 pain every other day or third day  Last Monday eyes were fine at doctors office.     Will get a MRI     Brain mRI 5/6/2024  1.  No acute intracranial process.  2.  Generalized brain atrophy and presumed microvascular ischemic changes as detailed above.  3.  No intracranial mass, mass effect or acute/chronic hemorrhage.  4.  No pathologic enhancement.  5.  No significant change overall from 10/9/2019 MRI brain.    Her lorazepam was stopped as she was not taking it but actually needs it for her MRI scan so a Rx was sent.   [facility] Rx sent in [place].      Shantel Fenton is her PCP and an MRI will be ordered and scheduled for the symptoms.      Has incontinence - getting up every 2 hours  Had a urinalysis and not sure if there is a infection or contaminant.   Urology appointment may 2024  May get therapy   Frances Cleveland PA-C      Consider getting a sed rate or/and CRP with the headache history.   No eye issues  Wearing mouth guard  Family history of pituitary gland tumor  Pending mri to be scheduled  Neurology headache - referral if needed  Maria Rdz or Aashish      Saw oncologist on Friday and is taking tamoxifen and will remain on it for 18 months.      She is on reclast and will have a decision from rheumatology about if she will get another dose.   Her bowel  habits are better with the change in her calcium     Remains on simvastatin     Tremors are about the same and tolerable.   Christiana and Kat are studying a couple of medications for tremor.      [email or web address]  [email or web address]     Cognitive testing was good. 10/3/2023      Medications               Acetaminophen tylenol 500mg 2          Aspirin 81mg prn         Calc Carb-cholecalciferol 1000-800  2         Cholecalciferol Vit D3 50mcg 2000 units 1         Cyanocobalamin Vit B12 1000mcg qod         Polyethylene glycol miralax qod         Famotidine pepcid 20mg off         Propranolol inderal LA 60mg 24hr       1   Simvastatin zocor 20mg        1   Tamoxifen nolvadex 20mg  1         Topiramate topamax 50mg       1   Zoledronic acid reclast year                        Plan:    Tremors are increasing -   Discussed [facility] [employer].     Her tremors are manageable  She is on topiramate/topamax 50mg  Propranolol inderal LA 60mg 24h  Refilled both of these medications    Return one year.     Discussed dbs workup as well as other options such as focused/ultrasound   She may be considering surgery spring/summer 2026 due to travel.     Travels   Wed is off to [place] and will be traveling this summer  Will go to [place] after going to [place]  Winter 2025/2026 [place] - may come back feb/april 2026  Goes to [place] middle part of October   Consider     10/3/2023  IDENTIFYING INFORMATION AND REASON FOR REFERRAL   Ms. Johnson is a 67-year-old, right-handed, White female with a history including essential tremor and breast cancer. She was referred for neuropsychological testing by her neurologist in the context of patient s concern about her memory. This evaluation was requested to provide a comprehensive assessment of her current neuropsychological status to inform treatment planning.      SUMMARY & IMPRESSION  See below for relevant background information and detailed test results. See separate abstract for supporting documentation including a list of neuropsychological measures and test scores.     In the context of estimated high average premorbid abilities, Ms. Johnson s current neurocognitive profile revealed performance within expectation across all cognitive domains assessed. Subtle weaknesses (i.e., low average scores) were noted on tests of immediate  auditory attention and semantic verbal fluency. Cognitive strengths were noted on tests of abstract reasoning and visual memory. Performance was within expectation across other cognitive tests including working memory, speeded processing, naming, letter-cue verbal fluency, visuospatial processing, verbal learning and memory, cognitive inhibition, planning/organization, and mental flexibility. Assessment of current psychological and emotional status revealed mild symptoms of anxiety and the absence of clinically significant depressive mood symptoms.     Overall, Ms. Johnson is functioning quite well from a neuropsychological standpoint and does not evidence any objective cognitive deficits. The very subtle weakness in attention and her subjective cognitive complaints are likely attributable to cognitive lapses in the context of anxiety, stress, and fatigue.     Medical Decision Making:  #  Chronic progressive medical conditions addressed  tremor  Review and/or interpretation of unique test or documentation from a provider outside of neurology no   Independent historian provided additional details  no   Prescription drug management and review of potential side effects and/or monitoring for side effects  yes   Health impacted by social determinants of health  no    I have reviewed the note as documented above.  This accurately captures the substance of my conversation with the patient and total time spent preparing for visit, executing visit and completing visit on the day of the visit:  30 minutes.     The longitudinal plan of care for Seema Johnson was addressed during this visit. Due to the added complexity in care, I will continue to support eSema Johnson in the subsequent management of this condition(s) and with the ongoing continuity of care of this condition(s).    Pawel Salinas  "MD      ------------------------------------------------------------------------------------------------------------------------------------------------------------------------    Video-Visit Details    The patient has been notified of following:     \"After a review of the patient s situation, this visit was changed from an in-person visit to a video visit to reduce the risk of COVID-19 exposure.   The patient is being evaluated via a billable video visit.\"    \"This video visit will be conducted via a call between you and your physician/provider. We have found that certain health care needs can be provided without the need for an in-person physical exam.  This service lets us provide the care you need with a video conversation.  If a prescription is necessary we can send it directly to your pharmacy.  If lab work is needed we can place an order for that and you can then stop by our lab to have the test done at a later time.    If during the course of the call the physician/provider feels a video visit is not appropriate, you will not be charged for this service.\"     Patient has given verbal consent for Video visit? Yes    Patient would like the video invitation sent by:     Type of service:  Video Visit    Video Start Time:     Video End Time (time video stopped):     Duration:  minutes - see above    Originating Location (pt. Location):     Distant Location (provider location):  [facility] NEUROLOGY [facility]     Mode of Communication:  Video Conference via [employer] (and if not possible then doximity)      Pawel Salinas MD      --------------------------------------------------------------------------------------------------------------    Seema Johnson is a 69 year old female who is being evaluated via a billable video visit.      Charts reviewed  Consult from  Images reviewed        I have reviewed and updated the patient's Past Medical History, Social History, Family History and Medication " List.    ALLERGIES  Codeine sulfate, Compazine [prochlorperazine], and Primidone    Lasts visit details if there was a last visit:       14 Review of systems  are negative except for   Patient Active Problem List   Diagnosis    Mohs defect of cheek    Chronic bilateral thoracic back pain    Closed compression fracture of thoracic vertebra (H)    Closed compression fracture of first lumbar vertebra (H)    Osteoporosis    Invasive ductal carcinoma of breast, female (H)- RIGHT breast, Upper outer quadrant- 2015    Benign essential tremor    Hyperlipidemia LDL goal <100    Vertigo    Lateral pain of left hip    Estrogen receptor positive neoplasm    Family history of breast cancer    Malignant neoplasm of upper-outer quadrant of female breast (H)        Allergies   Allergen Reactions    Codeine Sulfate Nausea    Compazine [Prochlorperazine] Other (See Comments)     Jittery and hyper and foggy    Primidone      vomiting, chills & hot flashes, headache, sensitivity to light & noise     Past Surgical History:   Procedure Laterality Date    BIOPSY BREAST      BREAST SURGERY Right 01/2016    breast cancer    LUMPECTOMY BREAST       Past Medical History:   Diagnosis Date    Basal cell cancer     Breast cancer (H)     Dyslipidemia     History of blood transfusion     Hypertension     Mohs defect of nose 06/2012     Social History     Socioeconomic History    Marital status:      Spouse name: Not on file    Number of children: Not on file    Years of education: Not on file    Highest education level: Not on file   Occupational History    Not on file   Tobacco Use    Smoking status: Never    Smokeless tobacco: Never   Vaping Use    Vaping status: Never Used   Substance and Sexual Activity    Alcohol use: Yes     Comment: socially    Drug use: No    Sexual activity: Yes     Partners: Male   Other Topics Concern    Parent/sibling w/ CABG, MI or angioplasty before 65F 55M? No   Social History Narrative    Not on file      Social Drivers of Health     Financial Resource Strain: Low Risk  (4/16/2024)    Financial Resource Strain     Within the past 12 months, have you or your family members you live with been unable to get utilities (heat, electricity) when it was really needed?: No   Food Insecurity: Low Risk  (4/16/2024)    Food Insecurity     Within the past 12 months, did you worry that your food would run out before you got money to buy more?: No     Within the past 12 months, did the food you bought just not last and you didn t have money to get more?: No   Transportation Needs: Low Risk  (4/16/2024)    Transportation Needs     Within the past 12 months, has lack of transportation kept you from medical appointments, getting your medicines, non-medical meetings or appointments, work, or from getting things that you need?: No   Physical Activity: Sufficiently Active (4/16/2024)    Exercise Vital Sign     Days of Exercise per Week: 5 days     Minutes of Exercise per Session: 60 min   Stress: No Stress Concern Present (4/16/2024)    [place] of Occupational [employer] - Occupational Stress Questionnaire     Feeling of Stress : Only a little   Social Connections: Unknown (4/16/2024)    Social Connection and Isolation Panel [NHANES]     Frequency of Communication with Friends and Family: Not on file     Frequency of Social Gatherings with Friends and Family: Twice a week     Attends Confucianist Services: Not on file     Active Member of Clubs or Organizations: Not on file     Attends Club or Organization Meetings: Not on file     Marital Status: Not on file   Interpersonal Safety: Low Risk  (10/1/2024)    Interpersonal Safety     Do you feel physically and emotionally safe where you currently live?: Yes     Within the past 12 months, have you been hit, slapped, kicked or otherwise physically hurt by someone?: No     Within the past 12 months, have you been humiliated or emotionally abused in other ways by your partner or  ex-partner?: No   Housing Stability: Low Risk  (4/16/2024)    Housing Stability     Do you have housing? : Yes     Are you worried about losing your housing?: No     Family History   Problem Relation Age of Onset    Macular Degeneration Mother     Cerebrovascular Disease Mother     Hypertension Mother     Thyroid Disease Mother     Other - See Comments Mother         ?tremor    Tremor Mother         hand tremor    Cerebrovascular Disease Father     Hypothyroidism Father     Diabetes Maternal Grandmother     Coronary Artery Disease Maternal Grandmother     Tremor Maternal Grandmother     Colon Cancer Paternal Grandmother     Other - See Comments Brother     Other - See Comments Brother     Other - See Comments Brother     Other - See Comments Brother     Breast Cancer Sister     Other Cancer Sister     Anesthesia Reaction Sister     Diabetes Sister     Anesthesia Reaction Sister     Diabetes Son     Other - See Comments Son         lives in [place]    Diabetes Sister     Coronary Artery Disease No family hx of     Hyperlipidemia No family hx of     Colon Cancer No family hx of     Prostate Cancer No family hx of     Depression No family hx of     Substance Abuse No family hx of     Obesity No family hx of     Osteoporosis No family hx of     Asthma No family hx of     Mental Illness No family hx of     Anxiety Disorder No family hx of     Other Cancer No family hx of      Current Outpatient Medications   Medication Sig Dispense Refill    acetaminophen (TYLENOL) 500 MG tablet Take 1,000 mg by mouth every morning 2 tablet 0    aspirin (ASA) 81 MG EC tablet Take 81 mg by mouth daily as needed (prevent clots when flying) (Patient not taking: Reported on 10/9/2024)      Calcium Carbonate-Vit D-Min (CALTRATE MINIS PLUS MINERALS) 300-800 MG-UNIT TABS Take 1 tablet by mouth 2 times daily      Cholecalciferol (VITAMIN D3) 50 MCG (2000 UT) CAPS Take 2,000 Units by mouth daily      cyanocobalamin (VITAMIN B-12) 1000 MCG  tablet Take 1 tablet (1,000 mcg) by mouth every other day      polyethylene glycol (MIRALAX) 17 GM/Dose powder Take 1 capful. by mouth every other day      propranolol ER (INDERAL LA) 60 MG 24 hr capsule TAKE ONE CAPSULE BY MOUTH AT BEDTIME 180 capsule 0    simvastatin (ZOCOR) 20 MG tablet TAKE ONE TABLET BY MOUTH AT BEDTIME 90 tablet 0    tamoxifen (NOLVADEX) 20 MG tablet Take 1 tablet (20 mg) by mouth daily 90 tablet 3    topiramate (TOPAMAX) 50 MG tablet Take 1 tablet (50 mg) by mouth at bedtime 90 tablet 3    zoledronic Acid (RECLAST) 5 MG/100ML SOLN infusion 5 mg/100 mL once a year

## 2025-04-22 ENCOUNTER — ANCILLARY PROCEDURE (OUTPATIENT)
Dept: MAMMOGRAPHY | Facility: CLINIC | Age: 70
End: 2025-04-22
Attending: INTERNAL MEDICINE
Payer: COMMERCIAL

## 2025-04-22 DIAGNOSIS — C50.911 INFILTRATING DUCTAL CARCINOMA OF RIGHT FEMALE BREAST (H): Chronic | ICD-10-CM

## 2025-04-22 DIAGNOSIS — Z12.31 ENCOUNTER FOR SCREENING MAMMOGRAM FOR MALIGNANT NEOPLASM OF BREAST: ICD-10-CM

## 2025-04-22 PROCEDURE — 77063 BREAST TOMOSYNTHESIS BI: CPT | Mod: GC | Performed by: RADIOLOGY

## 2025-04-22 PROCEDURE — 77067 SCR MAMMO BI INCL CAD: CPT | Mod: GC | Performed by: RADIOLOGY

## 2025-04-24 NOTE — PROGRESS NOTES
Virtual Oncology Follow-Up Visit: April 25, 2025    Oncologist: Previously Dr. Contreras   PCP: Shantel Plaza    Reason for Visit: Follow-up breast cancer     Diagnosis: Stage II R) breast cancer   Seema Johnson is a 70 yo female who was found to have a 7 mm breast cancer with an intramammary lymph node positivity and a sentinel lymph node positivity. One of two sentinel lymph nodes was positive with the tumor size being 0.3 cm and an intramammary lymph node was positive as well. All margins completely clear with invasive margin being 0.3 cm. There was some background DCIS and that margin was also clear, but less than 0.1 cm. The tumor was grade 1, strongly ER positive in 95% of the cells, MT negative (stained in less than 1% of the cells) and HER2/lex negative. (T1B N1 M0)  BREAST NEXT genetic testing panel was negative.    Treatment:  1/2016 R) lumpectomy and SLNBx  3/2016 to 4/2016 adjuvant chemotherapy with taxol x4 cycles followed by DD-AC x4 cycles  8/2016 completed adjuvant radiation  10/2016 initiated tamoxifen     Interval History:  Pt is seen virtually for review of ***    Patient denies any of the following except if noted above: fevers, chills, difficulty with energy, vision or hearing changes, chest pain, dyspnea, abdominal pain, nausea, vomiting, diarrhea, constipation, urinary concerns, headaches, numbness, tingling, issues with sleep or mood. He/She also denies lumps, bumps, rashes or skin lesions, bleeding or bruising issues.    Physical Exam: Limited due to virtual visit restrictions.  General: No acute distress. Appearance is well-groomed.  Resp: No respiratory distress. No cough.   Skin: No visible rash or lesions.  Neuro: A/O x 4. Appropriate mentation and speech.  Psych: Appropriate mood.     Laboratory Results:   No results found for any visits on 04/25/25.  I reviewed the above labs today.    Imaging:  MA Screen Bilateral w/Hakeem  Narrative: BILATERAL FULL FIELD DIGITAL SCREENING  MAMMOGRAM WITH TOMOSYNTHESIS    Performed on: 4/22/25    Compared to: 04/12/2024, 05/05/2023, 10/10/2022, 10/05/2021, 08/24/2020,   and 10/08/2019    Technique:  This study was evaluated with the assistance of Computer-Aided   Detection.  Breast Tomosynthesis was used in interpretation.    Findings: The breasts are extremely dense, which lowers the sensitivity of   mammography.  There are breast conservation changes in the right breast.  There is no radiographic evidence of malignancy.  Left breast surgery.  Impression: IMPRESSION: ACR BI-RADS Category 2: Benign    BREAST CANCER SCREENING RECOMMENDATION: Routine yearly mammography   beginning at age 40 or as discussed with your provider.  Risk appropriate breast cancer screening.    The results and recommendations of this examination will be communicated   to the patient.    I have personally reviewed the examination and initial interpretation  and I agree with the findings.    Benigno Perdomo MD; Anais Hyatt MD    I reviewed the above imaging report today.      Assessment and Plan:   Prognostic Stage I R) breast cancer   - s/p R) lumpectomy and SLNBx, adjuvant chemoradiation, and initiation of endocrine therapy.  - Current use of tamoxifen with plan for 10-years of use, due to complete in 10/2026.  - Continuing to tolerate well with manageable side effects.  - No concerns regarding lymphedema or changes r/t to previous radiation.  - Reviewed need for annual gynecology exam d/t risk of uterine malignancies.  - Reviewed need for annual ophthalmology exam d/t risk of cataract development.  - Reviewed s/s of DVT and PE.  - Most recent labs reviewed and discussed ***  - 4/2025 screening mammogram without radiographic evidence of cancer. Continue annually. Pt has extremely dense breast tissue.   - RTC in 12 months for provider review and clinical exam, no labs necessary.     Bone health  - Currently receives Reclast annually through rheumatology provider  - Continue  weight-bearing exercise as tolerated  - Continue calcium and vitamin D daily    Ester HANNAH, CNP                Virtual Visit Details     Type of service:  Video Visit     Originating Location (pt. Location): Home  Distant Location (provider location): On-site  Platform used for Video Visit: {[email or web address]}      Video Total Time:

## 2025-04-25 ENCOUNTER — VIRTUAL VISIT (OUTPATIENT)
Dept: ONCOLOGY | Facility: CLINIC | Age: 70
End: 2025-04-25
Payer: COMMERCIAL

## 2025-04-25 SDOH — HEALTH STABILITY: PHYSICAL HEALTH: ON AVERAGE, HOW MANY MINUTES DO YOU ENGAGE IN EXERCISE AT THIS LEVEL?: 60 MIN

## 2025-04-25 SDOH — HEALTH STABILITY: PHYSICAL HEALTH: ON AVERAGE, HOW MANY DAYS PER WEEK DO YOU ENGAGE IN MODERATE TO STRENUOUS EXERCISE (LIKE A BRISK WALK)?: 6 DAYS

## 2025-04-25 ASSESSMENT — SOCIAL DETERMINANTS OF HEALTH (SDOH): HOW OFTEN DO YOU GET TOGETHER WITH FRIENDS OR RELATIVES?: THREE TIMES A WEEK

## 2025-04-28 ENCOUNTER — OFFICE VISIT (OUTPATIENT)
Dept: DERMATOLOGY | Facility: CLINIC | Age: 70
End: 2025-04-28
Payer: COMMERCIAL

## 2025-04-28 ENCOUNTER — VIRTUAL VISIT (OUTPATIENT)
Dept: NEUROLOGY | Facility: CLINIC | Age: 70
End: 2025-04-28
Payer: COMMERCIAL

## 2025-04-28 DIAGNOSIS — G24.1 DYSTONIA, TORSION, FRAGMENTS OF: ICD-10-CM

## 2025-04-28 DIAGNOSIS — L81.4 SOLAR LENTIGO: ICD-10-CM

## 2025-04-28 DIAGNOSIS — G25.0 BENIGN ESSENTIAL TREMOR: ICD-10-CM

## 2025-04-28 DIAGNOSIS — L81.4 LENTIGINES: ICD-10-CM

## 2025-04-28 DIAGNOSIS — D22.9 MULTIPLE BENIGN NEVI: ICD-10-CM

## 2025-04-28 DIAGNOSIS — D18.01 CHERRY ANGIOMA: ICD-10-CM

## 2025-04-28 DIAGNOSIS — R25.1 TREMOR: Primary | ICD-10-CM

## 2025-04-28 DIAGNOSIS — Z85.828 HISTORY OF NONMELANOMA SKIN CANCER: Primary | ICD-10-CM

## 2025-04-28 DIAGNOSIS — L82.1 SEBORRHEIC KERATOSIS: ICD-10-CM

## 2025-04-28 PROCEDURE — 98006 SYNCH AUDIO-VIDEO EST MOD 30: CPT | Performed by: PSYCHIATRY & NEUROLOGY

## 2025-04-28 RX ORDER — TOPIRAMATE 50 MG/1
50 TABLET, FILM COATED ORAL AT BEDTIME
Qty: 90 TABLET | Refills: 3 | Status: SHIPPED | OUTPATIENT
Start: 2025-04-28

## 2025-04-28 RX ORDER — PROPRANOLOL HYDROCHLORIDE 60 MG/1
60 CAPSULE, EXTENDED RELEASE ORAL AT BEDTIME
Qty: 180 CAPSULE | Refills: 3 | Status: SHIPPED | OUTPATIENT
Start: 2025-04-28

## 2025-04-28 ASSESSMENT — PAIN SCALES - GENERAL: PAINLEVEL_OUTOF10: NO PAIN (0)

## 2025-04-28 NOTE — PROGRESS NOTES
Seema is a 69 year old who is being evaluated via a billable video visit.    How would you like to obtain your AVS? MyChart  If the video visit is dropped, the invitation should be resent by: Send to e-mail at: [email or web address]  Will anyone else be joining your video visit? No    Video-Visit Details    Type of service:  Video Visit   Video End Time:  Originating Location (pt. Location):     Distant Location (provider location):    Platform used for Video Visit:   MIGDALIA Mccray, EVA ([facility])

## 2025-04-28 NOTE — LETTER
4/28/2025      Seema Johnson  [address]  [place] [address]      Dear Colleague,    Thank you for referring your patient, Seema Johnson, to the [facility]. Please see a copy of my visit note below.    [facility] Dermatology Note  Encounter Date: Apr 28, 2025  Office Visit     Reviewed patients past medical history and pertinent chart review prior to patients visit today.     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   2. Fibrous papule of nose   3. Actinic Keratosis   - left cheek , right cheek , cryotherapy 09/27/24   4. History of other biopsies  -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 10/17/24      Personal Hx: history of NMSC   [facility] in [place], [place]    Family Hx: nothing significant to note  ____________________________________________    Assessment & Plan:     # Personal history of nonmelanoma skin cancer  - No signs of recurrence. Continued observation recommended.   - Sun protection: Counseled SPF 30+ sunscreen, UPF clothing, sun avoidance, tanning bed avoidance.    # Multiple nevi, trunk and extremities  # Solar lentigines  - Nevi demonstrate no concerning features on dermoscopy. We discussed the importance of self exams at home.   - ABCDEs: Counseled ABCDEs of melanoma: Asymmetry, Border (irregularity), Color (not uniform, changes in color), Diameter (greater than 6 mm which is about the size of a pencil eraser), and Evolving (any changes in preexisting moles).    # Cherry angiomas  # Seborrheic keratoses  - We discussed the benign nature of the skin lesions. No treatment required. Continued observation recommended. Follow up with any concerns.        Follow-up:  6 months for follow up full body skin exam, as needed for new or changing lesions or new concerns    All risks, benefits and  alternatives were discussed with patient.  Patient is in agreement and understands the assessment and plan.  All questions were answered.  Graciela Girard PA-C  [facility] Dermatology    ____________________________________________    CC: No chief complaint on file.    HPI:  Ms. Seema Johnson is a(n) 69 year old female who presents today as a return patient for a full body skin cancer screening. The patient has a history of nonmelanoma skin cancer. The patient was last seen in dermatology 10/17/24. Today, the patient reports no cutaneous concerns. Patient reports being diligent with photoprotection, especially so with taking Tamoxifen.      Physical Exam:  Vitals: There were no vitals taken for this visit.   SKIN: Total skin excluding the genitalia areas was performed. The exam included the scalp, face, neck, bilateral arms, chest, back, abdomen, bilateral legs, digits, mons pubis, buttocks, and nails.   - Ibarra II.  - Multiple tan/brown macules and papules scattered throughout exam, consistent with benign nevi. No concerning features on dermoscopy.   - Scattered tan, homogenous macules scattered on sun exposed skin, consistent with solar lentigines.   - Scattered waxy, stuck on appearing papules and patches, consistent with seborrheic keratoses.  - Several 1-2 mm red dome shaped symmetric papules, consistent with cherry angiomas.     - No other lesions of concern on areas examined.     Medications:  Current Outpatient Medications   Medication Sig Dispense Refill     acetaminophen (TYLENOL) 500 MG tablet Take 1,000 mg by mouth every morning 2 tablet 0     aspirin (ASA) 81 MG EC tablet Take 81 mg by mouth daily as needed (prevent clots when flying) (Patient not taking: Reported on 10/9/2024)       Calcium Carbonate-Vit D-Min (CALTRATE MINIS PLUS MINERALS) 300-800 MG-UNIT TABS Take 1 tablet by mouth 2 times daily       Cholecalciferol (VITAMIN D3) 50 MCG (2000 UT) CAPS Take 2,000 Units by mouth  daily       cyanocobalamin (VITAMIN B-12) 1000 MCG tablet Take 1 tablet (1,000 mcg) by mouth every other day       polyethylene glycol (MIRALAX) 17 GM/Dose powder Take 1 capful. by mouth every other day       propranolol ER (INDERAL LA) 60 MG 24 hr capsule TAKE ONE CAPSULE BY MOUTH AT BEDTIME 180 capsule 0     simvastatin (ZOCOR) 20 MG tablet TAKE ONE TABLET BY MOUTH AT BEDTIME 90 tablet 3     tamoxifen (NOLVADEX) 20 MG tablet Take 1 tablet (20 mg) by mouth daily 90 tablet 3     topiramate (TOPAMAX) 50 MG tablet Take 1 tablet (50 mg) by mouth at bedtime 90 tablet 3     zoledronic Acid (RECLAST) 5 MG/100ML SOLN infusion 5 mg/100 mL once a year       No current facility-administered medications for this visit.      Past Medical History:   Patient Active Problem List   Diagnosis     Mohs defect of cheek     Chronic bilateral thoracic back pain     Closed compression fracture of thoracic vertebra (H)     Closed compression fracture of first lumbar vertebra (H)     Osteoporosis     Invasive ductal carcinoma of breast, female (H)- RIGHT breast, Upper outer quadrant- 2015     Benign essential tremor     Hyperlipidemia LDL goal <100     Vertigo     Lateral pain of left hip     Estrogen receptor positive neoplasm     Family history of breast cancer     Malignant neoplasm of upper-outer quadrant of female breast (H)     Past Medical History:   Diagnosis Date     Basal cell cancer      Breast cancer (H)      Dyslipidemia      History of blood transfusion      Hypertension      Mohs defect of nose 06/2012       CC No referring provider defined for this encounter. on close of this encounter.      Again, thank you for allowing me to participate in the care of your patient.        Sincerely,        Graciela Girard PA-C    Electronically signed

## 2025-04-28 NOTE — NURSING NOTE
Seema Johnson's chief complaint for this visit includes:  Chief Complaint   Patient presents with    Skin Check     FBSE: no areas of concern.  Hx of NMSC.     PCP: Shantel Plaza    Referring Provider:  Referred Self, MD  No address on file    There were no vitals taken for this visit.  No Pain (0)        Allergies   Allergen Reactions    Codeine Sulfate Nausea    Compazine [Prochlorperazine] Other (See Comments)     Jittery and hyper and foggy    Primidone      vomiting, chills & hot flashes, headache, sensitivity to light & noise         Do you need any medication refills at today's visit? No    Anita Lopez, CMA

## 2025-04-28 NOTE — LETTER
4/28/2025      Seema Johnson  [address]  [facility] [address]      Dear Colleague,    Thank you for referring your patient, Seema Johnson, to the [facility] [place]. Please see a copy of my visit note below.        VIDEO VISIT    Date of Visit: Apr 28, 2025   Name: Seema Johnson  Date of Birth 1955  MRN [id number]  [address]   [facility] [address]     [phone] (M)   [phone] (H)   NONE (W)      [email or web address]     Tyrell Johnson  [phone]     Retired      Seen by Leida 2018  Brain mri 2019 for vertigo        Assessment:  (G25.0) Benign essential tremor  (primary encounter diagnosis)  Mgm with tremor  Mother hand tremor  50s onset   Head tremor  Hand tremor   Right handed  Bilateral hand tremor  Right may be worse than her left h and  No clear if alcohol helps her tremor     Reasons [facility] Therapy is Required   Family History of tremor  yes  Uncontrolled shaking  yes  Tremors on action or intention  yes  Difficulty holding items yes - problems with cup and has to use two hands  Frequently spills/drops items  - yes problems pouring  Difficulty eating normally  - has a fancy adaptable device  Difficulty with dressing/daily hygiene needs  - yes buttoning  Difficulty writing  Yes problems  Difficulty using cell phone/computers  Double tapping     Tried and failed therapies   Has been on medications for tremor   Propranolol inderal LA 60mg - 10 years   Rx by a neurologist      Handedness   Right handeded  Which hand is more severe  right  Treatment: RIght, Left or both hands   - bilateral   Where is the tremor worse - close to the mouth or when arms are extended - extended is worse and left is worse than right       Confirm that you do not have any of these contraindications:   An implanted electrical medical device, such as a pacemaker,  defibrillator, heart monitor, insulin pump, bladder stimulator, or  deep brain stimulator  or an Insulin pump   NO      Suspected or diagnosed epilepsy or other seizure disorder  no      Pregnancy  - no     Confirm that your tremor is not caused by any of the followings no   Thyroid issues (e.g., hyperthyroidism)   Metabolic disorders (e.g., B-12 deficiency)     Size of watch band as measured around your wrist  16 cm - SMALL   Small (13.6-16.4 cm), Medium (16.5-18.4 cm) or large (18.5-20.4 cm)         Review of diagnosis    Essential tremor      Avoidance of dopamine blockers   Not taking     Motor complication review   N/a     Review of Impulse control disorders   N/a     Review of surgical or medication options   reviewed     Gait/Balance/Falls   falls due to not seeing a step - tend to tuck and roll     Exercise/Therapy performed/offered   Exercising      Cognitive/Driving   Retired -  and worked in a green house      Irish background   Studied [place] started in 3rd grade  [employer] in [place]  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   Nocturia  Wakes up 3 times per night  Eventual gets back to sleep  Wakes up at 7am and feels tired   May nap      Bladder/Renal/Prostate/Gyn/Other  Mixed urinary dysfunction  Has a pad  Nocturia 3/noc  Has urgency and frequency  Has not seen a urologist     GI/Constipation/GERD   Changed her diet and no longer  Has issues with GERD  asymptomatic     ENDO/Lipid/DM/Bone density/Thyroid  Osteoporosis  Calcium with milk in motor, afternoon and evening  Vitamin D3  Lipid disorder  On statin - borderline     Cardio/heart/Hyper or Hypotensive   Has low blood pressure at times -  strenuous and bent over  Has not fainted.      Vision/Dry Eyes/Cataracts/Glaucoma/Macular   Wears   Early cataracts     Heme/Anticoagulation/Antiplatelet/Anemia/Other  Vitamin B12 every other day  Aspirin when travels 81mg day      ENT/Resp  Vertigo - associated - no issues lately  Hearing loss - going to get hearing aides     Skin/Cancer/Seborrhea/other  Skin cancer - mohs defect  Squamous cell carcinoma     Musculoskeletal/Pain/Headache  Closed compression fracture L1  Closed compression fracture thoracic spine  Left hip pain     Lumbar spine MRI 4/25/2022  Findings: Exaggerated lumbar lordosis. Presumed 5 lumbar-type  vertebra. Chronic wedge-shaped deformity and vertebral body height  loss at T12 without any bone marrow abnormality. Stepwise trace  retrolisthesis is at L1-2 and L2-3 better seen on prior radiographs.  Multilevel disc height loss and disc degeneration.   The tip of the  conus medullaris is at  L1.  No pars defect identified. Bone marrow  signal intensity is within normal limits and no abnormal signal is  visible.      On a level by level basis:     T12-L1: Disc bulge without spinal canal or foraminal stenosis.     L1-2: No significant spinal canal or foraminal narrowing.     L2-3: Mild facet hypertrophy and associated mild neural foraminal  narrowing without significant spinal canal narrowing.     L3-4: Disc bulge and facet hypertrophy. Bilateral mild neural  foraminal stenosis without spinal canal narrowing.     L4-5: No significant spinal canal or foraminal narrowing.     L5-S1: No significant spinal canal or foraminal narrowing.     Partially visualized presumed benign cyst in the liver segment 6  measuring 1 cm in diameter.                                                                    Impression: Degenerative changes as described above. No high-grade  spinal canal or neural foraminal stenosis at any level.     Other:  Invasive ductal carcinoma right breast 2016      Treating insomnia can  "be difficult because the medications we use can be harmful, especially in older adults. In addition, sleep medications do not tend to be very effective and should only be used short-term. Cognitive behavioral therapy (CBT) is the most effective treatment for long-term insomnia. The goal of CBT for insomnia is to address the underlying causes of the sleep problems, including your habits and how you think about sleep. You can do CBT with a trained psychologist, or from the comfort of your home by following an online program or workbook. Here are some great resources for at-home CBT:     1) 6-week online CBT course through [facility] for $40: Echo it/sleep  2) \"Say Noel to Insomnia\" by Kimo Noel, PhD at [employer]: 6-week program  3) \"Overcoming Insomnia: A Cognitive-Behavioral Therapy Approach Workbook\" by Margarito Hurtado and Yohana Roberts  4) \"Quiet Your Mind and Get to Sleep: Solutions to Insomnia for Those with Depression, Anxiety or Chronic Pain ([place] [employer] Self-Help Workbook) by Yohana Roberts and Ester Leon     Urinary issues - would recommend a consultation     Cognitive changes  Neuropsychological evaluation was ordered --- this      Still on tamoxifen - 7 years out but was told may need to take it 10 years based on the type of cancer.      Has hearing loss and will get a hearing aides     Has visual changes and may have early cataracts.       [employer] DBS Education Video  Deep brain stimulation (DBS)      If the above link does not work, cut and paste this address into your browser     [email or web address]     Pharmacy (MTM) consultation and medication management     Continue on the beta blocker for now -has some light headedness; bp and heart rate are 110/69 and heart rate 64     Discussed risks of primidone/mysoline which can affect memory and balance     Discussed topiramate (topamax) " which can help tremor but carry risk of renal stones     Discussed cervical dystonia/dystonic head tremor - has a bit of tilt and rotation of the head  Consider botox/botulinum toxin injections  Dr Radha Hubbard     mychart ksenia to be installed on her phone.     Return 6-12 months     95/61 and pulse is 65  Taking propranolol inderal LA 60mg 24hr     Taking fluid 32 oz daily  On topiramate topamax 50mg     10/3/2023 Dr. Demarco evaluation  Encouraged her to review test results with her after testing     In the context of estimated high average premorbid abilities, Ms. Johnson s current neurocognitive profile revealed performance within expectation across all cognitive domains assessed. Subtle weaknesses (i.e., low average scores) were noted on tests of immediate auditory attention and semantic verbal fluency. Cognitive strengths were noted on tests of abstract reasoning and visual memory. Performance was within expectation across other cognitive tests including working memory, speeded processing, naming, letter-cue verbal fluency, visuospatial processing, verbal learning and memory, cognitive inhibition, planning/organization, and mental flexibility. Assessment of current psychological and emotional status revealed mild symptoms of anxiety and the absence of clinically significant depressive mood symptoms.     Overall, Ms. Johnson is functioning quite well from a neuropsychological standpoint and does not evidence any objective cognitive deficits. The very subtle weakness in attention and her subjective cognitive complaints are likely attributable to cognitive lapses in the context of anxiety, stress, and fatigue.       Stress level is up      Encouraged her to spread her calcium to twice daily     Will be going to [place] and will be in [facility] for the winter and return mid April 2024     Rx sent to [facility] for her propranolol and topiramate  Watched the video and learned about mood      Will be ending her reclast  treatments at some point      Discussed DBS and botox - she will live with her symptoms as is with her present medication regimen which was refilled.      Next summer Ace, Oleg and Radha      Discussed antiamyloid treatments.      Susu blankenship  [place] and had an intense headache and flashing in her eyes and headache lasted like a migraine. Lasted 3 hours - started getting headaches and gradually resolved.   Level 2 pain every other day or third day  Last Monday eyes were fine at doctors office.     Will get a MRI     Brain mRI 5/6/2024  1.  No acute intracranial process.  2.  Generalized brain atrophy and presumed microvascular ischemic changes as detailed above.  3.  No intracranial mass, mass effect or acute/chronic hemorrhage.  4.  No pathologic enhancement.  5.  No significant change overall from 10/9/2019 MRI brain.    Her lorazepam was stopped as she was not taking it but actually needs it for her MRI scan so a Rx was sent.   [facility] Rx sent in [place].      Shantel Fenton is her PCP and an MRI will be ordered and scheduled for the symptoms.      Has incontinence - getting up every 2 hours  Had a urinalysis and not sure if there is a infection or contaminant.   Urology appointment may 2024  May get therapy   Frances Cleveland PA-C      Consider getting a sed rate or/and CRP with the headache history.   No eye issues  Wearing mouth guard  Family history of pituitary gland tumor  Pending mri to be scheduled  Neurology headache - referral if needed  Maria Rdz or Aashish      Saw oncologist on Friday and is taking tamoxifen and will remain on it for 18 months.      She is on reclast and will have a decision from rheumatology about if she will get another dose.   Her bowel  habits are better with the change in her calcium     Remains on simvastatin     Tremors are about the same and tolerable.   Praclementina and Kat are studying a couple of medications for tremor.       [email or web address]  [email or web address]     Cognitive testing was good. 10/3/2023      Medications               Acetaminophen tylenol 500mg 2         Aspirin 81mg prn         Calc Carb-cholecalciferol 1000-800  2         Cholecalciferol Vit D3 50mcg 2000 units 1         Cyanocobalamin Vit B12 1000mcg qod         Polyethylene glycol miralax qod         Famotidine pepcid 20mg off         Propranolol inderal LA 60mg 24hr       1   Simvastatin zocor 20mg        1   Tamoxifen nolvadex 20mg  1         Topiramate topamax 50mg       1   Zoledronic acid reclast year                        Plan:    Tremors are increasing -   Discussed [facility] [employer].     Her tremors are manageable  She is on topiramate/topamax 50mg  Propranolol inderal LA 60mg 24h  Refilled both of these medications    Return one year.     Discussed dbs workup as well as other options such as focused/ultrasound   She may be considering surgery spring/summer 2026 due to travel.     Travels   Wed is off to [place] and will be traveling this summer  Will go to [place] after going to [place]  Winter 2025/2026 [place] - may come back feb/april 2026  Goes to [place] middle part of October   Consider     10/3/2023  IDENTIFYING INFORMATION AND REASON FOR REFERRAL   Ms. Johnson is a 67-year-old, right-handed, White female with a history including essential tremor and breast cancer. She was referred for neuropsychological testing by her neurologist in the context of patient s concern about her memory. This evaluation was requested to provide a comprehensive assessment of her current neuropsychological status to inform treatment planning.      SUMMARY & IMPRESSION  See below for relevant background information and detailed test results. See separate abstract for supporting documentation including a list of neuropsychological measures and test scores.     In the  context of estimated high average premorbid abilities, Ms. Johnson s current neurocognitive profile revealed performance within expectation across all cognitive domains assessed. Subtle weaknesses (i.e., low average scores) were noted on tests of immediate auditory attention and semantic verbal fluency. Cognitive strengths were noted on tests of abstract reasoning and visual memory. Performance was within expectation across other cognitive tests including working memory, speeded processing, naming, letter-cue verbal fluency, visuospatial processing, verbal learning and memory, cognitive inhibition, planning/organization, and mental flexibility. Assessment of current psychological and emotional status revealed mild symptoms of anxiety and the absence of clinically significant depressive mood symptoms.     Overall, Ms. Johnson is functioning quite well from a neuropsychological standpoint and does not evidence any objective cognitive deficits. The very subtle weakness in attention and her subjective cognitive complaints are likely attributable to cognitive lapses in the context of anxiety, stress, and fatigue.     Medical Decision Making:  #  Chronic progressive medical conditions addressed  tremor  Review and/or interpretation of unique test or documentation from a provider outside of neurology no   Independent historian provided additional details  no   Prescription drug management and review of potential side effects and/or monitoring for side effects  yes   Health impacted by social determinants of health  no    I have reviewed the note as documented above.  This accurately captures the substance of my conversation with the patient and total time spent preparing for visit, executing visit and completing visit on the day of the visit:  30 minutes.     The longitudinal plan of care for Seema Johnson was addressed during this visit. Due to the added complexity in care, I will continue to support Seemabryant Johnson  "in the subsequent management of this condition(s) and with the ongoing continuity of care of this condition(s).    Pawel Salinas MD      ------------------------------------------------------------------------------------------------------------------------------------------------------------------------    Video-Visit Details    The patient has been notified of following:     \"After a review of the patient s situation, this visit was changed from an in-person visit to a video visit to reduce the risk of COVID-19 exposure.   The patient is being evaluated via a billable video visit.\"    \"This video visit will be conducted via a call between you and your physician/provider. We have found that certain health care needs can be provided without the need for an in-person physical exam.  This service lets us provide the care you need with a video conversation.  If a prescription is necessary we can send it directly to your pharmacy.  If lab work is needed we can place an order for that and you can then stop by our lab to have the test done at a later time.    If during the course of the call the physician/provider feels a video visit is not appropriate, you will not be charged for this service.\"     Patient has given verbal consent for Video visit? Yes    Patient would like the video invitation sent by:     Type of service:  Video Visit    Video Start Time:     Video End Time (time video stopped):     Duration:  minutes - see above    Originating Location (pt. Location):     Distant Location (provider location):  [facility] NEUROLOGY [facility]     Mode of Communication:  Video Conference via [employer]asure (and if not possible then doximity)      Pawel Salinas MD      --------------------------------------------------------------------------------------------------------------    Seema Garcia Alex is a 69 year old female who is being evaluated via a billable video visit.      Charts reviewed  Consult from  Images " reviewed        I have reviewed and updated the patient's Past Medical History, Social History, Family History and Medication List.    ALLERGIES  Codeine sulfate, Compazine [prochlorperazine], and Primidone    Lasts visit details if there was a last visit:       14 Review of systems  are negative except for   Patient Active Problem List   Diagnosis    Mohs defect of cheek    Chronic bilateral thoracic back pain    Closed compression fracture of thoracic vertebra (H)    Closed compression fracture of first lumbar vertebra (H)    Osteoporosis    Invasive ductal carcinoma of breast, female (H)- RIGHT breast, Upper outer quadrant- 2015    Benign essential tremor    Hyperlipidemia LDL goal <100    Vertigo    Lateral pain of left hip    Estrogen receptor positive neoplasm    Family history of breast cancer    Malignant neoplasm of upper-outer quadrant of female breast (H)        Allergies   Allergen Reactions    Codeine Sulfate Nausea    Compazine [Prochlorperazine] Other (See Comments)     Jittery and hyper and foggy    Primidone      vomiting, chills & hot flashes, headache, sensitivity to light & noise     Past Surgical History:   Procedure Laterality Date    BIOPSY BREAST      BREAST SURGERY Right 01/2016    breast cancer    LUMPECTOMY BREAST       Past Medical History:   Diagnosis Date    Basal cell cancer     Breast cancer (H)     Dyslipidemia     History of blood transfusion     Hypertension     Mohs defect of nose 06/2012     Social History     Socioeconomic History    Marital status:      Spouse name: Not on file    Number of children: Not on file    Years of education: Not on file    Highest education level: Not on file   Occupational History    Not on file   Tobacco Use    Smoking status: Never    Smokeless tobacco: Never   Vaping Use    Vaping status: Never Used   Substance and Sexual Activity    Alcohol use: Yes     Comment: socially    Drug use: No    Sexual activity: Yes     Partners: Male   Other  Topics Concern    Parent/sibling w/ CABG, MI or angioplasty before 65F 55M? No   Social History Narrative    Not on file     Social Drivers of Health     Financial Resource Strain: Low Risk  (4/16/2024)    Financial Resource Strain     Within the past 12 months, have you or your family members you live with been unable to get utilities (heat, electricity) when it was really needed?: No   Food Insecurity: Low Risk  (4/16/2024)    Food Insecurity     Within the past 12 months, did you worry that your food would run out before you got money to buy more?: No     Within the past 12 months, did the food you bought just not last and you didn t have money to get more?: No   Transportation Needs: Low Risk  (4/16/2024)    Transportation Needs     Within the past 12 months, has lack of transportation kept you from medical appointments, getting your medicines, non-medical meetings or appointments, work, or from getting things that you need?: No   Physical Activity: Sufficiently Active (4/16/2024)    Exercise Vital Sign     Days of Exercise per Week: 5 days     Minutes of Exercise per Session: 60 min   Stress: No Stress Concern Present (4/16/2024)    [place] [employer] [place] [employer] - Occupational Stress Questionnaire     Feeling of Stress : Only a little   Social Connections: Unknown (4/16/2024)    Social Connection and Isolation Panel [NHANES]     Frequency of Communication with Friends and Family: Not on file     Frequency of Social Gatherings with Friends and Family: Twice a week     Attends Episcopal Services: Not on file     Active Member of Clubs or Organizations: Not on file     Attends Club or Organization Meetings: Not on file     Marital Status: Not on file   Interpersonal Safety: Low Risk  (10/1/2024)    Interpersonal Safety     Do you feel physically and emotionally safe where you currently live?: Yes     Within the past 12 months, have you been hit, slapped, kicked or otherwise physically hurt by someone?:  No     Within the past 12 months, have you been humiliated or emotionally abused in other ways by your partner or ex-partner?: No   Housing Stability: Low Risk  (4/16/2024)    Housing Stability     Do you have housing? : Yes     Are you worried about losing your housing?: No     Family History   Problem Relation Age of Onset    Macular Degeneration Mother     Cerebrovascular Disease Mother     Hypertension Mother     Thyroid Disease Mother     Other - See Comments Mother         ?tremor    Tremor Mother         hand tremor    Cerebrovascular Disease Father     Hypothyroidism Father     Diabetes Maternal Grandmother     Coronary Artery Disease Maternal Grandmother     Tremor Maternal Grandmother     Colon Cancer Paternal Grandmother     Other - See Comments Brother     Other - See Comments Brother     Other - See Comments Brother     Other - See Comments Brother     Breast Cancer Sister     Other Cancer Sister     Anesthesia Reaction Sister     Diabetes Sister     Anesthesia Reaction Sister     Diabetes Son     Other - See Comments Son         lives in [place]    Diabetes Sister     Coronary Artery Disease No family hx of     Hyperlipidemia No family hx of     Colon Cancer No family hx of     Prostate Cancer No family hx of     Depression No family hx of     Substance Abuse No family hx of     Obesity No family hx of     Osteoporosis No family hx of     Asthma No family hx of     Mental Illness No family hx of     Anxiety Disorder No family hx of     Other Cancer No family hx of      Current Outpatient Medications   Medication Sig Dispense Refill    acetaminophen (TYLENOL) 500 MG tablet Take 1,000 mg by mouth every morning 2 tablet 0    aspirin (ASA) 81 MG EC tablet Take 81 mg by mouth daily as needed (prevent clots when flying) (Patient not taking: Reported on 10/9/2024)      Calcium Carbonate-Vit D-Min (CALTRATE MINIS PLUS MINERALS) 300-800 MG-UNIT TABS Take 1 tablet by mouth 2 times daily      Cholecalciferol  (VITAMIN D3) 50 MCG (2000 UT) CAPS Take 2,000 Units by mouth daily      cyanocobalamin (VITAMIN B-12) 1000 MCG tablet Take 1 tablet (1,000 mcg) by mouth every other day      polyethylene glycol (MIRALAX) 17 GM/Dose powder Take 1 capful. by mouth every other day      propranolol ER (INDERAL LA) 60 MG 24 hr capsule TAKE ONE CAPSULE BY MOUTH AT BEDTIME 180 capsule 0    simvastatin (ZOCOR) 20 MG tablet TAKE ONE TABLET BY MOUTH AT BEDTIME 90 tablet 0    tamoxifen (NOLVADEX) 20 MG tablet Take 1 tablet (20 mg) by mouth daily 90 tablet 3    topiramate (TOPAMAX) 50 MG tablet Take 1 tablet (50 mg) by mouth at bedtime 90 tablet 3    zoledronic Acid (RECLAST) 5 MG/100ML SOLN infusion 5 mg/100 mL once a year       Seema is a 69 year old who is being evaluated via a billable video visit.    How would you like to obtain your AVS? MyChart  If the video visit is dropped, the invitation should be resent by: Send to e-mail at: [email or web address]  Will anyone else be joining your video visit? No    Video-Visit Details    Type of service:  Video Visit   Video End Time:  Originating Location (pt. Location):     Distant Location (provider location):    Platform used for Video Visit:   MIGDALIA Mccray, EVA ([facility])      Again, thank you for allowing me to participate in the care of your patient.        Sincerely,      Pawel Salnias MD    Electronically signed

## 2025-04-28 NOTE — PATIENT INSTRUCTIONS
Medications               Acetaminophen tylenol 500mg 2         Aspirin 81mg prn         Calc Carb-cholecalciferol 1000-800  2         Cholecalciferol Vit D3 50mcg 2000 units 1         Cyanocobalamin Vit B12 1000mcg qod         Polyethylene glycol miralax qod         Famotidine pepcid 20mg off         Propranolol inderal LA 60mg 24hr       1   Simvastatin zocor 20mg        1   Tamoxifen nolvadex 20mg  1         Topiramate topamax 50mg       1   Zoledronic acid reclast year                        Plan:    Tremors are increasing -   Discussed [employer].     Her tremors are manageable  She is on topiramate/topamax 50mg  Propranolol inderal LA 60mg 24h  Refilled both of these medications    Return one year.     Discussed dbs workup as well as other options such as focused/ultrasound   She may be considering surgery spring/summer 2026 due to travel.     Travels   Wed is off to [place] and will be traveling this summer  Will go to [place] after going to [place]  Winter 2025/2026 [place] - may come back feb/april 2026  Goes to [place] middle part of October   Consider     10/3/2023  IDENTIFYING INFORMATION AND REASON FOR REFERRAL   Ms. Johnson is a 67-year-old, right-handed, White female with a history including essential tremor and breast cancer. She was referred for neuropsychological testing by her neurologist in the context of patient s concern about her memory. This evaluation was requested to provide a comprehensive assessment of her current neuropsychological status to inform treatment planning.      SUMMARY & IMPRESSION  See below for relevant background information and detailed test results. See separate abstract for supporting documentation including a list of neuropsychological measures and test scores.     In the context of estimated high average premorbid abilities, Ms. Johnson s current neurocognitive profile revealed performance within expectation across all cognitive domains assessed. Subtle  weaknesses (i.e., low average scores) were noted on tests of immediate auditory attention and semantic verbal fluency. Cognitive strengths were noted on tests of abstract reasoning and visual memory. Performance was within expectation across other cognitive tests including working memory, speeded processing, naming, letter-cue verbal fluency, visuospatial processing, verbal learning and memory, cognitive inhibition, planning/organization, and mental flexibility. Assessment of current psychological and emotional status revealed mild symptoms of anxiety and the absence of clinically significant depressive mood symptoms.     Overall, Ms. Johnson is functioning quite well from a neuropsychological standpoint and does not evidence any objective cognitive deficits. The very subtle weakness in attention and her subjective cognitive complaints are likely attributable to cognitive lapses in the context of anxiety, stress, and fatigue.

## 2025-04-30 ENCOUNTER — OFFICE VISIT (OUTPATIENT)
Dept: FAMILY MEDICINE | Facility: CLINIC | Age: 70
End: 2025-04-30
Attending: PHYSICIAN ASSISTANT
Payer: COMMERCIAL

## 2025-04-30 VITALS
TEMPERATURE: 97.5 F | WEIGHT: 121.6 LBS | DIASTOLIC BLOOD PRESSURE: 74 MMHG | HEART RATE: 60 BPM | RESPIRATION RATE: 16 BRPM | OXYGEN SATURATION: 100 % | HEIGHT: 64 IN | SYSTOLIC BLOOD PRESSURE: 110 MMHG | BODY MASS INDEX: 20.76 KG/M2

## 2025-04-30 DIAGNOSIS — R13.12 OROPHARYNGEAL DYSPHAGIA: ICD-10-CM

## 2025-04-30 DIAGNOSIS — M81.0 OSTEOPOROSIS, UNSPECIFIED OSTEOPOROSIS TYPE, UNSPECIFIED PATHOLOGICAL FRACTURE PRESENCE: ICD-10-CM

## 2025-04-30 DIAGNOSIS — K59.01 SLOW TRANSIT CONSTIPATION: ICD-10-CM

## 2025-04-30 DIAGNOSIS — G25.0 BENIGN ESSENTIAL TREMOR: ICD-10-CM

## 2025-04-30 DIAGNOSIS — N39.46 MIXED STRESS AND URGE URINARY INCONTINENCE: ICD-10-CM

## 2025-04-30 DIAGNOSIS — E78.5 HYPERLIPIDEMIA LDL GOAL <100: ICD-10-CM

## 2025-04-30 DIAGNOSIS — Z00.00 ENCOUNTER FOR MEDICARE ANNUAL WELLNESS EXAM: Primary | ICD-10-CM

## 2025-04-30 DIAGNOSIS — C50.911 INFILTRATING DUCTAL CARCINOMA OF RIGHT FEMALE BREAST (H): ICD-10-CM

## 2025-04-30 DIAGNOSIS — R19.8 CHANGE IN BOWEL MOVEMENT: ICD-10-CM

## 2025-04-30 LAB
ALBUMIN SERPL BCG-MCNC: 4.3 G/DL (ref 3.5–5.2)
ALP SERPL-CCNC: 28 U/L (ref 40–150)
ALT SERPL W P-5'-P-CCNC: 8 U/L (ref 0–50)
ANION GAP SERPL CALCULATED.3IONS-SCNC: 9 MMOL/L (ref 7–15)
AST SERPL W P-5'-P-CCNC: 23 U/L (ref 0–45)
BASOPHILS # BLD AUTO: 0 10E3/UL (ref 0–0.2)
BASOPHILS NFR BLD AUTO: 1 %
BILIRUB SERPL-MCNC: 0.4 MG/DL
BUN SERPL-MCNC: 15 MG/DL (ref 8–23)
CALCIUM SERPL-MCNC: 9.2 MG/DL (ref 8.8–10.4)
CHLORIDE SERPL-SCNC: 108 MMOL/L (ref 98–107)
CHOLEST SERPL-MCNC: 155 MG/DL
CREAT SERPL-MCNC: 0.84 MG/DL (ref 0.51–0.95)
EGFRCR SERPLBLD CKD-EPI 2021: 74 ML/MIN/1.73M2
EOSINOPHIL # BLD AUTO: 0.1 10E3/UL (ref 0–0.7)
EOSINOPHIL NFR BLD AUTO: 3 %
ERYTHROCYTE [DISTWIDTH] IN BLOOD BY AUTOMATED COUNT: 12.6 % (ref 10–15)
FASTING STATUS PATIENT QL REPORTED: YES
GLUCOSE SERPL-MCNC: 91 MG/DL (ref 70–99)
HCO3 SERPL-SCNC: 21 MMOL/L (ref 22–29)
HCT VFR BLD AUTO: 34.7 % (ref 35–47)
HDLC SERPL-MCNC: 74 MG/DL
HGB BLD-MCNC: 11.6 G/DL (ref 11.7–15.7)
IMM GRANULOCYTES # BLD: 0 10E3/UL
IMM GRANULOCYTES NFR BLD: 0 %
LDLC SERPL CALC-MCNC: 68 MG/DL
LYMPHOCYTES # BLD AUTO: 1.5 10E3/UL (ref 0.8–5.3)
LYMPHOCYTES NFR BLD AUTO: 35 %
MCH RBC QN AUTO: 30.5 PG (ref 26.5–33)
MCHC RBC AUTO-ENTMCNC: 33.4 G/DL (ref 31.5–36.5)
MCV RBC AUTO: 91 FL (ref 78–100)
MONOCYTES # BLD AUTO: 0.5 10E3/UL (ref 0–1.3)
MONOCYTES NFR BLD AUTO: 11 %
NEUTROPHILS # BLD AUTO: 2.1 10E3/UL (ref 1.6–8.3)
NEUTROPHILS NFR BLD AUTO: 50 %
NONHDLC SERPL-MCNC: 81 MG/DL
PLATELET # BLD AUTO: 205 10E3/UL (ref 150–450)
POTASSIUM SERPL-SCNC: 4.2 MMOL/L (ref 3.4–5.3)
PROT SERPL-MCNC: 6.4 G/DL (ref 6.4–8.3)
RBC # BLD AUTO: 3.8 10E6/UL (ref 3.8–5.2)
SODIUM SERPL-SCNC: 138 MMOL/L (ref 135–145)
TRIGL SERPL-MCNC: 66 MG/DL
TSH SERPL DL<=0.005 MIU/L-ACNC: 1.7 UIU/ML (ref 0.3–4.2)
VIT B12 SERPL-MCNC: 1255 PG/ML (ref 232–1245)
WBC # BLD AUTO: 4.2 10E3/UL (ref 4–11)

## 2025-04-30 PROCEDURE — 80053 COMPREHEN METABOLIC PANEL: CPT | Performed by: PHYSICIAN ASSISTANT

## 2025-04-30 PROCEDURE — 3078F DIAST BP <80 MM HG: CPT | Performed by: PHYSICIAN ASSISTANT

## 2025-04-30 PROCEDURE — 82607 VITAMIN B-12: CPT | Performed by: PHYSICIAN ASSISTANT

## 2025-04-30 PROCEDURE — 1126F AMNT PAIN NOTED NONE PRSNT: CPT | Performed by: PHYSICIAN ASSISTANT

## 2025-04-30 PROCEDURE — 36415 COLL VENOUS BLD VENIPUNCTURE: CPT | Performed by: PHYSICIAN ASSISTANT

## 2025-04-30 PROCEDURE — 99214 OFFICE O/P EST MOD 30 MIN: CPT | Mod: 25 | Performed by: PHYSICIAN ASSISTANT

## 2025-04-30 PROCEDURE — 3074F SYST BP LT 130 MM HG: CPT | Performed by: PHYSICIAN ASSISTANT

## 2025-04-30 PROCEDURE — G2211 COMPLEX E/M VISIT ADD ON: HCPCS | Performed by: PHYSICIAN ASSISTANT

## 2025-04-30 PROCEDURE — 85025 COMPLETE CBC W/AUTO DIFF WBC: CPT | Performed by: PHYSICIAN ASSISTANT

## 2025-04-30 PROCEDURE — 84443 ASSAY THYROID STIM HORMONE: CPT | Performed by: PHYSICIAN ASSISTANT

## 2025-04-30 PROCEDURE — 80061 LIPID PANEL: CPT | Performed by: PHYSICIAN ASSISTANT

## 2025-04-30 PROCEDURE — G0439 PPPS, SUBSEQ VISIT: HCPCS | Performed by: PHYSICIAN ASSISTANT

## 2025-04-30 ASSESSMENT — PAIN SCALES - GENERAL: PAINLEVEL_OUTOF10: NO PAIN (0)

## 2025-04-30 NOTE — PROGRESS NOTES
Preventive Care Visit  [facility] EVANS Plaza PA-C, Family Medicine  Apr 30, 2025      Assessment & Plan     Encounter for Medicare annual wellness exam  Reviewed VS, weight/BMI   Routine screenings see orders  Immunizations: see orders and documentation in HPI. Discussed vaccines coverage as pharmacy benefit.  Health and cancer screening recommendations delivered according to the USPSTF and other appropriate society guidelines  Nutrition and exercise counseling performed.  Vaccines UTD    Hyperlipidemia LDL goal <100  Well controlled on statin. Tolerating statin well. No side effects. Update labs , refilled x 1 yr.   Counseled on heart healthy eating and regular exercise  - Lipid panel reflex to direct LDL Fasting; Future  - Lipid panel reflex to direct LDL Fasting  - simvastatin (ZOCOR) 20 MG tablet; Take 1 tablet (20 mg) by mouth daily.  Dispense: 90 tablet; Refill: 3    Change in bowel movement  Slow transit constipation  She is back on track with her bowel movements. She feels last fall she just was not fully cleaned out and once that happened was able to get to her normal pattern. Stools regular, soft, normal caliber, no bleeding. She does not think she needs a colonoscopy. Discussed warning s/sx for follow up    Infiltrating ductal carcinoma of right female breast (H)  Established and following with  oncology   - CBC with Platelets & Differential  - Comprehensive metabolic panel (BMP + Alb, Alk Phos, ALT, AST, Total. Bili, TP)    Osteoporosis, unspecified osteoporosis type, unspecified pathological fracture presence  Following with  rheumatology     Benign essential tremor  Following with .     Mixed stress and urge urinary incontinence  Had improvement previously with pelvic floor PT. She would like referral back to PT. Discussed medications and possible side effects. She is not interested in medications for sx at this time.   - Physical Therapy   Referral; Future    Oropharyngeal dysphagia  Difficulty initiating swallow and transferring from mouth to back of oropharynx. Advise she reach out to her neurologist. Ordered swallow study. Labs below.  - TSH with free T4 reflex; Future  - Vitamin B12; Future  - Speech Therapy  Referral; Future  - TSH with free T4 reflex  - Vitamin B12      Patient has been advised of split billing requirements and indicates understanding: Yes        Counseling  Appropriate preventive services were addressed with this patient via screening, questionnaire, or discussion as appropriate for fall prevention, nutrition, physical activity, Tobacco-use cessation, social engagement, weight loss and cognition.  Checklist reviewing preventive services available has been given to the patient.  Reviewed patient's diet, addressing concerns and/or questions.   She is at risk for psychosocial distress and has been provided with information to reduce risk.   Discussed possible causes of fatigue. The patient was provided with written information regarding signs of hearing loss.   Information on urinary incontinence and treatment options given to patient.       Follow Up: see above. Additionally patient was instructed to contact clinic for worsening symptoms, non-improvement in time frame discussed, and for questions regarding treatment plan.   For virtual visits, the patient was advised to be seen for in person evaluation if symptoms or condition are worsening or non-improvement as expected.   RESHMA Berg   Seema is a 69 year old, presenting for the following:  Wellness Visit        4/30/2025     9:25 AM   Additional Questions   Roomed by BT   Accompanied by            HPI  Routine Health Maintenance:  Immunizations - had covid vaccine in [place] . UTD   Mammogram: 04/2025  Colonoscopy: - repeat in 10 yr   Fasting: yes    GYN Hx: menopause. No bleeding.    Leaving today for [place]  "with friends. Then leaving to [facility] in July then onto the [place] then to [facility].     Last fall was struggling with constipation and bowel changes. Did a colon clean out and then a final dulcolax seemed to get me really cleaned out. Then bowels went back to normal and actually better than normal. I have been really careful with hydration and diet. Miralax every other day (long standing pattern). \"Great BMs. Regular, soft,tooth paste.\" I cut back my calcium because it makes the constipation worse. I am taking 600mg once daily and then increasing dietary calcium to offset. Milk, cottage cheese, greek yogurt.   No bleeding.   Caliber of stools no longer skinny. Normal.   Weight stable.     In [place] for 4 months all winter-  walking daily 4-5miles. Feeling good. No chest pain, SPENCE, lightheadedness/presyncope.    Sensation of difficulty initiating swallowing- trouble getting my pills from my outh into the  back of my throat. Liquids an issue also. Holding my coffee in my mouth and have to concentrate to swallow. No issues with food. No pain. No gerd sx. No cough. No lumps/bumps/masses head/neck.     History of breast cancer. Following with  oncology. On tamoxifen. Just saw her last week.     Osteoporosis- reclast infusions with  at rheumatology. I am at the end- I can't have anymore.     Tremor seeing neurology. Just had virtual visit last week. Progressing. Considering the stimulator. I don't want more drugs.      Hyperlipidemia- taking simvastatin. Tolerating without side effects. Denies myalgias or concerns.     No more of the visual aura and migraines.     Urinary incontinence- urge incontinence. No pelvic pain. No UTI sx. I stopped my kegels and I don't know why. They worked so well. I saw urology in 2023. Up at night 4x a night to go. Saw pelvic floor PT April 2024 and it really helped.       Advance Care Planning    Discussed advance care planning with patient; " informed AVS has link to [facility].        4/25/2025   General Health   How would you rate your overall physical health? Good   Feel stress (tense, anxious, or unable to sleep) Rather much   (!) STRESS CONCERN      4/25/2025   Nutrition   Diet: Regular (no restrictions)         4/25/2025   Exercise   Days per week of moderate/strenous exercise 6 days   Average minutes spent exercising at this level 60 min         4/25/2025   Social Factors   Frequency of gathering with friends or relatives Three times a week   Worry food won't last until get money to buy more No   Food not last or not have enough money for food? No   Do you have housing? (Housing is defined as stable permanent housing and does not include staying outside in a car, in a tent, in an abandoned building, in an overnight shelter, or couch-surfing.) Yes   Are you worried about losing your housing? No   Lack of transportation? No   Unable to get utilities (heat,electricity)? No         4/25/2025   Fall Risk   Fallen 2 or more times in the past year? No    No   Trouble with walking or balance? No    No       Multiple values from one day are sorted in reverse-chronological order          4/25/2025   Activities of Daily Living- Home Safety   Needs help with the following daily activites None of the above   Safety concerns in the home None of the above         4/25/2025   Dental   Dentist two times every year? Yes         4/25/2025   Hearing Screening   Hearing concerns? (!) I FEEL THAT PEOPLE ARE MUMBLING OR NOT SPEAKING CLEARLY.    (!) I NEED TO ASK PEOPLE TO SPEAK UP OR REPEAT THEMSELVES.    (!) IT'S HARD TO FOLLOW A CONVERSATION IN A NOISY RESTAURANT OR CROWDED ROOM.    (!) TROUBLE UNDESTANDING A SPEAKER IN A PUBLIC MEETING OR [facility].    (!) TROUBLE FOLLOWING DIALOGUE IN THE THEATHER.    (!) TROUBLE UNDERSTANDING SOFT OR WHISPERED SPEECH.       Multiple values from one day are sorted in reverse-chronological order         4/25/2025    Driving Risk Screening   Patient/family members have concerns about driving No         4/25/2025   General Alertness/Fatigue Screening   Have you been more tired than usual lately? (!) YES         4/25/2025   Urinary Incontinence Screening   Bothered by leaking urine in past 6 months Yes         Today's PHQ-2 Score:       4/29/2025    12:55 PM   PHQ-2 ( 1999 [employer])   Q1: Little interest or pleasure in doing things 0   Q2: Feeling down, depressed or hopeless 0   PHQ-2 Score 0    Q1: Little interest or pleasure in doing things Not at all   Q2: Feeling down, depressed or hopeless Not at all   PHQ-2 Score 0       Patient-reported           4/25/2025   Substance Use   Alcohol more than 3/day or more than 7/wk No   Do you have a current opioid prescription? No   How severe/bad is pain from 1 to 10? 3/10   Do you use any other substances recreationally? No     Social History     Tobacco Use    Smoking status: Never    Smokeless tobacco: Never   Vaping Use    Vaping status: Never Used   Substance Use Topics    Alcohol use: Yes     Comment: socially    Drug use: No           4/22/2025   LAST FHS-7 RESULTS   1st degree relative breast or ovarian cancer Yes   Any relative bilateral breast cancer No   Any male have breast cancer No   Any ONE woman have BOTH breast AND ovarian cancer No   Any woman with breast cancer before 50yrs No   2 or more relatives with breast AND/OR ovarian cancer No   2 or more relatives with breast AND/OR bowel cancer Yes            ASCVD Risk   The 10-year ASCVD risk score ([place] GURROLA, et al., 2019) is: 7.1%    Values used to calculate the score:      Age: 69 years      Sex: Female      Is Non- : No      Diabetic: No      Tobacco smoker: No      Systolic Blood Pressure: 110 mmHg      Is BP treated: No      HDL Cholesterol: 76 mg/dL      Total Cholesterol: 153 mg/dL            Reviewed and updated as needed this visit by Provider                    Past Medical  History:   Diagnosis Date    Basal cell cancer     Breast cancer (H)     Dyslipidemia     History of blood transfusion     Hypertension     Mohs defect of nose 06/2012     Past Surgical History:   Procedure Laterality Date    BIOPSY BREAST      BREAST SURGERY Right 01/2016    breast cancer    LUMPECTOMY BREAST       Lab work is in process  Labs reviewed in EPIC  BP Readings from Last 3 Encounters:   04/30/25 110/74   10/09/24 92/62   10/01/24 106/69    Wt Readings from Last 3 Encounters:   04/30/25 55.2 kg (121 lb 9.6 oz)   10/09/24 53.3 kg (117 lb 6.4 oz)   10/01/24 54.2 kg (119 lb 6.4 oz)                  Patient Active Problem List   Diagnosis    Mohs defect of cheek    Chronic bilateral thoracic back pain    Closed compression fracture of thoracic vertebra (H)    Closed compression fracture of first lumbar vertebra (H)    Osteoporosis    Invasive ductal carcinoma of breast, female (H)- RIGHT breast, Upper outer quadrant- 2015    Benign essential tremor    Hyperlipidemia LDL goal <100    Vertigo    Lateral pain of left hip    Estrogen receptor positive neoplasm    Family history of breast cancer    Malignant neoplasm of upper-outer quadrant of female breast (H)     Past Surgical History:   Procedure Laterality Date    BIOPSY BREAST      BREAST SURGERY Right 01/2016    breast cancer    LUMPECTOMY BREAST         Social History     Tobacco Use    Smoking status: Never    Smokeless tobacco: Never   Substance Use Topics    Alcohol use: Yes     Comment: socially     Family History   Problem Relation Age of Onset    Macular Degeneration Mother     Cerebrovascular Disease Mother     Hypertension Mother     Thyroid Disease Mother     Other - See Comments Mother         ?tremor    Tremor Mother         hand tremor    Cerebrovascular Disease Father     Hypothyroidism Father     Diabetes Maternal Grandmother     Coronary Artery Disease Maternal Grandmother     Tremor Maternal Grandmother     Colon Cancer Paternal Grandmother      Other - See Comments Brother     Other - See Comments Brother     Other - See Comments Brother     Other - See Comments Brother     Breast Cancer Sister     Other Cancer Sister     Anesthesia Reaction Sister     Diabetes Sister     Anesthesia Reaction Sister     Diabetes Son     Other - See Comments Son         lives in [place]    Diabetes Sister     Coronary Artery Disease No family hx of     Hyperlipidemia No family hx of     Colon Cancer No family hx of     Prostate Cancer No family hx of     Depression No family hx of     Substance Abuse No family hx of     Obesity No family hx of     Osteoporosis No family hx of     Asthma No family hx of     Mental Illness No family hx of     Anxiety Disorder No family hx of     Other Cancer No family hx of          Current Outpatient Medications   Medication Sig Dispense Refill    acetaminophen (TYLENOL) 500 MG tablet Take 1,000 mg by mouth every morning 2 tablet 0    aspirin (ASA) 81 MG EC tablet Take 81 mg by mouth daily as needed (prevent clots when flying)      Calcium Carbonate-Vit D-Min (CALTRATE MINIS PLUS MINERALS) 300-800 MG-UNIT TABS Take 1 tablet by mouth 2 times daily      Cholecalciferol (VITAMIN D3) 50 MCG (2000 UT) CAPS Take 2,000 Units by mouth daily      cyanocobalamin (VITAMIN B-12) 1000 MCG tablet Take 1 tablet (1,000 mcg) by mouth every other day      polyethylene glycol (MIRALAX) 17 GM/Dose powder Take 1 capful. by mouth every other day      propranolol ER (INDERAL LA) 60 MG 24 hr capsule Take 1 capsule (60 mg) by mouth at bedtime. 180 capsule 3    simvastatin (ZOCOR) 20 MG tablet TAKE ONE TABLET BY MOUTH AT BEDTIME 90 tablet 0    tamoxifen (NOLVADEX) 20 MG tablet Take 1 tablet (20 mg) by mouth daily. 90 tablet 3    topiramate (TOPAMAX) 50 MG tablet Take 1 tablet (50 mg) by mouth at bedtime. 90 tablet 3    zoledronic Acid (RECLAST) 5 MG/100ML SOLN infusion        Allergies   Allergen Reactions    Codeine Sulfate Nausea    Compazine  [Prochlorperazine] Other (See Comments)     Jittery and hyper and foggy    Primidone      vomiting, chills & hot flashes, headache, sensitivity to light & noise     Current providers sharing in care for this patient include:  Patient Care Team:  Shantel Plaza PA-C as PCP - General (Physician Assistant - Medical)  Yo Cortes MD as MD  Shantel Plaza PA-C as Assigned PCP  Ruth Ann Davis RN as Specialty Care Coordinator (Oncology)  Juan Nevarez MD as Physician (Ophthalmology)  Shantel Plaza PA-C as Referring Physician (Family Medicine)  Denzel López MA as Audiologist (Audiology)  Rita, Pawel Grant MD as Assigned Neuroscience Provider  Rita, Pawel Grant MD as MD (Neurology)  Evelina Saldivar [facility] as Assigned MTM Pharmacist  Graciela Girard PA-C (Dermatology)  Joan Contreras MD as Assigned Cancer Care Provider  Shyam Washington MD as Assigned Dermatology Provider  Ester Zendejas APRN CNP as Nurse Practitioner (Hematology & Oncology)    The following health maintenance items are reviewed in Epic and correct as of today:  Health Maintenance   Topic Date Due    COVID-19 Vaccine (5 - 2024-25 season) 09/01/2024    LIPID  04/22/2025    MEDICARE ANNUAL WELLNESS VISIT  04/22/2025    ANNUAL REVIEW OF HM ORDERS  10/01/2025    HEPATITIS C SCREENING  10/09/2025 (Originally 10/25/1973)    COLORECTAL CANCER SCREENING  09/25/2025    MAMMO SCREENING  04/22/2026    FALL RISK ASSESSMENT  04/30/2026    DIABETES SCREENING  05/08/2027    ADVANCE CARE PLANNING  04/22/2029    DTAP/TDAP/TD IMMUNIZATION (5 - Td or Tdap) 06/01/2033    DEXA  10/14/2039    PHQ-2 (once per calendar year)  Completed    INFLUENZA VACCINE  Completed    Pneumococcal Vaccine: 50+ Years  Completed    ZOSTER IMMUNIZATION  Completed    RSV VACCINE  Completed    HPV IMMUNIZATION  Aged Out    MENINGITIS IMMUNIZATION  Aged Out         Review of Systems  Constitutional, HEENT, cardiovascular, pulmonary, gi  "and gu systems are negative, except as otherwise noted.     Objective    Exam  /74   Pulse 60   Temp 97.5  F (36.4  C) (Temporal)   Resp 16   Ht 1.626 m (5' 4\")   Wt 55.2 kg (121 lb 9.6 oz)   SpO2 100%   BMI 20.87 kg/m     Estimated body mass index is 20.87 kg/m  as calculated from the following:    Height as of this encounter: 1.626 m (5' 4\").    Weight as of this encounter: 55.2 kg (121 lb 9.6 oz).    Physical Exam  GENERAL: alert and no distress  EYES: Eyes grossly normal to inspection, PERRL and conjunctivae and sclerae normal  HENT: ear canals and TM's normal, nose and mouth without ulcers or lesions  NECK: no adenopathy, no asymmetry, masses, or scars  RESP: lungs clear to auscultation - no rales, rhonchi or wheezes  CV: regular rate and rhythm, normal S1 S2, no S3 or S4, no murmur, click or rub, no peripheral edema  ABDOMEN: soft, nontender, no hepatosplenomegaly, no masses and bowel sounds normal  MS: kyphotic posture with scoliosis curve, no gross musculoskeletal defects noted, no edema  NEURO: fine head tremor, normal strength and tone, mentation intact and speech normal  PSYCH: mentation appears normal, affect normal/bright        4/30/2025   Mini Cog   Clock Draw Score 2 Normal   3 Item Recall 3 objects recalled   Mini Cog Total Score 5            Results for orders placed or performed in visit on 04/30/25   CBC with platelets and differential     Status: Abnormal   Result Value Ref Range    WBC Count 4.2 4.0 - 11.0 10e3/uL    RBC Count 3.80 3.80 - 5.20 10e6/uL    Hemoglobin 11.6 (L) 11.7 - 15.7 g/dL    Hematocrit 34.7 (L) 35.0 - 47.0 %    MCV 91 78 - 100 fL    MCH 30.5 26.5 - 33.0 pg    MCHC 33.4 31.5 - 36.5 g/dL    RDW 12.6 10.0 - 15.0 %    Platelet Count 205 150 - 450 10e3/uL    % Neutrophils 50 %    % Lymphocytes 35 %    % Monocytes 11 %    % Eosinophils 3 %    % Basophils 1 %    % Immature Granulocytes 0 %    Absolute Neutrophils 2.1 1.6 - 8.3 10e3/uL    Absolute Lymphocytes 1.5 0.8 - " 5.3 10e3/uL    Absolute Monocytes 0.5 0.0 - 1.3 10e3/uL    Absolute Eosinophils 0.1 0.0 - 0.7 10e3/uL    Absolute Basophils 0.0 0.0 - 0.2 10e3/uL    Absolute Immature Granulocytes 0.0 <=0.4 10e3/uL   CBC with Platelets & Differential     Status: Abnormal    Narrative    The following orders were created for panel order CBC with Platelets & Differential.  Procedure                               Abnormality         Status                     ---------                               -----------         ------                     CBC with platelets and ...[[id number]]  Abnormal            Final result                 Please view results for these tests on the individual orders.         Signed Electronically by: Shantel Plaza PA-C

## 2025-04-30 NOTE — PATIENT INSTRUCTIONS
Patient Education     Instructions from Today's Visit:  Bone health- Preventing Osteopenia and Osteoporosis  Steps you can take:  Regular weight bearing exercising- walking, running, weight lifting or body weight strength exercises  Total daily calcium intake of 1200 to 1500mg and vit D3 800-1000 international unit(s) . Daily calcium can come from a combination of dietary calcium (from milk, yogurt, cheese, leafy greens) and from a calcium supplement.    DEXA is a bone density scan to measure and monitor density .  International Osteoporosis Foundation Calcium Calculator- to calculate your average weekly intake of calcium.   [email or web address]/educational-hub/topic/calcium-calculator    Trochanteric bursitis  Heat   Exercises         General Instructions After Your Visit  If you have been seen for a concern and are worsening or not improving as expected, please schedule a follow-up visit or reach out to a member of our care team or nurses if it is urgent.  We have Nurse advice available 24/7 by calling [phone].  For emergencies, please call [phone].    My Clinic Hours  I am in the office Mondays, Wednesdays, and Thursdays. Messages received outside of normal clinic hours and on my days out of the office will be reviewed by our [place] care team, but may not be addressed by me personally until I am back in the office. If you have concerns that cannot wait for my return please call the Nurse advise line [phone].    Test results  You may see your lab or test results before we can make recommendations. This is common, as sometimes we are awaiting on other labs to return or we are out of office on a particular day. Please be patient, and if you don't see a response from me or one of my colleagues within 2-3 business days, and you have a specific concern, please send us a message.    Refills  If you have run out of refills, please schedule a visit. This is generally an indication that you are due  for a follow-up visit. All prescriptions are only valid for 1 year from the date written and need a visit annually to renew. Most mental health and chronic diseases we are treating (diabetes, high blood pressure, etc) require some type of visit every 6 months. We are now offering video visits! However, if physical exams are needed or it is a complex concern, we may ask you to be seen in person.    Physicals & Preventative Visits   These appointment slots fill up fast. Please consider scheduling these 2-3 months in advance to allow for the appointment time that fits you best. If you have medications ordered or other issues addressed that are not preventive at these visits, please be aware there are extra costs associated with this.       Preventive Care Advice   This is general advice given by our system to help you stay healthy. However, your care team may have specific advice just for you. Please talk to your care team about your preventive care needs.  Nutrition  Eat 5 or more servings of fruits and vegetables each day.  Try wheat bread, brown rice and whole grain pasta (instead of white bread, rice, and pasta).  Get enough calcium and vitamin D. Check the label on foods and aim for 100% of the RDA (recommended daily allowance).  Lifestyle  Exercise at least 150 minutes each week  (30 minutes a day, 5 days a week).  Do muscle strengthening activities 2 days a week. These help control your weight and prevent disease.  No smoking.  Wear sunscreen to prevent skin cancer.  Have a dental exam and cleaning every 6 months.  Yearly exams  See your health care team every year to talk about:  Any changes in your health.  Any medicines your care team has prescribed.  Preventive care, family planning, and ways to prevent chronic diseases.  Shots (vaccines)   HPV shots (up to age 26), if you've never had them before.  Hepatitis B shots (up to age 59), if you've never had them before.  COVID-19 shot: Get this shot when it's  due.  Flu shot: Get a flu shot every year.  Tetanus shot: Get a tetanus shot every 10 years.  Pneumococcal, hepatitis A, and RSV shots: Ask your care team if you need these based on your risk.  Shingles shot (for age 50 and up)  General health tests  Diabetes screening:  Starting at age 35, Get screened for diabetes at least every 3 years.  If you are younger than age 35, ask your care team if you should be screened for diabetes.  Cholesterol test: At age 39, start having a cholesterol test every 5 years, or more often if advised.  Bone density scan (DEXA): At age 50, ask your care team if you should have this scan for osteoporosis (brittle bones).  Hepatitis C: Get tested at least once in your life.  STIs (sexually transmitted infections)  Before age 24: Ask your care team if you should be screened for STIs.  After age 24: Get screened for STIs if you're at risk. You are at risk for STIs (including HIV) if:  You are sexually active with more than one person.  You don't use condoms every time.  You or a partner was diagnosed with a sexually transmitted infection.  If you are at risk for HIV, ask about PrEP medicine to prevent HIV.  Get tested for HIV at least once in your life, whether you are at risk for HIV or not.  Cancer screening tests  Cervical cancer screening: If you have a cervix, begin getting regular cervical cancer screening tests starting at age 21.  Breast cancer scan (mammogram): If you've ever had breasts, begin having regular mammograms starting at age 40. This is a scan to check for breast cancer.  Colon cancer screening: It is important to start screening for colon cancer at age 45.  Have a colonoscopy test every 10 years (or more often if you're at risk) Or, ask your provider about stool tests like a FIT test every year or Cologuard test every 3 years.  To learn more about your testing options, visit:   .  For help making a decision, visit:   https://bit.ly/tn12528.  Prostate cancer screening  test: If you have a prostate, ask your care team if a prostate cancer screening test (PSA) at age 55 is right for you.  Lung cancer screening: If you are a current or former smoker ages 50 to 80, ask your care team if ongoing lung cancer screenings are right for you.  For informational purposes only. Not to replace the advice of your health care provider. Copyright   2023 [facility]. All rights reserved. Clinically reviewed by the  [facility] [place] [facility]. [employer] 709082 - REV 01/24.  Hearing Loss: Care Instructions  Overview     Hearing loss is a sudden or slow decrease in how well you hear. It can range from slight to profound. Permanent hearing loss can occur with aging. It also can happen when you are exposed long-term to loud noise. Examples include listening to loud music, riding motorcycles, or being around other loud machines.  Hearing loss can affect your work and home life. It can make you feel lonely or depressed. You may feel that you have lost your independence. But hearing aids and other devices can help you hear better and feel connected to others.  Follow-up care is a key part of your treatment and safety. Be sure to make and go to all appointments, and call your doctor if you are having problems. It's also a good idea to know your test results and keep a list of the medicines you take.  How can you care for yourself at home?  Avoid loud noises whenever possible. This helps keep your hearing from getting worse.  Always wear hearing protection around loud noises.  Wear a hearing aid as directed.  A professional can help you pick a hearing aid that will work best for you.  You can also get hearing aids over the counter for mild to moderate hearing loss.  Have hearing tests as your doctor suggests. They can show whether your hearing has changed. Your hearing aid may need to be adjusted.  Use other devices as needed. These may include:  Telephone amplifiers and hearing aids  "that can connect to a television, stereo, radio, or microphone.  Devices that use lights or vibrations. These alert you to the doorbell, a ringing telephone, or a baby monitor.  Television closed-captioning. This shows the words at the bottom of the screen. Most new TVs can do this.  TTY (text telephone). This lets you type messages back and forth on the telephone instead of talking or listening. These devices are also called TDD. When messages are typed on the keyboard, they are sent over the phone line to a receiving TTY. The message is shown on a monitor.  Use text messaging, social media, and email if it is hard for you to communicate by telephone.  Try to learn a listening technique called speechreading. It is not lipreading. You pay attention to people's gestures, expressions, posture, and tone of voice. These clues can help you understand what a person is saying. Face the person you are talking to, and have them face you. Make sure the lighting is good. You need to see the other person's face clearly.  Think about counseling if you need help to adjust to your hearing loss.  When should you call for help?  Watch closely for changes in your health, and be sure to contact your doctor if:    You think your hearing is getting worse.     You have new symptoms, such as dizziness or nausea.   Where can you learn more?  Go to https://www.[employer].net/patiented  Enter R798 in the search box to learn more about \"Hearing Loss: Care Instructions.\"  Current as of: October 27, 2024  Content Version: 14.4    0607-6958 [employer].   Care instructions adapted under license by your healthcare professional. If you have questions about a medical condition or this instruction, always ask your healthcare professional. [employer] disclaims any warranty or liability for your use of this information.    Learning About Stress  What is stress?     Stress is your body's response to a hard situation. Your body " can have a physical, emotional, or mental response. Stress is a fact of life for most people, and it affects everyone differently. What causes stress for you may not be stressful for someone else.  A lot of things can cause stress. You may feel stress when you go on a job interview, take a test, or run a race. This kind of short-term stress is normal and even useful. It can help you if you need to work hard or react quickly. For example, stress can help you finish an important job on time.  Long-term stress is caused by ongoing stressful situations or events. Examples of long-term stress include long-term health problems, ongoing problems at work, or conflicts in your family. Long-term stress can harm your health.  How does stress affect your health?  When you are stressed, your body responds as though you are in danger. It makes hormones that speed up your heart, make you breathe faster, and give you a burst of energy. This is called the fight-or-flight stress response. If the stress is over quickly, your body goes back to normal and no harm is done.  But if stress happens too often or lasts too long, it can have bad effects. Long-term stress can make you more likely to get sick, and it can make symptoms of some diseases worse. If you tense up when you are stressed, you may develop neck, shoulder, or low back pain. Stress is linked to high blood pressure and heart disease.  Stress also harms your emotional health. It can make you montoya, tense, or depressed. Your relationships may suffer, and you may not do well at work or school.  What can you do to manage stress?  You can try these things to help manage stress:   Do something active. Exercise or activity can help reduce stress. Walking is a great way to get started. Even everyday activities such as housecleaning or yard work can help.  Try yoga or chon chi. These techniques combine exercise and meditation. You may need some training at first to learn them.  Do  "something you enjoy. For example, listen to music or go to a movie. Practice your hobby or do volunteer work.  Meditate. This can help you relax, because you are not worrying about what happened before or what may happen in the future.  Do guided imagery. Imagine yourself in any setting that helps you feel calm. You can use online videos, books, or a teacher to guide you.  Do breathing exercises. For example:  From a standing position, bend forward from the waist with your knees slightly bent. Let your arms dangle close to the floor.  Breathe in slowly and deeply as you return to a standing position. Roll up slowly and lift your head last.  Hold your breath for just a few seconds in the standing position.  Breathe out slowly and bend forward from the waist.  Let your feelings out. Talk, laugh, cry, and express anger when you need to. Talking with supportive friends or family, a counselor, or a lacey leader about your feelings is a healthy way to relieve stress. Avoid discussing your feelings with people who make you feel worse.  Write. It may help to write about things that are bothering you. This helps you find out how much stress you feel and what is causing it. When you know this, you can find better ways to cope.  What can you do to prevent stress?  You might try some of these things to help prevent stress:  Manage your time. This helps you find time to do the things you want and need to do.  Get enough sleep. Your body recovers from the stresses of the day while you are sleeping.  Get support. Your family, friends, and community can make a difference in how you experience stress.  Limit your news feed. Avoid or limit time on social media or news that may make you feel stressed.  Do something active. Exercise or activity can help reduce stress. Walking is a great way to get started.  Where can you learn more?  Go to https://www.healthwise.net/patiented  Enter N032 in the search box to learn more about \"Learning " "About Stress.\"  Current as of: October 24, 2024  Content Version: 14.4    8111-8432 [email or web address].   Care instructions adapted under license by your healthcare professional. If you have questions about a medical condition or this instruction, always ask your healthcare professional. [email or web address] disclaims any warranty or liability for your use of this information.    Learning About Sleeping Well  What does sleeping well mean?     Sleeping well means getting enough sleep to feel good and stay healthy. How much sleep is enough varies among people.  The number of hours you sleep and how you feel when you wake up are both important. If you do not feel refreshed, you probably need more sleep. Another sign of not getting enough sleep is feeling tired during the day.  Experts recommend that adults get at least 7 or more hours of sleep per day. Children and older adults need more sleep.  Why is getting enough sleep important?  Getting enough quality sleep is a basic part of good health. When your sleep suffers, your physical health, mood, and your thoughts can suffer too. You may find yourself feeling more grumpy or stressed. Not getting enough sleep also can lead to serious problems, including injury, accidents, anxiety, and depression.  What might cause poor sleeping?  Many things can cause sleep problems, including:  Changes to your sleep schedule.  Stress. Stress can be caused by fear about a single event, such as giving a speech. Or you may have ongoing stress, such as worry about work or school.  Depression, anxiety, and other mental or emotional conditions.  Changes in your sleep habits or surroundings. This includes changes that happen where you sleep, such as noise, light, or sleeping in a different bed. It also includes changes in your sleep pattern, such as having jet lag or working a late shift.  Health problems, such as pain, breathing problems, and restless legs syndrome.  Lack of regular " "exercise.  Using alcohol, nicotine, or caffeine before bed.  How can you help yourself?  Here are some tips that may help you sleep more soundly and wake up feeling more refreshed.  Your sleeping area   Use your bedroom only for sleeping and sex. A bit of light reading may help you fall asleep. But if it doesn't, do your reading elsewhere in the house. Try not to use your TV, computer, smartphone, or tablet while you are in bed.  Be sure your bed is big enough to stretch out comfortably, especially if you have a sleep partner.  Keep your bedroom quiet, dark, and cool. Use curtains, blinds, or a sleep mask to block out light. To block out noise, use earplugs, soothing music, or a \"white noise\" machine.  Your evening and bedtime routine   Create a relaxing bedtime routine. You might want to take a warm shower or bath, or listen to soothing music.  Go to bed at the same time every night. And get up at the same time every morning, even if you feel tired.  What to avoid   Limit caffeine (coffee, tea, caffeinated sodas) during the day, and don't have any for at least 6 hours before bedtime.  Avoid drinking alcohol before bedtime. Alcohol can cause you to wake up more often during the night.  Try not to smoke or use tobacco, especially in the evening. Nicotine can keep you awake.  Limit naps during the day, especially close to bedtime.  Avoid lying in bed awake for too long. If you can't fall asleep or if you wake up in the middle of the night and can't get back to sleep within about 20 minutes, get out of bed and go to another room until you feel sleepy.  Avoid taking medicine right before bed that may keep you awake or make you feel hyper or energized. Your doctor can tell you if your medicine may do this and if you can take it earlier in the day.  If you can't sleep   Imagine yourself in a peaceful, pleasant scene. Focus on the details and feelings of being in a place that is relaxing.  Get up and do a quiet or boring " "activity until you feel sleepy.  Avoid drinking any liquids before going to bed to help prevent waking up often to use the bathroom.  Where can you learn more?  Go to https://www.[employer].net/patiented  Enter J942 in the search box to learn more about \"Learning About Sleeping Well.\"  Current as of: July 31, 2024  Content Version: 14.4    2235-1910 [employer].   Care instructions adapted under license by your healthcare professional. If you have questions about a medical condition or this instruction, always ask your healthcare professional. [employer] disclaims any warranty or liability for your use of this information.    Bladder Training: Care Instructions  Your Care Instructions     Bladder training is used to treat urge incontinence and stress incontinence. Urge incontinence means that the need to urinate comes on so fast that you can't get to a toilet in time. Stress incontinence means that you leak urine because of pressure on your bladder. For example, it may happen when you laugh, cough, or lift something heavy.  Bladder training can increase how long you can wait before you have to urinate. It can also help your bladder hold more urine. And it can give you better control over the urge to urinate.  It is important to remember that bladder training takes a few weeks to a few months to make a difference. You may not see results right away, but don't give up.  Follow-up care is a key part of your treatment and safety. Be sure to make and go to all appointments, and call your doctor if you are having problems. It's also a good idea to know your test results and keep a list of the medicines you take.  How can you care for yourself at home?  Work with your doctor to come up with a bladder training program that is right for you. You may use one or more of the following methods.  Delayed urination  In the beginning, try to keep from urinating for 5 minutes after you first feel the need to " "go.  While you wait, take deep, slow breaths to relax. Kegel exercises can also help you delay the need to go to the bathroom.  After some practice, when you can easily wait 5 minutes to urinate, try to wait 10 minutes before you urinate.  Slowly increase the waiting period until you are able to control when you have to urinate.  Scheduled urination  Empty your bladder when you first wake up in the morning.  Schedule times throughout the day when you will urinate.  Start by going to the bathroom every hour, even if you don't need to go.  Slowly increase the time between trips to the bathroom.  When you have found a schedule that works well for you, keep doing it.  If you wake up during the night and have to urinate, do it. Apply your schedule to waking hours only.  Kegel exercises  These tighten and strengthen pelvic muscles, which can help you control the flow of urine. (If doing these exercises causes pain, stop doing them and talk with your doctor.) To do Kegel exercises:  Squeeze your muscles as if you were trying not to pass gas. Or squeeze your muscles as if you were stopping the flow of urine. Your belly, legs, and buttocks shouldn't move.  Hold the squeeze for 3 seconds, then relax for 5 to 10 seconds.  Start with 3 seconds, then add 1 second each week until you are able to squeeze for 10 seconds.  Repeat the exercise 10 times a session. Do 3 to 8 sessions a day.  When should you call for help?  Watch closely for changes in your health, and be sure to contact your doctor if:    Your incontinence is getting worse.     You do not get better as expected.   Where can you learn more?  Go to https://www.[employer].net/patiented  Enter V684 in the search box to learn more about \"Bladder Training: Care Instructions.\"  Current as of: April 30, 2024  Content Version: 14.4    8593-5579 [employer].   Care instructions adapted under license by your healthcare professional. If you have questions about a " medical condition or this instruction, always ask your healthcare professional. [email or web address] disclaims any warranty or liability for your use of this information.

## 2025-05-01 RX ORDER — SIMVASTATIN 20 MG
20 TABLET ORAL DAILY
Qty: 90 TABLET | Refills: 3 | Status: SHIPPED | OUTPATIENT
Start: 2025-05-01

## 2025-06-04 ENCOUNTER — OFFICE VISIT (OUTPATIENT)
Dept: FAMILY MEDICINE | Facility: CLINIC | Age: 70
End: 2025-06-04
Payer: COMMERCIAL

## 2025-06-04 VITALS
SYSTOLIC BLOOD PRESSURE: 120 MMHG | BODY MASS INDEX: 21.67 KG/M2 | DIASTOLIC BLOOD PRESSURE: 78 MMHG | RESPIRATION RATE: 10 BRPM | WEIGHT: 122.3 LBS | HEART RATE: 60 BPM | HEIGHT: 63 IN | TEMPERATURE: 97.1 F | OXYGEN SATURATION: 99 %

## 2025-06-04 DIAGNOSIS — H25.9 AGE-RELATED CATARACT OF BOTH EYES, UNSPECIFIED AGE-RELATED CATARACT TYPE: ICD-10-CM

## 2025-06-04 DIAGNOSIS — Z87.898 HISTORY OF MOTION SICKNESS: ICD-10-CM

## 2025-06-04 DIAGNOSIS — Z01.818 PREOP GENERAL PHYSICAL EXAM: Primary | ICD-10-CM

## 2025-06-04 DIAGNOSIS — R13.12 OROPHARYNGEAL DYSPHAGIA: ICD-10-CM

## 2025-06-04 DIAGNOSIS — H61.22 IMPACTED CERUMEN OF LEFT EAR: ICD-10-CM

## 2025-06-04 PROCEDURE — 3078F DIAST BP <80 MM HG: CPT | Performed by: PHYSICIAN ASSISTANT

## 2025-06-04 PROCEDURE — 3074F SYST BP LT 130 MM HG: CPT | Performed by: PHYSICIAN ASSISTANT

## 2025-06-04 PROCEDURE — 1126F AMNT PAIN NOTED NONE PRSNT: CPT | Performed by: PHYSICIAN ASSISTANT

## 2025-06-04 PROCEDURE — 69209 REMOVE IMPACTED EAR WAX UNI: CPT | Mod: 4MD | Performed by: PHYSICIAN ASSISTANT

## 2025-06-04 PROCEDURE — 99214 OFFICE O/P EST MOD 30 MIN: CPT | Performed by: PHYSICIAN ASSISTANT

## 2025-06-04 RX ORDER — MECLIZINE HYDROCHLORIDE 25 MG/1
25 TABLET ORAL 3 TIMES DAILY PRN
Qty: 30 TABLET | Refills: 0 | Status: SHIPPED | OUTPATIENT
Start: 2025-06-04

## 2025-06-04 ASSESSMENT — PAIN SCALES - GENERAL: PAINLEVEL_OUTOF10: NO PAIN (0)

## 2025-06-04 NOTE — PATIENT INSTRUCTIONS
How to Take Your Medication Before Surgery  Preoperative Medication Instructions   Antiplatelet or Anticoagulation Medication Instructions   - aspirin: only takes aspirin when flying. Will hold prior to surgery      Additional Medication Instructions  Take all scheduled medications on the day of surgery EXCEPT for modifications listed below:   - Herbal medications and vitamins: DO NOT TAKE 14 days prior to surgery.     Can take all medications on normal schedule.   Acetaminophen (Tylenol) is ok to take     Patient Education   Preparing for Your Surgery  For Adults  Getting started  In most cases, a nurse will call to review your health history and instructions. They will give you an arrival time based on your scheduled surgery time. Please be ready to share:  Your doctor's clinic name and phone number  Your medical, surgical, and anesthesia history  A list of allergies and sensitivities  A list of medicines, including herbal treatments and over-the-counter drugs  Whether the patient has a legal guardian (ask how to send us the papers in advance)  Note: You may not receive a call if you were seen at our PAC (Preoperative Assessment Center).  Please tell us if you're pregnant--or if there's any chance you might be pregnant. Some surgeries may injure a fetus (unborn baby), so they require a pregnancy test. Surgeries that are safe for a fetus don't always need a test, and you can choose whether to have one.   Preparing for surgery  Within 10 to 30 days of surgery: Have a pre-op exam (sometimes called an H&P, or History and Physical). This can be done at a clinic or pre-operative center.  If you're having a , you may not need this exam. Talk to your care team.  At your pre-op exam, talk to your care team about all medicines you take. (This includes CBD oil and any drugs, such as THC, marijuana, and other forms of cannabis.) If you need to stop any medicine before surgery, ask when to start taking it again.  This  is for your safety. Many medicines and drugs can make you bleed too much during surgery. Some change how well surgery (anesthesia) drugs work.  Call your insurance company to let them know you're having surgery. (If you don't have insurance, call [phone].)  Call your clinic if there's any change in your health. This includes a scrape or scratch near the surgery site, or any signs of a cold (sore throat, runny nose, cough, rash, fever).  Eating and drinking guidelines  For your safety: Unless your surgeon tells you otherwise, follow the guidelines below.  Eat and drink as normal until 8 hours before you arrive for surgery. After that, no food or milk. You can spit out gum when you arrive.  Drink clear liquids until 2 hours before you arrive. These are liquids you can see through, like water, Gatorade, and Propel Water. They also include plain black coffee and tea (no cream or milk).  No alcohol for 24 hours before you arrive. The night before surgery, stop any drinks that contain THC.  If your care team tells you to take medicine on the morning of surgery, it's okay to take it with a sip of water. No other medicines or drugs are allowed (including CBD oil)--follow your care team's instructions.  If you have questions the day of surgery, call your hospital or surgery center.   Preventing infection  Shower or bathe the night before and the morning of surgery. Follow the instructions your clinic gave you. (If no instructions, use regular soap.)  Don't shave or clip hair near your surgery site. We'll remove the hair if needed.  Don't smoke or vape the morning of surgery. No chewing tobacco for 6 hours before you arrive. A nicotine patch is okay. You may spit out nicotine gum when you arrive.  For some surgeries, the surgeon will tell you to fully quit smoking and nicotine.  We will make every effort to keep you safe from infection. We will:  Clean our hands often with soap and water (or an alcohol-based hand  rub).  Clean the skin at your surgery site with a special soap that kills germs.  Give you a special gown to keep you warm. (Cold raises the risk of infection.)  Wear hair covers, masks, gowns, and gloves during surgery.  Give antibiotic medicine, if prescribed. Not all surgeries need this medicine.  What to bring on the day of surgery  Photo ID and insurance card  Copy of your health care directive, if you have one  Glasses and hearing aids (bring cases)  You can't wear contacts during surgery  Inhaler and eye drops, if you use them (tell us about these when you arrive)  CPAP machine or breathing device, if you use them  A few personal items, if spending the night  If you have . . .  A pacemaker, ICD (cardiac defibrillator), or other implant: Bring the ID card.  An implanted stimulator: Bring the remote control.  A legal guardian: Bring a copy of the certified (court-stamped) guardianship papers.  Please remove any jewelry, including body piercings. Leave jewelry and other valuables at home.  If you're going home the day of surgery  You must have a support person drive you home. They should stay with you overnight, and they may need to help with your self-care.  If you don't have a support person, please tells us as soon as possible. We can help.  After surgery  If it's hard to control your pain or you need more pain medicine, please call your surgeon's office.  Questions?   If you have any questions for your care team, list them here:   ____________________________________________________________________________________________________________________________________________________________________________________________________________________________________________________________  For informational purposes only. Not to replace the advice of your health care provider. Copyright   2003, 2019 [facility]. All rights reserved. Clinically reviewed by Catracho Kendrick MD. [employer] 274239 - REV  02/25.

## 2025-06-04 NOTE — PROGRESS NOTES
Preoperative Evaluation  [facility] [place]  [address]  [place] [address]  Phone: [phone]  Fax: [phone]  Primary Provider: Shantel Plaza PA-C  Pre-op Performing Provider: Shantel Plaza PA-C  Jun 4, 2025 6/2/2025   Surgical Information   What procedure is being done? pre-cataract surgery check-up   Facility or Hospital where procedure/surgery will be performed: [facility]   Who is doing the procedure / surgery? Dr Bautista   Date of surgery / procedure: 6/9/25  RIGHT eye 9:45am & 6/17/25 LEFT eye time TBD   Time of surgery / procedure: ?   Where do you plan to recover after surgery? at home with family     Fax number for surgical facility: [phone]    Assessment & Plan     The proposed surgical procedure is considered LOW risk.    Preop general physical exam  Age-related cataract of both eyes, unspecified age-related cataract type  Pt does not have heart disease, arrhythmia history, diabetes on insulin, CKD or PVD.   Pt can exercise >4 METs , no CV sx at rest or with exertion.  Chronic conditions are stable  Surgery as scheduled.   Labs ordered below or as indicated.   Reviewed preop info in AVS with pt including NPO, showering, medication recommendations, indications to delay/schedule (ie new illness s/sx). Pt questions answered.    History of motion sickness  [place]ing [place] [employer]. Gets motion sickness. Has used meclizine int he past and tolerated well. Would like a refill.   - meclizine (ANTIVERT) 25 MG tablet; Take 1 tablet (25 mg) by mouth 3 times daily as needed for dizziness.    Impacted cerumen of left ear  Ear wax removed with lavage by MA staff.   - MI REMOVAL IMPACTED CERUMEN IRRIGATION/LVG UNILAT    Oropharyngeal dysphagia  Difficulty initiating swallow and transferring from tongue/mouth to back of oropharynx. Swallow study with speech therapy scheduled this week. Advise she reach out to her neurologist as well.              - No identified additional risk factors other than previously addressed    Preoperative Medication Instructions  Antiplatelet or Anticoagulation Medication Instructions   - aspirin: only takes aspirin when flying. Will hold prior to surgery      Additional Medication Instructions  Take all scheduled medications on the day of surgery EXCEPT for modifications listed below:   - Herbal medications and vitamins: DO NOT TAKE 14 days prior to surgery.    Recommendation  Approval given to proceed with proposed procedure, without further diagnostic evaluation.    Follow Up: see above. Additionally patient was instructed to contact clinic for worsening symptoms, non-improvement in time frame discussed, and for questions regarding treatment plan.   For virtual visits, the patient was advised to be seen for in person evaluation if symptoms or condition are worsening or non-improvement as expected.   Shantel Plaza PA-C    Jazzmine Stephenson is a 69 year old, presenting for the following:  Pre-Op Exam    Also, would like Meclizine, she's traveling soon.      6/4/2025    10:49 AM   Additional Questions   Roomed by Ignacia   Accompanied by Self     HPI: cataract surgery bilateral           6/2/2025   Pre-Op Questionnaire   Have you ever had a heart attack or stroke? No   Have you ever had surgery on your heart or blood vessels, such as a stent placement, a coronary artery bypass, or surgery on an artery in your head, neck, heart, or legs? No   Do you have chest pain with activity? No   Do you have a history of heart failure? No   Do you currently have a cold, bronchitis or symptoms of other infection? No   Do you have a cough, shortness of breath, or wheezing? No   Do you or anyone in your family have previous history of blood clots? No   Do you or does anyone in your family have a serious bleeding problem such as prolonged bleeding following surgeries or cuts? No   Have you ever had problems with anemia or been  "told to take iron pills? No   Have you had any abnormal blood loss such as black, tarry or bloody stools, or abnormal vaginal bleeding? No   Have you ever had a blood transfusion? (!) YES   Have you ever had a transfusion reaction? No   Are you willing to have a blood transfusion if it is medically needed before, during, or after your surgery? Yes   Have you or any of your relatives ever had problems with anesthesia? (!) YES - no personal trouble with anesthesia.   Sister with trouble \"coming out of anesthesia\"   Do you have sleep apnea, excessive snoring or daytime drowsiness? No   Do you have any artifical heart valves or other implanted medical devices like a pacemaker, defibrillator, or continuous glucose monitor? No   Do you have artificial joints? No   Are you allergic to latex? No     Advance Care Planning    Discussed advance care planning with patient; however, patient declined at this time.    Preoperative Review of    reviewed - no record of controlled substances prescribed.      Traveling [place] [employer] [place]. Gets motion sickness. Has used meclizine int he past and tolerated well. Would like a refill.     Hyperlipidemia- on simvastatin tolerates well.     Tremor- propranolol and topirimate. Following with neurology     Dysphagia- trouble getting from tongue and front of mouth to back of oropharynx. She is schedule for swallow study eval this week. I am doing little tricks to get my pills down. Worse when I am anxious about it or thinking about it. Started in March 2025. No other associated sx that started with it. No speech change, weakness, stroke s/sx. No lumps/masses of mouth, head/neck. No cough, gerd. Saw dentist and told normal oropharynx exam. Has not reached out to her neurologist yet.     Status of Chronic Conditions:  See problem list for active medical problems.  Problems all longstanding and stable, except as noted/documented.  See ROS for pertinent symptoms related to these " conditions.    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 Rheumatology Dr.Maren Boo. Summer 2016: starting Prolia   02/2016: compression fx T11-12  03/2015: T score spine -1.2; L fem neck -2.1; L hip total -1.5 ([facility])  05/2013: T score spine -2.0; L fem neck -1.9; L hip total -2.0; Radius prox -2.7 ([facility]). Started fosamax 05/2013.  [facility] DEXA   2008: T score spine -1.8; L hip -1.5.   2005: T score spine -1.1; L hip -1.4        Invasive ductal carcinoma of breast, female (H)- RIGHT breast, Upper outer quadrant- 2015 08/24/2016     Priority: Medium    Benign essential tremor 08/24/2016     Priority: Medium    Hyperlipidemia LDL goal <100 08/24/2016     Priority: Medium    Mohs defect of cheek 10/15/2012     Priority: Medium      Past Medical History:   Diagnosis Date    Basal cell cancer     Breast cancer (H)     Dyslipidemia     History of blood transfusion     Hypertension     Mohs defect of nose 06/2012     Past Surgical History:   Procedure Laterality Date    BIOPSY BREAST      BREAST SURGERY Right 01/2016    breast cancer    LUMPECTOMY BREAST       Current Outpatient Medications   Medication Sig Dispense Refill    acetaminophen (TYLENOL) 500 MG tablet Take 1,000 mg by mouth every morning 2  tablet 0    aspirin (ASA) 81 MG EC tablet Take 81 mg by mouth daily as needed (prevent clots when flying)      Calcium Carbonate-Vit D-Min (CALTRATE MINIS PLUS MINERALS) 300-800 MG-UNIT TABS Take 1 tablet by mouth 2 times daily      Cholecalciferol (VITAMIN D3) 50 MCG (2000 UT) CAPS Take 2,000 Units by mouth daily      cyanocobalamin (VITAMIN B-12) 1000 MCG tablet Take 1 tablet (1,000 mcg) by mouth every other day      polyethylene glycol (MIRALAX) 17 GM/Dose powder Take 1 capful. by mouth every other day      propranolol ER (INDERAL LA) 60 MG 24 hr capsule Take 1 capsule (60 mg) by mouth at bedtime. 180 capsule 3    simvastatin (ZOCOR) 20 MG tablet Take 1 tablet (20 mg) by mouth daily. 90 tablet 3    tamoxifen (NOLVADEX) 20 MG tablet Take 1 tablet (20 mg) by mouth daily. 90 tablet 3    topiramate (TOPAMAX) 50 MG tablet Take 1 tablet (50 mg) by mouth at bedtime. 90 tablet 3    zoledronic Acid (RECLAST) 5 MG/100ML SOLN infusion          Allergies   Allergen Reactions    Codeine Sulfate Nausea    Compazine [Prochlorperazine] Other (See Comments)     Jittery and hyper and foggy    Primidone      vomiting, chills & hot flashes, headache, sensitivity to light & noise        Social History     Tobacco Use    Smoking status: Never    Smokeless tobacco: Never   Substance Use Topics    Alcohol use: Yes     Comment: socially     Family History   Problem Relation Age of Onset    Macular Degeneration Mother     Cerebrovascular Disease Mother     Hypertension Mother     Thyroid Disease Mother     Other - See Comments Mother         ?tremor    Tremor Mother         hand tremor    Cerebrovascular Disease Father     Hypothyroidism Father     Diabetes Maternal Grandmother     Coronary Artery Disease Maternal Grandmother     Tremor Maternal Grandmother     Colon Cancer Paternal Grandmother     Other - See Comments Brother     Other - See Comments Brother     Other - See Comments Brother     Other - See Comments Brother     Breast  "Cancer Sister     Other Cancer Sister     Anesthesia Reaction Sister     Diabetes Sister     Anesthesia Reaction Sister     Diabetes Son     Other - See Comments Son         lives in [place]    Diabetes Sister     Coronary Artery Disease No family hx of     Hyperlipidemia No family hx of     Colon Cancer No family hx of     Prostate Cancer No family hx of     Depression No family hx of     Substance Abuse No family hx of     Obesity No family hx of     Osteoporosis No family hx of     Asthma No family hx of     Mental Illness No family hx of     Anxiety Disorder No family hx of     Other Cancer No family hx of      History   Drug Use No             Review of Systems  CONSTITUTIONAL: NEGATIVE for fever, chills, change in weight  INTEGUMENTARY/SKIN: NEGATIVE for worrisome rashes, moles or lesions  EYES: NEGATIVE for vision changes or irritation  ENT/MOUTH: NEGATIVE for ear, mouth and throat problems  ENT/MOUTH: POS swallowing as above. POS left ear pressure.   RESP: NEGATIVE for significant cough or SOB  BREAST: NEGATIVE for masses, tenderness or discharge  CV: NEGATIVE for chest pain, palpitations or peripheral edema  GI: NEGATIVE for nausea, abdominal pain, heartburn, or change in bowel habits  : NEGATIVE for frequency, dysuria, or hematuria  MUSCULOSKELETAL: NEGATIVE for significant arthralgias or myalgia  NEURO: NEGATIVE for weakness, dizziness or paresthesias  ENDOCRINE: NEGATIVE for temperature intolerance, skin/hair changes  HEME: NEGATIVE for bleeding problems  PSYCHIATRIC: NEGATIVE for changes in mood or affect    Objective    /78   Pulse 60   Temp 97.1  F (36.2  C) (Temporal)   Resp 10   Ht 1.612 m (5' 3.47\")   Wt 55.5 kg (122 lb 4.8 oz)   SpO2 99%   BMI 21.35 kg/m     Estimated body mass index is 21.35 kg/m  as calculated from the following:    Height as of this encounter: 1.612 m (5' 3.47\").    Weight as of this encounter: 55.5 kg (122 lb 4.8 oz).  Physical Exam  Physical Exam  GENERAL: " alert and no distress; tremor of head and hands   EYES: Eyes grossly normal to inspection, PERRL and conjunctivae and sclerae normal  HENT: ear canals and TM's normal, nose and mouth without ulcers or lesions; small amount of cerumen opening of left ear canal.   NECK: no adenopathy, no asymmetry, masses, or scars  RESP: lungs clear to auscultation - no rales, rhonchi or wheezes  CV: regular rate and rhythm, normal S1 S2, no S3 or S4, no murmur, click or rub, no peripheral edema  ABDOMEN: soft, nontender, no hepatosplenomegaly, no masses and bowel sounds normal  MS: kyphotic posture with scoliosis curve, no gross musculoskeletal defects noted, no edema  NEURO: fine head tremor, normal strength and tone, mentation intact and speech normal  PSYCH: mentation appears normal, affect normal/bright    Recent Labs   Lab Test 04/30/25  1100   HGB 11.6*         POTASSIUM 4.2   CR 0.84        Diagnostics  No labs were ordered during this visit.   No EKG required for low risk surgery (cataract, skin procedure, breast biopsy, etc).    Revised Cardiac Risk Index (RCRI)  The patient has the following serious cardiovascular risks for perioperative complications:   - No serious cardiac risks = 0 points     RCRI Interpretation: 0 points: Class I (very low risk - 0.4% complication rate)         Signed Electronically by: Shantel Plaza PA-C  A copy of this evaluation report is provided to the requesting physician.

## 2025-06-05 ENCOUNTER — THERAPY VISIT (OUTPATIENT)
Dept: SPEECH THERAPY | Facility: CLINIC | Age: 70
End: 2025-06-05
Attending: PHYSICIAN ASSISTANT
Payer: COMMERCIAL

## 2025-06-05 DIAGNOSIS — R13.12 OROPHARYNGEAL DYSPHAGIA: ICD-10-CM

## 2025-06-05 PROCEDURE — 92526 ORAL FUNCTION THERAPY: CPT | Mod: GN | Performed by: SPEECH-LANGUAGE PATHOLOGIST

## 2025-06-05 PROCEDURE — 92610 EVALUATE SWALLOWING FUNCTION: CPT | Mod: GN | Performed by: SPEECH-LANGUAGE PATHOLOGIST

## 2025-06-05 NOTE — PROGRESS NOTES
SPEECH LANGUAGE PATHOLOGY EVALUATION       Fall Risk Screen:  Have you fallen 2 or more times in the past year?: No  Have you fallen and had an injury in the past year?: No    Subjective        Presenting condition or subjective complaint: Trouble swallowing pills.  Date of onset: 04/30/25 (Order date.)    Relevant medical history: Bladder or bowel problems; Cancer   Dates & types of surgery: breast surgery 2018    Prior diagnostic imaging/testing results:       Prior therapy history for the same diagnosis, illness or injury:        Living Environment  Social support: With a significant other or spouse   Help at home: None  Equipment owned:       Employment: No    Hobbies/Interests: travel, walking, reading    Patient goals for therapy:      Pain assessment: Pain denied     Objective     SWALLOW EVALUTION  Dysphagia history: Seema reports difficulty taking pills. She currently takes them with water or coffee. She often puts them in her mouth and isn't able to start her swallow. She will have to wait until they eventually go down. Sometimes she ends up coughing/choking on the water in the process and the pills come back up. Seema denies difficulty with liquids outside of taking pills and denies globus sensation with solids.    Current Diet/Method of Nutritional Intake: oral diet, thin liquids (level 0), regular diet      CLINICAL SWALLOW EVALUATION  Oral Motor Function: generally intact  Dentition: natural dentition, adequate dentition  Secretion management: WFL  Mucosal quality: good  Mandibular function: intact  Oral labial function: WFL  Lingual function: WFL  Velar function: intact   Buccal function: intact  Laryngeal function: cough, throat clear, volitional swallow, voicing WFL     Level of assist required for feeding: no assistance needed   Textures Trialed: No trials completed today due to patient report affecting pills only and no pills available at this time to complete a trial with.     ADDITIONAL ARMIN  "COMPLETED TODAY : No    ESOPHAGEAL PHASE OF SWALLOW  no observed or reported concerns related to esophageal function     SWALLOW ASSESSMENT CLINICAL IMPRESSIONS AND RATIONALE  Diet Consistency Recommendations: oral diet, thin liquids (level 0), regular diet    Recommended Feeding/Eating Techniques: None.   Medication Administration Recommendations: Trial puree consistencies such as applesauce, yogurt or pudding. Tell yourself you're having yogurt to eat, not \"taking pills\". Use props such as an empty cup or spoon to complete the action of regular eating/drinking to help trigger the swallow.  Instrumental Assessment Recommendations: instrumental evaluation not recommended at this time     Assessment & Plan   CLINICAL IMPRESSIONS   Medical Diagnosis: Oropharyngeal dysphagia    Treatment Diagnosis: Mild oropharyngeal dysphagia   Impression/Assessment: Pt is a 69 year old female with complaints related to taking pills. The following significant findings have been identified: impaired swallowing, characterized by delayed swallow trigger when taking pills with liquids. Identified deficits interfere with their ability to take her pills as compared to previous level of function.    Seema's description indicates a delayed swallow trigger which is at it's worst when taking pills. Strategies recommended include taking pills with a puree consistency and using mental and physical cues to trigger the swallow such as using an empty cup or spoon to pretend to take a drink.    PLAN OF CARE  Treatment Interventions: Swallowing dysfunction and/or oral function for feeding    Prognosis to achieve stated therapy goals is good   Rehab potential is impacted by: patient motivation, mild nature of difficulty.    Long Term Goals:   SLP Goal 1  Goal Identifier: Pills  Goal Description: Seema mcgee trial provided strategies (i.e. puree, props) for taking pills to get pills down in 30 seconds or less in 80% of opportunities for improved " functional swallow of pills.  Target Date: 08/05/25      Frequency of Treatment: Every other week.  Duration of Treatment: 2 months     Recommended Referrals to Other Professionals: None.  Education Assessment:   Learner/Method: Patient;Listening;Demonstration;No Barriers to Learning  Education Comments: Seema was educated in evaluation results and recommendations.    Risks and benefits of evaluation/treatment have been explained.   Patient/Family/caregiver agrees with Plan of Care.     Evaluation Time:    SLP Eval: oral/pharyngeal swallow function, clinical minutes (84609): 15    Signing Clinician: WILLIAM Spera      [facility]                                                                                   OUTPATIENT SPEECH LANGUAGE PATHOLOGY      PLAN OF TREATMENT FOR OUTPATIENT REHABILITATION   Patient's Last Name, First Name, Seema Bingham  Radha YOB: 1955   Provider's Name   [facility]   Medical Record No.  [id number]     Onset Date: 04/30/25 (Order date.) Start of Care Date: 06/05/25     Medical Diagnosis:  Oropharyngeal dysphagia      SLP Treatment Diagnosis: Mild oropharyngeal dysphagia  Plan of Treatment  Frequency/Duration: Every other week.  / 2 months     Certification date from 06/05/25   To 08/05/25          See note for plan of treatment details and functional goals     Sariah Ferrer, WILLIAM                         I CERTIFY THE NEED FOR THESE SERVICES FURNISHED UNDER        THIS PLAN OF TREATMENT AND WHILE UNDER MY CARE     (Physician attestation of this document indicates review and certification of the therapy plan).              Referring Provider:  Shantel Plaza    Initial Assessment  See Epic Evaluation- 06/05/25

## 2025-06-19 ENCOUNTER — THERAPY VISIT (OUTPATIENT)
Dept: SPEECH THERAPY | Facility: CLINIC | Age: 70
End: 2025-06-19
Attending: PHYSICIAN ASSISTANT
Payer: COMMERCIAL

## 2025-06-19 DIAGNOSIS — R13.12 OROPHARYNGEAL DYSPHAGIA: Primary | ICD-10-CM

## 2025-06-19 PROCEDURE — 92526 ORAL FUNCTION THERAPY: CPT | Mod: GN | Performed by: SPEECH-LANGUAGE PATHOLOGIST

## 2025-06-21 ENCOUNTER — THERAPY VISIT (OUTPATIENT)
Dept: PHYSICAL THERAPY | Facility: CLINIC | Age: 70
End: 2025-06-21
Attending: PHYSICIAN ASSISTANT
Payer: COMMERCIAL

## 2025-06-21 DIAGNOSIS — N39.46 MIXED STRESS AND URGE URINARY INCONTINENCE: ICD-10-CM

## 2025-06-21 PROCEDURE — 97110 THERAPEUTIC EXERCISES: CPT | Mod: GP | Performed by: PHYSICAL THERAPIST

## 2025-06-21 PROCEDURE — 97161 PT EVAL LOW COMPLEX 20 MIN: CPT | Mod: GP | Performed by: PHYSICAL THERAPIST

## 2025-06-21 PROCEDURE — 97112 NEUROMUSCULAR REEDUCATION: CPT | Mod: GP | Performed by: PHYSICAL THERAPIST

## 2025-06-21 NOTE — PROGRESS NOTES
PHYSICAL THERAPY EVALUATION  Type of Visit: Evaluation       Fall Risk Screen:  Have you fallen 2 or more times in the past year?: No  Have you fallen and had an injury in the past year?: No    Subjective         Presenting condition or subjective complaint: Trouble swallowing pills.  Date of onset: 04/30/25    Relevant medical history: Bladder or bowel problems; Cancer   Dates & types of surgery: breast surgery 2018    Prior diagnostic imaging/testing results:       Prior therapy history for the same diagnosis, illness or injury:        Prior Level of Function  Transfers: Independent  Ambulation: Independent  ADL: Independent  IADL:     Living Environment  Social support: With a significant other or spouse   Type of home: House; Multi-level; Basement   Stairs to enter the home: Yes 8 Is there a railing: Yes     Ramp: No   Stairs inside the home: Yes 8 Is there a railing: Yes     Help at home: None  Equipment owned:       Employment: No    Hobbies/Interests: travel, walking, reading    Patient goals for therapy:      Pain assessment:      Objective      PELVIC EVALUATION  ADDITIONAL HISTORY:  Sex assigned at birth: Female  Gender identity: Female    Pronouns: She/Her Hers      Bladder History:  Feels bladder filling: No  Triggers for feeling of inability to wait to go to the bathroom: Yes hot water on my hands, leaving the house to go somewhere  How long can you wait to urinate: not long at all  Gets up at night to urinate:      Can stop the flow of urine when urinating: No  Volume of urine usually released: Medium   Other issues: Trouble emptying bladder completely; Dribbling after urinating  Number of bladder infections in last 12 months:    Fluid intake per day: 64 oz. 8 oz.    Medications taken for bladder:       Activities causing urine leak: Cough; Hurrying to the bathroom due to a strong urge to urinate (pee)    Amount of urine typically leaked: 1 TBS - 1/4c  Pads used to help with leaking: Yes I use this  many pads per day: 2        Bowel History:  Frequency of bowel movement: usually once a day  Consistency of stool:      Ignores the urge to defecate: No  Other bowel issues: Loss of stool  Length of time spent trying to have a bowel movement:      Sexual Function History:  Sexual orientation: Straight    Sexually active: Yes  Lubrication used: No No  Pelvic pain:      Pain or difficulty with orgasms/erection/ejaculation: No    State of menopause: Post-menopause (I am done with menopause)  Hormone medications: No      Are you currently pregnant: No  Number of previous pregnancies: two  Number of deliveries: one  If you have delivered before, did you have any of these issues during delivery: Episiotomy; Vaginal delivery  Have you been diagnosed with pelvic prolapse or abdominal separation: No  Do you get regular exercise: Yes  I do this type of exercise: walking  Have you tried pelvic floor strengthening exercises for 4 weeks: No  Do you have any history of trauma that is relevant to your care that you d like to share: No      Discussed reason for referral regarding pelvic health needs and external/internal pelvic floor muscle examination with patient/guardian.  Opportunity provided to ask questions and verbal consent for assessment and intervention was given.    PAIN:   POSTURE:   LUMBAR SCREEN:   HIP SCREEN:  Strength:    Functional Strength Testing:     PELVIC/SI SCREEN:     PAIN PROVOCATION TEST:   PELVIS/SI SPECIAL TESTS:   BREATHING SYMMETRY:     PELVIC EXAM  External Visual Inspection:  At rest: Normal  With voluntary pelvic floor contraction: Perineal elevation  Relaxation of PFM: Partial/delayed relaxation    Integumentary:   Introitus: Atrophy    External Digital Palpation per Perineum:   Ischiocavernosis: Unremarkable  Bulbo cavernosis: Unremarkable  Transverse perineal: Unremarkable  Levator ani: Unremarkable  Perineal body: Unremarkable    Scar:   Location/Type:   Mobility:     Internal Digital  Palpation:  Per Vagina:  Myofascial Resistance to Palpation: Soft  Digital Muscle Performance: P (Power): 3+/5  Compensations: Adductors, Gluts, Breath holding  Relaxation Post-Contraction: Partial/delayed relaxation    Per Rectum:        Pelvic Organ Prolapse:       ABDOMINAL ASSESSMENT  Diastasis Rectus Abdominis (JEAN):      Abdominal Activation/Strength:     Scar:   Location/Type:   Mobility:     Fascial Tension/Restriction:     BIOFEEDBACK:  Position: Supine  Surface Electrodes: Perineal    Abdominals:     Perianals:   Endurance Hold-Average mean amplitude/work average: Supine 9.9uV/5.9uV/W-R+4.01 Sititng 11.8uV/5.9uV/W-R+5.94    DERMATOMES:   DTR S:     Assessment & Plan   CLINICAL IMPRESSIONS  Medical Diagnosis: Urinary Incontinence    Treatment Diagnosis:     Impression/Assessment: Patient is a 69 year old female with Urinary incontinence complaints.  The following significant findings have been identified: Decreased strength, Impaired muscle performance, and Decreased activity tolerance. These impairments interfere with their ability to perform self care tasks and recreational activities as compared to previous level of function.     Clinical Decision Making (Complexity):  Clinical Presentation: Stable/Uncomplicated  Clinical Presentation Rationale: based on medical and personal factors listed in PT evaluation  Clinical Decision Making (Complexity): Low complexity    PLAN OF CARE  Treatment Interventions:  Interventions: Neuromuscular Re-education, Therapeutic Activity, Therapeutic Exercise    Long Term Goals     PT Goal 1  Goal Identifier: Urinary leaking  Goal Description: No leaking for 1 week  Rationale: to maximize safety and independence with performance of ADLs and functional tasks  Goal Progress: Leaking 2-3x/day  Target Date: 09/13/25      Frequency of Treatment: 2x month  Duration of Treatment: 3 months    Recommended Referrals to Other Professionals:   Education Assessment:        Risks and  benefits of evaluation/treatment have been explained.   Patient/Family/caregiver agrees with Plan of Care.     Evaluation Time:     PT Eval, Low Complexity Minutes (97927): 30       Signing Clinician: STERLING [facility]                                                                                   OUTPATIENT PHYSICAL THERAPY      PLAN OF TREATMENT FOR OUTPATIENT REHABILITATION   Patient's Last Name, First Name, JOHNNYTyrellNIKKOTyrell  Seema Johnson  Radha YOB: 1955   Provider's Name   [facility]   Medical Record No.  [id number]     Onset Date: 04/30/25  Start of Care Date: 06/21/25     Medical Diagnosis:  Urinary Incontinence      PT Treatment Diagnosis:    Plan of Treatment  Frequency/Duration: 2x month/ 3 months    Certification date from 06/21/25 to 09/13/25         See note for plan of treatment details and functional goals     Myrna Carreon PT                         I CERTIFY THE NEED FOR THESE SERVICES FURNISHED UNDER        THIS PLAN OF TREATMENT AND WHILE UNDER MY CARE     (Physician attestation of this document indicates review and certification of the therapy plan).              Referring Provider:  Shantel Plaza    Initial Assessment  See Epic Evaluation- Start of Care Date: 06/21/25

## 2025-08-18 ENCOUNTER — TELEPHONE (OUTPATIENT)
Dept: NEPHROLOGY | Facility: CLINIC | Age: 70
End: 2025-08-18
Payer: COMMERCIAL

## 2025-08-26 ENCOUNTER — PATIENT OUTREACH (OUTPATIENT)
Dept: CARE COORDINATION | Facility: CLINIC | Age: 70
End: 2025-08-26
Payer: COMMERCIAL

## (undated) RX ORDER — METOPROLOL TARTRATE 50 MG
TABLET ORAL
Status: DISPENSED
Start: 2022-05-25

## (undated) RX ORDER — FENTANYL CITRATE 50 UG/ML
INJECTION, SOLUTION INTRAMUSCULAR; INTRAVENOUS
Status: DISPENSED
Start: 2020-09-25

## (undated) RX ORDER — IVABRADINE 5 MG/1
TABLET, FILM COATED ORAL
Status: DISPENSED
Start: 2022-05-25